# Patient Record
Sex: FEMALE | Race: WHITE | NOT HISPANIC OR LATINO
[De-identification: names, ages, dates, MRNs, and addresses within clinical notes are randomized per-mention and may not be internally consistent; named-entity substitution may affect disease eponyms.]

---

## 2018-04-15 ENCOUNTER — TRANSCRIPTION ENCOUNTER (OUTPATIENT)
Age: 73
End: 2018-04-15

## 2018-04-16 ENCOUNTER — INPATIENT (INPATIENT)
Facility: HOSPITAL | Age: 73
LOS: 2 days | Discharge: HOME | DRG: 853 | End: 2018-04-19
Attending: UROLOGY | Admitting: UROLOGY
Payer: MEDICARE

## 2018-04-16 VITALS
HEART RATE: 84 BPM | TEMPERATURE: 98 F | SYSTOLIC BLOOD PRESSURE: 129 MMHG | OXYGEN SATURATION: 98 % | HEIGHT: 63 IN | DIASTOLIC BLOOD PRESSURE: 70 MMHG | RESPIRATION RATE: 17 BRPM | WEIGHT: 220.02 LBS

## 2018-04-16 DIAGNOSIS — N20.0 CALCULUS OF KIDNEY: ICD-10-CM

## 2018-04-16 LAB
ALBUMIN SERPL ELPH-MCNC: 3.6 G/DL — SIGNIFICANT CHANGE UP (ref 3.3–5)
ALP SERPL-CCNC: 109 U/L — SIGNIFICANT CHANGE UP (ref 40–120)
ALT FLD-CCNC: 15 U/L RC — SIGNIFICANT CHANGE UP (ref 10–45)
ANION GAP SERPL CALC-SCNC: 18 MMOL/L — HIGH (ref 5–17)
APPEARANCE UR: ABNORMAL
APTT BLD: 27.3 SEC — LOW (ref 27.5–37.4)
AST SERPL-CCNC: 18 U/L — SIGNIFICANT CHANGE UP (ref 10–40)
BASOPHILS # BLD AUTO: 0 K/UL — SIGNIFICANT CHANGE UP (ref 0–0.2)
BILIRUB SERPL-MCNC: 1.4 MG/DL — HIGH (ref 0.2–1.2)
BILIRUB UR-MCNC: NEGATIVE — SIGNIFICANT CHANGE UP
BUN SERPL-MCNC: 28 MG/DL — HIGH (ref 7–23)
CALCIUM SERPL-MCNC: 9.9 MG/DL — SIGNIFICANT CHANGE UP (ref 8.4–10.5)
CHLORIDE SERPL-SCNC: 99 MMOL/L — SIGNIFICANT CHANGE UP (ref 96–108)
CO2 SERPL-SCNC: 22 MMOL/L — SIGNIFICANT CHANGE UP (ref 22–31)
COLOR SPEC: YELLOW — SIGNIFICANT CHANGE UP
CREAT SERPL-MCNC: 1.19 MG/DL — SIGNIFICANT CHANGE UP (ref 0.5–1.3)
DIFF PNL FLD: ABNORMAL
EOSINOPHIL # BLD AUTO: 0.1 K/UL — SIGNIFICANT CHANGE UP (ref 0–0.5)
GAS PNL BLDV: SIGNIFICANT CHANGE UP
GLUCOSE BLDC GLUCOMTR-MCNC: 133 MG/DL — HIGH (ref 70–99)
GLUCOSE SERPL-MCNC: 211 MG/DL — HIGH (ref 70–99)
GLUCOSE UR QL: NEGATIVE — SIGNIFICANT CHANGE UP
HCT VFR BLD CALC: 44.7 % — SIGNIFICANT CHANGE UP (ref 34.5–45)
HGB BLD-MCNC: 14.1 G/DL — SIGNIFICANT CHANGE UP (ref 11.5–15.5)
INR BLD: 1.3 RATIO — HIGH (ref 0.88–1.16)
KETONES UR-MCNC: NEGATIVE — SIGNIFICANT CHANGE UP
LEUKOCYTE ESTERASE UR-ACNC: ABNORMAL
LIDOCAIN IGE QN: 12 U/L — SIGNIFICANT CHANGE UP (ref 7–60)
LYMPHOCYTES # BLD AUTO: 0.8 K/UL — LOW (ref 1–3.3)
LYMPHOCYTES # BLD AUTO: 2 % — LOW (ref 13–44)
MCHC RBC-ENTMCNC: 27.9 PG — SIGNIFICANT CHANGE UP (ref 27–34)
MCHC RBC-ENTMCNC: 31.5 GM/DL — LOW (ref 32–36)
MCV RBC AUTO: 88.5 FL — SIGNIFICANT CHANGE UP (ref 80–100)
MONOCYTES # BLD AUTO: 0.4 K/UL — SIGNIFICANT CHANGE UP (ref 0–0.9)
MONOCYTES NFR BLD AUTO: 2 % — SIGNIFICANT CHANGE UP (ref 2–14)
NEUTROPHILS # BLD AUTO: 19.9 K/UL — HIGH (ref 1.8–7.4)
NEUTROPHILS NFR BLD AUTO: 76 % — SIGNIFICANT CHANGE UP (ref 43–77)
NITRITE UR-MCNC: NEGATIVE — SIGNIFICANT CHANGE UP
PH UR: 6.5 — SIGNIFICANT CHANGE UP (ref 5–8)
PLATELET # BLD AUTO: 191 K/UL — SIGNIFICANT CHANGE UP (ref 150–400)
POTASSIUM SERPL-MCNC: 4.3 MMOL/L — SIGNIFICANT CHANGE UP (ref 3.5–5.3)
POTASSIUM SERPL-SCNC: 4.3 MMOL/L — SIGNIFICANT CHANGE UP (ref 3.5–5.3)
PROT SERPL-MCNC: 7.4 G/DL — SIGNIFICANT CHANGE UP (ref 6–8.3)
PROT UR-MCNC: 100 MG/DL
PROTHROM AB SERPL-ACNC: 14.3 SEC — HIGH (ref 9.8–12.7)
RBC # BLD: 5.05 M/UL — SIGNIFICANT CHANGE UP (ref 3.8–5.2)
RBC # FLD: 15.1 % — HIGH (ref 10.3–14.5)
SODIUM SERPL-SCNC: 139 MMOL/L — SIGNIFICANT CHANGE UP (ref 135–145)
SP GR SPEC: >1.03 — HIGH (ref 1.01–1.02)
TROPONIN T SERPL-MCNC: <0.01 NG/ML — SIGNIFICANT CHANGE UP (ref 0–0.06)
UROBILINOGEN FLD QL: NEGATIVE — SIGNIFICANT CHANGE UP
WBC # BLD: 21.2 K/UL — HIGH (ref 3.8–10.5)
WBC # FLD AUTO: 21.2 K/UL — HIGH (ref 3.8–10.5)

## 2018-04-16 PROCEDURE — 93010 ELECTROCARDIOGRAM REPORT: CPT

## 2018-04-16 PROCEDURE — 76705 ECHO EXAM OF ABDOMEN: CPT | Mod: 26

## 2018-04-16 PROCEDURE — 52332 CYSTOSCOPY AND TREATMENT: CPT | Mod: LT

## 2018-04-16 PROCEDURE — 71045 X-RAY EXAM CHEST 1 VIEW: CPT | Mod: 26,59

## 2018-04-16 PROCEDURE — 74177 CT ABD & PELVIS W/CONTRAST: CPT | Mod: 26

## 2018-04-16 PROCEDURE — 99285 EMERGENCY DEPT VISIT HI MDM: CPT | Mod: 25

## 2018-04-16 PROCEDURE — 99291 CRITICAL CARE FIRST HOUR: CPT

## 2018-04-16 PROCEDURE — 71046 X-RAY EXAM CHEST 2 VIEWS: CPT | Mod: 26

## 2018-04-16 PROCEDURE — 74420 UROGRAPHY RTRGR +-KUB: CPT | Mod: 26

## 2018-04-16 RX ORDER — CHOLECALCIFEROL (VITAMIN D3) 125 MCG
2000 CAPSULE ORAL DAILY
Qty: 0 | Refills: 0 | Status: DISCONTINUED | OUTPATIENT
Start: 2018-04-16 | End: 2018-04-19

## 2018-04-16 RX ORDER — DEXTROSE 50 % IN WATER 50 %
1 SYRINGE (ML) INTRAVENOUS ONCE
Qty: 0 | Refills: 0 | Status: DISCONTINUED | OUTPATIENT
Start: 2018-04-16 | End: 2018-04-16

## 2018-04-16 RX ORDER — DEXTROSE 50 % IN WATER 50 %
12.5 SYRINGE (ML) INTRAVENOUS ONCE
Qty: 0 | Refills: 0 | Status: DISCONTINUED | OUTPATIENT
Start: 2018-04-16 | End: 2018-04-16

## 2018-04-16 RX ORDER — INSULIN LISPRO 100/ML
VIAL (ML) SUBCUTANEOUS EVERY 6 HOURS
Qty: 0 | Refills: 0 | Status: DISCONTINUED | OUTPATIENT
Start: 2018-04-16 | End: 2018-04-17

## 2018-04-16 RX ORDER — SODIUM CHLORIDE 9 MG/ML
1000 INJECTION INTRAMUSCULAR; INTRAVENOUS; SUBCUTANEOUS ONCE
Qty: 0 | Refills: 0 | Status: COMPLETED | OUTPATIENT
Start: 2018-04-16 | End: 2018-04-16

## 2018-04-16 RX ORDER — LOSARTAN POTASSIUM 100 MG/1
50 TABLET, FILM COATED ORAL DAILY
Qty: 0 | Refills: 0 | Status: DISCONTINUED | OUTPATIENT
Start: 2018-04-16 | End: 2018-04-16

## 2018-04-16 RX ORDER — DEXTROSE 50 % IN WATER 50 %
25 SYRINGE (ML) INTRAVENOUS ONCE
Qty: 0 | Refills: 0 | Status: DISCONTINUED | OUTPATIENT
Start: 2018-04-16 | End: 2018-04-16

## 2018-04-16 RX ORDER — SODIUM CHLORIDE 9 MG/ML
500 INJECTION, SOLUTION INTRAVENOUS ONCE
Qty: 0 | Refills: 0 | Status: COMPLETED | OUTPATIENT
Start: 2018-04-16 | End: 2018-04-16

## 2018-04-16 RX ORDER — ATORVASTATIN CALCIUM 80 MG/1
20 TABLET, FILM COATED ORAL AT BEDTIME
Qty: 0 | Refills: 0 | Status: DISCONTINUED | OUTPATIENT
Start: 2018-04-16 | End: 2018-04-19

## 2018-04-16 RX ORDER — SODIUM CHLORIDE 9 MG/ML
1000 INJECTION, SOLUTION INTRAVENOUS
Qty: 0 | Refills: 0 | Status: DISCONTINUED | OUTPATIENT
Start: 2018-04-16 | End: 2018-04-18

## 2018-04-16 RX ORDER — METRONIDAZOLE 500 MG
500 TABLET ORAL ONCE
Qty: 0 | Refills: 0 | Status: DISCONTINUED | OUTPATIENT
Start: 2018-04-16 | End: 2018-04-16

## 2018-04-16 RX ORDER — HEPARIN SODIUM 5000 [USP'U]/ML
5000 INJECTION INTRAVENOUS; SUBCUTANEOUS EVERY 8 HOURS
Qty: 0 | Refills: 0 | Status: DISCONTINUED | OUTPATIENT
Start: 2018-04-16 | End: 2018-04-19

## 2018-04-16 RX ORDER — SODIUM CHLORIDE 9 MG/ML
1000 INJECTION, SOLUTION INTRAVENOUS
Qty: 0 | Refills: 0 | Status: DISCONTINUED | OUTPATIENT
Start: 2018-04-16 | End: 2018-04-16

## 2018-04-16 RX ORDER — SENNA PLUS 8.6 MG/1
2 TABLET ORAL AT BEDTIME
Qty: 0 | Refills: 0 | Status: DISCONTINUED | OUTPATIENT
Start: 2018-04-16 | End: 2018-04-19

## 2018-04-16 RX ORDER — FAMOTIDINE 10 MG/ML
20 INJECTION INTRAVENOUS ONCE
Qty: 0 | Refills: 0 | Status: COMPLETED | OUTPATIENT
Start: 2018-04-16 | End: 2018-04-16

## 2018-04-16 RX ORDER — DOCUSATE SODIUM 100 MG
100 CAPSULE ORAL THREE TIMES A DAY
Qty: 0 | Refills: 0 | Status: DISCONTINUED | OUTPATIENT
Start: 2018-04-16 | End: 2018-04-19

## 2018-04-16 RX ORDER — GLUCAGON INJECTION, SOLUTION 0.5 MG/.1ML
1 INJECTION, SOLUTION SUBCUTANEOUS ONCE
Qty: 0 | Refills: 0 | Status: DISCONTINUED | OUTPATIENT
Start: 2018-04-16 | End: 2018-04-16

## 2018-04-16 RX ORDER — ONDANSETRON 8 MG/1
4 TABLET, FILM COATED ORAL ONCE
Qty: 0 | Refills: 0 | Status: COMPLETED | OUTPATIENT
Start: 2018-04-16 | End: 2018-04-16

## 2018-04-16 RX ORDER — ACETAMINOPHEN 500 MG
1000 TABLET ORAL ONCE
Qty: 0 | Refills: 0 | Status: COMPLETED | OUTPATIENT
Start: 2018-04-16 | End: 2018-04-16

## 2018-04-16 RX ORDER — OXYCODONE HYDROCHLORIDE 5 MG/1
10 TABLET ORAL ONCE
Qty: 0 | Refills: 0 | Status: DISCONTINUED | OUTPATIENT
Start: 2018-04-16 | End: 2018-04-16

## 2018-04-16 RX ORDER — MORPHINE SULFATE 50 MG/1
4 CAPSULE, EXTENDED RELEASE ORAL ONCE
Qty: 0 | Refills: 0 | Status: DISCONTINUED | OUTPATIENT
Start: 2018-04-16 | End: 2018-04-16

## 2018-04-16 RX ORDER — ATENOLOL 25 MG/1
50 TABLET ORAL DAILY
Qty: 0 | Refills: 0 | Status: DISCONTINUED | OUTPATIENT
Start: 2018-04-16 | End: 2018-04-16

## 2018-04-16 RX ORDER — CEFTRIAXONE 500 MG/1
1 INJECTION, POWDER, FOR SOLUTION INTRAMUSCULAR; INTRAVENOUS ONCE
Qty: 0 | Refills: 0 | Status: COMPLETED | OUTPATIENT
Start: 2018-04-16 | End: 2018-04-16

## 2018-04-16 RX ORDER — ASPIRIN/CALCIUM CARB/MAGNESIUM 324 MG
81 TABLET ORAL DAILY
Qty: 0 | Refills: 0 | Status: DISCONTINUED | OUTPATIENT
Start: 2018-04-16 | End: 2018-04-19

## 2018-04-16 RX ORDER — CEFTRIAXONE 500 MG/1
INJECTION, POWDER, FOR SOLUTION INTRAMUSCULAR; INTRAVENOUS
Qty: 0 | Refills: 0 | Status: DISCONTINUED | OUTPATIENT
Start: 2018-04-16 | End: 2018-04-19

## 2018-04-16 RX ORDER — CIPROFLOXACIN LACTATE 400MG/40ML
400 VIAL (ML) INTRAVENOUS ONCE
Qty: 0 | Refills: 0 | Status: DISCONTINUED | OUTPATIENT
Start: 2018-04-16 | End: 2018-04-16

## 2018-04-16 RX ORDER — METOCLOPRAMIDE HCL 10 MG
10 TABLET ORAL EVERY 6 HOURS
Qty: 0 | Refills: 0 | Status: DISCONTINUED | OUTPATIENT
Start: 2018-04-16 | End: 2018-04-16

## 2018-04-16 RX ORDER — ONDANSETRON 8 MG/1
4 TABLET, FILM COATED ORAL EVERY 6 HOURS
Qty: 0 | Refills: 0 | Status: DISCONTINUED | OUTPATIENT
Start: 2018-04-16 | End: 2018-04-16

## 2018-04-16 RX ORDER — ACETAMINOPHEN 500 MG
650 TABLET ORAL EVERY 6 HOURS
Qty: 0 | Refills: 0 | Status: DISCONTINUED | OUTPATIENT
Start: 2018-04-16 | End: 2018-04-19

## 2018-04-16 RX ORDER — SODIUM CHLORIDE 9 MG/ML
250 INJECTION, SOLUTION INTRAVENOUS ONCE
Qty: 0 | Refills: 0 | Status: COMPLETED | OUTPATIENT
Start: 2018-04-16 | End: 2018-04-16

## 2018-04-16 RX ORDER — INFLUENZA VIRUS VACCINE 15; 15; 15; 15 UG/.5ML; UG/.5ML; UG/.5ML; UG/.5ML
0.5 SUSPENSION INTRAMUSCULAR ONCE
Qty: 0 | Refills: 0 | Status: DISCONTINUED | OUTPATIENT
Start: 2018-04-16 | End: 2018-04-19

## 2018-04-16 RX ORDER — OXYCODONE HYDROCHLORIDE 5 MG/1
5 TABLET ORAL ONCE
Qty: 0 | Refills: 0 | Status: DISCONTINUED | OUTPATIENT
Start: 2018-04-16 | End: 2018-04-17

## 2018-04-16 RX ORDER — CEFTRIAXONE 500 MG/1
1 INJECTION, POWDER, FOR SOLUTION INTRAMUSCULAR; INTRAVENOUS EVERY 24 HOURS
Qty: 0 | Refills: 0 | Status: DISCONTINUED | OUTPATIENT
Start: 2018-04-17 | End: 2018-04-19

## 2018-04-16 RX ADMIN — SODIUM CHLORIDE 500 MILLILITER(S): 9 INJECTION, SOLUTION INTRAVENOUS at 19:55

## 2018-04-16 RX ADMIN — OXYCODONE HYDROCHLORIDE 10 MILLIGRAM(S): 5 TABLET ORAL at 14:41

## 2018-04-16 RX ADMIN — Medication 400 MILLIGRAM(S): at 10:27

## 2018-04-16 RX ADMIN — Medication 100 MILLIGRAM(S): at 22:23

## 2018-04-16 RX ADMIN — ATORVASTATIN CALCIUM 20 MILLIGRAM(S): 80 TABLET, FILM COATED ORAL at 22:23

## 2018-04-16 RX ADMIN — HEPARIN SODIUM 5000 UNIT(S): 5000 INJECTION INTRAVENOUS; SUBCUTANEOUS at 22:23

## 2018-04-16 RX ADMIN — SODIUM CHLORIDE 2000 MILLILITER(S): 9 INJECTION INTRAMUSCULAR; INTRAVENOUS; SUBCUTANEOUS at 10:27

## 2018-04-16 RX ADMIN — ONDANSETRON 4 MILLIGRAM(S): 8 TABLET, FILM COATED ORAL at 18:51

## 2018-04-16 RX ADMIN — CEFTRIAXONE 100 GRAM(S): 500 INJECTION, POWDER, FOR SOLUTION INTRAMUSCULAR; INTRAVENOUS at 15:41

## 2018-04-16 RX ADMIN — SODIUM CHLORIDE 125 MILLILITER(S): 9 INJECTION, SOLUTION INTRAVENOUS at 18:07

## 2018-04-16 RX ADMIN — FAMOTIDINE 20 MILLIGRAM(S): 10 INJECTION INTRAVENOUS at 10:28

## 2018-04-16 RX ADMIN — SODIUM CHLORIDE 1000 MILLILITER(S): 9 INJECTION, SOLUTION INTRAVENOUS at 19:08

## 2018-04-16 RX ADMIN — ONDANSETRON 4 MILLIGRAM(S): 8 TABLET, FILM COATED ORAL at 10:27

## 2018-04-16 RX ADMIN — SODIUM CHLORIDE 2000 MILLILITER(S): 9 INJECTION INTRAMUSCULAR; INTRAVENOUS; SUBCUTANEOUS at 14:36

## 2018-04-16 NOTE — ED ADULT NURSE NOTE - OBJECTIVE STATEMENT
Recvd pt with  at bedside with c/o suprapubic pain that radiates to left flank associated with n/v/d and fevers x 5 days.  per pt, her grandchildren had the same s/s and she cares for them.  pt is awake, alert and responsive to all stimuli.  no sob or respiratory distress noted at this time.  pt was medicated, per md's orders and is awaiting radiological studies, once po contrast is completed.  pt resting in bed with spouse at bedside.  will continue to monitor.

## 2018-04-16 NOTE — ED PROVIDER NOTE - ATTENDING CONTRIBUTION TO CARE
Patient presenting with nausea, vomiting and diarrhea x5 days associated with cramping abdominal pains (improved with bowel movements.  Has grandchildren who she helps take care of with similar symptoms prior to her.    On exam patient well appearing, vital signs within normal limits, DMM, RRR S1/S2, lungs clear to ascultation bilaterally, abdomen soft, non tender, non distended.    Symptoms most likely viral gastroenteritis given sick contacts, mildly dehydrated appearing on exam, plan for screening workup, intravenous fluids, symptomatic treatment, possible discharge if workup unremarkable and tolerating PO.

## 2018-04-16 NOTE — H&P ADULT - NSHPPHYSICALEXAM_GEN_ALL_CORE
GEN: NAD, mild SOB lying flat (patient states is baseline)  ABD: soft, NT/ND, obese, +mild left CVAT  : has not voided yet

## 2018-04-16 NOTE — ED PROVIDER NOTE - OBJECTIVE STATEMENT
72F w/PMH htn, hld, remote renal stent for nephrolithiasis now removed, PNA treated in Feb 2018 p/w abd pain, n/v/d x5d. +fever x2d last yesterday. +chills. Last vomiting yesterday (NBNB), last BM last night (loose, NB, no melena). +AGE in grandchildren ~2w ago. +dec UO. +sob. +mild gradual onset headache. No cp, back pain, cough, constipation, weakness/numbness, lightheadedness/syncope, dysuria/hematuria/frequency, travel/sick contacts. Referred in by Dr. Machuca.

## 2018-04-16 NOTE — H&P ADULT - NSHPLABSRESULTS_GEN_ALL_CORE
Lab Results:  CBC  CBC Full  -  ( 16 Apr 2018 10:39 )  WBC Count : 21.2 K/uL  Hemoglobin : 14.1 g/dL  Hematocrit : 44.7 %  Platelet Count - Automated : 191 K/uL  Mean Cell Volume : 88.5 fl  Mean Cell Hemoglobin : 27.9 pg  Mean Cell Hemoglobin Concentration : 31.5 gm/dL  Auto Neutrophil # : 19.9 K/uL  Auto Lymphocyte # : 0.8 K/uL  Auto Monocyte # : 0.4 K/uL  Auto Eosinophil # : 0.1 K/uL  Auto Basophil # : 0.0 K/uL  Auto Neutrophil % : 76.0 %  Auto Lymphocyte % : 2.0 %  Auto Monocyte % : 2.0 %  Auto Eosinophil % : x  Auto Basophil % : x    .		Differential:	[] Automated		[] Manual  Chemistry  04-16    139  |  99  |  28<H>  ----------------------------<  211<H>  4.3   |  22  |  1.19    Ca    9.9      16 Apr 2018 10:39    TPro  7.4  /  Alb  3.6  /  TBili  1.4<H>  /  DBili  x   /  AST  18  /  ALT  15  /  AlkPhos  109  04-16    LIVER FUNCTIONS - ( 16 Apr 2018 10:39 )  Alb: 3.6 g/dL / Pro: 7.4 g/dL / ALK PHOS: 109 U/L / ALT: 15 U/L RC / AST: 18 U/L / GGT: x           PT/INR - ( 16 Apr 2018 10:39 )   PT: 14.3 sec;   INR: 1.30 ratio         PTT - ( 16 Apr 2018 10:39 )  PTT:27.3 sec      MICROBIOLOGY/CULTURES:      RADIOLOGY RESULTS:  CT: 9mm distal left ureteral stone with moderate hydro and stranding

## 2018-04-16 NOTE — ED PROVIDER NOTE - MEDICAL DECISION MAKING DETAILS
[place]: 72F w/htn, hld p/w abd pain, n/v/d, sob. R/o diverticulitis/colitis, pancreatitis, PNA, ACS, UTI. +dehydration. Labs, EKG, CXR, CT A/P, analgesia, IVF.

## 2018-04-16 NOTE — CONSULT NOTE ADULT - ATTENDING COMMENTS
Patient seen and examined in SICU on 4/16/18 and managed overnight.  Admitted with left uretal obstruction and urosepsis.  S/P urgent placement of left uretal stent.  Postoperatively with signs / symptoms of septic shock.  Brought to SICU for active resuscitation  Actively resuscitated with fluids, SBP borderline but did not need vasopressors.  On empiric antibiotics, cultures pending  No signs of myocardial ischemia, troponins negative.    - 35 minutes direct critical care on 4/16/18

## 2018-04-16 NOTE — CONSULT NOTE ADULT - SUBJECTIVE AND OBJECTIVE BOX
SICU Consult Note    HISTORY OF PRESENT ILLNESS:  72F PMHx DM, HTN, HLD, who presented to [facility] ED the morning of 4/16/18 complaining of L flank pain and fevers. Reports has had pain for ~3 days, non-radiating. Baseline chronic abdominal pain from GI "issues" which have been about the same. New flank pain is associated with nausea and vomiting. Measured Tmax of 102.2F at home; reports chills. Denies hematuria, frequency, dysuria. Known history of kidney stones ~8 years ago, does not recall name of urologist; has not seen urologist since. Recently treated for pneumonia and flu, now resolved.     In ED VS: T36.8, P84, /70, RR17, SpO2 98% on RA. Labs notable for WBC21, venous lactate 4.8, Cr 1.19, Tbili 1.4. Bedside US showing cholelithiasis w/out cholecystitis. CT showing L 9mm distal stone in L distal ureter w/ moderate left hydroureteronephrosis and perinephric stranding. In ED patient received 2L IVF, cipro/flagyl/ceftriaxone.     Urology took patient to OR for cystoscopy & L ureteral stent placement.     PAST MEDICAL HISTORY: Nephrolithiasis  Hyperlipidemia  Diabetes type 2, controlled  Hypertension      PAST SURGICAL HISTORY: prior stent by urology    FAMILY HISTORY: n/a    SOCIAL HISTORY: .     CODE STATUS: FULL    HOME MEDICATIONS:  Home Medications:  Aspir 81 oral delayed release tablet: 1 tab(s) orally once a day (16 Apr 2018 13:44)  atenolol 50 mg oral tablet: 1 tab(s) orally once a day (16 Apr 2018 13:44)  atorvastatin 20 mg oral tablet: 1 tab(s) orally once a day (16 Apr 2018 13:44)  losartan 50 mg oral tablet: 1 tab(s) orally once a day (16 Apr 2018 13:44)  metFORMIN 500 mg oral tablet, extended release: 1 tab(s) orally once a day (16 Apr 2018 13:44)  Vitamin D3 2000 intl units oral tablet: 1 tab(s) orally once a day (16 Apr 2018 13:44)      ALLERGIES: No Known Allergies      VITAL SIGNS:  ICU Vital Signs Last 24 Hrs  T(C): 36.9 (16 Apr 2018 21:38), Max: 37.6 (16 Apr 2018 17:45)  T(F): 98.4 (16 Apr 2018 21:38), Max: 99.7 (16 Apr 2018 17:45)  HR: 89 (16 Apr 2018 21:38) (79 - 91)  BP: 112/55 (16 Apr 2018 21:38) (82/51 - 129/70)  BP(mean): 79 (16 Apr 2018 21:38) (60 - 79)  ABP: --  ABP(mean): --  RR: 38 (16 Apr 2018 21:38) (14 - 38)  SpO2: 92% (16 Apr 2018 21:38) (92% - 98%)      NEURO  Exam:  Meds:acetaminophen   Tablet. 650 milliGRAM(s) Oral every 6 hours PRN Mild Pain (1 - 3) or fever > 100.4  metoclopramide Injectable 10 milliGRAM(s) IV Push every 6 hours PRN Nausea and/or Vomiting  ondansetron Injectable 4 milliGRAM(s) IV Push every 6 hours PRN Nausea  oxyCODONE    IR 5 milliGRAM(s) Oral Once PRN Moderate Pain (4 - 6)      RESPIRATORY  Mechanical Ventilation:     Exam:  Meds:    CARDIOVASCULAR  VBG - ( 16 Apr 2018 10:39 )  pH: 7.35  /  pCO2: 45    /  pO2: 22    / HCO3: 24    / Base Excess: -0.9  /  SaO2: 31     Lactate: 4.8              Exam:  Cardiac Rhythm:  Meds:    GI/NUTRITION  Exam:  Diet:  Meds:docusate sodium 100 milliGRAM(s) Oral three times a day  senna 2 Tablet(s) Oral at bedtime PRN Constipation      GENITOURINARY/RENAL  Meds:cholecalciferol 2000 Unit(s) Oral daily  lactated ringers. 1000 milliLiter(s) IV Continuous <Continuous>      04-16 @ 07:01  -  04-16 @ 21:45  --------------------------------------------------------  IN:    IV PiggyBack: 250 mL    lactated ringers.: 375 mL    Sodium Chloride 0.9% IV Bolus: 500 mL  Total IN: 1125 mL    OUT:    Indwelling Catheter - Urethral: 100 mL    Voided: 50 mL  Total OUT: 150 mL    Total NET: 975 mL        Weight (kg): 99.8 (04-16 @ 09:32)  04-16    139  |  99  |  28<H>  ----------------------------<  211<H>  4.3   |  22  |  1.19    Ca    9.9      16 Apr 2018 10:39    TPro  7.4  /  Alb  3.6  /  TBili  1.4<H>  /  DBili  x   /  AST  18  /  ALT  15  /  AlkPhos  109  04-16    [ ] Madden catheter, indication: urine output monitoring in critically ill patient    HEMATOLOGIC  [ ] VTE Prophylaxis:  aspirin enteric coated 81 milliGRAM(s) Oral daily  heparin  Injectable 5000 Unit(s) SubCutaneous every 8 hours                          14.1   21.2  )-----------( 191      ( 16 Apr 2018 10:39 )             44.7     PT/INR - ( 16 Apr 2018 10:39 )   PT: 14.3 sec;   INR: 1.30 ratio         PTT - ( 16 Apr 2018 10:39 )  PTT:27.3 sec  Transfusion: [ ] PRBC	[ ] Platelets	[ ] FFP	[ ] Cryoprecipitate      INFECTIOUS DISEASES  Meds:cefTRIAXone   IVPB        RECENT CULTURES:      ENDOCRINE  Meds:atorvastatin 20 milliGRAM(s) Oral at bedtime  insulin lispro (HumaLOG) corrective regimen sliding scale   SubCutaneous every 6 hours    CAPILLARY BLOOD GLUCOSE      POCT Blood Glucose.: 133 mg/dL (16 Apr 2018 18:05)      PATIENT CARE ACCESS DEVICES:  [ ] Peripheral IV  [ ] Central Venous Line	[ ] R	[ ] L	[ ] IJ	[ ] Fem	[ ] SC	Placed:   [ ] Arterial Line		[ ] R	[ ] L	[ ] Fem	[ ] Rad	[ ] Ax	Placed:   [ ] PICC:					[ ] Mediport  [ ] Urinary Catheter, Date Placed:   [x] Necessity of urinary, arterial, and venous catheters discussed    OTHER MEDICATIONS:     IMAGING STUDIES: SICU Consult Note    HISTORY OF PRESENT ILLNESS:  72F PMHx DM, HTN, HLD, who presented to [facility] ED the morning of 4/16/18 complaining of L flank pain and fevers. Reports has had pain for ~3 days, non-radiating. Baseline chronic abdominal pain from GI "issues" which have been about the same. New flank pain is associated with nausea and vomiting. Measured Tmax of 102.2F at home; reports chills. Denies hematuria, frequency, dysuria. Known history of kidney stones ~8 years ago, does not recall name of urologist; has not seen urologist since. Recently treated for pneumonia and flu, now resolved.     In ED VS: T36.8, P84, /70, RR17, SpO2 98% on RA. Labs notable for WBC21, venous lactate 4.8, Cr 1.19, Tbili 1.4. Bedside US showing cholelithiasis w/out cholecystitis. CT showing L 9mm distal stone in L distal ureter w/ moderate left hydroureteronephrosis and perinephric stranding. In ED patient received 2L IVF, cipro/flagyl/ceftriaxone.     Urology took patient to OR emergently the afternoon of 4/16/18.  Procedure: cystoscopy & L 8x24 double J ureteral stent placement.   Anesthesia time: 59min  Airway: Mallampati 3; Easy, grade 1; Mac3, 7.0ETT 20cm   EBL: minimal  IVF: 600mL    In PACU, patient hypotensive ([id number]). Reportedly given 2L IVF in PACU (only ordered for 250 & 500 bolus per EMR), but remained hypotensive. SICU consulted given hypotension in patient w/ urosepsis for ongoing monitoring.       PAST MEDICAL HISTORY: Nephrolithiasis  Hyperlipidemia  Diabetes type 2, controlled  Hypertension      PAST SURGICAL HISTORY: prior stent by urology    FAMILY HISTORY: n/a    SOCIAL HISTORY: .     CODE STATUS: FULL    HOME MEDICATIONS:  Aspir 81 oral delayed release tablet: 1 tab(s) orally once a day (16 Apr 2018 13:44)  atenolol 50 mg oral tablet: 1 tab(s) orally once a day (16 Apr 2018 13:44)  atorvastatin 20 mg oral tablet: 1 tab(s) orally once a day (16 Apr 2018 13:44)  losartan 50 mg oral tablet: 1 tab(s) orally once a day (16 Apr 2018 13:44)  metFORMIN 500 mg oral tablet, extended release: 1 tab(s) orally once a day (16 Apr 2018 13:44)  Vitamin D3 2000 intl units oral tablet: 1 tab(s) orally once a day (16 Apr 2018 13:44)      ALLERGIES: No Known Allergies      VITAL SIGNS:  ICU Vital Signs Last 24 Hrs  T(C): 36.9 (16 Apr 2018 21:38), Max: 37.6 (16 Apr 2018 17:45)  T(F): 98.4 (16 Apr 2018 21:38), Max: 99.7 (16 Apr 2018 17:45)  HR: 89 (16 Apr 2018 21:38) (79 - 91)  BP: 112/55 (16 Apr 2018 21:38) (82/51 - 129/70)  BP(mean): 79 (16 Apr 2018 21:38) (60 - 79)  ABP: --  ABP(mean): --  RR: 38 (16 Apr 2018 21:38) (14 - 38)  SpO2: 92% (16 Apr 2018 21:38) (92% - 98%)      NEURO  Exam: A&Ox4; no focal deficits  Meds:acetaminophen   Tablet. 650 milliGRAM(s) Oral every 6 hours PRN Mild Pain (1 - 3) or fever > 100.4  metoclopramide Injectable 10 milliGRAM(s) IV Push every 6 hours PRN Nausea and/or Vomiting  ondansetron Injectable 4 milliGRAM(s) IV Push every 6 hours PRN Nausea  oxyCODONE    IR 5 milliGRAM(s) Oral Once PRN Moderate Pain (4 - 6)      RESPIRATORY  Mechanical Ventilation: x    Exam: breathing comfortably on NC  Meds: none        CARDIOVASCULAR  VBG - ( 16 Apr 2018 10:39 )  pH: 7.35  /  pCO2: 45    /  pO2: 22    / HCO3: 24    / Base Excess: -0.9  /  SaO2: 31     Lactate: 4.8      Exam: regular  Cardiac Rhythm: sinus  Meds: none    GI/NUTRITION  Exam: soft, NT/ND  Diet: NPO except Rx  Meds:docusate sodium 100 milliGRAM(s) Oral three times a day  senna 2 Tablet(s) Oral at bedtime PRN Constipation      GENITOURINARY/RENAL  Meds:cholecalciferol 2000 Unit(s) Oral daily  lactated ringers. 1000 milliLiter(s) IV Continuous <Continuous>      04-16 @ 07:01  -  04-16 @ 21:45  --------------------------------------------------------  IN:    IV PiggyBack: 250 mL    lactated ringers.: 375 mL    Sodium Chloride 0.9% IV Bolus: 500 mL  Total IN: 1125 mL    OUT:    Indwelling Catheter - Urethral: 100 mL    Voided: 50 mL  Total OUT: 150 mL    Total NET: 975 mL        Weight (kg): 99.8 (04-16 @ 09:32)  04-16    139  |  99  |  28<H>  ----------------------------<  211<H>  4.3   |  22  |  1.19    Ca    9.9      16 Apr 2018 10:39    TPro  7.4  /  Alb  3.6  /  TBili  1.4<H>  /  DBili  x   /  AST  18  /  ALT  15  /  AlkPhos  109  04-16    [x ] Madden catheter, indication: urine output monitoring in critically ill patient    HEMATOLOGIC  [x ] VTE Prophylaxis: SCD's & SQHq8  aspirin enteric coated 81 milliGRAM(s) Oral daily  heparin  Injectable 5000 Unit(s) SubCutaneous every 8 hours                          14.1   21.2  )-----------( 191      ( 16 Apr 2018 10:39 )             44.7     PT/INR - ( 16 Apr 2018 10:39 )   PT: 14.3 sec;   INR: 1.30 ratio    PTT - ( 16 Apr 2018 10:39 )  PTT:27.3 sec    Transfusion: none/24hr [ ] PRBC	[ ] Platelets	[ ] FFP	[ ] Cryoprecipitate      INFECTIOUS DISEASES  Meds:cefTRIAXone   IVPB        RECENT CULTURES: pending  -BCx  -UCx  -OR Cx      ENDOCRINE  Meds:atorvastatin 20 milliGRAM(s) Oral at bedtime  insulin lispro (HumaLOG) corrective regimen sliding scale   SubCutaneous every 6 hours    CAPILLARY BLOOD GLUCOSE      POCT Blood Glucose.: 133 mg/dL (16 Apr 2018 18:05)      PATIENT CARE ACCESS DEVICES:  [x ] Peripheral IV  [ ] Central Venous Line	[ ] R	[ ] L	[ ] IJ	[ ] Fem	[ ] SC	Placed:   [ ] Arterial Line		[ ] R	[ ] L	[ ] Fem	[ ] Rad	[ ] Ax	Placed:   [ ] PICC:					[ ] Mediport  [x] Urinary Catheter, Date Placed: 4/16/18  [x] Necessity of urinary, arterial, and venous catheters discussed    OTHER MEDICATIONS: x    IMAGING STUDIES:

## 2018-04-16 NOTE — CONSULT NOTE ADULT - ASSESSMENT
72F PMHx DM, HTN, HLD presenting w/ L flank pain, leukocytosis, elevated lactate found to have 9mm obstructing stone of L distal ureter, now s/p cystoscopy & stent placement by urology. Concern for ongoing urosepsis given hypotension despite IVF.    Neuro: pain  Tylenol PRN  -oxycodone PRN    Resp: post-op atelectasis  -IS  -OOB    CV: septic shock  -telemetry  -aggressive IVF & bolus w/ additional as necessary  -will start vasopressors if necessary  -hold home losartan & atenolol given hypotension  -con't home ASA & atorvastatin    GI: constipation  -NPO   -bowel regimen: senna/colace    : hydronephrosis & urosepsis 2/2 obstructing L ureter stone, s/p cystoscopy & stent placement  -abx  -con't rowley, strict I&O  -IVF: LR@150/h    Heme: DVTppx  -con't home ASA  -SQHq8    Endo: DM  -Hgb A1C w/ am labs  -hold home metformin  -low dose lispro ISS  -con't home cholecalciferol      ID: urosepsis  -f/u BCx, UCx, OR Cx  -daily BCx until clears  -con't ceftriaxone    Lines:  -rowley (4/16/18)  -PIV    Dispo:  -con't SICU care  -FULL CODE    Please contact SICU PA/resident [phone] or [phone] with questions/concerns.

## 2018-04-16 NOTE — H&P ADULT - ATTENDING COMMENTS
Patient seen and examined, images and labs reviewed.   Fever, obstructing stone in distal left ureter, elevated serum lactate and WBC. Emergent cystoscopy and ureteral stent insertion. R/b/a reviewed with patient and family. Will proceed.

## 2018-04-16 NOTE — H&P ADULT - ASSESSMENT
73 yo female with septic stone, 9mm left distal, fever 102 at home  -admit to urology  - urine and blood cultures  - ceftriaxone  - NPO  - IVF  - pain control  - add on for emergent left ureteral stent placement.

## 2018-04-16 NOTE — PROGRESS NOTE ADULT - SUBJECTIVE AND OBJECTIVE BOX
Post op Check    Pt seen and examined patient with persistent hypotension despite 2L bolus. Pt is not tachycardic, comfortable appearing, alert. Rowley placed, with purulent output.    Vital Signs Last 24 Hrs  T(C): 37.6 (16 Apr 2018 20:00), Max: 37.6 (16 Apr 2018 17:45)  T(F): 99.7 (16 Apr 2018 20:00), Max: 99.7 (16 Apr 2018 17:45)  HR: 86 (16 Apr 2018 20:30) (79 - 91)  BP: 94/51 (16 Apr 2018 20:30) (82/51 - 129/70)  BP(mean): 67 (16 Apr 2018 20:30) (60 - 76)  RR: 14 (16 Apr 2018 20:30) (14 - 18)  SpO2: 95% (16 Apr 2018 20:30) (92% - 98%)    I&O's Summary    16 Apr 2018 07:01  -  16 Apr 2018 20:55  --------------------------------------------------------  IN: 1125 mL / OUT: 150 mL / NET: 975 mL        Physical Exam  Gen: NAD, A&Ox3  Pulm: No respiratory distress, no subcostal retractions  CV: RRR, no JVD  Abd: Soft, NT, ND  : Rowley in place, dark yellow urine                           14.1   21.2  )-----------( 191      ( 16 Apr 2018 10:39 )             44.7       04-16    139  |  99  |  28<H>  ----------------------------<  211<H>  4.3   |  22  |  1.19    Ca    9.9      16 Apr 2018 10:39    TPro  7.4  /  Alb  3.6  /  TBili  1.4<H>  /  DBili  x   /  AST  18  /  ALT  15  /  AlkPhos  109  04-16      A/P: 72F hx DM s/p L ureteral stent for 9mm stone, now with persistant hypotension, fever intra op  - SICU consult placed, will accept for monitoring, BP support  - c/w abx  - monitor lactate, WBC  - c/w rowley  - f/u BCx, UCx  - Dr. Charles notified

## 2018-04-16 NOTE — CHART NOTE - NSCHARTNOTEFT_GEN_A_CORE
Called to beside for persistent hypotension despite 2L bolus. Pt is not tachycardic, comfortable appearing, alert. Rowley placed, with purulent output.    ICU Vital Signs Last 24 Hrs  T(C): 37.6 (16 Apr 2018 20:00), Max: 37.6 (16 Apr 2018 17:45)  T(F): 99.7 (16 Apr 2018 20:00), Max: 99.7 (16 Apr 2018 17:45)  HR: 89 (16 Apr 2018 20:00) (79 - 91)  BP: 92/51 (16 Apr 2018 20:00) (82/51 - 129/70)  BP(mean): 66 (16 Apr 2018 20:00) (60 - 76)  ABP: --  ABP(mean): --  RR: 17 (16 Apr 2018 20:00) (14 - 18)  SpO2: 97% (16 Apr 2018 20:00) (92% - 98%)    72F hx DM s/p L ureteral stent for 9mm stone, now w/persistent hypotension, fever intra op  -- SICU consult placed, will accept for monitoring, BP support  -- c/w abx  -- monitor lactate, WBC  -- c/w rowley  -- f/u BCx, UCx  -- Dr. Charles aware

## 2018-04-17 LAB
ALBUMIN SERPL ELPH-MCNC: 2.7 G/DL — LOW (ref 3.3–5)
ALP SERPL-CCNC: 100 U/L — SIGNIFICANT CHANGE UP (ref 40–120)
ALT FLD-CCNC: 14 U/L RC — SIGNIFICANT CHANGE UP (ref 10–45)
ANION GAP SERPL CALC-SCNC: 16 MMOL/L — SIGNIFICANT CHANGE UP (ref 5–17)
APTT BLD: 25.5 SEC — LOW (ref 27.5–37.4)
AST SERPL-CCNC: 24 U/L — SIGNIFICANT CHANGE UP (ref 10–40)
BILIRUB DIRECT SERPL-MCNC: 0.3 MG/DL — HIGH (ref 0–0.2)
BILIRUB INDIRECT FLD-MCNC: 0.4 MG/DL — SIGNIFICANT CHANGE UP (ref 0.2–1)
BILIRUB SERPL-MCNC: 0.7 MG/DL — SIGNIFICANT CHANGE UP (ref 0.2–1.2)
BUN SERPL-MCNC: 21 MG/DL — SIGNIFICANT CHANGE UP (ref 7–23)
CALCIUM SERPL-MCNC: 8.6 MG/DL — SIGNIFICANT CHANGE UP (ref 8.4–10.5)
CHLORIDE SERPL-SCNC: 102 MMOL/L — SIGNIFICANT CHANGE UP (ref 96–108)
CK MB BLD-MCNC: 1.9 % — SIGNIFICANT CHANGE UP (ref 0–3.5)
CK MB BLD-MCNC: 2 % — SIGNIFICANT CHANGE UP (ref 0–3.5)
CK MB CFR SERPL CALC: 2 NG/ML — SIGNIFICANT CHANGE UP (ref 0–3.8)
CK MB CFR SERPL CALC: 2.1 NG/ML — SIGNIFICANT CHANGE UP (ref 0–3.8)
CK SERPL-CCNC: 105 U/L — SIGNIFICANT CHANGE UP (ref 25–170)
CK SERPL-CCNC: 107 U/L — SIGNIFICANT CHANGE UP (ref 25–170)
CO2 SERPL-SCNC: 19 MMOL/L — LOW (ref 22–31)
CREAT SERPL-MCNC: 0.86 MG/DL — SIGNIFICANT CHANGE UP (ref 0.5–1.3)
GAS PNL BLDA: SIGNIFICANT CHANGE UP
GLUCOSE BLDC GLUCOMTR-MCNC: 121 MG/DL — HIGH (ref 70–99)
GLUCOSE BLDC GLUCOMTR-MCNC: 133 MG/DL — HIGH (ref 70–99)
GLUCOSE BLDC GLUCOMTR-MCNC: 133 MG/DL — HIGH (ref 70–99)
GLUCOSE BLDC GLUCOMTR-MCNC: 93 MG/DL — SIGNIFICANT CHANGE UP (ref 70–99)
GLUCOSE SERPL-MCNC: 133 MG/DL — HIGH (ref 70–99)
HBA1C BLD-MCNC: 7.4 % — HIGH (ref 4–5.6)
HCT VFR BLD CALC: 37.6 % — SIGNIFICANT CHANGE UP (ref 34.5–45)
HGB BLD-MCNC: 12.2 G/DL — SIGNIFICANT CHANGE UP (ref 11.5–15.5)
INR BLD: 1.31 RATIO — HIGH (ref 0.88–1.16)
MAGNESIUM SERPL-MCNC: 1.5 MG/DL — LOW (ref 1.6–2.6)
MCHC RBC-ENTMCNC: 28.6 PG — SIGNIFICANT CHANGE UP (ref 27–34)
MCHC RBC-ENTMCNC: 32.5 GM/DL — SIGNIFICANT CHANGE UP (ref 32–36)
MCV RBC AUTO: 88 FL — SIGNIFICANT CHANGE UP (ref 80–100)
PHOSPHATE SERPL-MCNC: 1.9 MG/DL — LOW (ref 2.5–4.5)
PLATELET # BLD AUTO: 122 K/UL — LOW (ref 150–400)
POTASSIUM SERPL-MCNC: 4.2 MMOL/L — SIGNIFICANT CHANGE UP (ref 3.5–5.3)
POTASSIUM SERPL-SCNC: 4.2 MMOL/L — SIGNIFICANT CHANGE UP (ref 3.5–5.3)
PROCALCITONIN SERPL-MCNC: 9.64 NG/ML — HIGH (ref 0–0.04)
PROT SERPL-MCNC: 6.1 G/DL — SIGNIFICANT CHANGE UP (ref 6–8.3)
PROTHROM AB SERPL-ACNC: 14.4 SEC — HIGH (ref 9.8–12.7)
RBC # BLD: 4.28 M/UL — SIGNIFICANT CHANGE UP (ref 3.8–5.2)
RBC # FLD: 15.2 % — HIGH (ref 10.3–14.5)
SODIUM SERPL-SCNC: 137 MMOL/L — SIGNIFICANT CHANGE UP (ref 135–145)
TROPONIN T SERPL-MCNC: <0.01 NG/ML — SIGNIFICANT CHANGE UP (ref 0–0.06)
TROPONIN T SERPL-MCNC: <0.01 NG/ML — SIGNIFICANT CHANGE UP (ref 0–0.06)
WBC # BLD: 14.9 K/UL — HIGH (ref 3.8–10.5)
WBC # FLD AUTO: 14.9 K/UL — HIGH (ref 3.8–10.5)

## 2018-04-17 PROCEDURE — 99233 SBSQ HOSP IP/OBS HIGH 50: CPT

## 2018-04-17 RX ORDER — DEXTROSE 50 % IN WATER 50 %
12.5 SYRINGE (ML) INTRAVENOUS ONCE
Qty: 0 | Refills: 0 | Status: DISCONTINUED | OUTPATIENT
Start: 2018-04-17 | End: 2018-04-19

## 2018-04-17 RX ORDER — MAGNESIUM SULFATE 500 MG/ML
2 VIAL (ML) INJECTION ONCE
Qty: 0 | Refills: 0 | Status: COMPLETED | OUTPATIENT
Start: 2018-04-17 | End: 2018-04-17

## 2018-04-17 RX ORDER — DEXTROSE 50 % IN WATER 50 %
25 SYRINGE (ML) INTRAVENOUS ONCE
Qty: 0 | Refills: 0 | Status: DISCONTINUED | OUTPATIENT
Start: 2018-04-17 | End: 2018-04-19

## 2018-04-17 RX ORDER — DEXTROSE 50 % IN WATER 50 %
1 SYRINGE (ML) INTRAVENOUS ONCE
Qty: 0 | Refills: 0 | Status: DISCONTINUED | OUTPATIENT
Start: 2018-04-17 | End: 2018-04-19

## 2018-04-17 RX ORDER — INSULIN LISPRO 100/ML
VIAL (ML) SUBCUTANEOUS AT BEDTIME
Qty: 0 | Refills: 0 | Status: DISCONTINUED | OUTPATIENT
Start: 2018-04-17 | End: 2018-04-19

## 2018-04-17 RX ORDER — GLUCAGON INJECTION, SOLUTION 0.5 MG/.1ML
1 INJECTION, SOLUTION SUBCUTANEOUS ONCE
Qty: 0 | Refills: 0 | Status: DISCONTINUED | OUTPATIENT
Start: 2018-04-17 | End: 2018-04-19

## 2018-04-17 RX ORDER — SODIUM CHLORIDE 9 MG/ML
1000 INJECTION, SOLUTION INTRAVENOUS ONCE
Qty: 0 | Refills: 0 | Status: COMPLETED | OUTPATIENT
Start: 2018-04-17 | End: 2018-04-17

## 2018-04-17 RX ORDER — INSULIN LISPRO 100/ML
VIAL (ML) SUBCUTANEOUS
Qty: 0 | Refills: 0 | Status: DISCONTINUED | OUTPATIENT
Start: 2018-04-17 | End: 2018-04-19

## 2018-04-17 RX ORDER — FUROSEMIDE 40 MG
20 TABLET ORAL ONCE
Qty: 0 | Refills: 0 | Status: COMPLETED | OUTPATIENT
Start: 2018-04-17 | End: 2018-04-17

## 2018-04-17 RX ORDER — SODIUM CHLORIDE 9 MG/ML
1000 INJECTION, SOLUTION INTRAVENOUS
Qty: 0 | Refills: 0 | Status: DISCONTINUED | OUTPATIENT
Start: 2018-04-17 | End: 2018-04-19

## 2018-04-17 RX ADMIN — SODIUM CHLORIDE 3000 MILLILITER(S): 9 INJECTION, SOLUTION INTRAVENOUS at 04:00

## 2018-04-17 RX ADMIN — Medication 100 MILLIGRAM(S): at 21:27

## 2018-04-17 RX ADMIN — Medication 100 MILLIGRAM(S): at 13:00

## 2018-04-17 RX ADMIN — Medication 650 MILLIGRAM(S): at 00:01

## 2018-04-17 RX ADMIN — Medication 62.5 MILLIMOLE(S): at 02:05

## 2018-04-17 RX ADMIN — OXYCODONE HYDROCHLORIDE 5 MILLIGRAM(S): 5 TABLET ORAL at 11:28

## 2018-04-17 RX ADMIN — Medication 20 MILLIGRAM(S): at 18:14

## 2018-04-17 RX ADMIN — HEPARIN SODIUM 5000 UNIT(S): 5000 INJECTION INTRAVENOUS; SUBCUTANEOUS at 13:00

## 2018-04-17 RX ADMIN — SODIUM CHLORIDE 150 MILLILITER(S): 9 INJECTION, SOLUTION INTRAVENOUS at 05:21

## 2018-04-17 RX ADMIN — ATORVASTATIN CALCIUM 20 MILLIGRAM(S): 80 TABLET, FILM COATED ORAL at 21:27

## 2018-04-17 RX ADMIN — Medication 81 MILLIGRAM(S): at 12:52

## 2018-04-17 RX ADMIN — Medication 650 MILLIGRAM(S): at 00:31

## 2018-04-17 RX ADMIN — CEFTRIAXONE 100 GRAM(S): 500 INJECTION, POWDER, FOR SOLUTION INTRAMUSCULAR; INTRAVENOUS at 12:52

## 2018-04-17 RX ADMIN — OXYCODONE HYDROCHLORIDE 5 MILLIGRAM(S): 5 TABLET ORAL at 11:58

## 2018-04-17 RX ADMIN — HEPARIN SODIUM 5000 UNIT(S): 5000 INJECTION INTRAVENOUS; SUBCUTANEOUS at 05:21

## 2018-04-17 RX ADMIN — Medication 2000 UNIT(S): at 12:52

## 2018-04-17 RX ADMIN — Medication 50 GRAM(S): at 02:05

## 2018-04-17 RX ADMIN — Medication 100 MILLIGRAM(S): at 05:21

## 2018-04-17 RX ADMIN — Medication 650 MILLIGRAM(S): at 16:07

## 2018-04-17 RX ADMIN — HEPARIN SODIUM 5000 UNIT(S): 5000 INJECTION INTRAVENOUS; SUBCUTANEOUS at 21:27

## 2018-04-17 NOTE — PROGRESS NOTE ADULT - ASSESSMENT
72F PMHx DM, HTN, HLD presenting w/ L flank pain, leukocytosis, elevated lactate found to have 9mm obstructing stone of L distal ureter, now s/p cystoscopy & stent placement by urology. Concern for ongoing urosepsis given hypotension despite IVF.    Neuro: pain  Tylenol PRN  -oxycodone PRN    Resp: post-op atelectasis  -IS  -OOB    CV: septic shock  -telemetry  -aggressive IVF & bolus w/ additional as necessary  -will start vasopressors if necessary  -hold home losartan & atenolol given hypotension  -con't home ASA & atorvastatin    GI: constipation  -NPO; will advance diet this am if remains hemodynamically normal, not requiring vasopressors.   -bowel regimen: senna/colace    : hydronephrosis & urosepsis 2/2 obstructing L ureter stone, s/p cystoscopy & stent placement  -abx  -con't rowley, strict I&O  -IVF: LR@150/h    Heme: DVTppx  -SQHq8  -ASA    Endo: DM  -Hgb A1C w/ am labs  -hold home metformin  -low dose lispro ISS  -con't home cholecalciferol    ID: urosepsis  -f/u BCx, UCx, OR Cx  -daily BCx until clears  -con't ceftriaxone    Lines:  -rowley (4/16/18)  -PIV    Dispo:  -con't SICU care  -FULL CODE    Please contact SICU PA/resident [phone] or [phone] with questions/concerns. 72F PMHx DM, HTN, HLD presenting w/ L flank pain, leukocytosis, elevated lactate found to have 9mm obstructing stone of L distal ureter, now s/p cystoscopy & stent placement by urology. Concern for ongoing urosepsis given hypotension despite IVF.    Neuro: pain  Tylenol PRN  -oxycodone PRN    Resp: post-op atelectasis  -IS  -OOB    CV: septic shock  -telemetry  -aggressive IVF & bolus w/ additional as necessary  -will start vasopressors if necessary  -hold home losartan & atenolol given hypotension  -con't home ASA & atorvastatin    GI: constipation  -NPO; will advance diet this am if remains hemodynamically normal, not requiring vasopressors.   -bowel regimen: senna/colace    : hydronephrosis & urosepsis 2/2 obstructing L ureter stone, s/p cystoscopy & stent placement  -abx  -con't rowley, strict I&O  -IVF: LR@150/h    Heme: DVTppx  -SQHq8  -ASA    Endo: DM, Hbg A1C 7.4%  -hold home metformin  -low dose lispro ISS  -con't home cholecalciferol    ID: urosepsis  -f/u BCx, UCx, OR Cx  -daily BCx until clears  -con't ceftriaxone    Lines:  -rowley (4/16/18)  -PIV    Dispo:  -con't SICU care  -FULL CODE    Please contact SICU PA/resident [phone] or [phone] with questions/concerns. 72F PMHx DM, HTN, HLD presenting w/ L flank pain, leukocytosis, elevated lactate found to have 9mm obstructing stone of L distal ureter, now s/p cystoscopy & stent placement by urology. Concern for ongoing urosepsis given hypotension despite IVF.    Neuro: pain  Tylenol PRN  -oxycodone PRN    Resp: post-op atelectasis  -IS  -OOB    CV: septic shock  -telemetry  -aggressive IVF & bolus w/ additional as necessary  -will start vasopressors if necessary  -hold home losartan & atenolol given hypotension  -con't home ASA & atorvastatin    GI: constipation  -NPO; will advance diet this am if remains hemodynamically normal, not requiring vasopressors.   -bowel regimen: senna/colace    : hydronephrosis & urosepsis 2/2 obstructing L ureter stone, s/p cystoscopy & stent placement  -abx  -con't rowley, strict I&O  -IVF: LR@150/h    Heme: DVTppx  -SQHq8  -ASA    Endo: DM, Hbg A1C 7.4%  -hold home metformin  -low dose lispro ISS  -con't home cholecalciferol    ID: urosepsis  -f/u BCx, UCx, OR Cx  -daily BCx until clears  -con't ceftriaxone    Lines:  -rowley (4/16/18)  -PIV    Dispo:  -ready to go to floor  -FULL CODE    Please contact SICU PA/resident [phone] or [phone] with questions/concerns.

## 2018-04-17 NOTE — PROGRESS NOTE ADULT - SUBJECTIVE AND OBJECTIVE BOX
UROLOGY PA PROGRESS NOTE:     Subjective:  s/p left ureteral stent placement last night for septic stone, hypotensive but not requiring pressors. feeling well this morning. denies any pain, n/v    Objective:  Vital signs  T(F): , Max: 99.7 (04-16-18 @ 17:45)  HR: 77 (04-17-18 @ 07:00)  BP: 96/54 (04-17-18 @ 07:00)  SpO2: 95% (04-17-18 @ 07:00)  Wt(kg): --    Output     04-16 @ 07:01  -  04-17 @ 07:00  --------------------------------------------------------  IN: 3900 mL / OUT: 990 mL / NET: 2910 mL        Physical Exam:  Gen: NAD  Abd: soft, NT/ND, no CVAT  : +rowley draining clear urine    Labs:  04-17  14.9  / 37.6  /0.86   04-16  21.2  / 44.7  /1.19                           12.2   14.9  )-----------( 122      ( 17 Apr 2018 01:14 )             37.6     04-17    137  |  102  |  21  ----------------------------<  133<H>  4.2   |  19<L>  |  0.86    Ca    8.6      17 Apr 2018 01:14  Phos  1.9     04-17  Mg     1.5     04-17    TPro  6.1  /  Alb  2.7<L>  /  TBili  0.7  /  DBili  0.3<H>  /  AST  24  /  ALT  14  /  AlkPhos  100  04-17    PT/INR - ( 17 Apr 2018 01:14 )   PT: 14.4 sec;   INR: 1.31 ratio         PTT - ( 17 Apr 2018 01:14 )  PTT:25.5 sec  Urinalysis Basic - ( 16 Apr 2018 15:47 )    Color: Yellow / Appearance: SL Turbid / SG: >1.030 / pH: x  Gluc: x / Ketone: Negative  / Bili: Negative / Urobili: Negative   Blood: x / Protein: 100 mg/dL / Nitrite: Negative   Leuk Esterase: Large / RBC: 5-10 /HPF / WBC >50 /HPF   Sq Epi: x / Non Sq Epi: Many /HPF / Bacteria: Few /HPF

## 2018-04-17 NOTE — PROGRESS NOTE ADULT - SUBJECTIVE AND OBJECTIVE BOX
SICU Progress Note    HISTORY  72F PMHx DM, HTN, HLD, who presented to [facility] ED the morning of 4/16/18 complaining of L flank pain and fevers. Reports has had pain for ~3 days, non-radiating. Baseline chronic abdominal pain from GI "issues" which have been about the same. New flank pain is associated with nausea and vomiting. Measured Tmax of 102.2F at home; reports chills. Denies hematuria, frequency, dysuria. Known history of kidney stones ~8 years ago, does not recall name of urologist; has not seen urologist since. Recently treated for pneumonia and flu, now resolved. In ED VS: T36.8, P84, /70, RR17, SpO2 98% on RA. Labs notable for WBC21, venous lactate 4.8, Cr 1.19, Tbili 1.4. Bedside US showing cholelithiasis w/out cholecystitis. CT showing L 9mm distal stone in L distal ureter w/ moderate left hydroureteronephrosis and perinephric stranding. In ED patient received 2L IVF, cipro/flagyl/ceftriaxone.     Urology took patient to OR emergently the afternoon of 4/16/18 for cystoscopy & L 8x24 double J ureteral stent placement. EBL: minimal, IVF: 600mL. In PACU, patient hypotensive ([id number]). Reportedly given 2L IVF in PACU (only ordered for 250 & 500 bolus per EMR), but remained hypotensive. SICU consulted given hypotension in patient w/ urosepsis for ongoing monitoring.     24 HOUR EVENTS:  -transferred to SICU  -    SUBJECTIVE/ROS:  [ ] A ten-point review of systems was otherwise negative except as noted.  [ ] Due to altered mental status/intubation, subjective information were not able to be obtained from the patient. History was obtained, to the extent possible, from review of the chart and collateral sources of information.      NEURO  RASS:     GCS:     CAM ICU:  Exam:   Meds: acetaminophen   Tablet. 650 milliGRAM(s) Oral every 6 hours PRN Mild Pain (1 - 3) or fever > 100.4  oxyCODONE    IR 5 milliGRAM(s) Oral Once PRN Moderate Pain (4 - 6)    [x] Adequacy of sedation and pain control has been assessed and adjusted      RESPIRATORY  RR: 20 (04-17-18 @ 01:00) (14 - 38)  SpO2: 96% (04-17-18 @ 01:00) (92% - 98%)  Wt(kg): --  Exam: unlabored, clear to auscultation bilaterally  Mechanical Ventilation:   ABG - ( 17 Apr 2018 01:06 )  pH: 7.43  /  pCO2: 33    /  pO2: 79    / HCO3: 22    / Base Excess: -1.4  /  SaO2: 96      Lactate: x                [ ] Extubation Readiness Assessed  Meds:       CARDIOVASCULAR  HR: 94 (04-17-18 @ 01:00) (79 - 96)  BP: 124/58 (04-17-18 @ 01:00) (82/51 - 137/82)  BP(mean): 73 (04-17-18 @ 01:00) (60 - 105)  ABP: --  ABP(mean): --  Wt(kg): --  CVP(cm H2O): --  VBG - ( 16 Apr 2018 10:39 )  pH: 7.35  /  pCO2: 45    /  pO2: 22    / HCO3: 24    / Base Excess: -0.9  /  SaO2: 31     Lactate: 4.8                Exam:  Cardiac Rhythm:  Perfusion     [ ]Adequate   [ ]Inadequate  Mentation   [ ]Normal       [ ]Reduced  Extremities  [ ]Warm         [ ]Cool  Volume Status [ ]Hypervolemic [ ]Euvolemic [ ]Hypovolemic  Meds:       GI/NUTRITION  Exam:  Diet:  Meds: docusate sodium 100 milliGRAM(s) Oral three times a day  senna 2 Tablet(s) Oral at bedtime PRN Constipation      GENITOURINARY  I&O's Detail    04-16 @ 07:01  -  04-17 @ 01:53  --------------------------------------------------------  IN:    IV PiggyBack: 250 mL    lactated ringers.: 1100 mL    Sodium Chloride 0.9% IV Bolus: 500 mL  Total IN: 1850 mL    OUT:    Indwelling Catheter - Urethral: 600 mL    Voided: 50 mL  Total OUT: 650 mL    Total NET: 1200 mL        Weight (kg): 99.8 (04-16 @ 09:32)  04-17    137  |  102  |  21  ----------------------------<  133<H>  4.2   |  19<L>  |  0.86    Ca    8.6      17 Apr 2018 01:14  Phos  1.9     04-17  Mg     1.5     04-17    TPro  6.1  /  Alb  2.7<L>  /  TBili  0.7  /  DBili  0.3<H>  /  AST  24  /  ALT  14  /  AlkPhos  100  04-17    [ ] Madden catheter, indication: N/A  Meds: cholecalciferol 2000 Unit(s) Oral daily  lactated ringers. 1000 milliLiter(s) IV Continuous <Continuous>        HEMATOLOGIC  Meds: aspirin enteric coated 81 milliGRAM(s) Oral daily  heparin  Injectable 5000 Unit(s) SubCutaneous every 8 hours    [x] VTE Prophylaxis                        12.2   14.9  )-----------( 122      ( 17 Apr 2018 01:14 )             37.6     PT/INR - ( 17 Apr 2018 01:14 )   PT: 14.4 sec;   INR: 1.31 ratio         PTT - ( 17 Apr 2018 01:14 )  PTT:25.5 sec  Transfusion     [ ] PRBC   [ ] Platelets   [ ] FFP   [ ] Cryoprecipitate      INFECTIOUS DISEASES  T(C): 36.9 (04-16-18 @ 23:00), Max: 37.6 (04-16-18 @ 17:45)  Wt(kg): --  WBC Count: 14.9 K/uL (04-17 @ 01:14)  WBC Count: 21.2 K/uL (04-16 @ 10:39)    Recent Cultures:    Meds: cefTRIAXone   IVPB 1 Gram(s) IV Intermittent every 24 hours  cefTRIAXone   IVPB      influenza   Vaccine 0.5 milliLiter(s) IntraMuscular once        ENDOCRINE  Capillary Blood Glucose    Meds: atorvastatin 20 milliGRAM(s) Oral at bedtime  insulin lispro (HumaLOG) corrective regimen sliding scale   SubCutaneous every 6 hours        ACCESS DEVICES:  [ ] Peripheral IV  [ ] Central Venous Line	[ ] R	[ ] L	[ ] IJ	[ ] Fem	[ ] SC	Placed:   [ ] Arterial Line		[ ] R	[ ] L	[ ] Fem	[ ] Rad	[ ] Ax	Placed:   [ ] PICC:					[ ] Mediport  [ ] Urinary Catheter, Date Placed:   [ ] Necessity of urinary, arterial, and venous catheters discussed    OTHER MEDICATIONS:      CODE STATUS:     IMAGING: SICU Progress Note    HISTORY  72F PMHx DM, HTN, HLD, who presented to [facility] ED the morning of 4/16/18 complaining of L flank pain and fevers. Reports has had pain for ~3 days, non-radiating. Baseline chronic abdominal pain from GI "issues" which have been about the same. New flank pain is associated with nausea and vomiting. Measured Tmax of 102.2F at home; reports chills. Denies hematuria, frequency, dysuria. Known history of kidney stones ~8 years ago, does not recall name of urologist; has not seen urologist since. Recently treated for pneumonia and flu, now resolved. In ED VS: T36.8, P84, /70, RR17, SpO2 98% on RA. Labs notable for WBC21, venous lactate 4.8, Cr 1.19, Tbili 1.4. Bedside US showing cholelithiasis w/out cholecystitis. CT showing L 9mm distal stone in L distal ureter w/ moderate left hydroureteronephrosis and perinephric stranding. In ED patient received 2L IVF, cipro/flagyl/ceftriaxone.     Urology took patient to OR emergently the afternoon of 4/16/18 for cystoscopy & L 8x24 double J ureteral stent placement. EBL: minimal, IVF: 600mL. In PACU, patient hypotensive ([id number]). Reportedly given 2L IVF in PACU (only ordered for 250 & 500 bolus per EMR), but remained hypotensive. SICU consulted given hypotension in patient w/ urosepsis for ongoing monitoring.     24 HOUR EVENTS:  -transferred to SICU  -normotensive in SICU; remained off vasopressor agents      SUBJECTIVE/ROS:  [x ] A ten-point review of systems was otherwise negative except as noted.  [ ] Due to altered mental status/intubation, subjective information were not able to be obtained from the patient. History was obtained, to the extent possible, from review of the chart and collateral sources of information. SICU Progress Note    HISTORY  72F PMHx DM, HTN, HLD, who presented to [facility] ED the morning of 4/16/18 complaining of L flank pain and fevers. Reports has had pain for ~3 days, non-radiating. Baseline chronic abdominal pain from GI "issues" which have been about the same. New flank pain is associated with nausea and vomiting. Measured Tmax of 102.2F at home; reports chills. Denies hematuria, frequency, dysuria. Known history of kidney stones ~8 years ago, does not recall name of urologist; has not seen urologist since. Recently treated for pneumonia and flu, now resolved. In ED VS: T36.8, P84, /70, RR17, SpO2 98% on RA. Labs notable for WBC21, venous lactate 4.8, Cr 1.19, Tbili 1.4. Bedside US showing cholelithiasis w/out cholecystitis. CT showing L 9mm distal stone in L distal ureter w/ moderate left hydroureteronephrosis and perinephric stranding. In ED patient received 2L IVF, cipro/flagyl/ceftriaxone.     Urology took patient to OR emergently the afternoon of 4/16/18 for cystoscopy & L 8x24 double J ureteral stent placement. EBL: minimal, IVF: 600mL. In PACU, patient hypotensive ([id number]). Reportedly given 2L IVF in PACU (only ordered for 250 & 500 bolus per EMR), but remained hypotensive. SICU consulted given hypotension in patient w/ urosepsis for ongoing monitoring.     24 HOUR EVENTS:  -transferred to SICU  -normotensive in SICU; remained off vasopressor agents      SUBJECTIVE/ROS:  [x ] A ten-point review of systems was otherwise negative except as noted.  [ ] Due to altered mental status/intubation, subjective information were not able to be obtained from the patient. History was obtained, to the extent possible, from review of the chart and collateral sources of information.        NEURO  RASS:   0  Exam: A&Ox4, no focal deficits  Meds: acetaminophen   Tablet. 650 milliGRAM(s) Oral every 6 hours PRN Mild Pain (1 - 3) or fever > 100.4  oxyCODONE    IR 5 milliGRAM(s) Oral Once PRN Moderate Pain (4 - 6)    [x] Adequacy of sedation and pain control has been assessed and adjusted      RESPIRATORY  RR: 23 (04-17-18 @ 04:00) (14 - 38)  SpO2: 94% (04-17-18 @ 04:00) (92% - 98%)  Wt(kg): --  Exam: unlabored, clear to auscultation bilaterally  Mechanical Ventilation:   ABG - ( 17 Apr 2018 01:06 )  pH: 7.43  /  pCO2: 33    /  pO2: 79    / HCO3: 22    / Base Excess: -1.4  /  SaO2: 96      Lactate: x          [n/a ] Extubation Readiness Assessed  Meds: x      CARDIOVASCULAR  HR: 85 (04-17-18 @ 04:00) (79 - 96)  BP: 95/51 (04-17-18 @ 04:00) (75/48 - 137/82)  BP(mean): 67 (04-17-18 @ 04:00) (58 - 105)  ABP: --  ABP(mean): --  Wt(kg): --  CVP(cm H2O): --  VBG - ( 16 Apr 2018 10:39 )  pH: 7.35  /  pCO2: 45    /  pO2: 22    / HCO3: 24    / Base Excess: -0.9  /  SaO2: 31     Lactate: 4.8        Exam: regular  Cardiac Rhythm: sinus  Perfusion     [ x]Adequate   [ ]Inadequate  Mentation   [ x]Normal       [ ]Reduced  Extremities  [ x]Warm         [ ]Cool  Volume Status [ ]Hypervolemic [x ]Euvolemic [ ]Hypovolemic  Meds: x      GI/NUTRITION  Exam: soft, NT/ND  Diet: NPO except Rx  Meds: docusate sodium 100 milliGRAM(s) Oral three times a day  senna 2 Tablet(s) Oral at bedtime PRN Constipation      GENITOURINARY  I&O's Detail    04-16 @ 07:01  -  04-17 @ 04:47  --------------------------------------------------------  IN:    IV PiggyBack: 300 mL    Lactated Ringers IV Bolus: 1000 mL    lactated ringers.: 1550 mL    Sodium Chloride 0.9% IV Bolus: 500 mL    Solution: 187.5 mL  Total IN: 3537.5 mL    OUT:    Indwelling Catheter - Urethral: 730 mL    Voided: 50 mL  Total OUT: 780 mL    Total NET: 2757.5 mL        Weight (kg): 99.8 (04-16 @ 09:32)  04-17    137  |  102  |  21  ----------------------------<  133<H>  4.2   |  19<L>  |  0.86    Ca    8.6      17 Apr 2018 01:14  Phos  1.9     04-17  Mg     1.5     04-17    TPro  6.1  /  Alb  2.7<L>  /  TBili  0.7  /  DBili  0.3<H>  /  AST  24  /  ALT  14  /  AlkPhos  100  04-17    [x ] Madden catheter, indication: N/A  Meds: cholecalciferol 2000 Unit(s) Oral daily  lactated ringers. 1000 milliLiter(s) IV Continuous <Continuous>        HEMATOLOGIC  Meds: aspirin enteric coated 81 milliGRAM(s) Oral daily  heparin  Injectable 5000 Unit(s) SubCutaneous every 8 hours    [x] VTE Prophylaxis                        12.2   14.9  )-----------( 122      ( 17 Apr 2018 01:14 )             37.6     PT/INR - ( 17 Apr 2018 01:14 )   PT: 14.4 sec;   INR: 1.31 ratio    PTT - ( 17 Apr 2018 01:14 )  PTT:25.5 sec    Transfusion: none/24hr     [ ] PRBC   [ ] Platelets   [ ] FFP   [ ] Cryoprecipitate      INFECTIOUS DISEASES  T(C): 37.5 (04-17-18 @ 03:00), Max: 37.6 (04-16-18 @ 17:45)  Wt(kg): --  WBC Count: 14.9 K/uL (04-17 @ 01:14)  WBC Count: 21.2 K/uL (04-16 @ 10:39)    Recent Cultures: x    Meds: cefTRIAXone   IVPB 1 Gram(s) IV Intermittent every 24 hours  cefTRIAXone   IVPB      influenza   Vaccine 0.5 milliLiter(s) IntraMuscular once        ENDOCRINE  Capillary Blood Glucose    Meds: atorvastatin 20 milliGRAM(s) Oral at bedtime  insulin lispro (HumaLOG) corrective regimen sliding scale   SubCutaneous every 6 hours        ACCESS DEVICES:  [x ] Peripheral IV  [ ] Central Venous Line	[ ] R	[ ] L	[ ] IJ	[ ] Fem	[ ] SC	Placed:   [ ] Arterial Line		[ ] R	[ ] L	[ ] Fem	[ ] Rad	[ ] Ax	Placed:   [ ] PICC:					[ ] Mediport  [x ] Urinary Catheter, Date Placed: 4/16/18  [x ] Necessity of urinary, arterial, and venous catheters discussed    OTHER MEDICATIONS: x      CODE STATUS: FULL     IMAGING:  < from: CT Abdomen and Pelvis w/ Oral Cont and w/ IV Cont (04.16.18 @ 11:48) >    IMPRESSION: Obstructing calculus in the left distal ureter, measuring 0.9   cm with moderate left hydroureteronephrosis and perinephric stranding.      < end of copied text > SICU Progress Note    HISTORY  72F PMHx DM, HTN, HLD, who presented to [facility] ED the morning of 4/16/18 complaining of L flank pain and fevers. Reports has had pain for ~3 days, non-radiating. Baseline chronic abdominal pain from GI "issues" which have been about the same. New flank pain is associated with nausea and vomiting. Measured Tmax of 102.2F at home; reports chills. Denies hematuria, frequency, dysuria. Known history of kidney stones ~8 years ago, does not recall name of urologist; has not seen urologist since. Recently treated for pneumonia and flu, now resolved. In ED VS: T36.8, P84, /70, RR17, SpO2 98% on RA. Labs notable for WBC21, venous lactate 4.8, Cr 1.19, Tbili 1.4. Bedside US showing cholelithiasis w/out cholecystitis. CT showing L 9mm distal stone in L distal ureter w/ moderate left hydroureteronephrosis and perinephric stranding. In ED patient received 2L IVF, cipro/flagyl/ceftriaxone.     Urology took patient to OR emergently the afternoon of 4/16/18 for cystoscopy & L 8x24 double J ureteral stent placement. EBL: minimal, IVF: 600mL. In PACU, patient hypotensive ([id number]). Reportedly given 2L IVF in PACU (only ordered for 250 & 500 bolus per EMR), but remained hypotensive. SICU consulted given hypotension in patient w/ urosepsis for ongoing monitoring.     24 HOUR EVENTS:  -transferred to SICU  -normotensive in SICU; remained off vasopressor agents      SUBJECTIVE/ROS:  [x ] A ten-point review of systems was otherwise negative except as noted.  [ ] Due to altered mental status/intubation, subjective information were not able to be obtained from the patient. History was obtained, to the extent possible, from review of the chart and collateral sources of information.        NEURO  RASS:   0  Exam: A&Ox4, no focal deficits  Meds: acetaminophen   Tablet. 650 milliGRAM(s) Oral every 6 hours PRN Mild Pain (1 - 3) or fever > 100.4  oxyCODONE    IR 5 milliGRAM(s) Oral Once PRN Moderate Pain (4 - 6)    [x] Adequacy of sedation and pain control has been assessed and adjusted      RESPIRATORY  RR: 23 (04-17-18 @ 04:00) (14 - 38)  SpO2: 94% (04-17-18 @ 04:00) (92% - 98%)  Wt(kg): --  Exam: unlabored, clear to auscultation bilaterally  Mechanical Ventilation:   ABG - ( 17 Apr 2018 01:06 )  pH: 7.43  /  pCO2: 33    /  pO2: 79    / HCO3: 22    / Base Excess: -1.4  /  SaO2: 96      Lactate: x          [n/a ] Extubation Readiness Assessed  Meds: x      CARDIOVASCULAR  HR: 85 (04-17-18 @ 04:00) (79 - 96)  BP: 95/51 (04-17-18 @ 04:00) (75/48 - 137/82)  BP(mean): 67 (04-17-18 @ 04:00) (58 - 105)  ABP: --  ABP(mean): --  Wt(kg): --  CVP(cm H2O): --  VBG - ( 16 Apr 2018 10:39 )  pH: 7.35  /  pCO2: 45    /  pO2: 22    / HCO3: 24    / Base Excess: -0.9  /  SaO2: 31     Lactate: 4.8        Exam: regular  Cardiac Rhythm: sinus  Perfusion     [ x]Adequate   [ ]Inadequate  Mentation   [ x]Normal       [ ]Reduced  Extremities  [ x]Warm         [ ]Cool  Volume Status [ ]Hypervolemic [x ]Euvolemic [ ]Hypovolemic  Meds: x      GI/NUTRITION  Exam: soft, NT/ND  Diet: NPO except Rx  Meds: docusate sodium 100 milliGRAM(s) Oral three times a day  senna 2 Tablet(s) Oral at bedtime PRN Constipation      GENITOURINARY  I&O's Summary    16 Apr 2018 07:01  -  17 Apr 2018 07:00  --------------------------------------------------------  IN: 3900 mL / OUT: 990 mL / NET: 2910 mL    17 Apr 2018 07:01  -  17 Apr 2018 09:35  --------------------------------------------------------  IN: 300 mL / OUT: 175 mL / NET: 125 mL        I&O's Detail    16 Apr 2018 07:01  -  17 Apr 2018 07:00  --------------------------------------------------------  IN:    IV PiggyBack: 300 mL    Lactated Ringers IV Bolus: 1000 mL    lactated ringers.: 1850 mL    Sodium Chloride 0.9% IV Bolus: 500 mL    Solution: 250 mL  Total IN: 3900 mL    OUT:    Indwelling Catheter - Urethral: 940 mL    Voided: 50 mL  Total OUT: 990 mL    Total NET: 2910 mL      17 Apr 2018 07:01  -  17 Apr 2018 09:35  --------------------------------------------------------  IN:    lactated ringers.: 300 mL  Total IN: 300 mL    OUT:    Indwelling Catheter - Urethral: 175 mL  Total OUT: 175 mL    Total NET: 125 mL              Weight (kg): 99.8 (04-16 @ 09:32)  04-17    137  |  102  |  21  ----------------------------<  133<H>  4.2   |  19<L>  |  0.86    Ca    8.6      17 Apr 2018 01:14  Phos  1.9     04-17  Mg     1.5     04-17    TPro  6.1  /  Alb  2.7<L>  /  TBili  0.7  /  DBili  0.3<H>  /  AST  24  /  ALT  14  /  AlkPhos  100  04-17    [x ] Madden catheter, indication: N/A  Meds: cholecalciferol 2000 Unit(s) Oral daily  lactated ringers. 1000 milliLiter(s) IV Continuous <Continuous>        HEMATOLOGIC  Meds: aspirin enteric coated 81 milliGRAM(s) Oral daily  heparin  Injectable 5000 Unit(s) SubCutaneous every 8 hours    [x] VTE Prophylaxis                        12.2   14.9  )-----------( 122      ( 17 Apr 2018 01:14 )             37.6     PT/INR - ( 17 Apr 2018 01:14 )   PT: 14.4 sec;   INR: 1.31 ratio    PTT - ( 17 Apr 2018 01:14 )  PTT:25.5 sec    Transfusion: none/24hr     [ ] PRBC   [ ] Platelets   [ ] FFP   [ ] Cryoprecipitate      INFECTIOUS DISEASES  T(C): 37.5 (04-17-18 @ 03:00), Max: 37.6 (04-16-18 @ 17:45)  Wt(kg): --  WBC Count: 14.9 K/uL (04-17 @ 01:14)  WBC Count: 21.2 K/uL (04-16 @ 10:39)    Recent Cultures: x    Meds: cefTRIAXone   IVPB 1 Gram(s) IV Intermittent every 24 hours  cefTRIAXone   IVPB      influenza   Vaccine 0.5 milliLiter(s) IntraMuscular once        ENDOCRINE  Capillary Blood Glucose    Meds: atorvastatin 20 milliGRAM(s) Oral at bedtime  insulin lispro (HumaLOG) corrective regimen sliding scale   SubCutaneous every 6 hours        ACCESS DEVICES:  [x ] Peripheral IV  [ ] Central Venous Line	[ ] R	[ ] L	[ ] IJ	[ ] Fem	[ ] SC	Placed:   [ ] Arterial Line		[ ] R	[ ] L	[ ] Fem	[ ] Rad	[ ] Ax	Placed:   [ ] PICC:					[ ] Mediport  [x ] Urinary Catheter, Date Placed: 4/16/18  [x ] Necessity of urinary, arterial, and venous catheters discussed    OTHER MEDICATIONS: x      CODE STATUS: FULL     IMAGING:  < from: CT Abdomen and Pelvis w/ Oral Cont and w/ IV Cont (04.16.18 @ 11:48) >    IMPRESSION: Obstructing calculus in the left distal ureter, measuring 0.9   cm with moderate left hydroureteronephrosis and perinephric stranding.      < end of copied text >

## 2018-04-17 NOTE — PROGRESS NOTE ADULT - ASSESSMENT
73yo female s/p left ureteral stent placement for septic stone, POD#1  - continue abx  - f/u cultures  - trend fevers/BP  - wean O2  - keep rowley for now

## 2018-04-18 LAB
-  AMIKACIN: SIGNIFICANT CHANGE UP
-  AMIKACIN: SIGNIFICANT CHANGE UP
-  AMOXICILLIN/CLAVULANIC ACID: SIGNIFICANT CHANGE UP
-  AMOXICILLIN/CLAVULANIC ACID: SIGNIFICANT CHANGE UP
-  AMPICILLIN/SULBACTAM: SIGNIFICANT CHANGE UP
-  AMPICILLIN/SULBACTAM: SIGNIFICANT CHANGE UP
-  AMPICILLIN: SIGNIFICANT CHANGE UP
-  AMPICILLIN: SIGNIFICANT CHANGE UP
-  AZTREONAM: SIGNIFICANT CHANGE UP
-  AZTREONAM: SIGNIFICANT CHANGE UP
-  CEFAZOLIN: SIGNIFICANT CHANGE UP
-  CEFAZOLIN: SIGNIFICANT CHANGE UP
-  CEFEPIME: SIGNIFICANT CHANGE UP
-  CEFEPIME: SIGNIFICANT CHANGE UP
-  CEFOXITIN: SIGNIFICANT CHANGE UP
-  CEFOXITIN: SIGNIFICANT CHANGE UP
-  CEFTRIAXONE: SIGNIFICANT CHANGE UP
-  CEFTRIAXONE: SIGNIFICANT CHANGE UP
-  CIPROFLOXACIN: SIGNIFICANT CHANGE UP
-  CIPROFLOXACIN: SIGNIFICANT CHANGE UP
-  ERTAPENEM: SIGNIFICANT CHANGE UP
-  ERTAPENEM: SIGNIFICANT CHANGE UP
-  GENTAMICIN: SIGNIFICANT CHANGE UP
-  GENTAMICIN: SIGNIFICANT CHANGE UP
-  IMIPENEM: SIGNIFICANT CHANGE UP
-  IMIPENEM: SIGNIFICANT CHANGE UP
-  LEVOFLOXACIN: SIGNIFICANT CHANGE UP
-  LEVOFLOXACIN: SIGNIFICANT CHANGE UP
-  MEROPENEM: SIGNIFICANT CHANGE UP
-  MEROPENEM: SIGNIFICANT CHANGE UP
-  NITROFURANTOIN: SIGNIFICANT CHANGE UP
-  NITROFURANTOIN: SIGNIFICANT CHANGE UP
-  PIPERACILLIN/TAZOBACTAM: SIGNIFICANT CHANGE UP
-  PIPERACILLIN/TAZOBACTAM: SIGNIFICANT CHANGE UP
-  TIGECYCLINE: SIGNIFICANT CHANGE UP
-  TIGECYCLINE: SIGNIFICANT CHANGE UP
-  TOBRAMYCIN: SIGNIFICANT CHANGE UP
-  TOBRAMYCIN: SIGNIFICANT CHANGE UP
-  TRIMETHOPRIM/SULFAMETHOXAZOLE: SIGNIFICANT CHANGE UP
-  TRIMETHOPRIM/SULFAMETHOXAZOLE: SIGNIFICANT CHANGE UP
ANION GAP SERPL CALC-SCNC: 11 MMOL/L — SIGNIFICANT CHANGE UP (ref 5–17)
BUN SERPL-MCNC: 18 MG/DL — SIGNIFICANT CHANGE UP (ref 7–23)
CALCIUM SERPL-MCNC: 8.9 MG/DL — SIGNIFICANT CHANGE UP (ref 8.4–10.5)
CHLORIDE SERPL-SCNC: 100 MMOL/L — SIGNIFICANT CHANGE UP (ref 96–108)
CO2 SERPL-SCNC: 24 MMOL/L — SIGNIFICANT CHANGE UP (ref 22–31)
CREAT SERPL-MCNC: 0.74 MG/DL — SIGNIFICANT CHANGE UP (ref 0.5–1.3)
GLUCOSE BLDC GLUCOMTR-MCNC: 103 MG/DL — HIGH (ref 70–99)
GLUCOSE BLDC GLUCOMTR-MCNC: 107 MG/DL — HIGH (ref 70–99)
GLUCOSE BLDC GLUCOMTR-MCNC: 125 MG/DL — HIGH (ref 70–99)
GLUCOSE BLDC GLUCOMTR-MCNC: 201 MG/DL — HIGH (ref 70–99)
GLUCOSE SERPL-MCNC: 110 MG/DL — HIGH (ref 70–99)
HCT VFR BLD CALC: 34.8 % — SIGNIFICANT CHANGE UP (ref 34.5–45)
HGB BLD-MCNC: 11.2 G/DL — LOW (ref 11.5–15.5)
LACTATE SERPL-SCNC: 1.6 MMOL/L — SIGNIFICANT CHANGE UP (ref 0.7–2)
MCHC RBC-ENTMCNC: 27.9 PG — SIGNIFICANT CHANGE UP (ref 27–34)
MCHC RBC-ENTMCNC: 32.3 GM/DL — SIGNIFICANT CHANGE UP (ref 32–36)
MCV RBC AUTO: 86.4 FL — SIGNIFICANT CHANGE UP (ref 80–100)
METHOD TYPE: SIGNIFICANT CHANGE UP
METHOD TYPE: SIGNIFICANT CHANGE UP
PLATELET # BLD AUTO: 108 K/UL — LOW (ref 150–400)
POTASSIUM SERPL-MCNC: 3.8 MMOL/L — SIGNIFICANT CHANGE UP (ref 3.5–5.3)
POTASSIUM SERPL-SCNC: 3.8 MMOL/L — SIGNIFICANT CHANGE UP (ref 3.5–5.3)
RBC # BLD: 4.03 M/UL — SIGNIFICANT CHANGE UP (ref 3.8–5.2)
RBC # FLD: 15.1 % — HIGH (ref 10.3–14.5)
SODIUM SERPL-SCNC: 135 MMOL/L — SIGNIFICANT CHANGE UP (ref 135–145)
WBC # BLD: 11.1 K/UL — HIGH (ref 3.8–10.5)
WBC # FLD AUTO: 11.1 K/UL — HIGH (ref 3.8–10.5)

## 2018-04-18 PROCEDURE — 99233 SBSQ HOSP IP/OBS HIGH 50: CPT

## 2018-04-18 PROCEDURE — 71045 X-RAY EXAM CHEST 1 VIEW: CPT | Mod: 26

## 2018-04-18 RX ORDER — ACETAMINOPHEN 500 MG
1000 TABLET ORAL ONCE
Qty: 0 | Refills: 0 | Status: DISCONTINUED | OUTPATIENT
Start: 2018-04-18 | End: 2018-04-19

## 2018-04-18 RX ORDER — SODIUM CHLORIDE 9 MG/ML
1000 INJECTION, SOLUTION INTRAVENOUS
Qty: 0 | Refills: 0 | Status: DISCONTINUED | OUTPATIENT
Start: 2018-04-18 | End: 2018-04-18

## 2018-04-18 RX ORDER — ACETAMINOPHEN 500 MG
650 TABLET ORAL EVERY 6 HOURS
Qty: 0 | Refills: 0 | Status: DISCONTINUED | OUTPATIENT
Start: 2018-04-18 | End: 2018-04-18

## 2018-04-18 RX ORDER — ONDANSETRON 8 MG/1
4 TABLET, FILM COATED ORAL EVERY 6 HOURS
Qty: 0 | Refills: 0 | Status: COMPLETED | OUTPATIENT
Start: 2018-04-18 | End: 2018-04-18

## 2018-04-18 RX ORDER — ONDANSETRON 8 MG/1
4 TABLET, FILM COATED ORAL ONCE
Qty: 0 | Refills: 0 | Status: COMPLETED | OUTPATIENT
Start: 2018-04-18 | End: 2018-04-18

## 2018-04-18 RX ADMIN — SODIUM CHLORIDE 75 MILLILITER(S): 9 INJECTION, SOLUTION INTRAVENOUS at 08:11

## 2018-04-18 RX ADMIN — ONDANSETRON 4 MILLIGRAM(S): 8 TABLET, FILM COATED ORAL at 18:29

## 2018-04-18 RX ADMIN — CEFTRIAXONE 100 GRAM(S): 500 INJECTION, POWDER, FOR SOLUTION INTRAMUSCULAR; INTRAVENOUS at 11:54

## 2018-04-18 RX ADMIN — Medication 2000 UNIT(S): at 11:54

## 2018-04-18 RX ADMIN — HEPARIN SODIUM 5000 UNIT(S): 5000 INJECTION INTRAVENOUS; SUBCUTANEOUS at 11:59

## 2018-04-18 RX ADMIN — Medication 650 MILLIGRAM(S): at 04:49

## 2018-04-18 RX ADMIN — Medication 100 MILLIGRAM(S): at 04:22

## 2018-04-18 RX ADMIN — ONDANSETRON 4 MILLIGRAM(S): 8 TABLET, FILM COATED ORAL at 04:19

## 2018-04-18 RX ADMIN — HEPARIN SODIUM 5000 UNIT(S): 5000 INJECTION INTRAVENOUS; SUBCUTANEOUS at 21:30

## 2018-04-18 RX ADMIN — Medication 650 MILLIGRAM(S): at 19:04

## 2018-04-18 RX ADMIN — Medication 650 MILLIGRAM(S): at 04:19

## 2018-04-18 RX ADMIN — ATORVASTATIN CALCIUM 20 MILLIGRAM(S): 80 TABLET, FILM COATED ORAL at 21:30

## 2018-04-18 RX ADMIN — Medication 650 MILLIGRAM(S): at 18:34

## 2018-04-18 RX ADMIN — HEPARIN SODIUM 5000 UNIT(S): 5000 INJECTION INTRAVENOUS; SUBCUTANEOUS at 04:22

## 2018-04-18 RX ADMIN — Medication 81 MILLIGRAM(S): at 11:54

## 2018-04-18 RX ADMIN — Medication 2: at 11:54

## 2018-04-18 NOTE — PROGRESS NOTE ADULT - SUBJECTIVE AND OBJECTIVE BOX
Progress Note    Subjective  Patient seen and examined in AM. Requiring 2L supplemental oxygen.   OOB in chair. Pain controlled. Afebrile since 4 pm yesterday.  Repeat CXR improved after 20 mg lasix.  No nausea/vomiting     Objective  T(F): , Max: 101.7 (04-17-18 @ 16:00), HR: 93, BP: 138/78, SpO2: 93% (2L O2)  Indwelling Catheter - Urethral: 940 mL cc clear yellow    Gen: NAD  Abd: soft, NT/ND    Cultures:  Urine Cx: Klebsiella  Blood Cx: NGTD    Antibiotics: Ceftriaxone

## 2018-04-18 NOTE — PROGRESS NOTE ADULT - ASSESSMENT
72F POD # 2 s/p L ureteral stent place, sepsis 2/2 obstructing stone  -AM CBC/BMP  -Ceftriaxone  -Follow up cultures  -OOB/amb  -IS  -Wean O2  -TOV in PM if afebrile

## 2018-04-19 ENCOUNTER — TRANSCRIPTION ENCOUNTER (OUTPATIENT)
Age: 73
End: 2018-04-19

## 2018-04-19 VITALS
HEART RATE: 86 BPM | OXYGEN SATURATION: 95 % | RESPIRATION RATE: 16 BRPM | SYSTOLIC BLOOD PRESSURE: 141 MMHG | TEMPERATURE: 98 F | DIASTOLIC BLOOD PRESSURE: 82 MMHG

## 2018-04-19 LAB
-  AMIKACIN: SIGNIFICANT CHANGE UP
-  AMPICILLIN/SULBACTAM: SIGNIFICANT CHANGE UP
-  AMPICILLIN: SIGNIFICANT CHANGE UP
-  AZTREONAM: SIGNIFICANT CHANGE UP
-  CEFAZOLIN: SIGNIFICANT CHANGE UP
-  CEFEPIME: SIGNIFICANT CHANGE UP
-  CEFOXITIN: SIGNIFICANT CHANGE UP
-  CEFTRIAXONE: SIGNIFICANT CHANGE UP
-  CIPROFLOXACIN: SIGNIFICANT CHANGE UP
-  ERTAPENEM: SIGNIFICANT CHANGE UP
-  GENTAMICIN: SIGNIFICANT CHANGE UP
-  IMIPENEM: SIGNIFICANT CHANGE UP
-  LEVOFLOXACIN: SIGNIFICANT CHANGE UP
-  MEROPENEM: SIGNIFICANT CHANGE UP
-  NITROFURANTOIN: SIGNIFICANT CHANGE UP
-  PIPERACILLIN/TAZOBACTAM: SIGNIFICANT CHANGE UP
-  TIGECYCLINE: SIGNIFICANT CHANGE UP
-  TOBRAMYCIN: SIGNIFICANT CHANGE UP
-  TRIMETHOPRIM/SULFAMETHOXAZOLE: SIGNIFICANT CHANGE UP
CULTURE RESULTS: SIGNIFICANT CHANGE UP
CULTURE RESULTS: SIGNIFICANT CHANGE UP
GLUCOSE BLDC GLUCOMTR-MCNC: 103 MG/DL — HIGH (ref 70–99)
METHOD TYPE: SIGNIFICANT CHANGE UP
ORGANISM # SPEC MICROSCOPIC CNT: SIGNIFICANT CHANGE UP
SPECIMEN SOURCE: SIGNIFICANT CHANGE UP
SPECIMEN SOURCE: SIGNIFICANT CHANGE UP

## 2018-04-19 PROCEDURE — 84145 PROCALCITONIN (PCT): CPT

## 2018-04-19 PROCEDURE — 85610 PROTHROMBIN TIME: CPT

## 2018-04-19 PROCEDURE — 83735 ASSAY OF MAGNESIUM: CPT

## 2018-04-19 PROCEDURE — 87086 URINE CULTURE/COLONY COUNT: CPT

## 2018-04-19 PROCEDURE — 83690 ASSAY OF LIPASE: CPT

## 2018-04-19 PROCEDURE — 96375 TX/PRO/DX INJ NEW DRUG ADDON: CPT

## 2018-04-19 PROCEDURE — 83036 HEMOGLOBIN GLYCOSYLATED A1C: CPT

## 2018-04-19 PROCEDURE — 84295 ASSAY OF SERUM SODIUM: CPT

## 2018-04-19 PROCEDURE — 80076 HEPATIC FUNCTION PANEL: CPT

## 2018-04-19 PROCEDURE — 87186 SC STD MICRODIL/AGAR DIL: CPT

## 2018-04-19 PROCEDURE — 99232 SBSQ HOSP IP/OBS MODERATE 35: CPT

## 2018-04-19 PROCEDURE — 96374 THER/PROPH/DIAG INJ IV PUSH: CPT | Mod: XU

## 2018-04-19 PROCEDURE — 71046 X-RAY EXAM CHEST 2 VIEWS: CPT

## 2018-04-19 PROCEDURE — 84484 ASSAY OF TROPONIN QUANT: CPT

## 2018-04-19 PROCEDURE — 82435 ASSAY OF BLOOD CHLORIDE: CPT

## 2018-04-19 PROCEDURE — 80053 COMPREHEN METABOLIC PANEL: CPT

## 2018-04-19 PROCEDURE — 76705 ECHO EXAM OF ABDOMEN: CPT

## 2018-04-19 PROCEDURE — 82330 ASSAY OF CALCIUM: CPT

## 2018-04-19 PROCEDURE — 84132 ASSAY OF SERUM POTASSIUM: CPT

## 2018-04-19 PROCEDURE — 82962 GLUCOSE BLOOD TEST: CPT

## 2018-04-19 PROCEDURE — 93005 ELECTROCARDIOGRAM TRACING: CPT

## 2018-04-19 PROCEDURE — 87040 BLOOD CULTURE FOR BACTERIA: CPT

## 2018-04-19 PROCEDURE — 82947 ASSAY GLUCOSE BLOOD QUANT: CPT

## 2018-04-19 PROCEDURE — 74177 CT ABD & PELVIS W/CONTRAST: CPT

## 2018-04-19 PROCEDURE — 85014 HEMATOCRIT: CPT

## 2018-04-19 PROCEDURE — 82803 BLOOD GASES ANY COMBINATION: CPT

## 2018-04-19 PROCEDURE — C1758: CPT

## 2018-04-19 PROCEDURE — 71045 X-RAY EXAM CHEST 1 VIEW: CPT

## 2018-04-19 PROCEDURE — 80048 BASIC METABOLIC PNL TOTAL CA: CPT

## 2018-04-19 PROCEDURE — 83605 ASSAY OF LACTIC ACID: CPT

## 2018-04-19 PROCEDURE — C2617: CPT

## 2018-04-19 PROCEDURE — 82550 ASSAY OF CK (CPK): CPT

## 2018-04-19 PROCEDURE — 76000 FLUOROSCOPY <1 HR PHYS/QHP: CPT

## 2018-04-19 PROCEDURE — 81001 URINALYSIS AUTO W/SCOPE: CPT

## 2018-04-19 PROCEDURE — 85027 COMPLETE CBC AUTOMATED: CPT

## 2018-04-19 PROCEDURE — 82553 CREATINE MB FRACTION: CPT

## 2018-04-19 PROCEDURE — 99285 EMERGENCY DEPT VISIT HI MDM: CPT | Mod: 25

## 2018-04-19 PROCEDURE — 84100 ASSAY OF PHOSPHORUS: CPT

## 2018-04-19 PROCEDURE — 85730 THROMBOPLASTIN TIME PARTIAL: CPT

## 2018-04-19 PROCEDURE — C1769: CPT

## 2018-04-19 RX ORDER — AZTREONAM 2 G
1 VIAL (EA) INJECTION
Qty: 20 | Refills: 0 | OUTPATIENT
Start: 2018-04-19 | End: 2018-04-28

## 2018-04-19 RX ADMIN — Medication 81 MILLIGRAM(S): at 11:07

## 2018-04-19 RX ADMIN — HEPARIN SODIUM 5000 UNIT(S): 5000 INJECTION INTRAVENOUS; SUBCUTANEOUS at 05:30

## 2018-04-19 RX ADMIN — Medication 2000 UNIT(S): at 11:07

## 2018-04-19 NOTE — DISCHARGE NOTE ADULT - CARE PROVIDER_API CALL
Vita Charles), Urology  [address] [place] [address]  Phone: [phone]  Fax: [phone]

## 2018-04-19 NOTE — DISCHARGE NOTE ADULT - PATIENT PORTAL LINK FT
You can access the [facility] Portal, offered by [facility], by registering with the following website: [email or web address]

## 2018-04-19 NOTE — DISCHARGE NOTE ADULT - CARE PLAN
Principal Discharge DX:	Nephrolithiasis  Goal:	s/p stent placement  Assessment and plan of treatment:	Drink plenty of fluids. Follow up with Dr. Charles  Secondary Diagnosis:	Diabetes type 2, controlled  Goal:	To maintain healthy blood glucose  Assessment and plan of treatment:	Take home medications as prescribed. Follow up with PCP outpatient  Secondary Diagnosis:	Hypertension  Goal:	To maintain healthy blood pressure  Assessment and plan of treatment:	Take home medications as prescribed. Follow up with PCP outpatient  Secondary Diagnosis:	Hyperlipidemia  Goal:	To maintain healthy cholesterol levels  Assessment and plan of treatment:	Take home medications as prescribed. Follow up with PCP outpatient Principal Discharge DX:	Nephrolithiasis  Goal:	s/p stent placement  Assessment and plan of treatment:	Drink plenty of fluids. Follow up with Dr. Charles next week  Secondary Diagnosis:	Diabetes type 2, controlled  Goal:	To maintain healthy blood glucose  Assessment and plan of treatment:	Take home medications as prescribed. Follow up with PCP outpatient  Secondary Diagnosis:	Hypertension  Goal:	To maintain healthy blood pressure  Assessment and plan of treatment:	Take home medications as prescribed. Follow up with PCP outpatient  Secondary Diagnosis:	Hyperlipidemia  Goal:	To maintain healthy cholesterol levels  Assessment and plan of treatment:	Take home medications as prescribed. Follow up with PCP outpatient

## 2018-04-19 NOTE — PROGRESS NOTE ADULT - ASSESSMENT
72F with sepsis POD # 3 s/p L stent placement 2/2 obstructing stone  -No AM labs  -Follow up cultures and add to culture list  -D/C home with 10 days Bactrim   -Will follow up as outpatient for definitive stone management

## 2018-04-19 NOTE — PROGRESS NOTE ADULT - SUBJECTIVE AND OBJECTIVE BOX
Progress Note    Subjective  Patient seen and examined in AM. No further fevers.  Cultures speciated with sensitivities. No nausea/vomiting, pain controlled.   Madden removed and voiding. OOB/ambulating.     Objective  Afebrile  HR: 86  BP: 145/85  SpO2: 94%    Void: 750 cc     Gen: NAD  Abd: soft, NT/ND    Diet/Fluids  ADA reg, IV lock        Cultures:  Urine Cx: Klebsiella  Blood Cx: prelim negative    Antibiotics: Ceftriaxone

## 2018-04-19 NOTE — DISCHARGE NOTE ADULT - HOSPITAL COURSE
72 year old female presents with septic left distal obstructing kidney stone. Patient admitted to urology. Started on empiric antibiotics with ceftriaxone. Patient emergently taken to the OR for cystoscopy, left ureteral stent placement. Post-op, patient hypotensive and transferred to the ICU for close monitoring. Patient remained stable and transferred to the floor. Madden discontinued with passed TOV. OR urines growing klebsiella. On the day of discharge, patient was tolerating diet, afebrile, voiding without difficulty with pain controlled. Patient deemed stable for discharge home

## 2018-04-19 NOTE — DISCHARGE NOTE ADULT - ADDITIONAL INSTRUCTIONS
Drink plenty of fluids. Ambulation encouraged. No heavy lifting or strenuous exercise. Take Bactrim for 10 days total. Please call to schedule an appointment with Dr. Charles outpatient [phone] Drink plenty of fluids. Ambulation encouraged. No heavy lifting or strenuous exercise. Take Bactrim for 10 days total. Please call to schedule an appointment with Dr. Charles outpatient  next week [phone]

## 2018-04-19 NOTE — DISCHARGE NOTE ADULT - MEDICATION SUMMARY - MEDICATIONS TO TAKE
I will START or STAY ON the medications listed below when I get home from the hospital:    Aspir 81 oral delayed release tablet  -- 1 tab(s) by mouth once a day  -- Indication: For Heart    losartan 50 mg oral tablet  -- 1 tab(s) by mouth once a day  -- Indication: For Hypertension    metFORMIN 500 mg oral tablet, extended release  -- 1 tab(s) by mouth once a day  -- Indication: For Diabetes type 2, controlled    atorvastatin 20 mg oral tablet  -- 1 tab(s) by mouth once a day  -- Indication: For Hyperlipidemia    atenolol 50 mg oral tablet  -- 1 tab(s) by mouth once a day  -- Indication: For Hypertension    Bactrim  mg-160 mg oral tablet  -- 1 tab(s) by mouth every 12 hours for infection  -- Avoid prolonged or excessive exposure to direct and/or artificial sunlight while taking this medication.  Finish all this medication unless otherwise directed by prescriber.  Medication should be taken with plenty of water.    -- Indication: For Calculus of kidney    Vitamin D3 2000 intl units oral tablet  -- 1 tab(s) by mouth once a day  -- Indication: For Nutrition

## 2018-04-19 NOTE — PROGRESS NOTE ADULT - ATTENDING COMMENTS
Patient seen and examined.  Agree with above.
Patient seen and examined.  Agree with above.
Patient seen and examined.  Agree with above.  Outpatient follow up to schedule Ureteroscopy laser lithotripsy.
presently off pressors  responded to fluids  reviewed with urology team.  OK for transfer to the floor

## 2018-04-19 NOTE — DISCHARGE NOTE ADULT - PLAN OF CARE
s/p stent placement Drink plenty of fluids. Follow up with Dr. Charles To maintain healthy blood glucose Take home medications as prescribed. Follow up with PCP outpatient To maintain healthy blood pressure To maintain healthy cholesterol levels Drink plenty of fluids. Follow up with Dr. Charles next week

## 2018-04-20 RX ORDER — ONDANSETRON 8 MG/1
1 TABLET, FILM COATED ORAL
Qty: 10 | Refills: 0 | OUTPATIENT
Start: 2018-04-20

## 2018-04-21 LAB
CULTURE RESULTS: SIGNIFICANT CHANGE UP
CULTURE RESULTS: SIGNIFICANT CHANGE UP
SPECIMEN SOURCE: SIGNIFICANT CHANGE UP
SPECIMEN SOURCE: SIGNIFICANT CHANGE UP

## 2018-04-23 PROBLEM — N20.0 CALCULUS OF KIDNEY: Chronic | Status: ACTIVE | Noted: 2018-04-16

## 2018-04-23 PROBLEM — E11.9 TYPE 2 DIABETES MELLITUS WITHOUT COMPLICATIONS: Chronic | Status: ACTIVE | Noted: 2018-04-16

## 2018-04-23 PROBLEM — E78.5 HYPERLIPIDEMIA, UNSPECIFIED: Chronic | Status: ACTIVE | Noted: 2018-04-16

## 2018-04-25 PROBLEM — Z00.00 ENCOUNTER FOR PREVENTIVE HEALTH EXAMINATION: Status: ACTIVE | Noted: 2018-04-25

## 2018-04-30 ENCOUNTER — APPOINTMENT (OUTPATIENT)
Dept: UROLOGY | Facility: CLINIC | Age: 73
End: 2018-04-30
Payer: MEDICARE

## 2018-04-30 ENCOUNTER — TRANSCRIPTION ENCOUNTER (OUTPATIENT)
Age: 73
End: 2018-04-30

## 2018-04-30 VITALS
DIASTOLIC BLOOD PRESSURE: 71 MMHG | TEMPERATURE: 98 F | RESPIRATION RATE: 16 BRPM | SYSTOLIC BLOOD PRESSURE: 111 MMHG | HEART RATE: 82 BPM

## 2018-04-30 DIAGNOSIS — N13.5 CROSSING VESSEL AND STRICTURE OF URETER W/OUT HYDRONEPHROSIS: ICD-10-CM

## 2018-04-30 PROCEDURE — 99214 OFFICE O/P EST MOD 30 MIN: CPT

## 2018-05-04 ENCOUNTER — INPATIENT (INPATIENT)
Facility: HOSPITAL | Age: 73
LOS: 6 days | Discharge: HOME | DRG: 292 | End: 2018-05-11
Attending: HOSPITALIST | Admitting: HOSPITALIST
Payer: COMMERCIAL

## 2018-05-04 VITALS
SYSTOLIC BLOOD PRESSURE: 125 MMHG | DIASTOLIC BLOOD PRESSURE: 84 MMHG | HEIGHT: 63 IN | HEART RATE: 105 BPM | TEMPERATURE: 98 F | OXYGEN SATURATION: 97 % | RESPIRATION RATE: 17 BRPM

## 2018-05-04 DIAGNOSIS — I50.9 HEART FAILURE, UNSPECIFIED: ICD-10-CM

## 2018-05-04 DIAGNOSIS — L03.115 CELLULITIS OF RIGHT LOWER LIMB: ICD-10-CM

## 2018-05-04 DIAGNOSIS — Z96.659 PRESENCE OF UNSPECIFIED ARTIFICIAL KNEE JOINT: Chronic | ICD-10-CM

## 2018-05-04 DIAGNOSIS — E11.9 TYPE 2 DIABETES MELLITUS WITHOUT COMPLICATIONS: ICD-10-CM

## 2018-05-04 DIAGNOSIS — N20.0 CALCULUS OF KIDNEY: Chronic | ICD-10-CM

## 2018-05-04 DIAGNOSIS — E78.5 HYPERLIPIDEMIA, UNSPECIFIED: ICD-10-CM

## 2018-05-04 DIAGNOSIS — R60.9 EDEMA, UNSPECIFIED: ICD-10-CM

## 2018-05-04 DIAGNOSIS — I10 ESSENTIAL (PRIMARY) HYPERTENSION: ICD-10-CM

## 2018-05-04 DIAGNOSIS — I48.2 CHRONIC ATRIAL FIBRILLATION: ICD-10-CM

## 2018-05-04 LAB
ALBUMIN SERPL ELPH-MCNC: 3.4 G/DL — SIGNIFICANT CHANGE UP (ref 3.3–5)
ALP SERPL-CCNC: 126 U/L — HIGH (ref 40–120)
ALT FLD-CCNC: 32 U/L — SIGNIFICANT CHANGE UP (ref 10–45)
ANION GAP SERPL CALC-SCNC: 14 MMOL/L — SIGNIFICANT CHANGE UP (ref 5–17)
APTT BLD: 29.1 SEC — SIGNIFICANT CHANGE UP (ref 27.5–37.4)
AST SERPL-CCNC: 31 U/L — SIGNIFICANT CHANGE UP (ref 10–40)
BACTERIA UR CULT: NORMAL
BASOPHILS # BLD AUTO: 0.1 K/UL — SIGNIFICANT CHANGE UP (ref 0–0.2)
BASOPHILS NFR BLD AUTO: 0.6 % — SIGNIFICANT CHANGE UP (ref 0–2)
BILIRUB SERPL-MCNC: 0.5 MG/DL — SIGNIFICANT CHANGE UP (ref 0.2–1.2)
BUN SERPL-MCNC: 12 MG/DL — SIGNIFICANT CHANGE UP (ref 7–23)
CALCIUM SERPL-MCNC: 9.4 MG/DL — SIGNIFICANT CHANGE UP (ref 8.4–10.5)
CHLORIDE SERPL-SCNC: 104 MMOL/L — SIGNIFICANT CHANGE UP (ref 96–108)
CK MB CFR SERPL CALC: 1.4 NG/ML — SIGNIFICANT CHANGE UP (ref 0–3.8)
CK SERPL-CCNC: 44 U/L — SIGNIFICANT CHANGE UP (ref 25–170)
CO2 SERPL-SCNC: 23 MMOL/L — SIGNIFICANT CHANGE UP (ref 22–31)
CREAT SERPL-MCNC: 0.88 MG/DL — SIGNIFICANT CHANGE UP (ref 0.5–1.3)
EOSINOPHIL # BLD AUTO: 0.1 K/UL — SIGNIFICANT CHANGE UP (ref 0–0.5)
EOSINOPHIL NFR BLD AUTO: 1 % — SIGNIFICANT CHANGE UP (ref 0–6)
GLUCOSE SERPL-MCNC: 121 MG/DL — HIGH (ref 70–99)
HCT VFR BLD CALC: 38.3 % — SIGNIFICANT CHANGE UP (ref 34.5–45)
HGB BLD-MCNC: 12 G/DL — SIGNIFICANT CHANGE UP (ref 11.5–15.5)
INR BLD: 1.24 RATIO — HIGH (ref 0.88–1.16)
LYMPHOCYTES # BLD AUTO: 1.7 K/UL — SIGNIFICANT CHANGE UP (ref 1–3.3)
LYMPHOCYTES # BLD AUTO: 17.4 % — SIGNIFICANT CHANGE UP (ref 13–44)
MCHC RBC-ENTMCNC: 28 PG — SIGNIFICANT CHANGE UP (ref 27–34)
MCHC RBC-ENTMCNC: 31.3 GM/DL — LOW (ref 32–36)
MCV RBC AUTO: 89.4 FL — SIGNIFICANT CHANGE UP (ref 80–100)
MONOCYTES # BLD AUTO: 0.8 K/UL — SIGNIFICANT CHANGE UP (ref 0–0.9)
MONOCYTES NFR BLD AUTO: 8.5 % — SIGNIFICANT CHANGE UP (ref 2–14)
NEUTROPHILS # BLD AUTO: 7.1 K/UL — SIGNIFICANT CHANGE UP (ref 1.8–7.4)
NEUTROPHILS NFR BLD AUTO: 72.5 % — SIGNIFICANT CHANGE UP (ref 43–77)
NT-PROBNP SERPL-SCNC: 3002 PG/ML — HIGH (ref 0–300)
PLATELET # BLD AUTO: 242 K/UL — SIGNIFICANT CHANGE UP (ref 150–400)
POTASSIUM SERPL-MCNC: 4.1 MMOL/L — SIGNIFICANT CHANGE UP (ref 3.5–5.3)
POTASSIUM SERPL-SCNC: 4.1 MMOL/L — SIGNIFICANT CHANGE UP (ref 3.5–5.3)
PROT SERPL-MCNC: 7.3 G/DL — SIGNIFICANT CHANGE UP (ref 6–8.3)
PROTHROM AB SERPL-ACNC: 13.6 SEC — HIGH (ref 9.8–12.7)
RBC # BLD: 4.28 M/UL — SIGNIFICANT CHANGE UP (ref 3.8–5.2)
RBC # FLD: 16.1 % — HIGH (ref 10.3–14.5)
SODIUM SERPL-SCNC: 141 MMOL/L — SIGNIFICANT CHANGE UP (ref 135–145)
TROPONIN T SERPL-MCNC: <0.01 NG/ML — SIGNIFICANT CHANGE UP (ref 0–0.06)
WBC # BLD: 9.8 K/UL — SIGNIFICANT CHANGE UP (ref 3.8–10.5)
WBC # FLD AUTO: 9.8 K/UL — SIGNIFICANT CHANGE UP (ref 3.8–10.5)

## 2018-05-04 PROCEDURE — 71045 X-RAY EXAM CHEST 1 VIEW: CPT | Mod: 26

## 2018-05-04 PROCEDURE — 93970 EXTREMITY STUDY: CPT | Mod: 26

## 2018-05-04 PROCEDURE — 99285 EMERGENCY DEPT VISIT HI MDM: CPT | Mod: 25

## 2018-05-04 PROCEDURE — 93010 ELECTROCARDIOGRAM REPORT: CPT

## 2018-05-04 PROCEDURE — 99223 1ST HOSP IP/OBS HIGH 75: CPT | Mod: AI

## 2018-05-04 RX ORDER — ASPIRIN/CALCIUM CARB/MAGNESIUM 324 MG
81 TABLET ORAL DAILY
Qty: 0 | Refills: 0 | Status: DISCONTINUED | OUTPATIENT
Start: 2018-05-04 | End: 2018-05-11

## 2018-05-04 RX ORDER — FUROSEMIDE 40 MG
40 TABLET ORAL ONCE
Qty: 0 | Refills: 0 | Status: COMPLETED | OUTPATIENT
Start: 2018-05-04 | End: 2018-05-04

## 2018-05-04 RX ORDER — FUROSEMIDE 40 MG
20 TABLET ORAL
Qty: 0 | Refills: 0 | Status: DISCONTINUED | OUTPATIENT
Start: 2018-05-04 | End: 2018-05-04

## 2018-05-04 RX ORDER — LOSARTAN POTASSIUM 100 MG/1
100 TABLET, FILM COATED ORAL DAILY
Qty: 0 | Refills: 0 | Status: DISCONTINUED | OUTPATIENT
Start: 2018-05-04 | End: 2018-05-07

## 2018-05-04 RX ORDER — INSULIN LISPRO 100/ML
VIAL (ML) SUBCUTANEOUS AT BEDTIME
Qty: 0 | Refills: 0 | Status: DISCONTINUED | OUTPATIENT
Start: 2018-05-04 | End: 2018-05-11

## 2018-05-04 RX ORDER — ATENOLOL 25 MG/1
100 TABLET ORAL
Qty: 0 | Refills: 0 | Status: DISCONTINUED | OUTPATIENT
Start: 2018-05-04 | End: 2018-05-06

## 2018-05-04 RX ORDER — CEPHALEXIN 500 MG
500 CAPSULE ORAL EVERY 6 HOURS
Qty: 0 | Refills: 0 | Status: DISCONTINUED | OUTPATIENT
Start: 2018-05-04 | End: 2018-05-11

## 2018-05-04 RX ORDER — ATORVASTATIN CALCIUM 80 MG/1
20 TABLET, FILM COATED ORAL AT BEDTIME
Qty: 0 | Refills: 0 | Status: DISCONTINUED | OUTPATIENT
Start: 2018-05-04 | End: 2018-05-11

## 2018-05-04 RX ORDER — INSULIN LISPRO 100/ML
VIAL (ML) SUBCUTANEOUS
Qty: 0 | Refills: 0 | Status: DISCONTINUED | OUTPATIENT
Start: 2018-05-04 | End: 2018-05-11

## 2018-05-04 RX ORDER — RIVAROXABAN 15 MG-20MG
20 KIT ORAL EVERY 24 HOURS
Qty: 0 | Refills: 0 | Status: DISCONTINUED | OUTPATIENT
Start: 2018-05-04 | End: 2018-05-11

## 2018-05-04 RX ORDER — DILTIAZEM HCL 120 MG
120 CAPSULE, EXT RELEASE 24 HR ORAL DAILY
Qty: 0 | Refills: 0 | Status: DISCONTINUED | OUTPATIENT
Start: 2018-05-04 | End: 2018-05-06

## 2018-05-04 RX ORDER — FUROSEMIDE 40 MG
20 TABLET ORAL
Qty: 0 | Refills: 0 | Status: DISCONTINUED | OUTPATIENT
Start: 2018-05-05 | End: 2018-05-06

## 2018-05-04 RX ORDER — INFLUENZA VIRUS VACCINE 15; 15; 15; 15 UG/.5ML; UG/.5ML; UG/.5ML; UG/.5ML
0.5 SUSPENSION INTRAMUSCULAR ONCE
Qty: 0 | Refills: 0 | Status: COMPLETED | OUTPATIENT
Start: 2018-05-04 | End: 2018-05-04

## 2018-05-04 RX ADMIN — ATENOLOL 100 MILLIGRAM(S): 25 TABLET ORAL at 20:27

## 2018-05-04 RX ADMIN — ATORVASTATIN CALCIUM 20 MILLIGRAM(S): 80 TABLET, FILM COATED ORAL at 22:33

## 2018-05-04 RX ADMIN — RIVAROXABAN 20 MILLIGRAM(S): KIT at 20:27

## 2018-05-04 RX ADMIN — Medication 40 MILLIGRAM(S): at 14:24

## 2018-05-04 NOTE — ED ADULT TRIAGE NOTE - HEIGHT IN FEET
5
Pt seen and examined , agree with most above noted  SVT  in sinus  EP consult for ablation  echo    elevated LFT  monitor   will get GI eval    hypothyroid  on synthroid  normal TSH

## 2018-05-04 NOTE — H&P ADULT - HISTORY OF PRESENT ILLNESS
72F h/o HTN, HLD, DM2, recently diagnosed Afib on Xarelto, nephrolithiasis s/p recent left ureteral stent, sent in by PCP with LE swelling. Pt says she has been drinking a lot of fluid since her kidney stone 2 weeks ago and her LE swelling has been progressively worsening since. Pt not on a water pill at home. Leg swelling has gotten so bad that she developed blisters that are weeping. Pt also states that her legs feel really heavy and is difficult to ambulate. Pt has had intermittent leg swelling in the past but never to this bad. Pt also reports some shortness of breath with exertion.

## 2018-05-04 NOTE — CHART NOTE - NSCHARTNOTEFT_GEN_A_CORE
Patient is a 72y old  Female who presents with a chief complaint of leg swelling (04 May 2018 17:51)  Pt seen seen & examined at bedside @ 20:30.  RN reported pt to be in KUSH noted on tele up to rates 160 bpm. Pt known to have AFib prior to hosp. admission, is on Atenolol & Xarelto.  Pt reported having elevated HR's an outpt., and her PMD raised the BB dose.   At bedside, pt seen seated at side of bed, denied having cp, sob, n/v, palpitations, but relates feeling "woozy", but no syncope or pre-syncope.  On tele, HR ranges high 90's - 120's in AFib.  Pt directed to return to bed, and maintain HOB > 30 deg., & she reported feeling less dizzy.      Vital Signs Last 24 Hrs  T(C): 37.1 (04 May 2018 20:12), Max: 37.2 (04 May 2018 14:36)  T(F): 98.7 (04 May 2018 20:12), Max: 99 (04 May 2018 14:36)  HR: 86 (04 May 2018 18:39) (85 - 105)  BP: 100/70 (04 May 2018 20:12) (100/70 - 125/84)  BP(mean): --  RR: 18 (04 May 2018 20:12) (17 - 18)  SpO2: 95% (04 May 2018 20:12) (95% - 99%)      Labs:                          12.0   9.8   )-----------( 242      ( 04 May 2018 14:29 )             38.3     05-04    141  |  104  |  12  ----------------------------<  121<H>  4.1   |  23  |  0.88    Ca    9.4      04 May 2018 14:29    TPro  7.3  /  Alb  3.4  /  TBili  0.5  /  DBili  x   /  AST  31  /  ALT  32  /  AlkPhos  126<H>  05-04        Radiology:    Physical Exam:  General: WN/WD NAD  Neurology: A&Ox3, nonfocal, THOMPSON x 4  Head:  Normocephalic, atraumatic  Respiratory: CTA B/L  CV: RRR, S1S2, no murmur  Abdominal: Soft, NT, ND no palpable mass  MSK: No edema, + peripheral pulses, FROM all 4 extremity    Assessment & Plan:  HPI:  72F h/o HTN, HLD, DM2, recently diagnosed Afib on Xarelto, nephrolithiasis s/p recent left ureteral stent, sent in by PCP with LE swelling. Pt says she has been drinking a lot of fluid since her kidney stone 2 weeks ago and her LE swelling has been progressively worsening since. Pt not on a water pill at home. Leg swelling has gotten so bad that she developed blisters that are weeping. Pt also states that her legs feel really heavy and is difficult to ambulate. Pt has had intermittent leg swelling in the past but never to this bad. Pt also reports some shortness of breath with exertion. (04 May 2018 17:51)    >  >  >  >        Follow up with Attending in AM. Patient is a 72y old  Female who presents with a chief complaint of leg swelling (04 May 2018 17:51)  Pt seen seen & examined at bedside @ 20:30.  RN reported pt to be in KUSH noted on tele up to rates 160 bpm. Pt known to have AFib prior to hosp. admission, is on Atenolol & Xarelto.  Pt reported having elevated HR's an outpt., and her PMD raised the BB dose.   At bedside, pt seen seated at side of bed, denied having cp, sob, n/v, palpitations, but relates feeling "woozy", but no syncope or pre-syncope.  On tele, HR ranges high 90's - 120's in AFib.  Pt directed to return to bed, and maintain HOB > 30 deg., & she reported feeling less dizzy.      Vital Signs Last 24 Hrs  T(C): 37.1 (04 May 2018 20:12), Max: 37.2 (04 May 2018 14:36)  T(F): 98.7 (04 May 2018 20:12), Max: 99 (04 May 2018 14:36)  HR: 86 (04 May 2018 18:39) (85 - 105)  BP: 100/70 (04 May 2018 20:12) (100/70 - 125/84)  BP(mean): --  RR: 18 (04 May 2018 20:12) (17 - 18)  SpO2: 95% (04 May 2018 20:12) (95% - 99%)      Labs:                          12.0   9.8   )-----------( 242      ( 04 May 2018 14:29 )             38.3     05-04    141  |  104  |  12  ----------------------------<  121<H>  4.1   |  23  |  0.88    Ca    9.4      04 May 2018 14:29    TPro  7.3  /  Alb  3.4  /  TBili  0.5  /  DBili  x   /  AST  31  /  ALT  32  /  AlkPhos  126<H>  05-04        Radiology:    Physical Exam:  General: WN/WD NAD  Neurology: A&Ox3, nonfocal, THOMPSON x 4  Head:  Normocephalic, atraumatic  Respiratory: CTA B/L  CV: irregular rate & rhythm,  S1S2, no murmur  Abdominal: Soft, non tender, non distended, + BS  MSK: 3+ roque LE edema, + peripheral pulses, FROM all 4 extremity    Assessment & Plan:  HPI:  72F h/o HTN, HLD, DM2, recently diagnosed Afib on Xarelto, nephrolithiasis s/p recent left ureteral stent, sent in by PCP with LE swelling. Pt says she has been drinking a lot of fluid since her kidney stone 2 weeks ago and her LE swelling has been progressively worsening since. Pt not on a water pill at home. Leg swelling has gotten so bad that she developed blisters that are weeping. Pt also states that her legs feel really heavy and is difficult to ambulate. Pt has had intermittent leg swelling in the past but never to this bad. Pt also reports some shortness of breath with exertion. (04 May 2018 17:51)    Pt seen for AFib with RVR up to 160 bpm. Pt initially felt dizzy while seated at edge of bed, improved when supine in bed. VSS.  PLAN:  >Continue to monitor on tele  >Continue Atenolol 100 mg PO BID & Cardizem  mg PO daily - both home meds  >Continue IV Lasix  >Keep K > 4 Mg > 2  >Will endorse to day team      Follow up with Attending in AM.  Dhara Auguste PA-C  #[id number]

## 2018-05-04 NOTE — ED PROVIDER NOTE - OBJECTIVE STATEMENT
72 YOF with AF, HLD, HTN p/w b/l lower extremity edema and erythema. Pt states she has had progressively worsening edema in b/l lower extremity. Pt denies any chest pain, back pain SOB, abdominal pain.

## 2018-05-04 NOTE — H&P ADULT - NSHPLABSRESULTS_GEN_ALL_CORE
12.0   9.8   )-----------( 242      ( 04 May 2018 14:29 )             38.3   05-04    141  |  104  |  12  ----------------------------<  121<H>  4.1   |  23  |  0.88    Ca    9.4      04 May 2018 14:29    TPro  7.3  /  Alb  3.4  /  TBili  0.5  /  DBili  x   /  AST  31  /  ALT  32  /  AlkPhos  126<H>  05-04    CARDIAC MARKERS ( 04 May 2018 14:29 )  x     / <0.01 ng/mL / 44 U/L / x     / 1.4 ng/mL    LE Duplex:   No evidence of bilateral proximal lower extremity deep venous thrombosis.   Calf veins are not visualized due to limb size and edema.    EKG tracing reviewed and interpreted: Afib w/ PVCs at 94bpm

## 2018-05-04 NOTE — ED PROVIDER NOTE - NS ED ROS FT
CONSTITUTIONAL: No fevers, no chills  Eyes: no visual changes  Ears: no ear drainage, no ear pain  Nose: no nasal congestion  Mouth/Throat: no sore throat  Cardiovascular: No Chest pain  Respiratory: No SOB  Gastrointestinal: No n/v/d, no abd pain  Genitourinary: no dysuria, no hematuria  SKIN: b/l lower extremity edema and erythema   NEURO: no headache  PSYCHIATRIC: no known mental health issues.

## 2018-05-04 NOTE — H&P ADULT - PROBLEM SELECTOR PLAN 1
-suspect acute unspecified heart failure d/t LE edema, orthopnea, TODD, elevated BNP  -check echo  -continue diuresis with IV Lasix  -strict I/Os, daily weights  -monitor lytes

## 2018-05-04 NOTE — H&P ADULT - PROBLEM SELECTOR PLAN 2
-as above  -check UA and r/o proteinuria   -LE dopplers negative for DVT -as above  -check UA and r/o proteinuria   -albumin normal   -LE dopplers negative for DVT

## 2018-05-04 NOTE — H&P ADULT - PROBLEM SELECTOR PLAN 4
-recently diagnosed as outpatient   -currently rate controlled, monitor on tele   -continue Atenolol and Diltiazem   -continue anticoagulation with Xarelto  -check TSH

## 2018-05-04 NOTE — PROVIDER CONTACT NOTE (OTHER) - ASSESSMENT
Patient is A&Ox4, c/o feeling "woozy" and lightheaded, no other complaints. , BP: 100/70, RR 18, O2 sat 955 on RA.

## 2018-05-04 NOTE — ED ADULT NURSE NOTE - OBJECTIVE STATEMENT
72y f pt arrived in ed with  sent by md for swelling/redness and weeping to [facility]; pt states has been swollen for "a while" redness/swelling and weeping started 2 days ago; pt states its just hard to walk cause legs are so heavy; aox3; no resp distress; no chest pain; no abd pain; pt obese; [facility] redness/warmth/swelling below knee; right leg worse; pt state had blisters but they broke and were weeping; skin warm dry; iv placed; labs drawn; pt medicated per md order; safety/comfort maintained;  at bedside; pt ambulated to bathroom on own 72y f pt arrived in ed with  sent by md for swelling/redness and weeping to [facility]; pt states has been swollen for "a while" redness/swelling and weeping started 2 days ago; pt states its just hard to walk cause legs are so heavy; pt has recent hx of kidney stone and stenting; was told by md to drink lots of water to move kidney stone; aox3; no resp distress; no chest pain; no abd pain; pt obese; [facility] below [facility]; right leg worse; pt state had blisters but they broke and were weeping; skin warm dry; iv placed; labs drawn; pt medicated per md order; safety/comfort maintained;  at bedside; pt ambulated to bathroom on own

## 2018-05-04 NOTE — H&P ADULT - ASSESSMENT
72F h/o HTN, HLD, DM2, recently diagnosed Afib on Xarelto, nephrolithiasis s/p recent left ureteral stent, sent in by PCP with worsening LE swelling, probably due to acute unspecified heart failure.

## 2018-05-04 NOTE — H&P ADULT - NSHPPHYSICALEXAM_GEN_ALL_CORE
Vital Signs Last 24 Hrs  T(C): 37.2 (04 May 2018 14:36), Max: 37.2 (04 May 2018 14:36)  T(F): 99 (04 May 2018 14:36), Max: 99 (04 May 2018 14:36)  HR: 85 (04 May 2018 14:36) (85 - 105)  BP: 121/82 (04 May 2018 14:36) (121/82 - 125/84)  BP(mean): --  RR: 18 (04 May 2018 14:36) (17 - 18)  SpO2: 99% (04 May 2018 14:36) (97% - 99%)

## 2018-05-04 NOTE — ED PROVIDER NOTE - ATTENDING CONTRIBUTION TO CARE
Private Physician [facility]  72y female pmh Afib, DMT2, HTN, HLD, no mi/cad/pcta, cancer Pt SP ureteral stent 4/16 , travel. Pt comes to ed complains of Per leg swelling two weeks with, weeping rt leg past two days, No fever chills cough, cp shortness of breath. Legs pain with weakness/heavyness with diff ambulating "I can barely lift my lefs" Seen by pmd and referred to hosptialits for admit.   PE Adult female awake alert NCAT Chest clear anterior & posterior abd soft + bs Cv rubs, gallops or murmurs, PER lower extr marked edema   Onesimo Fofana MD, Facep Private Physician [facility]  72y female pmh Afib, DMT2, HTN, HLD, no mi/cad/pcta, cancer Pt SP ureteral stent 4/16 , travel. Pt comes to ed complains of Per leg swelling two weeks with, weeping rt leg past two days, No fever chills cough, cp shortness of breath. Legs pain with weakness/heavyness with diff ambulating "I can barely lift my legs" Seen by pmd and referred to hosptialits for admit.   PE Adult female awake alert NCAT Chest clear anterior & posterior abd soft + bs Cv rubs, gallops or murmurs, PER lower extr marked edema   Onesimo Fofana MD, Facep

## 2018-05-05 LAB
ANION GAP SERPL CALC-SCNC: 12 MMOL/L — SIGNIFICANT CHANGE UP (ref 5–17)
APPEARANCE UR: CLEAR — SIGNIFICANT CHANGE UP
BACTERIA # UR AUTO: NEGATIVE — SIGNIFICANT CHANGE UP
BASOPHILS # BLD AUTO: 0.01 K/UL — SIGNIFICANT CHANGE UP (ref 0–0.2)
BASOPHILS NFR BLD AUTO: 0.1 % — SIGNIFICANT CHANGE UP (ref 0–2)
BILIRUB UR-MCNC: NEGATIVE — SIGNIFICANT CHANGE UP
BUN SERPL-MCNC: 14 MG/DL — SIGNIFICANT CHANGE UP (ref 7–23)
CALCIUM SERPL-MCNC: 9.1 MG/DL — SIGNIFICANT CHANGE UP (ref 8.4–10.5)
CHLORIDE SERPL-SCNC: 102 MMOL/L — SIGNIFICANT CHANGE UP (ref 96–108)
CO2 SERPL-SCNC: 26 MMOL/L — SIGNIFICANT CHANGE UP (ref 22–31)
COLOR SPEC: YELLOW — SIGNIFICANT CHANGE UP
CREAT ?TM UR-MCNC: 73 MG/DL — SIGNIFICANT CHANGE UP
CREAT SERPL-MCNC: 0.74 MG/DL — SIGNIFICANT CHANGE UP (ref 0.5–1.3)
DIFF PNL FLD: ABNORMAL
EOSINOPHIL # BLD AUTO: 0.13 K/UL — SIGNIFICANT CHANGE UP (ref 0–0.5)
EOSINOPHIL NFR BLD AUTO: 1.5 % — SIGNIFICANT CHANGE UP (ref 0–6)
EPI CELLS # UR: 11 /HPF — HIGH (ref 0–5)
GLUCOSE SERPL-MCNC: 147 MG/DL — HIGH (ref 70–99)
GLUCOSE UR QL: NEGATIVE MG/DL — SIGNIFICANT CHANGE UP
HBA1C BLD-MCNC: 7.6 % — HIGH (ref 4–5.6)
HCT VFR BLD CALC: 34.6 % — SIGNIFICANT CHANGE UP (ref 34.5–45)
HGB BLD-MCNC: 10.7 G/DL — LOW (ref 11.5–15.5)
HYALINE CASTS # UR AUTO: 7 /LPF — SIGNIFICANT CHANGE UP (ref 0–7)
IMM GRANULOCYTES NFR BLD AUTO: 0.6 % — SIGNIFICANT CHANGE UP (ref 0–1.5)
KETONES UR-MCNC: NEGATIVE — SIGNIFICANT CHANGE UP
LEUKOCYTE ESTERASE UR-ACNC: ABNORMAL
LYMPHOCYTES # BLD AUTO: 1.9 K/UL — SIGNIFICANT CHANGE UP (ref 1–3.3)
LYMPHOCYTES # BLD AUTO: 22.4 % — SIGNIFICANT CHANGE UP (ref 13–44)
MCHC RBC-ENTMCNC: 27 PG — SIGNIFICANT CHANGE UP (ref 27–34)
MCHC RBC-ENTMCNC: 30.9 GM/DL — LOW (ref 32–36)
MCV RBC AUTO: 87.2 FL — SIGNIFICANT CHANGE UP (ref 80–100)
MONOCYTES # BLD AUTO: 0.8 K/UL — SIGNIFICANT CHANGE UP (ref 0–0.9)
MONOCYTES NFR BLD AUTO: 9.4 % — SIGNIFICANT CHANGE UP (ref 2–14)
NEUTROPHILS # BLD AUTO: 5.61 K/UL — SIGNIFICANT CHANGE UP (ref 1.8–7.4)
NEUTROPHILS NFR BLD AUTO: 66 % — SIGNIFICANT CHANGE UP (ref 43–77)
NITRITE UR-MCNC: NEGATIVE — SIGNIFICANT CHANGE UP
PH UR: 5.5 — SIGNIFICANT CHANGE UP (ref 5–8)
PLATELET # BLD AUTO: 236 K/UL — SIGNIFICANT CHANGE UP (ref 150–400)
POTASSIUM SERPL-MCNC: 3.6 MMOL/L — SIGNIFICANT CHANGE UP (ref 3.5–5.3)
POTASSIUM SERPL-SCNC: 3.6 MMOL/L — SIGNIFICANT CHANGE UP (ref 3.5–5.3)
PROT ?TM UR-MCNC: 33 MG/DL — HIGH (ref 0–12)
PROT UR-MCNC: ABNORMAL MG/DL
PROT/CREAT UR-RTO: 0.5 RATIO — HIGH (ref 0–0.2)
RBC # BLD: 3.97 M/UL — SIGNIFICANT CHANGE UP (ref 3.8–5.2)
RBC # FLD: 18.2 % — HIGH (ref 10.3–14.5)
RBC CASTS # UR COMP ASSIST: 59 /HPF — HIGH (ref 0–4)
SODIUM SERPL-SCNC: 140 MMOL/L — SIGNIFICANT CHANGE UP (ref 135–145)
SP GR SPEC: 1.01 — SIGNIFICANT CHANGE UP (ref 1.01–1.02)
TSH SERPL-MCNC: 10.09 UIU/ML — HIGH (ref 0.27–4.2)
UROBILINOGEN FLD QL: NEGATIVE MG/DL — SIGNIFICANT CHANGE UP
WBC # BLD: 8.5 K/UL — SIGNIFICANT CHANGE UP (ref 3.8–10.5)
WBC # FLD AUTO: 8.5 K/UL — SIGNIFICANT CHANGE UP (ref 3.8–10.5)
WBC UR QL: 18 /HPF — HIGH (ref 0–5)

## 2018-05-05 RX ADMIN — Medication 500 MILLIGRAM(S): at 23:48

## 2018-05-05 RX ADMIN — Medication 120 MILLIGRAM(S): at 05:14

## 2018-05-05 RX ADMIN — Medication 20 MILLIGRAM(S): at 18:33

## 2018-05-05 RX ADMIN — Medication 500 MILLIGRAM(S): at 00:19

## 2018-05-05 RX ADMIN — ATENOLOL 100 MILLIGRAM(S): 25 TABLET ORAL at 05:14

## 2018-05-05 RX ADMIN — Medication 1: at 08:25

## 2018-05-05 RX ADMIN — Medication 20 MILLIGRAM(S): at 06:41

## 2018-05-05 RX ADMIN — Medication 81 MILLIGRAM(S): at 12:03

## 2018-05-05 RX ADMIN — Medication 500 MILLIGRAM(S): at 12:03

## 2018-05-05 RX ADMIN — ATORVASTATIN CALCIUM 20 MILLIGRAM(S): 80 TABLET, FILM COATED ORAL at 21:41

## 2018-05-05 RX ADMIN — LOSARTAN POTASSIUM 100 MILLIGRAM(S): 100 TABLET, FILM COATED ORAL at 05:14

## 2018-05-05 RX ADMIN — Medication 500 MILLIGRAM(S): at 05:14

## 2018-05-05 RX ADMIN — Medication 500 MILLIGRAM(S): at 17:26

## 2018-05-05 RX ADMIN — RIVAROXABAN 20 MILLIGRAM(S): KIT at 17:28

## 2018-05-05 RX ADMIN — ATENOLOL 100 MILLIGRAM(S): 25 TABLET ORAL at 17:27

## 2018-05-06 DIAGNOSIS — Z29.9 ENCOUNTER FOR PROPHYLACTIC MEASURES, UNSPECIFIED: ICD-10-CM

## 2018-05-06 DIAGNOSIS — R94.6 ABNORMAL RESULTS OF THYROID FUNCTION STUDIES: ICD-10-CM

## 2018-05-06 LAB
ANION GAP SERPL CALC-SCNC: 13 MMOL/L — SIGNIFICANT CHANGE UP (ref 5–17)
BUN SERPL-MCNC: 14 MG/DL — SIGNIFICANT CHANGE UP (ref 7–23)
CALCIUM SERPL-MCNC: 9.3 MG/DL — SIGNIFICANT CHANGE UP (ref 8.4–10.5)
CHLORIDE SERPL-SCNC: 98 MMOL/L — SIGNIFICANT CHANGE UP (ref 96–108)
CO2 SERPL-SCNC: 30 MMOL/L — SIGNIFICANT CHANGE UP (ref 22–31)
CREAT SERPL-MCNC: 0.79 MG/DL — SIGNIFICANT CHANGE UP (ref 0.5–1.3)
GLUCOSE SERPL-MCNC: 189 MG/DL — HIGH (ref 70–99)
HCT VFR BLD CALC: 40.3 % — SIGNIFICANT CHANGE UP (ref 34.5–45)
HGB BLD-MCNC: 12.3 G/DL — SIGNIFICANT CHANGE UP (ref 11.5–15.5)
MCHC RBC-ENTMCNC: 26.9 PG — LOW (ref 27–34)
MCHC RBC-ENTMCNC: 30.5 GM/DL — LOW (ref 32–36)
MCV RBC AUTO: 88 FL — SIGNIFICANT CHANGE UP (ref 80–100)
PLATELET # BLD AUTO: 241 K/UL — SIGNIFICANT CHANGE UP (ref 150–400)
POTASSIUM SERPL-MCNC: 3.7 MMOL/L — SIGNIFICANT CHANGE UP (ref 3.5–5.3)
POTASSIUM SERPL-SCNC: 3.7 MMOL/L — SIGNIFICANT CHANGE UP (ref 3.5–5.3)
RBC # BLD: 4.58 M/UL — SIGNIFICANT CHANGE UP (ref 3.8–5.2)
RBC # FLD: 18.1 % — HIGH (ref 10.3–14.5)
SODIUM SERPL-SCNC: 141 MMOL/L — SIGNIFICANT CHANGE UP (ref 135–145)
WBC # BLD: 10.1 K/UL — SIGNIFICANT CHANGE UP (ref 3.8–10.5)
WBC # FLD AUTO: 10.1 K/UL — SIGNIFICANT CHANGE UP (ref 3.8–10.5)

## 2018-05-06 PROCEDURE — 99233 SBSQ HOSP IP/OBS HIGH 50: CPT

## 2018-05-06 RX ORDER — METOPROLOL TARTRATE 50 MG
100 TABLET ORAL DAILY
Qty: 0 | Refills: 0 | Status: DISCONTINUED | OUTPATIENT
Start: 2018-05-06 | End: 2018-05-07

## 2018-05-06 RX ORDER — BUMETANIDE 0.25 MG/ML
2 INJECTION INTRAMUSCULAR; INTRAVENOUS
Qty: 0 | Refills: 0 | Status: DISCONTINUED | OUTPATIENT
Start: 2018-05-06 | End: 2018-05-07

## 2018-05-06 RX ORDER — ACETAMINOPHEN 500 MG
650 TABLET ORAL ONCE
Qty: 0 | Refills: 0 | Status: COMPLETED | OUTPATIENT
Start: 2018-05-06 | End: 2018-05-06

## 2018-05-06 RX ORDER — POTASSIUM CHLORIDE 20 MEQ
20 PACKET (EA) ORAL ONCE
Qty: 0 | Refills: 0 | Status: COMPLETED | OUTPATIENT
Start: 2018-05-06 | End: 2018-05-06

## 2018-05-06 RX ADMIN — Medication 1: at 12:16

## 2018-05-06 RX ADMIN — Medication 650 MILLIGRAM(S): at 03:45

## 2018-05-06 RX ADMIN — Medication 20 MILLIGRAM(S): at 05:29

## 2018-05-06 RX ADMIN — Medication 500 MILLIGRAM(S): at 17:36

## 2018-05-06 RX ADMIN — Medication 120 MILLIGRAM(S): at 05:29

## 2018-05-06 RX ADMIN — Medication 500 MILLIGRAM(S): at 05:29

## 2018-05-06 RX ADMIN — Medication 500 MILLIGRAM(S): at 12:16

## 2018-05-06 RX ADMIN — Medication 20 MILLIEQUIVALENT(S): at 16:55

## 2018-05-06 RX ADMIN — Medication 2: at 16:55

## 2018-05-06 RX ADMIN — BUMETANIDE 2 MILLIGRAM(S): 0.25 INJECTION INTRAMUSCULAR; INTRAVENOUS at 20:11

## 2018-05-06 RX ADMIN — Medication 81 MILLIGRAM(S): at 12:16

## 2018-05-06 RX ADMIN — RIVAROXABAN 20 MILLIGRAM(S): KIT at 17:36

## 2018-05-06 RX ADMIN — ATENOLOL 100 MILLIGRAM(S): 25 TABLET ORAL at 17:36

## 2018-05-06 RX ADMIN — ATORVASTATIN CALCIUM 20 MILLIGRAM(S): 80 TABLET, FILM COATED ORAL at 21:39

## 2018-05-06 RX ADMIN — LOSARTAN POTASSIUM 100 MILLIGRAM(S): 100 TABLET, FILM COATED ORAL at 05:29

## 2018-05-06 RX ADMIN — Medication 500 MILLIGRAM(S): at 23:22

## 2018-05-06 RX ADMIN — ATENOLOL 100 MILLIGRAM(S): 25 TABLET ORAL at 05:28

## 2018-05-06 RX ADMIN — Medication 1: at 08:24

## 2018-05-06 RX ADMIN — Medication 650 MILLIGRAM(S): at 02:44

## 2018-05-06 NOTE — PROGRESS NOTE ADULT - ATTENDING COMMENTS
Plan of care d/w NP (Chetna), and patient and her sister at bedside.    Jassi Ham M.D.  Hospitalist  Pager: [phone] Plan of care d/w NP (Chetna), and patient and her sister at bedside. d/w cardiology.    Jassi Ham M.D.  Hospitalist  Pager: [phone]

## 2018-05-06 NOTE — CONSULT NOTE ADULT - NSHPATTENDINGPLANDISCUSS_GEN_ALL_CORE
To reach Cardiology Attending call during weekdays Spectra [phone] or Fellow [phone]. Medicine. To reach Cardiology Attending call during weekdays [facility] [phone] or Fellow [phone].

## 2018-05-06 NOTE — CHART NOTE - NSCHARTNOTEFT_GEN_A_CORE
NP Note  - generalized weakness    called by RN for generalized weakness. pt seen and examined in the bed. pt felt dizziness, fatigue, and stomach upset while sitting in the chair and went to a bathroom and felt worsening of symptoms. now all symptoms are resolved after resting in the bed with 2L N/C O2 supply. v/s stable. all labs unremarkable. awaiting Echo and cardio c/s ([facility] called this afternoon). will continue to monitor.  Devin Basilio, LENIN  [phone]

## 2018-05-06 NOTE — CONSULT NOTE ADULT - ASSESSMENT
72F w/ PMHx of HTN, HLD, DM2, recently diagnosed AFl (CHADS2-VASC:4, on Xarelto), nephrolithiasis (s/p recent L ureteral stent) who presents from her PCPs office with worsening LE swelling. Found to have clinical signs of acute HF - suspect w/ rEF as addition of Cardizem coincided w/ progression of symptoms however this is nonspecific. Current SPBs in the 90s - 110s point away from chronic HTN causing HFpEF. The patient was found to have an elevated BNP, Edema and elevated JVP (w/ out overt rales on my exam), R-sided HF also a possibility. Tachy induced CM on DDx given recent Dx of AFl (typical AFl on ECG).    Plan:    1. Acute HF (likely w/ rEF)  -f/u TTE- ordered, pending  -D/C Cardizem  -Change Atenolol 100 bid to Toprol 100 daily (d/w Pharmacy)  -c/w Losartan 100 daily  -Change Lasix 20 IV bid to Bumex 2mg IV bid  -Strict I/Os, daily standing weights  -continue to monitor on Tele  -TSH:10 (care per Medicine)  -If HFrEF confirmed will need to consider ischemic evaluation    2. Typical AFl w/ variable block  -change Atenolol to Toprol and D/C Cardizem as above  -continue to monitor on Tele  -c/w Xarelto 20 daily  -Can consider AFl ablation pending clinical course    3. HLD  -c/w Lipitor 20    General Cardiology will continue to follow    OSCAR Goss  Cardiology Fellow  (p): [phone]

## 2018-05-06 NOTE — CONSULT NOTE ADULT - SUBJECTIVE AND OBJECTIVE BOX
Patient seen and evaluated at bedside on 6 [place] with  and sister at bedside    The patient does not have a Cardiologist. Her PCP is Dr. Dong Machuca    Chief Complaint: "My legs were getting bigger" x weeks    HPI: 72F w/ PMHx of HTN, HLD, DM2, recently diagnosed "AF" (CHADS2-VASC:4, on Xarelto), nephrolithiasis (s/p recent L ureteral stent) who presents from her PCPs office with worsening LE swelling. She reports that since she was Dx w/ "AF" 2w ago her legs have been increasing in size and they are becoming hard. She reports LE edema and red legs for a long time and has been sleeping sitting up on the couch for the past year. She did not notice a decline in her functional status until being diagnosed with the arrhythmia - now she has to walk slowly up 1 flight of stairs. She does not know if she gained weight and denied PND. She reports occasional palpitations and a chest, "discomfort", that resolved w/ ASA 81. When the "AF" was diagnosed 2s ago her Atenolol was increased from 50 bid t o100 bid, Cardizem  was added in addition to starting Xarelto. The patient denies ever having had a TTE, Stress or Cath in the past. General Cardiology called for heart failure    PMHx:   DM2  HLD  HTN  "AF"  Nephrolithiasis (s/p stent)    PSHx: Status post total knee replacement - b/l 1998 and 2000    Allergies: No Known Allergies    Home Medications:  Aspirin Enteric Coated 81 mg oral delayed release tablet: 1 tab(s) orally once a day (04 May 2018 18:04)  atenolol 100 mg oral tablet: 1 tab(s) orally 2 times a day (04 May 2018 18:04)  atorvastatin 20 mg oral tablet: 1 tab(s) orally once a day (04 May 2018 18:04)  DilTIAZem Hydrochloride  mg/24 hours oral capsule, extended release: 1 cap(s) orally once a day (04 May 2018 18:04)  losartan 100 mg oral tablet: 1 tab(s) orally once a day (04 May 2018 18:04)  metFORMIN 500 mg oral tablet, extended release: 1 tab(s) orally once a day (04 May 2018 18:04)  Vitamin D3 2000 intl units oral tablet: 1 tab(s) orally once a day (04 May 2018 18:04)  Xarelto 20 mg oral tablet: 1 tab(s) orally once a day (in the evening) (04 May 2018 18:04)    Current Medications:   aspirin enteric coated 81 milliGRAM(s) Oral daily  ATENolol  Tablet 100 milliGRAM(s) Oral two times a day  atorvastatin 20 milliGRAM(s) Oral at bedtime  cephalexin 500 milliGRAM(s) Oral every 6 hours  diltiazem    milliGRAM(s) Oral daily  furosemide   Injectable 20 milliGRAM(s) IV Push two times a day  influenza   Vaccine 0.5 milliLiter(s) IntraMuscular once  insulin lispro (HumaLOG) corrective regimen sliding scale   SubCutaneous three times a day before meals  insulin lispro (HumaLOG) corrective regimen sliding scale   SubCutaneous at bedtime  losartan 100 milliGRAM(s) Oral daily  rivaroxaban 20 milliGRAM(s) Oral every 24 hours    FAMILY HISTORY: Her father was diagnosed with HF in his 70s    Social History: , never worked  Smoking History: Denied  Alcohol Use: Denied  Drug Use: Denied    REVIEW OF SYSTEMS:  CONSTITUTIONAL: No weakness, fevers or chills  EYES/ENT: No visual changes;  No dysphagia  NECK: No pain or stiffness  RESPIRATORY: No cough, wheezing, hemoptysis; + shortness of breath  CARDIOVASCULAR: + chest pain (discomfort), + palpitations; + lower extremity edema  GASTROINTESTINAL: No abdominal or epigastric pain. + nausea, no vomiting, or hematemesis; No diarrhea or constipation. No melena or hematochezia.  BACK: No back pain  GENITOURINARY: No dysuria, frequency or hematuria  NEUROLOGICAL: No numbness or weakness  SKIN: No itching, burning, rashes, or lesions   All other review of systems is negative unless indicated above.    Physical Exam:  T(F): 98.3 (05-06), Max: 98.3 (05-06)  HR: 90 (05-06) (66 - 91)  BP: 104/68 (05-06) (99/66 - 116/77)  RR: 18 (05-06)  SpO2: 98% on 2L NC    I/Os: 480/450 - net + 30cc    GENERAL: No acute distress, well-developed, breathing comfortably on 2L NC, no accessory muscle use, speaking in full sentences  HEAD:  Atraumatic, Normocephalic  ENT: EOMI, PERRLA, conjunctiva and sclera clear, Neck supple, JVP elevated 5cm above the sternal notch with the bed at 45 degrees, moist mucosa  CHEST/LUNG: Clear to auscultation bilaterally; No wheeze, equal breath sounds bilaterally, no rales  BACK: No spinal tenderness  HEART: Irregular rhythm, not tachy on my exam, No murmurs, rubs, or gallops  ABDOMEN: Obese, Soft, Nontender, Nondistended; Bowel sounds present  EXTREMITIES:  No clubbing, cyanosis, significant b/l LE edema above the knees b/l w/ chronic venous stasis changes of b/l LE  PSYCH: Nl behavior, nl affect  NEUROLOGY: AAOx3, non-focal, cranial nerves intact  SKIN: see extrem  LINES: PIV    Cardiovascular Diagnostic Testing:    Tele: AFl 80s - 100s    ECG: Personally reviewed: Typical AFl @ 94 (variable block)    Echo: No prior in [place], TTE from this admission ordered and pending    Stress Testing: No prior in [place]    Cath: No prior in [place]    Imaging:    CXR: Personally reviewed: from 5/4 - clear lungs, enlarged cardiac silouette    < from: [facility] Duplex Lower Ext Vein Scan, Bilat (05.04.18 @ 15:54) > IMPRESSION:  No evidence of bilateral proximal lower extremity deep venous thrombosis.  Calf veins are not visualized due to limb size and edema. < end of copied text >    Labs: Personally reviewed                        12.3   10.10 )-----------( 241      ( 06 May 2018 14:25 )             40.3     05-06    141  |  98  |  14  ----------------------------<  189<H>  3.7   |  30  |  0.79    Ca    9.3      06 May 2018 12:11    Serum Pro-Brain Natriuretic Peptide: 3002 pg/mL (05-04 @ 14:29)    Hemoglobin A1C, Whole Blood: 7.6 % (05-05 @ 11:05)    Thyroid Stimulating Hormone, Serum: 10.09 uIU/mL (05-05 @ 11:05) Patient seen and evaluated at bedside on 6 [place] with  and sister at bedside    The patient does not have a Cardiologist. Her PCP is Dr. Dong Machuca    Chief Complaint: "My legs were getting bigger" x weeks    HPI: 72F w/ PMHx of HTN, HLD, DM2, recently diagnosed "AF" (CHADS2-VASC:4, on Xarelto), nephrolithiasis (s/p recent L ureteral stent) who presents from her PCPs office with worsening LE swelling. She reports that since she was Dx w/ "AF" 2w ago her legs have been increasing in size and they are becoming hard. She reports LE edema and red legs for a long time and has been sleeping sitting up on the couch for the past year. She did not notice a decline in her functional status until being diagnosed with the arrhythmia - now she has to walk slowly up 1 flight of stairs. She does not know if she gained weight and denied PND. She reports occasional palpitations and a chest, "discomfort", that resolved w/ ASA 81. When the "AF" was diagnosed 2s ago her Atenolol was increased from 50 bid t o100 bid, Cardizem  was added in addition to starting Xarelto. The patient denies ever having had a TTE, Stress or Cath in the past. General Cardiology called for heart failure    PMHx:   DM2  HLD  HTN  "AF"  Nephrolithiasis (s/p stent)    PSHx: Status post total knee replacement - b/l 1998 and 2000    REVIEW OF SYSTEMS:  CONSTITUTIONAL: No weakness, fevers or chills  EYES/ENT: No visual changes;  No dysphagia  NECK: No pain or stiffness  RESPIRATORY: No cough, wheezing, hemoptysis; + shortness of breath  CARDIOVASCULAR: + chest pain (discomfort), + palpitations; + lower extremity edema  GASTROINTESTINAL: No abdominal or epigastric pain. + nausea, no vomiting, or hematemesis; No diarrhea or constipation. No melena or hematochezia.  BACK: No back pain  GENITOURINARY: No dysuria, frequency or hematuria  NEUROLOGICAL: No numbness or weakness  SKIN: No itching, burning, rashes, or lesions   All other review of systems is negative unless indicated above.      Allergies: No Known Allergies    Home Medications:  Aspirin Enteric Coated 81 mg oral delayed release tablet: 1 tab(s) orally once a day (04 May 2018 18:04)  atenolol 100 mg oral tablet: 1 tab(s) orally 2 times a day (04 May 2018 18:04)  atorvastatin 20 mg oral tablet: 1 tab(s) orally once a day (04 May 2018 18:04)  DilTIAZem Hydrochloride  mg/24 hours oral capsule, extended release: 1 cap(s) orally once a day (04 May 2018 18:04)  losartan 100 mg oral tablet: 1 tab(s) orally once a day (04 May 2018 18:04)  metFORMIN 500 mg oral tablet, extended release: 1 tab(s) orally once a day (04 May 2018 18:04)  Vitamin D3 2000 intl units oral tablet: 1 tab(s) orally once a day (04 May 2018 18:04)  Xarelto 20 mg oral tablet: 1 tab(s) orally once a day (in the evening) (04 May 2018 18:04)    Current Medications:   aspirin enteric coated 81 milliGRAM(s) Oral daily  ATENolol  Tablet 100 milliGRAM(s) Oral two times a day  atorvastatin 20 milliGRAM(s) Oral at bedtime  cephalexin 500 milliGRAM(s) Oral every 6 hours  diltiazem    milliGRAM(s) Oral daily  furosemide   Injectable 20 milliGRAM(s) IV Push two times a day  influenza   Vaccine 0.5 milliLiter(s) IntraMuscular once  insulin lispro (HumaLOG) corrective regimen sliding scale   SubCutaneous three times a day before meals  insulin lispro (HumaLOG) corrective regimen sliding scale   SubCutaneous at bedtime  losartan 100 milliGRAM(s) Oral daily  rivaroxaban 20 milliGRAM(s) Oral every 24 hours    FAMILY HISTORY: Her father was diagnosed with HF in his 70s    Social History: , never worked  Smoking History: Denied  Alcohol Use: Denied  Drug Use: Denied    REVIEW OF SYSTEMS:  CONSTITUTIONAL: No weakness, fevers or chills  EYES/ENT: No visual changes;  No dysphagia  NECK: No pain or stiffness  RESPIRATORY: No cough, wheezing, hemoptysis; + shortness of breath  CARDIOVASCULAR: + chest pain (discomfort), + palpitations; + lower extremity edema  GASTROINTESTINAL: No abdominal or epigastric pain. + nausea, no vomiting, or hematemesis; No diarrhea or constipation. No melena or hematochezia.  BACK: No back pain  GENITOURINARY: No dysuria, frequency or hematuria  NEUROLOGICAL: No numbness or weakness  SKIN: No itching, burning, rashes, or lesions   All other review of systems is negative unless indicated above.    Physical Exam:  T(F): 98.3 (05-06), Max: 98.3 (05-06)  HR: 90 (05-06) (66 - 91)  BP: 104/68 (05-06) (99/66 - 116/77)  RR: 18 (05-06)  SpO2: 98% on 2L NC    I/Os: 480/450 - net + 30cc    GENERAL: No acute distress, well-developed, breathing comfortably on 2L NC, no accessory muscle use, speaking in full sentences  HEAD:  Atraumatic, Normocephalic  ENT: EOMI, PERRLA, conjunctiva and sclera clear, Neck supple, JVP elevated 5cm above the sternal notch with the bed at 45 degrees, moist mucosa  CHEST/LUNG: Clear to auscultation bilaterally; No wheeze, equal breath sounds bilaterally, no rales  BACK: No spinal tenderness  HEART: Irregular rhythm, not tachy on my exam, No murmurs, rubs, or gallops  ABDOMEN: Obese, Soft, Nontender, Nondistended; Bowel sounds present  EXTREMITIES:  No clubbing, cyanosis, significant b/l LE edema above the knees b/l w/ chronic venous stasis changes of b/l LE  PSYCH: Nl behavior, nl affect  NEUROLOGY: AAOx3, non-focal, cranial nerves intact  SKIN: see extrem  LINES: PIV    Cardiovascular Diagnostic Testing:    Tele: AFl 80s - 100s    ECG: Personally reviewed: Typical AFl @ 94 (variable block)    Echo: No prior in [place], TTE from this admission ordered and pending    Stress Testing: No prior in [place]    Cath: No prior in [place]    Imaging:    CXR: Personally reviewed: from 5/4 - clear lungs, enlarged cardiac silouette    < from: [facility] Duplex Lower Ext Vein Scan, Bilat (05.04.18 @ 15:54) > IMPRESSION:  No evidence of bilateral proximal lower extremity deep venous thrombosis.  Calf veins are not visualized due to limb size and edema. < end of copied text >    Labs: Personally reviewed                        12.3   10.10 )-----------( 241      ( 06 May 2018 14:25 )             40.3     05-06    141  |  98  |  14  ----------------------------<  189<H>  3.7   |  30  |  0.79    Ca    9.3      06 May 2018 12:11    Serum Pro-Brain Natriuretic Peptide: 3002 pg/mL (05-04 @ 14:29)    Hemoglobin A1C, Whole Blood: 7.6 % (05-05 @ 11:05)    Thyroid Stimulating Hormone, Serum: 10.09 uIU/mL (05-05 @ 11:05) Patient seen and evaluated at bedside on 6 [place] with  and sister at bedside    The patient does not have a Cardiologist. Her PCP is Dr. Dong Machuca    Chief Complaint: "My legs were getting bigger" x weeks    HPI: 72F w/ PMHx of HTN, HLD, DM2, recently diagnosed "AF" (CHADS2-VASC:4, on Xarelto), nephrolithiasis (s/p recent L ureteral stent) who presents from her PCPs office with worsening LE swelling. She reports that since she was Dx w/ "AF" 2w ago her legs have been increasing in size and they are becoming hard. She reports LE edema and red legs for a long time and has been sleeping sitting up on the couch for the past year. She did not notice a decline in her functional status until being diagnosed with the arrhythmia - now she has to walk slowly up 1 flight of stairs. She does not know if she gained weight and denied PND. She reports occasional palpitations and a chest, "discomfort", that resolved w/ ASA 81. When the "AF" was diagnosed 2s ago her Atenolol was increased from 50 bid t o100 bid, Cardizem  was added in addition to starting Xarelto. The patient denies ever having had a TTE, Stress or Cath in the past. General Cardiology called for heart failure    PMHx:   DM2  HLD  HTN  "AF"  Nephrolithiasis (s/p stent)    PSHx: Status post total knee replacement - b/l 1998 and 2000    Allergies: No Known Allergies    Home Medications:  Aspirin Enteric Coated 81 mg oral delayed release tablet: 1 tab(s) orally once a day (04 May 2018 18:04)  atenolol 100 mg oral tablet: 1 tab(s) orally 2 times a day (04 May 2018 18:04)  atorvastatin 20 mg oral tablet: 1 tab(s) orally once a day (04 May 2018 18:04)  DilTIAZem Hydrochloride  mg/24 hours oral capsule, extended release: 1 cap(s) orally once a day (04 May 2018 18:04)  losartan 100 mg oral tablet: 1 tab(s) orally once a day (04 May 2018 18:04)  metFORMIN 500 mg oral tablet, extended release: 1 tab(s) orally once a day (04 May 2018 18:04)  Vitamin D3 2000 intl units oral tablet: 1 tab(s) orally once a day (04 May 2018 18:04)  Xarelto 20 mg oral tablet: 1 tab(s) orally once a day (in the evening) (04 May 2018 18:04)    Current Medications:   aspirin enteric coated 81 milliGRAM(s) Oral daily  ATENolol  Tablet 100 milliGRAM(s) Oral two times a day  atorvastatin 20 milliGRAM(s) Oral at bedtime  cephalexin 500 milliGRAM(s) Oral every 6 hours  diltiazem    milliGRAM(s) Oral daily  furosemide   Injectable 20 milliGRAM(s) IV Push two times a day  influenza   Vaccine 0.5 milliLiter(s) IntraMuscular once  insulin lispro (HumaLOG) corrective regimen sliding scale   SubCutaneous three times a day before meals  insulin lispro (HumaLOG) corrective regimen sliding scale   SubCutaneous at bedtime  losartan 100 milliGRAM(s) Oral daily  rivaroxaban 20 milliGRAM(s) Oral every 24 hours    FAMILY HISTORY: Her father was diagnosed with HF in his 70s    Social History: , never worked  Smoking History: Denied  Alcohol Use: Denied  Drug Use: Denied    REVIEW OF SYSTEMS:  CONSTITUTIONAL: No weakness, fevers or chills  EYES/ENT: No visual changes;  No dysphagia  NECK: No pain or stiffness  RESPIRATORY: No cough, wheezing, hemoptysis; + shortness of breath  CARDIOVASCULAR: + chest pain (discomfort), + palpitations; + lower extremity edema  GASTROINTESTINAL: No abdominal or epigastric pain. + nausea, no vomiting, or hematemesis; No diarrhea or constipation. No melena or hematochezia.  BACK: No back pain  GENITOURINARY: No dysuria, frequency or hematuria  NEUROLOGICAL: No numbness or weakness  SKIN: No itching, burning, rashes, or lesions   All other review of systems is negative unless indicated above.    Physical Exam:  T(F): 98.3 (05-06), Max: 98.3 (05-06)  HR: 90 (05-06) (66 - 91)  BP: 104/68 (05-06) (99/66 - 116/77)  RR: 18 (05-06)  SpO2: 98% on 2L NC    I/Os: 480/450 - net + 30cc    GENERAL: No acute distress, well-developed, breathing comfortably on 2L NC, no accessory muscle use, speaking in full sentences  HEAD:  Atraumatic, Normocephalic  ENT: EOMI, PERRLA, conjunctiva and sclera clear, Neck supple, JVP elevated 5cm above the sternal notch with the bed at 45 degrees, moist mucosa  CHEST/LUNG: Clear to auscultation bilaterally; No wheeze, equal breath sounds bilaterally, no rales  BACK: No spinal tenderness  HEART: Irregular rhythm, not tachy on my exam, No murmurs, rubs, or gallops  ABDOMEN: Obese, Soft, Nontender, Nondistended; Bowel sounds present  EXTREMITIES:  No clubbing, cyanosis, significant b/l LE edema above the knees b/l w/ chronic venous stasis changes of b/l LE  PSYCH: Nl behavior, nl affect  NEUROLOGY: AAOx3, non-focal, cranial nerves intact  SKIN: see extrem  LINES: PIV    Cardiovascular Diagnostic Testing:    Tele: AFl 80s - 100s    ECG: Personally reviewed: Typical AFl @ 94 (variable block)    Echo: No prior in [place], TTE from this admission ordered and pending    Stress Testing: No prior in [place]    Cath: No prior in [place]    Imaging:    CXR: Personally reviewed: from 5/4 - clear lungs, enlarged cardiac silouette    < from: [facility] Duplex Lower Ext Vein Scan, Bilat (05.04.18 @ 15:54) > IMPRESSION:  No evidence of bilateral proximal lower extremity deep venous thrombosis.  Calf veins are not visualized due to limb size and edema. < end of copied text >    Labs: Personally reviewed                        12.3   10.10 )-----------( 241      ( 06 May 2018 14:25 )             40.3     05-06    141  |  98  |  14  ----------------------------<  189<H>  3.7   |  30  |  0.79    Ca    9.3      06 May 2018 12:11    Serum Pro-Brain Natriuretic Peptide: 3002 pg/mL (05-04 @ 14:29)    Hemoglobin A1C, Whole Blood: 7.6 % (05-05 @ 11:05)    Thyroid Stimulating Hormone, Serum: 10.09 uIU/mL (05-05 @ 11:05)

## 2018-05-06 NOTE — PROGRESS NOTE ADULT - PROBLEM SELECTOR PLAN 5
BP controlled   -continue Atenolol/Diltiazem/Losartan for now  -monitor BP controlled   - Meds as above

## 2018-05-06 NOTE — CHART NOTE - NSCHARTNOTEFT_GEN_A_CORE
Informed by RN of noted 4 beats of WCT on tele this AM while asleep.   VSS.  Pt seen this AM, recalls feeling "woozy" early this AM, but resolved when lying head up in bed.  She otherwise denied having cp, sob, n/v or palps.    Vital Signs Last 24 Hrs  T(C): 36.4 (06 May 2018 04:00), Max: 36.8 (05 May 2018 20:04)  T(F): 97.5 (06 May 2018 04:00), Max: 98.2 (05 May 2018 20:04)  HR: 69 (06 May 2018 05:51) (66 - 91)  BP: 111/75 (06 May 2018 05:51) (99/66 - 116/77)  BP(mean): --  RR: 18 (06 May 2018 04:00) (18 - 18)  SpO2: 96% (06 May 2018 04:00) (95% - 96%)    PLAN:  >Continue to monitor on tele  >Keep K > 4 Mg > 2  >Endorsed to day team    Follow up with Attending today    Dhara Ureña PA-C  #[id number]

## 2018-05-06 NOTE — CONSULT NOTE ADULT - ATTENDING COMMENTS
76 year old woman with rapid atrial fibrillation/flutter and volume overload congestive heart failure, ventricular function not determined. Adjusting medications, removing diltiazem and seeking rate control as remove volume excess. Cardiac echo pending.

## 2018-05-06 NOTE — PROGRESS NOTE ADULT - ASSESSMENT
71 y/o woman w/ PMH HTN, HLD, DM2, recently diagnosed Afib on Xarelto, nephrolithiasis s/p recent left ureteral stent, sent in by PCP with worsening LE swelling, probably due to acute unspecified heart failure.

## 2018-05-06 NOTE — PROGRESS NOTE ADULT - PROBLEM SELECTOR PLAN 4
-recently diagnosed as outpatient   -currently rate controlled, monitor on tele   -Continue with home atenolol  - C/w cardizem for now, caution given unknown EF (if < 35%, will need to D/C)  -continue anticoagulation with Xarelto  - f/u cardiology recs -recently diagnosed as outpatient   -currently rate controlled, monitor on tele   - Change atenolol to metoprolol, given new CHF  - D/C cardizem, given possibility of new HFrEF w/ EF < 35%  - continue anticoagulation with Xarelto  - f/u cardiology recs

## 2018-05-07 DIAGNOSIS — E03.9 HYPOTHYROIDISM, UNSPECIFIED: ICD-10-CM

## 2018-05-07 DIAGNOSIS — I48.91 UNSPECIFIED ATRIAL FIBRILLATION: ICD-10-CM

## 2018-05-07 LAB
ANION GAP SERPL CALC-SCNC: 12 MMOL/L — SIGNIFICANT CHANGE UP (ref 5–17)
ANION GAP SERPL CALC-SCNC: 14 MMOL/L — SIGNIFICANT CHANGE UP (ref 5–17)
BUN SERPL-MCNC: 20 MG/DL — SIGNIFICANT CHANGE UP (ref 7–23)
BUN SERPL-MCNC: 30 MG/DL — HIGH (ref 7–23)
CALCIUM SERPL-MCNC: 8.6 MG/DL — SIGNIFICANT CHANGE UP (ref 8.4–10.5)
CALCIUM SERPL-MCNC: 9.1 MG/DL — SIGNIFICANT CHANGE UP (ref 8.4–10.5)
CHLORIDE SERPL-SCNC: 99 MMOL/L — SIGNIFICANT CHANGE UP (ref 96–108)
CHLORIDE SERPL-SCNC: 99 MMOL/L — SIGNIFICANT CHANGE UP (ref 96–108)
CO2 SERPL-SCNC: 28 MMOL/L — SIGNIFICANT CHANGE UP (ref 22–31)
CO2 SERPL-SCNC: 30 MMOL/L — SIGNIFICANT CHANGE UP (ref 22–31)
CREAT SERPL-MCNC: 0.97 MG/DL — SIGNIFICANT CHANGE UP (ref 0.5–1.3)
CREAT SERPL-MCNC: 1 MG/DL — SIGNIFICANT CHANGE UP (ref 0.5–1.3)
GLUCOSE SERPL-MCNC: 173 MG/DL — HIGH (ref 70–99)
GLUCOSE SERPL-MCNC: 224 MG/DL — HIGH (ref 70–99)
HCT VFR BLD CALC: 38.4 % — SIGNIFICANT CHANGE UP (ref 34.5–45)
HGB BLD-MCNC: 11.9 G/DL — SIGNIFICANT CHANGE UP (ref 11.5–15.5)
MAGNESIUM SERPL-MCNC: 1.5 MG/DL — LOW (ref 1.6–2.6)
MAGNESIUM SERPL-MCNC: 1.8 MG/DL — SIGNIFICANT CHANGE UP (ref 1.6–2.6)
MCHC RBC-ENTMCNC: 27.1 PG — SIGNIFICANT CHANGE UP (ref 27–34)
MCHC RBC-ENTMCNC: 31 GM/DL — LOW (ref 32–36)
MCV RBC AUTO: 87.5 FL — SIGNIFICANT CHANGE UP (ref 80–100)
PHOSPHATE SERPL-MCNC: 3.5 MG/DL — SIGNIFICANT CHANGE UP (ref 2.5–4.5)
PLATELET # BLD AUTO: 237 K/UL — SIGNIFICANT CHANGE UP (ref 150–400)
POTASSIUM SERPL-MCNC: 3.7 MMOL/L — SIGNIFICANT CHANGE UP (ref 3.5–5.3)
POTASSIUM SERPL-MCNC: 4.6 MMOL/L — SIGNIFICANT CHANGE UP (ref 3.5–5.3)
POTASSIUM SERPL-SCNC: 3.7 MMOL/L — SIGNIFICANT CHANGE UP (ref 3.5–5.3)
POTASSIUM SERPL-SCNC: 4.6 MMOL/L — SIGNIFICANT CHANGE UP (ref 3.5–5.3)
RBC # BLD: 4.39 M/UL — SIGNIFICANT CHANGE UP (ref 3.8–5.2)
RBC # FLD: 18.3 % — HIGH (ref 10.3–14.5)
SODIUM SERPL-SCNC: 141 MMOL/L — SIGNIFICANT CHANGE UP (ref 135–145)
SODIUM SERPL-SCNC: 141 MMOL/L — SIGNIFICANT CHANGE UP (ref 135–145)
T3 SERPL-MCNC: 88 NG/DL — SIGNIFICANT CHANGE UP (ref 80–200)
T4 FREE SERPL-MCNC: 0.8 NG/DL — LOW (ref 0.9–1.8)
WBC # BLD: 8.66 K/UL — SIGNIFICANT CHANGE UP (ref 3.8–10.5)
WBC # FLD AUTO: 8.66 K/UL — SIGNIFICANT CHANGE UP (ref 3.8–10.5)

## 2018-05-07 PROCEDURE — 99223 1ST HOSP IP/OBS HIGH 75: CPT | Mod: GC

## 2018-05-07 PROCEDURE — 99233 SBSQ HOSP IP/OBS HIGH 50: CPT

## 2018-05-07 RX ORDER — MAGNESIUM SULFATE 500 MG/ML
1 VIAL (ML) INJECTION ONCE
Qty: 0 | Refills: 0 | Status: COMPLETED | OUTPATIENT
Start: 2018-05-07 | End: 2018-05-07

## 2018-05-07 RX ORDER — DIGOXIN 250 MCG
0.12 TABLET ORAL DAILY
Qty: 0 | Refills: 0 | Status: DISCONTINUED | OUTPATIENT
Start: 2018-05-08 | End: 2018-05-10

## 2018-05-07 RX ORDER — DIGOXIN 250 MCG
0.5 TABLET ORAL ONCE
Qty: 0 | Refills: 0 | Status: COMPLETED | OUTPATIENT
Start: 2018-05-07 | End: 2018-05-07

## 2018-05-07 RX ORDER — LOSARTAN POTASSIUM 100 MG/1
50 TABLET, FILM COATED ORAL DAILY
Qty: 0 | Refills: 0 | Status: DISCONTINUED | OUTPATIENT
Start: 2018-05-07 | End: 2018-05-07

## 2018-05-07 RX ORDER — LEVOTHYROXINE SODIUM 125 MCG
25 TABLET ORAL DAILY
Qty: 0 | Refills: 0 | Status: DISCONTINUED | OUTPATIENT
Start: 2018-05-07 | End: 2018-05-11

## 2018-05-07 RX ORDER — METOPROLOL TARTRATE 50 MG
125 TABLET ORAL DAILY
Qty: 0 | Refills: 0 | Status: DISCONTINUED | OUTPATIENT
Start: 2018-05-07 | End: 2018-05-09

## 2018-05-07 RX ORDER — ACETAMINOPHEN 500 MG
650 TABLET ORAL ONCE
Qty: 0 | Refills: 0 | Status: COMPLETED | OUTPATIENT
Start: 2018-05-07 | End: 2018-05-07

## 2018-05-07 RX ORDER — SODIUM CHLORIDE 9 MG/ML
250 INJECTION INTRAMUSCULAR; INTRAVENOUS; SUBCUTANEOUS ONCE
Qty: 0 | Refills: 0 | Status: COMPLETED | OUTPATIENT
Start: 2018-05-07 | End: 2018-05-07

## 2018-05-07 RX ORDER — BUMETANIDE 0.25 MG/ML
2 INJECTION INTRAMUSCULAR; INTRAVENOUS
Qty: 0 | Refills: 0 | Status: DISCONTINUED | OUTPATIENT
Start: 2018-05-07 | End: 2018-05-09

## 2018-05-07 RX ORDER — METOPROLOL TARTRATE 50 MG
25 TABLET ORAL ONCE
Qty: 0 | Refills: 0 | Status: COMPLETED | OUTPATIENT
Start: 2018-05-07 | End: 2018-05-07

## 2018-05-07 RX ORDER — DIGOXIN 250 MCG
0.25 TABLET ORAL EVERY 6 HOURS
Qty: 0 | Refills: 0 | Status: COMPLETED | OUTPATIENT
Start: 2018-05-07 | End: 2018-05-08

## 2018-05-07 RX ORDER — METOPROLOL TARTRATE 50 MG
2.5 TABLET ORAL ONCE
Qty: 0 | Refills: 0 | Status: COMPLETED | OUTPATIENT
Start: 2018-05-07 | End: 2018-05-07

## 2018-05-07 RX ADMIN — Medication 0.5 MILLIGRAM(S): at 14:31

## 2018-05-07 RX ADMIN — BUMETANIDE 2 MILLIGRAM(S): 0.25 INJECTION INTRAMUSCULAR; INTRAVENOUS at 20:32

## 2018-05-07 RX ADMIN — Medication 100 MILLIGRAM(S): at 06:04

## 2018-05-07 RX ADMIN — LOSARTAN POTASSIUM 100 MILLIGRAM(S): 100 TABLET, FILM COATED ORAL at 06:04

## 2018-05-07 RX ADMIN — Medication 100 GRAM(S): at 23:02

## 2018-05-07 RX ADMIN — Medication 0.25 MILLIGRAM(S): at 20:33

## 2018-05-07 RX ADMIN — Medication 25 MILLIGRAM(S): at 10:00

## 2018-05-07 RX ADMIN — SODIUM CHLORIDE 250 MILLILITER(S): 9 INJECTION INTRAMUSCULAR; INTRAVENOUS; SUBCUTANEOUS at 14:17

## 2018-05-07 RX ADMIN — BUMETANIDE 2 MILLIGRAM(S): 0.25 INJECTION INTRAMUSCULAR; INTRAVENOUS at 06:54

## 2018-05-07 RX ADMIN — Medication 500 MILLIGRAM(S): at 23:03

## 2018-05-07 RX ADMIN — Medication 500 MILLIGRAM(S): at 06:04

## 2018-05-07 RX ADMIN — Medication 100 GRAM(S): at 17:14

## 2018-05-07 RX ADMIN — Medication 1: at 12:00

## 2018-05-07 RX ADMIN — RIVAROXABAN 20 MILLIGRAM(S): KIT at 17:13

## 2018-05-07 RX ADMIN — Medication 1: at 08:11

## 2018-05-07 RX ADMIN — Medication 2.5 MILLIGRAM(S): at 12:01

## 2018-05-07 RX ADMIN — Medication 650 MILLIGRAM(S): at 21:59

## 2018-05-07 RX ADMIN — Medication 650 MILLIGRAM(S): at 20:59

## 2018-05-07 RX ADMIN — Medication 500 MILLIGRAM(S): at 12:01

## 2018-05-07 RX ADMIN — ATORVASTATIN CALCIUM 20 MILLIGRAM(S): 80 TABLET, FILM COATED ORAL at 20:32

## 2018-05-07 RX ADMIN — Medication 500 MILLIGRAM(S): at 17:13

## 2018-05-07 RX ADMIN — Medication 81 MILLIGRAM(S): at 12:01

## 2018-05-07 NOTE — PROGRESS NOTE ADULT - ATTENDING COMMENTS
Plan of care d/w NP (Samantha), and patient and her  at bedside. d/w cardiology (Dr. Ybarra).     Jassi Ham M.D.  Hospitalist  Pager: [phone]

## 2018-05-07 NOTE — PROGRESS NOTE ADULT - PROBLEM SELECTOR PLAN 5
Nonpurulent cellulitis of RLE in setting of LE edema is improving  -c/w PO Keflex (Day 4/7) and monitor

## 2018-05-07 NOTE — CHART NOTE - NSCHARTNOTEFT_GEN_A_CORE
Medicine NP Note     STEFFANIE VERDIN  MRN-[id number]  Allergies    No Known Allergies    Intolerances     Called to evaluate patient for persistent aflutter and hypotension 84/55. Pt states she feels lightheaded. Reports getting up and walking to the bathroom and feeling like she needed to " sit down" with nausea. No CP, no palpitation, shortness of breath.     Vital Signs Last 24 Hrs  T(C): 36.8 (18 @ 11:53), Max: 36.8 (18 @ 16:17)  T(F): 98.3 (18 @ 11:53), Max: 98.3 (18 @ 16:17)  HR: 128 (18 @ 13:54) (83 - 130)  BP: 84/55 (18 @ 13:54) (84/55 - 112/72)  BP(mean): --  RR: 18 (18 @ 11:53) (18 - 18)  SpO2: 96% (18 @ 11:53) (94% - 98%)  Daily     Daily Weight in k.9 (07 May 2018 08:00)  I&O's Summary    06 May 2018 07:01  -  07 May 2018 07:00  --------------------------------------------------------  IN: 600 mL / OUT: 500 mL / NET: 100 mL    07 May 2018 07:  -  07 May 2018 14:22  --------------------------------------------------------  IN: 520 mL / OUT: 600 mL / NET: -80 mL      CAPILLARY BLOOD GLUCOSE                          11.9   8.66  )-----------( 237      ( 07 May 2018 07:36 )             38.4     05    141  |  99  |  20  ----------------------------<  173<H>  4.6   |  30  |  1.00    Ca    9.1      07 May 2018 06:47  Phos  3.5     05-  Mg     1.5     05-    PHYSICAL EXAM:  GENERAL: NAD   CHEST/LUNG: Clear to auscultation bilaterally; No wheeze  HEART: irregular rhythm at times, tachycardic  ABDOMEN: Soft, Nontender, Nondistended; Bowel sounds present  EXTREMITIES:  mild edema, bilateral erythema   PSYCH: AAOx3      Assessment/Plan: HPI:  72F h/o HTN, HLD, DM2, recently diagnosed Afib on Xarelto, nephrolithiasis s/p recent left ureteral stent, sent in by PCP with LE swelling. Pt says she has been drinking a lot of fluid since her kidney stone 2 weeks ago and her LE swelling has been progressively worsening since. Pt not on a water pill at home. Leg swelling has gotten so bad that she developed blisters that are weeping. Pt also states that her legs feel really heavy and is difficult to ambulate. Pt has had intermittent leg swelling in the past but never to this bad. Pt also reports some shortness of breath with exertion. (04 May 2018 17:51) Medicine NP Note     STEFFANIE VERDIN  MRN-[id number]  Allergies    No Known Allergies    Intolerances     Called to evaluate patient for persistent aflutter and hypotension 84/55. Pt states she feels lightheaded. Reports getting up and walking to the bathroom and feeling like she needed to " sit down" with nausea. No CP, no palpitation, shortness of breath. Pt persistently tachycardic today, received Toprol XL 100mg at 6am, Lopressor 25mg at 10am and lopressor push 2.5mg at 12 noon ,without response.     Vital Signs Last 24 Hrs  T(C): 36.8 (18 @ 11:53), Max: 36.8 (18 @ 16:17)  T(F): 98.3 (18 @ 11:53), Max: 98.3 (18 @ 16:17)  HR: 128 (18 @ 13:54) (83 - 130)  BP: 84/55 (18 @ 13:54) (84/55 - 112/72)  BP(mean): --  RR: 18 (18 @ 11:53) (18 - 18)  SpO2: 96% (18 @ 11:53) (94% - 98%)  Daily     Daily Weight in k.9 (07 May 2018 08:00)  I&O's Summary    06 May 2018 07:  -  07 May 2018 07:00  --------------------------------------------------------  IN: 600 mL / OUT: 500 mL / NET: 100 mL    07 May 2018 07:  -  07 May 2018 14:22  --------------------------------------------------------  IN: 520 mL / OUT: 600 mL / NET: -80 mL      CAPILLARY BLOOD GLUCOSE                          11.9   8.66  )-----------( 237      ( 07 May 2018 07:36 )             38.4     -    141  |  99  |  20  ----------------------------<  173<H>  4.6   |  30  |  1.00    Ca    9.1      07 May 2018 06:47  Phos  3.5     05-  Mg     1.5     -    PHYSICAL EXAM:  GENERAL: NAD   CHEST/LUNG: Clear to auscultation bilaterally; No wheeze  HEART: irregular rhythm at times, tachycardic  ABDOMEN: Soft, Nontender, Nondistended; Bowel sounds present  EXTREMITIES:  mild edema, bilateral erythema   PSYCH: AAOx3      Assessment/Plan: HPI:  72F h/o HTN, HLD, DM2, recently diagnosed Afib on Xarelto, nephrolithiasis s/p recent left ureteral stent, sent in by PCP with LE swelling/Acute CHF on IV bumex, Aflutter with RVR unresponsive to lopressor now with hypotension, lightheadedness and nausea.     1. Symptomatic hypotension-  likely secondary to diuresis and BB this am. Discussed with Dr. Ham, will give 250ml NS bolus now. Continue daily BB for now. Cardiology to follow   2. Aflutter, RVR- without adequate response from BB and with hypotension, Discussed with cardiology fellow and Dr. Ham. Will start digoxin. Digoxin 0.5mg x 1 now. Pt may benefit from ablation as per cardiology if does not respond.

## 2018-05-07 NOTE — PROGRESS NOTE ADULT - PROBLEM SELECTOR PLAN 4
Some of LE edema is secondary to chronic venous stasis, given skin changes.  However, suspect some element of CHF given initial presentation w/ SOB and orthopnea.  - Management as above, may need compression stockings  - LE dopplers negative

## 2018-05-08 LAB
ANION GAP SERPL CALC-SCNC: 13 MMOL/L — SIGNIFICANT CHANGE UP (ref 5–17)
BUN SERPL-MCNC: 27 MG/DL — HIGH (ref 7–23)
CALCIUM SERPL-MCNC: 8.8 MG/DL — SIGNIFICANT CHANGE UP (ref 8.4–10.5)
CHLORIDE SERPL-SCNC: 99 MMOL/L — SIGNIFICANT CHANGE UP (ref 96–108)
CO2 SERPL-SCNC: 29 MMOL/L — SIGNIFICANT CHANGE UP (ref 22–31)
CREAT SERPL-MCNC: 0.82 MG/DL — SIGNIFICANT CHANGE UP (ref 0.5–1.3)
GLUCOSE SERPL-MCNC: 156 MG/DL — HIGH (ref 70–99)
HCT VFR BLD CALC: 39.5 % — SIGNIFICANT CHANGE UP (ref 34.5–45)
HGB BLD-MCNC: 12.4 G/DL — SIGNIFICANT CHANGE UP (ref 11.5–15.5)
MAGNESIUM SERPL-MCNC: 1.9 MG/DL — SIGNIFICANT CHANGE UP (ref 1.6–2.6)
MCHC RBC-ENTMCNC: 27.4 PG — SIGNIFICANT CHANGE UP (ref 27–34)
MCHC RBC-ENTMCNC: 31.4 GM/DL — LOW (ref 32–36)
MCV RBC AUTO: 87.2 FL — SIGNIFICANT CHANGE UP (ref 80–100)
PLATELET # BLD AUTO: 222 K/UL — SIGNIFICANT CHANGE UP (ref 150–400)
POTASSIUM SERPL-MCNC: 3.8 MMOL/L — SIGNIFICANT CHANGE UP (ref 3.5–5.3)
POTASSIUM SERPL-SCNC: 3.8 MMOL/L — SIGNIFICANT CHANGE UP (ref 3.5–5.3)
RBC # BLD: 4.53 M/UL — SIGNIFICANT CHANGE UP (ref 3.8–5.2)
RBC # FLD: 17.7 % — HIGH (ref 10.3–14.5)
SODIUM SERPL-SCNC: 141 MMOL/L — SIGNIFICANT CHANGE UP (ref 135–145)
WBC # BLD: 7.68 K/UL — SIGNIFICANT CHANGE UP (ref 3.8–10.5)
WBC # FLD AUTO: 7.68 K/UL — SIGNIFICANT CHANGE UP (ref 3.8–10.5)

## 2018-05-08 PROCEDURE — 99233 SBSQ HOSP IP/OBS HIGH 50: CPT

## 2018-05-08 PROCEDURE — 99233 SBSQ HOSP IP/OBS HIGH 50: CPT | Mod: GC

## 2018-05-08 RX ORDER — MAGNESIUM OXIDE 400 MG ORAL TABLET 241.3 MG
400 TABLET ORAL ONCE
Qty: 0 | Refills: 0 | Status: COMPLETED | OUTPATIENT
Start: 2018-05-08 | End: 2018-05-08

## 2018-05-08 RX ORDER — POTASSIUM CHLORIDE 20 MEQ
20 PACKET (EA) ORAL ONCE
Qty: 0 | Refills: 0 | Status: COMPLETED | OUTPATIENT
Start: 2018-05-08 | End: 2018-05-08

## 2018-05-08 RX ADMIN — Medication 500 MILLIGRAM(S): at 23:19

## 2018-05-08 RX ADMIN — BUMETANIDE 2 MILLIGRAM(S): 0.25 INJECTION INTRAMUSCULAR; INTRAVENOUS at 05:52

## 2018-05-08 RX ADMIN — BUMETANIDE 2 MILLIGRAM(S): 0.25 INJECTION INTRAMUSCULAR; INTRAVENOUS at 18:56

## 2018-05-08 RX ADMIN — Medication 20 MILLIEQUIVALENT(S): at 12:10

## 2018-05-08 RX ADMIN — Medication 1: at 08:49

## 2018-05-08 RX ADMIN — ATORVASTATIN CALCIUM 20 MILLIGRAM(S): 80 TABLET, FILM COATED ORAL at 20:03

## 2018-05-08 RX ADMIN — Medication 500 MILLIGRAM(S): at 18:27

## 2018-05-08 RX ADMIN — Medication 81 MILLIGRAM(S): at 12:10

## 2018-05-08 RX ADMIN — RIVAROXABAN 20 MILLIGRAM(S): KIT at 18:27

## 2018-05-08 RX ADMIN — MAGNESIUM OXIDE 400 MG ORAL TABLET 400 MILLIGRAM(S): 241.3 TABLET ORAL at 12:10

## 2018-05-08 RX ADMIN — Medication 125 MILLIGRAM(S): at 05:52

## 2018-05-08 RX ADMIN — Medication 500 MILLIGRAM(S): at 12:10

## 2018-05-08 RX ADMIN — Medication 25 MICROGRAM(S): at 08:51

## 2018-05-08 RX ADMIN — Medication 0.25 MILLIGRAM(S): at 02:19

## 2018-05-08 RX ADMIN — Medication 500 MILLIGRAM(S): at 05:52

## 2018-05-08 RX ADMIN — Medication 2: at 12:10

## 2018-05-08 NOTE — PROGRESS NOTE ADULT - ATTENDING COMMENTS
76 year old woman with rapid atrial fibrillation/flutter and volume overload congestive heart failure, ventricular function not determined. Adjusting medications, removing diltiazem and seeking rate control as remove volume excess. Cardiac echo pending. Medication adjustments since admission have improved rate control and continuing efforts to optimize volume status.

## 2018-05-08 NOTE — PROGRESS NOTE ADULT - ATTENDING COMMENTS
Plan of care d/w NP (Chetna), and patient at bedside. d/w cardiology (Dr. Ybarra).     Jassi Ham M.D.  Hospitalist  Pager: [phone]

## 2018-05-08 NOTE — PROGRESS NOTE ADULT - PROBLEM SELECTOR PLAN 5
Nonpurulent cellulitis of RLE in setting of LE edema is improving  - c/w PO Keflex (Day 5/7) and monitor

## 2018-05-08 NOTE — PROGRESS NOTE ADULT - ASSESSMENT
72F w/ PMHx of HTN, HLD, DM2, recently diagnosed AFl (CHADS2-VASC:4, on Xarelto), nephrolithiasis (s/p recent L ureteral stent) who presents from her PCPs office with worsening LE swelling. Found to have clinical signs of acute HF - suspect w/ rEF as addition of Cardizem coincided w/ progression of symptoms however this is nonspecific. Current SPBs in the 90s - 110s point away from chronic HTN causing HFpEF. The patient was found to have an elevated BNP, Edema and elevated JVP (w/ out overt rales on my exam), R-sided HF also a possibility. Tachy induced CM on DDx given recent Dx of AFl (typical AFl on ECG).    Plan:    1. Acute HF (likely w/ rEF)  -f/u TTE  -continue holding cardizem  -continue toprol  -c/w Losartan 100 daily  -continue bumex 2 mg IV BID  -Strict I/Os, daily standing weights  -continue to monitor on Tele  -If HFrEF confirmed will need to consider ischemic evaluation    2. Typical AFl w/ variable block  -change Atenolol to Toprol and D/C Cardizem as above  -continue to monitor on Tele  -c/w Xarelto 20 daily  -Can consider AFl ablation pending clinical course    3. HLD  -c/w Lipitor 20    General Cardiology will continue to follow    Barak Marquis MD

## 2018-05-08 NOTE — PHYSICAL THERAPY INITIAL EVALUATION ADULT - PERTINENT HX OF CURRENT PROBLEM, REHAB EVAL
73 y/o woman w/ PMH HTN, HLD, DM2, recently diagnosed Afib on Xarelto, nephrolithiasis s/p recent left ureteral stent, sent in by PCP with worsening LE swelling, probably due to acute unspecified heart failure.

## 2018-05-08 NOTE — PHYSICAL THERAPY INITIAL EVALUATION ADULT - ADDITIONAL COMMENTS
Patient lives in private home with 1 step to enter, no stairs inside. PTA, pt was independent with all mobility with use of cane for community ambulation. Spouse supportive and able to assist as needed

## 2018-05-08 NOTE — PHYSICAL THERAPY INITIAL EVALUATION ADULT - TRANSFER TRAINING, PT EVAL
GOAL: Pt will perform sit to/from stand transfers independently with cane as needed within 3-4weeks.

## 2018-05-08 NOTE — PHYSICAL THERAPY INITIAL EVALUATION ADULT - BALANCE TRAINING, PT EVAL
GOAL: Pt will improve her balance during static/dynamic standing activities by at least 1 balance grade within 3-4 weeks to assist with greater independence during functional mobility and ADL's.

## 2018-05-09 DIAGNOSIS — I50.31 ACUTE DIASTOLIC (CONGESTIVE) HEART FAILURE: ICD-10-CM

## 2018-05-09 LAB
ANION GAP SERPL CALC-SCNC: 14 MMOL/L — SIGNIFICANT CHANGE UP (ref 5–17)
BUN SERPL-MCNC: 21 MG/DL — SIGNIFICANT CHANGE UP (ref 7–23)
CALCIUM SERPL-MCNC: 9.6 MG/DL — SIGNIFICANT CHANGE UP (ref 8.4–10.5)
CHLORIDE SERPL-SCNC: 95 MMOL/L — LOW (ref 96–108)
CO2 SERPL-SCNC: 32 MMOL/L — HIGH (ref 22–31)
CREAT SERPL-MCNC: 0.72 MG/DL — SIGNIFICANT CHANGE UP (ref 0.5–1.3)
GLUCOSE SERPL-MCNC: 178 MG/DL — HIGH (ref 70–99)
MAGNESIUM SERPL-MCNC: 1.8 MG/DL — SIGNIFICANT CHANGE UP (ref 1.6–2.6)
PHOSPHATE SERPL-MCNC: 2.7 MG/DL — SIGNIFICANT CHANGE UP (ref 2.5–4.5)
POTASSIUM SERPL-MCNC: 5 MMOL/L — SIGNIFICANT CHANGE UP (ref 3.5–5.3)
POTASSIUM SERPL-SCNC: 5 MMOL/L — SIGNIFICANT CHANGE UP (ref 3.5–5.3)
SODIUM SERPL-SCNC: 141 MMOL/L — SIGNIFICANT CHANGE UP (ref 135–145)

## 2018-05-09 PROCEDURE — 93306 TTE W/DOPPLER COMPLETE: CPT | Mod: 26

## 2018-05-09 PROCEDURE — 99233 SBSQ HOSP IP/OBS HIGH 50: CPT

## 2018-05-09 PROCEDURE — 99233 SBSQ HOSP IP/OBS HIGH 50: CPT | Mod: GC

## 2018-05-09 RX ORDER — METOPROLOL TARTRATE 50 MG
200 TABLET ORAL DAILY
Qty: 0 | Refills: 0 | Status: DISCONTINUED | OUTPATIENT
Start: 2018-05-10 | End: 2018-05-11

## 2018-05-09 RX ORDER — METOPROLOL TARTRATE 50 MG
25 TABLET ORAL ONCE
Qty: 0 | Refills: 0 | Status: COMPLETED | OUTPATIENT
Start: 2018-05-09 | End: 2018-05-09

## 2018-05-09 RX ADMIN — Medication 0.12 MILLIGRAM(S): at 05:06

## 2018-05-09 RX ADMIN — Medication 500 MILLIGRAM(S): at 05:06

## 2018-05-09 RX ADMIN — Medication 500 MILLIGRAM(S): at 11:53

## 2018-05-09 RX ADMIN — Medication 500 MILLIGRAM(S): at 16:58

## 2018-05-09 RX ADMIN — Medication 1: at 11:52

## 2018-05-09 RX ADMIN — RIVAROXABAN 20 MILLIGRAM(S): KIT at 16:58

## 2018-05-09 RX ADMIN — Medication 1: at 07:58

## 2018-05-09 RX ADMIN — Medication 500 MILLIGRAM(S): at 23:11

## 2018-05-09 RX ADMIN — Medication 125 MILLIGRAM(S): at 05:06

## 2018-05-09 RX ADMIN — ATORVASTATIN CALCIUM 20 MILLIGRAM(S): 80 TABLET, FILM COATED ORAL at 21:47

## 2018-05-09 RX ADMIN — Medication 25 MICROGRAM(S): at 05:06

## 2018-05-09 RX ADMIN — Medication 81 MILLIGRAM(S): at 11:53

## 2018-05-09 RX ADMIN — Medication 25 MILLIGRAM(S): at 11:53

## 2018-05-09 RX ADMIN — BUMETANIDE 2 MILLIGRAM(S): 0.25 INJECTION INTRAMUSCULAR; INTRAVENOUS at 07:59

## 2018-05-09 NOTE — PROGRESS NOTE ADULT - PROBLEM SELECTOR PLAN 4
Some of LE edema is secondary to chronic venous stasis, given skin changes.  This has improved.  - Management as above, may need compression stockings  - LE dopplers negative  - Leg elevation encouraged

## 2018-05-09 NOTE — PROGRESS NOTE ADULT - ATTENDING COMMENTS
Plan of care d/w NP (Samantha), and patient and her  at bedside. d/w cardiology (Dr. Ybarra).     Jassi Ham M.D.  Hospitalist  Pager: [phone]

## 2018-05-09 NOTE — PROGRESS NOTE ADULT - ASSESSMENT
71 y/o woman w/ PMH HTN, HLD, DM2, recently diagnosed Afib on Xarelto, nephrolithiasis s/p recent left ureteral stent, sent in by PCP with worsening LE swelling due to acute diastolic heart failure.

## 2018-05-09 NOTE — PROGRESS NOTE ADULT - PROBLEM SELECTOR PLAN 5
Nonpurulent cellulitis of RLE in setting of LE edema is improving  - c/w PO Keflex (Day 6/7) and monitor

## 2018-05-09 NOTE — PROGRESS NOTE ADULT - ATTENDING COMMENTS
76 year old woman with rapid atrial fibrillation/flutter and volume overload congestive heart failure. Adjusting medications, removed diltiazem and seeking rate control as remove volume excess. Cardiac echo done this morning, result pending. Medication adjustments since admission have improved rate control and continuing efforts to optimize volume status.

## 2018-05-09 NOTE — H&P ADULT - HISTORY OF PRESENT ILLNESS
History     Chief Complaint:  Chest Pain and Shortness of Breath    HPI   Thaddeus Lundy is an otherwise healthy 28 year old male who presents with chest pain and shortness of breath. The patient reports he has had chest tightness/pain and shortness of breath for the past month since returning home from a trip to [facility]. He states his symptoms are worse at night and make it difficult for him to sleep. He notes the pain radiates to his throat, and he adds that burping alleviates his pain. He denies trouble swallowing, blood in his stool, or leg pain or swelling.     CARDIAC RISK FACTORS:  Sex:    Male  Tobacco:   Current every day smoker  Hypertension:   No  Hyperlipidemia:  No  Diabetes:   No  Family History:  No    PE/DVT RISK FACTORS:  Sex:    Male  Hormones:   No  Tobacco:   Current every day smoker  Cancer:   No  Travel:   Yes, [place]  Surgery:   No  Other immobilization: No  Personal history:  No  Family history:  No     Allergies:  No known drug allergies     Medications:    The patient is currently on no regular medications.     Past Medical History:    Shoulder dislocation    Past Surgical History:    History reviewed. No pertinent surgical history.     Family History:    History reviewed. No pertinent family history.      Social History:  Smoking status: Current every day smoker  Alcohol use: Yes   Marital Status:  Single [1]     Review of Systems   HENT: Positive for sore throat. Negative for trouble swallowing.    Respiratory: Positive for chest tightness and shortness of breath.    Cardiovascular: Positive for chest pain. Negative for leg swelling.   Gastrointestinal: Negative for blood in stool.   All other systems reviewed and are negative.    Physical Exam     Patient Vitals for the past 24 hrs:   BP Pulse Heart Rate Resp SpO2 Weight   05/09/18 0713 111/86 58 58 18 98 % 71.6 kg (157 lb 13.6 oz)      Physical Exam   Constitutional: He is oriented to person, place, and time. He appears  72F h/o Dm, HTN, HLD, presenting to ED c/o left flank pain and fevers. Has had pain for about 3 days now, non-radiating. has baseline abdominal pain from GI issues which have been about the same. Pain is associated with nausea and vomiting. Tmax of 102 2 days ago, associated with chills. Denies hematuria, frequency, dysuria.   Has a history of kidney stones about 8 years ago, does not recall name of urologist, had ?ESWL and stent. has not seen a urologist since. Recently treated for pneumonia and flu which according to patient , has completely cleared based on follow up visits. Not currently complaining of any URI sxx. well-developed.   HENT:   Head: Normocephalic and atraumatic.   Right Ear: External ear normal.   Mouth/Throat: Oropharynx is clear and moist.   Eyes: Conjunctivae and EOM are normal. Pupils are equal, round, and reactive to light.   Neck: Normal range of motion. Neck supple. No JVD present.   Cardiovascular: Normal rate, regular rhythm and normal heart sounds.    Pulmonary/Chest: Effort normal and breath sounds normal.   Abdominal: Soft. Bowel sounds are normal. He exhibits no distension. There is no tenderness. There is no rebound.   Musculoskeletal: Normal range of motion.   Lymphadenopathy:     He has no cervical adenopathy.   Neurological: He is alert and oriented to person, place, and time. He displays normal reflexes. No cranial nerve deficit. He exhibits normal muscle tone. Coordination normal.   Skin: Skin is warm and dry.   Psychiatric: He has a normal mood and affect. His behavior is normal. Judgment normal.   Nursing note and vitals reviewed.        Emergency Department Course   ECG (07:14:28):  Rate 52 bpm. GA interval 148. QRS duration 78. QT/QTc 402/373. P-R-T axes 71 25 37. Sinus bradycardia. Otherwise normal ECG. Interpreted at 0721 by Bravo Koenig MD.     Imaging:  Radiographic findings were communicated with the patient who voiced understanding of the findings.    Chest XR, PA & LAT:  Hyperinflation. Otherwise negative.   As read by Radiology.      Laboratory:  CBC: WNL (WBC 5.6, HGB 13.7, )   BMP: WNL (Creatinine 0.95)  Troponin: <0.015    Emergency Department Course:  Past medical records, nursing notes, and vitals reviewed.  0713: I performed an exam of the patient and obtained history, as documented above.  IV inserted and blood drawn.  The patient was sent for a chest x-ray while in the emergency department, findings above.  EKG obtained, results above.    0901: I rechecked the patient. Explained findings to the patient.    Findings and plan explained to the Patient. Patient  discharged home with instructions regarding supportive care, medications, and reasons to return. The importance of close follow-up was reviewed.    Impression & Plan    Medical Decision Making:  Patient presents with vague chest pain and shortness of breath.  Patient is very well-appearing.  His vitals are stable.  He is at no risk for pulmonary embolism and the risk of a CT scan is greater than his risk for a pulmonary embolism and therefore in investigation and this was not performed.  Due to smoking patient was sent for chest x-ray to rule out pneumothorax and was normal.  EKG and troponin are negative.  Patient was offered reassurance and follow-up with primary care.  Patient is concerned about reflux and therefore a Prilosec prescription was given as a bridge to follow-up with primary care.    Diagnosis:    ICD-10-CM   1. Atypical chest pain R07.89     Disposition:  discharged to home    Discharge Medications:  New Prescriptions    OMEPRAZOLE (PRILOSEC) 20 MG CR CAPSULE    Take 1 capsule (20 mg) by mouth 2 times daily       Tomeka Tenorio  5/9/2018   [facility] EMERGENCY DEPARTMENT    I, Tomeka Tenorio, am serving as a scribe at 7:13 AM on 5/9/2018 to document services personally performed by Bravo Koenig MD based on my observations and the provider's statements to me.       Bravo Koenig MD  05/12/18 2040

## 2018-05-10 ENCOUNTER — TRANSCRIPTION ENCOUNTER (OUTPATIENT)
Age: 73
End: 2018-05-10

## 2018-05-10 LAB
ANION GAP SERPL CALC-SCNC: 13 MMOL/L — SIGNIFICANT CHANGE UP (ref 5–17)
BUN SERPL-MCNC: 24 MG/DL — HIGH (ref 7–23)
CALCIUM SERPL-MCNC: 9.7 MG/DL — SIGNIFICANT CHANGE UP (ref 8.4–10.5)
CHLORIDE SERPL-SCNC: 94 MMOL/L — LOW (ref 96–108)
CO2 SERPL-SCNC: 31 MMOL/L — SIGNIFICANT CHANGE UP (ref 22–31)
CREAT SERPL-MCNC: 0.84 MG/DL — SIGNIFICANT CHANGE UP (ref 0.5–1.3)
GLUCOSE SERPL-MCNC: 166 MG/DL — HIGH (ref 70–99)
HCT VFR BLD CALC: 40.1 % — SIGNIFICANT CHANGE UP (ref 34.5–45)
HGB BLD-MCNC: 12.6 G/DL — SIGNIFICANT CHANGE UP (ref 11.5–15.5)
MAGNESIUM SERPL-MCNC: 1.8 MG/DL — SIGNIFICANT CHANGE UP (ref 1.6–2.6)
MCHC RBC-ENTMCNC: 27 PG — SIGNIFICANT CHANGE UP (ref 27–34)
MCHC RBC-ENTMCNC: 31.4 GM/DL — LOW (ref 32–36)
MCV RBC AUTO: 85.9 FL — SIGNIFICANT CHANGE UP (ref 80–100)
PLATELET # BLD AUTO: 232 K/UL — SIGNIFICANT CHANGE UP (ref 150–400)
POTASSIUM SERPL-MCNC: 4.9 MMOL/L — SIGNIFICANT CHANGE UP (ref 3.5–5.3)
POTASSIUM SERPL-SCNC: 4.9 MMOL/L — SIGNIFICANT CHANGE UP (ref 3.5–5.3)
RBC # BLD: 4.67 M/UL — SIGNIFICANT CHANGE UP (ref 3.8–5.2)
RBC # FLD: 18.1 % — HIGH (ref 10.3–14.5)
SODIUM SERPL-SCNC: 138 MMOL/L — SIGNIFICANT CHANGE UP (ref 135–145)
WBC # BLD: 7.95 K/UL — SIGNIFICANT CHANGE UP (ref 3.8–10.5)
WBC # FLD AUTO: 7.95 K/UL — SIGNIFICANT CHANGE UP (ref 3.8–10.5)

## 2018-05-10 PROCEDURE — 99233 SBSQ HOSP IP/OBS HIGH 50: CPT

## 2018-05-10 PROCEDURE — 99233 SBSQ HOSP IP/OBS HIGH 50: CPT | Mod: GC

## 2018-05-10 RX ORDER — MAGNESIUM OXIDE 400 MG ORAL TABLET 241.3 MG
400 TABLET ORAL ONCE
Qty: 0 | Refills: 0 | Status: COMPLETED | OUTPATIENT
Start: 2018-05-10 | End: 2018-05-10

## 2018-05-10 RX ORDER — METOPROLOL TARTRATE 50 MG
1 TABLET ORAL
Qty: 30 | Refills: 0
Start: 2018-05-10 | End: 2018-06-08

## 2018-05-10 RX ORDER — LEVOTHYROXINE SODIUM 125 MCG
1 TABLET ORAL
Qty: 30 | Refills: 0
Start: 2018-05-10 | End: 2018-06-08

## 2018-05-10 RX ADMIN — RIVAROXABAN 20 MILLIGRAM(S): KIT at 17:19

## 2018-05-10 RX ADMIN — Medication 81 MILLIGRAM(S): at 12:07

## 2018-05-10 RX ADMIN — Medication 500 MILLIGRAM(S): at 12:07

## 2018-05-10 RX ADMIN — Medication 1: at 07:29

## 2018-05-10 RX ADMIN — Medication 500 MILLIGRAM(S): at 05:30

## 2018-05-10 RX ADMIN — ATORVASTATIN CALCIUM 20 MILLIGRAM(S): 80 TABLET, FILM COATED ORAL at 21:31

## 2018-05-10 RX ADMIN — Medication 25 MICROGRAM(S): at 05:30

## 2018-05-10 RX ADMIN — Medication 0.12 MILLIGRAM(S): at 05:30

## 2018-05-10 RX ADMIN — Medication 1: at 17:18

## 2018-05-10 RX ADMIN — Medication 200 MILLIGRAM(S): at 05:31

## 2018-05-10 RX ADMIN — Medication 500 MILLIGRAM(S): at 23:25

## 2018-05-10 RX ADMIN — MAGNESIUM OXIDE 400 MG ORAL TABLET 400 MILLIGRAM(S): 241.3 TABLET ORAL at 19:57

## 2018-05-10 RX ADMIN — Medication 500 MILLIGRAM(S): at 17:18

## 2018-05-10 NOTE — DISCHARGE NOTE ADULT - HOME CARE AGENCY
[facility] - [phone] - Registered Nurse will contact you to arrange initial visit.

## 2018-05-10 NOTE — DISCHARGE NOTE ADULT - CARE PROVIDERS DIRECT ADDRESSES
,DirectAddress_Unknown ,DirectAddress_Unknown,[email or web address]

## 2018-05-10 NOTE — DISCHARGE NOTE ADULT - SECONDARY DIAGNOSIS.
Atrial fibrillation with RVR Cellulitis of right lower extremity Diabetes mellitus type 2, noninsulin dependent Hypothyroidism, unspecified type Essential hypertension Hyperlipidemia, unspecified hyperlipidemia type

## 2018-05-10 NOTE — DISCHARGE NOTE ADULT - CARE PROVIDER_API CALL
Dong Machuca), Cardiovascular Disease; Internal Medicine  [facility] [place] [id number]  Phone: [phone]  Fax: [phone] Dong Machuca), Cardiovascular Disease; Internal Medicine  [address] [place] [id number]  Phone: [phone]  Fax: [phone]    Armen Ybarra (MD), Cardiovascular Disease; Internal Medicine; Nuclear Cardiology  [address] [address]  Phone: [phone]  Fax: [phone]

## 2018-05-10 NOTE — DISCHARGE NOTE ADULT - PLAN OF CARE
stable Patient w/ hyperdynamic LV w/ EF 75%, severely dilated LA, no RV dysfunction or significant valvular disease.  Patient has diuresed well, weight decreased by >6 pounds since admission. Remains w/ LE edema that is likely due in part to venous insufficiency.   Weigh yourself daily.  If you gain 3lbs in 3 days, or 5lbs in a week call your Health Care Provider.  Do not eat or drink foods containing more than 2000mg of salt (sodium) in your diet every day.  Call your Health Care Provider if you have any swelling or increased swelling in your feet, ankles, and/or stomach.  Take all of your medication as directed.  If you become dizzy call your Health Care Provider. HR control significantly improved with Toprol XL 200mg. Patient may benefit from eventual cardioversion in one month after urological procedure (as patient should not have interruption of blood thinner post-procedure).    Continue Toprol XL to 200 mg daily  Atrial fibrillation is the most common heart rhythm problem.  The condition puts you at risk for has stroke and heart attack  It helps if you control your blood pressure, not drink more than 1-2 alcohol drinks per day, cut down on caffeine, getting treatment for over active thyroid gland, and get regular exercise  Call your doctor if you feel your heart racing or beating unusually, chest tightness or pain, lightheaded, faint, shortness of breath especially with exercise completed antibiotic for 7days.  Call your Health Care Provider within two days of arriving home to make a follow up appointment within one week.  If the affected cellulitic area increases in redness, warmth, pain or swelling call your Health Care Provider.  If you develop fever, chills, and/or malaise, call your Health Care Provider. HgA1C this admission 7.6.  Make sure you get your HgA1c checked every three months.  If you take oral diabetes medications, check your blood glucose two times a day.  If you take insulin, check your blood glucose before meals and at bedtime.  It's important not to skip any meals.  Keep a log of your blood glucose results and always take it with you to your doctor appointments.  Keep a list of your current medications including injectables and over the counter medications and bring this medication list with you to all your doctor appointments.  If you have not seen your ophthalmologist this year call for appointment.  Check your feet daily for redness, sores, or openings. Do not self treat. If no improvement in two days call your primary care physician for an appointment.  Low blood sugar (hypoglycemia) is a blood sugar below 70mg/dl. Check your blood sugar if you feel signs/symptoms of hypoglycemia. If your blood sugar is below 70 take 15 grams of carbohydrates (ex 4 oz of apple juice, 3-4 glucose tablets, or 4-6 oz of regular soda) wait 15 minutes and repeat blood sugar to make sure it comes up above 70.  If your blood sugar is above 70 and you are due for a meal, have a meal.  If you are not due for a meal have a snack.  This snack helps keeps your blood sugar at a safe range. continue with synthroid.  repeat thyroid panel with your PMD in 3months. Low salt diet  Activity as tolerated.  Take all medication as prescribed.  Follow up with your medical doctor for routine blood pressure monitoring at your next visit.  Notify your doctor if you have any of the following symptoms:   Dizziness, Lightheadedness, Blurry vision, Headache, Chest pain, Shortness of breath Follow up with PCP for treatment goals, continue medication, have liver function testing every 3 months as anti lipid medications can cause liver irritation, eat low fat, low cholesterol meals stable. Patient w/ hyperdynamic LV w/ EF 75%, severely dilated LA, no RV dysfunction or significant valvular disease.  Patient has diuresed well. Off diuretic for now.  Remains w/ LE edema that is likely due in part to venous insufficiency.   Weigh yourself daily.  If you gain 3lbs in 3 days, or 5lbs in a week call your Health Care Provider.  Do not eat or drink foods containing more than 2000mg of salt (sodium) in your diet every day.  Call your Health Care Provider if you have any swelling or increased swelling in your feet, ankles, and/or stomach.  Take all of your medication as directed.  If you become dizzy call your Health Care Provider. please complete antibiotic for total 7days.   Call your Health Care Provider within two days of arriving home to make a follow up appointment within one week.  If the affected cellulitic area increases in redness, warmth, pain or swelling call your Health Care Provider.  If you develop fever, chills, and/or malaise, call your Health Care Provider.

## 2018-05-10 NOTE — PROGRESS NOTE ADULT - PROBLEM SELECTOR PLAN 5
Nonpurulent cellulitis of RLE in setting of LE edema is improving  - c/w PO Keflex (Day 7/7), d/c tomorrow AM

## 2018-05-10 NOTE — DISCHARGE NOTE ADULT - ADDITIONAL INSTRUCTIONS
please make an appointment with your PMD in 1 week.  follow up with your nephrologist and cardiologist in 2 ~ 4 weeks. please make an appointment with your PMD in 1 week.  follow up with your cardiologist in 2 ~ 4 weeks.  follow up with your urologist for surgery on next month.

## 2018-05-10 NOTE — DISCHARGE NOTE ADULT - MEDICATION SUMMARY - MEDICATIONS TO TAKE
I will START or STAY ON the medications listed below when I get home from the hospital:    Aspirin Enteric Coated 81 mg oral delayed release tablet  -- 1 tab(s) by mouth once a day  -- Indication: For Acute heart failure, unspecified heart failure type    Xarelto 20 mg oral tablet  -- 1 tab(s) by mouth once a day (in the evening)  -- Indication: For Atrial fibrillation with RVR    metFORMIN 500 mg oral tablet, extended release  -- 1 tab(s) by mouth once a day  -- Indication: For Diabetes mellitus type 2, noninsulin dependent    atorvastatin 20 mg oral tablet  -- 1 tab(s) by mouth once a day  -- Indication: For Hyperlipidemia, unspecified hyperlipidemia type    metoprolol succinate 200 mg oral tablet, extended release  -- 1 tab(s) by mouth once a day  -- Indication: For Atrial fibrillation with RVR    cephalexin 500 mg oral capsule  -- 1 cap(s) by mouth every 6 hours  -- Indication: For Cellulitis of right lower extremity    levothyroxine 25 mcg (0.025 mg) oral tablet  -- 1 tab(s) by mouth once a day  -- Indication: For Hypothyroidism    Vitamin D3 2000 intl units oral tablet  -- 1 tab(s) by mouth once a day  -- Indication: For Supplement

## 2018-05-10 NOTE — DISCHARGE NOTE ADULT - HOSPITAL COURSE
to be completed ADMISSION     72F h/o HTN, HLD, DM2, recently diagnosed Afib on Xarelto, nephrolithiasis s/p recent left ureteral stent, sent in by PCP with LE swelling. Pt says she has been drinking a lot of fluid since her kidney stone 2 weeks ago and her LE swelling has been progressively worsening since. Pt not on a water pill at home. Leg swelling has gotten so bad that she developed blisters that are weeping. Pt also states that her legs feel really heavy and is difficult to ambulate. Pt has had intermittent leg swelling in the past but never to this bad. Pt also reports some shortness of breath with exertion.     HOSPITAL COURSE    Patient admitted to telemetry.  Medications adjusted after cardiology evaluation.  Removed cardizem and changed atenolol to metoprolol.  Beta blocker uptitrated to achieve rate control.  Patient digoxin loaded when briefly hypotensive w/ AFib w/ RVR.  Digoxin d/c'ed prior to discharge as per cardiology recs.  TTE notable for hyperdynamic LV.  No evidence of wall motion abnormalities or need for further ischemic workup, as per cardiology.  Patient diuresed well on bumex IV and decreased in weight by 6 pounds.    Cellulitis of RLE was treated with cephalexin x 7 days with improvement in symptoms.    Plan of care d/w patient's PCP, Dr. Machuca.  Patient's condition continued to improve over the course of hospitalization.  She will f/u with Cardiology (Dr. Ybarra) for possible cardioversion after her urologic surgery next month. 72F h/o HTN, HLD, DM2, recently diagnosed Afib on Xarelto, nephrolithiasis s/p recent left ureteral stent, sent in by PCP with LE swelling. Pt says she has been drinking a lot of fluid since her kidney stone 2 weeks ago and her LE swelling has been progressively worsening since. Pt not on a water pill at home. Leg swelling has gotten so bad that she developed blisters that are weeping. Pt also states that her legs feel really heavy and is difficult to ambulate. Pt has had intermittent leg swelling in the past but never to this bad. Pt also reports some shortness of breath with exertion.   HOSPITAL COURSE  Patient admitted to telemetry.  Medications adjusted after cardiology evaluation.  Removed cardizem and changed atenolol to metoprolol.  Beta blocker uptitrated to achieve rate control.  Patient digoxin loaded when briefly hypotensive w/ AFib w/ RVR.  Digoxin d/c'ed prior to discharge as per cardiology recs.  TTE notable for hyperdynamic LV.  No evidence of wall motion abnormalities or need for further ischemic workup, as per cardiology.  Patient diuresed well on bumex IV and decreased in weight by 6 pounds.    Cellulitis of RLE was treated with cephalexin x 7 days with improvement in symptoms.  Plan of care d/w patient's PCP, Dr. Machuca.  Patient's condition continued to improve over the course of hospitalization.  She will f/u with Cardiology (Dr. Ybarra) for possible cardioversion after her urologic surgery next month. 72F h/o HTN, HLD, DM2, recently diagnosed Afib on Xarelto, nephrolithiasis s/p recent left ureteral stent, sent in by PCP with LE swelling. Pt says she has been drinking a lot of fluid since her kidney stone 2 weeks ago and her LE swelling has been progressively worsening since. Pt not on a water pill at home. Leg swelling has gotten so bad that she developed blisters that are weeping. Pt also states that her legs feel really heavy and is difficult to ambulate. Pt has had intermittent leg swelling in the past but never to this bad. Pt also reports some shortness of breath with exertion.     HOSPITAL COURSE  Patient admitted to telemetry.  Medications adjusted after cardiology evaluation.  Removed cardizem and changed atenolol to metoprolol.  Beta blocker uptitrated to achieve acceptable rate control.  Patient digoxin loaded when briefly hypotensive w/ AFib w/ RVR.  Digoxin d/c'ed prior to discharge as per cardiology recs.  TTE notable for hyperdynamic LV, diastolic dysfunction absence of wall motion abnormalities or need for further ischemic workup, as per cardiology.  Patient diuresed well on bumex IV and decreased in weight by 6 pounds, no lasix on discharge per cards as LE swelling likely multifactorial and also related to resolving cellulitis and chronic venous changes. Cellulitis of RLE was treated with cephalexin x 7 days with improvement in symptoms, remained afebrile, HD stable, no leukocytosis. Free T4 noted to be low at 0.8 in the setting of TSH > 10. so patient was given synthroid 25 mcg daily with Repeat TSH needed in 6 weeks w/ PCP.  Patient symptoms resolved and patient HD stable for discharge home with PCP and cards followup. Plan of care d/w patient's PCP, Dr. Machuca.  Patient's condition continued to improve over the course of hospitalization.  She will f/u with Cardiology (Dr. Ybarra) for possible cardioversion after her urologic surgery next month.     Discharge Diagnosis:  Acute diastolic heart failure  Atrial fibrillation with RVR  Hypothyroidism  Cellulitis of right lower extremity  Edema  Venous insufficiency  Essential hypertension  HLD  Diabetes mellitus type 2, noninsulin dependent

## 2018-05-10 NOTE — PROGRESS NOTE ADULT - ATTENDING COMMENTS
76 year old woman with rapid atrial fibrillation/flutter and volume overload congestive heart failure. Cardiac echo demonstrated hyperdynamic LV with concentric hypertrophy. Medication adjustments since admission have improved rate and observing hyperdynamic ventricle further increased beta blocker dose. Would favor stopping digoxin. Suspect mild over-diuresis and diuretic stopped. Rate control now markedly improved, all parameters more favorable and anticipate discharge to home remaining on anticoagulation with rivaroxaban and with outpatient cardiology follow up. Consider transesophageal echo guided cardioversion after at least three weeks continuous anticoagulation. Another consideration is transesophageal guided cardioversion prior to discharge.

## 2018-05-10 NOTE — DISCHARGE NOTE ADULT - PATIENT PORTAL LINK FT
You can access the [facility] Patient Portal, offered by [facility], by registering with the following website: [email or web address]

## 2018-05-10 NOTE — DISCHARGE NOTE ADULT - NS AS ACTIVITY OBS
No Heavy lifting/straining/Bathing allowed/Walking-Indoors allowed/Showering allowed/as tolerated/Walking-Outdoors allowed

## 2018-05-10 NOTE — DISCHARGE NOTE ADULT - CARE PLAN
Principal Discharge DX:	Acute diastolic heart failure  Goal:	stable  Assessment and plan of treatment:	Patient w/ hyperdynamic LV w/ EF 75%, severely dilated LA, no RV dysfunction or significant valvular disease.  Patient has diuresed well, weight decreased by >6 pounds since admission. Remains w/ LE edema that is likely due in part to venous insufficiency.   Weigh yourself daily.  If you gain 3lbs in 3 days, or 5lbs in a week call your Health Care Provider.  Do not eat or drink foods containing more than 2000mg of salt (sodium) in your diet every day.  Call your Health Care Provider if you have any swelling or increased swelling in your feet, ankles, and/or stomach.  Take all of your medication as directed.  If you become dizzy call your Health Care Provider.  Secondary Diagnosis:	Atrial fibrillation with RVR  Assessment and plan of treatment:	HR control significantly improved with Toprol XL 200mg. Patient may benefit from eventual cardioversion in one month after urological procedure (as patient should not have interruption of blood thinner post-procedure).    Continue Toprol XL to 200 mg daily  Atrial fibrillation is the most common heart rhythm problem.  The condition puts you at risk for has stroke and heart attack  It helps if you control your blood pressure, not drink more than 1-2 alcohol drinks per day, cut down on caffeine, getting treatment for over active thyroid gland, and get regular exercise  Call your doctor if you feel your heart racing or beating unusually, chest tightness or pain, lightheaded, faint, shortness of breath especially with exercise  Secondary Diagnosis:	Cellulitis of right lower extremity  Assessment and plan of treatment:	completed antibiotic for 7days.  Call your Health Care Provider within two days of arriving home to make a follow up appointment within one week.  If the affected cellulitic area increases in redness, warmth, pain or swelling call your Health Care Provider.  If you develop fever, chills, and/or malaise, call your Health Care Provider.  Secondary Diagnosis:	Diabetes mellitus type 2, noninsulin dependent  Assessment and plan of treatment:	HgA1C this admission 7.6.  Make sure you get your HgA1c checked every three months.  If you take oral diabetes medications, check your blood glucose two times a day.  If you take insulin, check your blood glucose before meals and at bedtime.  It's important not to skip any meals.  Keep a log of your blood glucose results and always take it with you to your doctor appointments.  Keep a list of your current medications including injectables and over the counter medications and bring this medication list with you to all your doctor appointments.  If you have not seen your ophthalmologist this year call for appointment.  Check your feet daily for redness, sores, or openings. Do not self treat. If no improvement in two days call your primary care physician for an appointment.  Low blood sugar (hypoglycemia) is a blood sugar below 70mg/dl. Check your blood sugar if you feel signs/symptoms of hypoglycemia. If your blood sugar is below 70 take 15 grams of carbohydrates (ex 4 oz of apple juice, 3-4 glucose tablets, or 4-6 oz of regular soda) wait 15 minutes and repeat blood sugar to make sure it comes up above 70.  If your blood sugar is above 70 and you are due for a meal, have a meal.  If you are not due for a meal have a snack.  This snack helps keeps your blood sugar at a safe range.  Secondary Diagnosis:	Hypothyroidism, unspecified type  Assessment and plan of treatment:	continue with synthroid.  repeat thyroid panel with your PMD in 3months.  Secondary Diagnosis:	Essential hypertension  Assessment and plan of treatment:	Low salt diet  Activity as tolerated.  Take all medication as prescribed.  Follow up with your medical doctor for routine blood pressure monitoring at your next visit.  Notify your doctor if you have any of the following symptoms:   Dizziness, Lightheadedness, Blurry vision, Headache, Chest pain, Shortness of breath  Secondary Diagnosis:	Hyperlipidemia, unspecified hyperlipidemia type  Assessment and plan of treatment:	Follow up with PCP for treatment goals, continue medication, have liver function testing every 3 months as anti lipid medications can cause liver irritation, eat low fat, low cholesterol meals Principal Discharge DX:	Acute diastolic heart failure  Goal:	stable.  Assessment and plan of treatment:	Patient w/ hyperdynamic LV w/ EF 75%, severely dilated LA, no RV dysfunction or significant valvular disease.  Patient has diuresed well. Off diuretic for now.  Remains w/ LE edema that is likely due in part to venous insufficiency.   Weigh yourself daily.  If you gain 3lbs in 3 days, or 5lbs in a week call your Health Care Provider.  Do not eat or drink foods containing more than 2000mg of salt (sodium) in your diet every day.  Call your Health Care Provider if you have any swelling or increased swelling in your feet, ankles, and/or stomach.  Take all of your medication as directed.  If you become dizzy call your Health Care Provider.  Secondary Diagnosis:	Atrial fibrillation with RVR  Assessment and plan of treatment:	HR control significantly improved with Toprol XL 200mg. Patient may benefit from eventual cardioversion in one month after urological procedure (as patient should not have interruption of blood thinner post-procedure).    Continue Toprol XL to 200 mg daily  Atrial fibrillation is the most common heart rhythm problem.  The condition puts you at risk for has stroke and heart attack  It helps if you control your blood pressure, not drink more than 1-2 alcohol drinks per day, cut down on caffeine, getting treatment for over active thyroid gland, and get regular exercise  Call your doctor if you feel your heart racing or beating unusually, chest tightness or pain, lightheaded, faint, shortness of breath especially with exercise  Secondary Diagnosis:	Cellulitis of right lower extremity  Assessment and plan of treatment:	please complete antibiotic for total 7days.   Call your Health Care Provider within two days of arriving home to make a follow up appointment within one week.  If the affected cellulitic area increases in redness, warmth, pain or swelling call your Health Care Provider.  If you develop fever, chills, and/or malaise, call your Health Care Provider.  Secondary Diagnosis:	Diabetes mellitus type 2, noninsulin dependent  Assessment and plan of treatment:	HgA1C this admission 7.6.  Make sure you get your HgA1c checked every three months.  If you take oral diabetes medications, check your blood glucose two times a day.  If you take insulin, check your blood glucose before meals and at bedtime.  It's important not to skip any meals.  Keep a log of your blood glucose results and always take it with you to your doctor appointments.  Keep a list of your current medications including injectables and over the counter medications and bring this medication list with you to all your doctor appointments.  If you have not seen your ophthalmologist this year call for appointment.  Check your feet daily for redness, sores, or openings. Do not self treat. If no improvement in two days call your primary care physician for an appointment.  Low blood sugar (hypoglycemia) is a blood sugar below 70mg/dl. Check your blood sugar if you feel signs/symptoms of hypoglycemia. If your blood sugar is below 70 take 15 grams of carbohydrates (ex 4 oz of apple juice, 3-4 glucose tablets, or 4-6 oz of regular soda) wait 15 minutes and repeat blood sugar to make sure it comes up above 70.  If your blood sugar is above 70 and you are due for a meal, have a meal.  If you are not due for a meal have a snack.  This snack helps keeps your blood sugar at a safe range.  Secondary Diagnosis:	Hypothyroidism, unspecified type  Assessment and plan of treatment:	continue with synthroid.  repeat thyroid panel with your PMD in 3months.  Secondary Diagnosis:	Essential hypertension  Assessment and plan of treatment:	Low salt diet  Activity as tolerated.  Take all medication as prescribed.  Follow up with your medical doctor for routine blood pressure monitoring at your next visit.  Notify your doctor if you have any of the following symptoms:   Dizziness, Lightheadedness, Blurry vision, Headache, Chest pain, Shortness of breath  Secondary Diagnosis:	Hyperlipidemia, unspecified hyperlipidemia type  Assessment and plan of treatment:	Follow up with PCP for treatment goals, continue medication, have liver function testing every 3 months as anti lipid medications can cause liver irritation, eat low fat, low cholesterol meals

## 2018-05-10 NOTE — DISCHARGE NOTE ADULT - MEDICATION SUMMARY - MEDICATIONS TO STOP TAKING
I will STOP taking the medications listed below when I get home from the hospital:    losartan 100 mg oral tablet  -- 1 tab(s) by mouth once a day    atenolol 100 mg oral tablet  -- 1 tab(s) by mouth 2 times a day    DilTIAZem Hydrochloride  mg/24 hours oral capsule, extended release  -- 1 cap(s) by mouth once a day

## 2018-05-10 NOTE — PROGRESS NOTE ADULT - ASSESSMENT
72F w/ PMHx of HTN, HLD, DM2, recently diagnosed AFl (CHADS2-VASC:4, on Xarelto), nephrolithiasis (s/p recent L ureteral stent) who presents from her PCPs office with worsening LE swelling. Found to have clinical signs of acute HF - suspect w/ rEF as addition of Cardizem coincided w/ progression of symptoms however this is nonspecific. Current SPBs in the 90s - 110s point away from chronic HTN causing HFpEF. The patient was found to have an elevated BNP, Edema and elevated JVP (w/ out overt rales on my exam), R-sided HF also a possibility. Tachy induced CM on DDx given recent Dx of AFl (typical AFl on ECG).    Plan:    1. Acute HF (likely w/ rEF)  -will consider adding back diltiazem for better rate control  -continue toprol  -c/w Losartan 100 daily  -holding diuresis  -Strict I/Os, daily standing weights  -continue to monitor on Tele    2. Typical AFl w/ variable block  -change Atenolol to Toprol and D/C Cardizem as above  -continue to monitor on Tele  -c/w Xarelto 20 daily  -Can consider AFl ablation pending clinical course    3. HLD  -c/w Lipitor 20    General Cardiology will continue to follow    Barak Marquis MD 72F w/ PMHx of HTN, HLD, DM2, recently diagnosed AFl (CHADS2-VASC:4, on Xarelto), nephrolithiasis (s/p recent L ureteral stent) who presents from her PCPs office with worsening LE swelling. Found to have clinical signs of acute HF. The patient was found to have an elevated BNP, edema and elevated JVP (w/ out overt rales on my exam), R-sided HF also a possibility. Tachy induced CM on DDx given recent Dx of AFl (typical AFl on ECG).    Plan:    1. Acute HF (likely w/ rEF)  -will consider adding back diltiazem for better rate control  -continue toprol  -c/w Losartan 100 daily  -holding diuresis  -Strict I/Os, daily standing weights  -continue to monitor on Tele    2. Typical AFl w/ variable block  -c/w Xarelto 20 daily    3. HLD  -c/w Lipitor 20    General Cardiology will continue to follow    Barak Marquis MD

## 2018-05-11 VITALS
DIASTOLIC BLOOD PRESSURE: 84 MMHG | RESPIRATION RATE: 18 BRPM | TEMPERATURE: 98 F | OXYGEN SATURATION: 96 % | SYSTOLIC BLOOD PRESSURE: 116 MMHG | HEART RATE: 93 BPM

## 2018-05-11 LAB
ANION GAP SERPL CALC-SCNC: 15 MMOL/L — SIGNIFICANT CHANGE UP (ref 5–17)
BUN SERPL-MCNC: 24 MG/DL — HIGH (ref 7–23)
CALCIUM SERPL-MCNC: 10 MG/DL — SIGNIFICANT CHANGE UP (ref 8.4–10.5)
CHLORIDE SERPL-SCNC: 96 MMOL/L — SIGNIFICANT CHANGE UP (ref 96–108)
CO2 SERPL-SCNC: 26 MMOL/L — SIGNIFICANT CHANGE UP (ref 22–31)
CREAT SERPL-MCNC: 0.76 MG/DL — SIGNIFICANT CHANGE UP (ref 0.5–1.3)
GLUCOSE SERPL-MCNC: 179 MG/DL — HIGH (ref 70–99)
MAGNESIUM SERPL-MCNC: 2.1 MG/DL — SIGNIFICANT CHANGE UP (ref 1.6–2.6)
POTASSIUM SERPL-MCNC: 3.9 MMOL/L — SIGNIFICANT CHANGE UP (ref 3.5–5.3)
POTASSIUM SERPL-SCNC: 3.9 MMOL/L — SIGNIFICANT CHANGE UP (ref 3.5–5.3)
SODIUM SERPL-SCNC: 137 MMOL/L — SIGNIFICANT CHANGE UP (ref 135–145)

## 2018-05-11 PROCEDURE — 99239 HOSP IP/OBS DSCHRG MGMT >30: CPT

## 2018-05-11 PROCEDURE — 99233 SBSQ HOSP IP/OBS HIGH 50: CPT | Mod: GC

## 2018-05-11 RX ORDER — RIVAROXABAN 15 MG-20MG
1 KIT ORAL
Qty: 30 | Refills: 0
Start: 2018-05-11 | End: 2018-06-09

## 2018-05-11 RX ORDER — POTASSIUM CHLORIDE 20 MEQ
20 PACKET (EA) ORAL ONCE
Qty: 0 | Refills: 0 | Status: COMPLETED | OUTPATIENT
Start: 2018-05-11 | End: 2018-05-11

## 2018-05-11 RX ORDER — CEPHALEXIN 500 MG
1 CAPSULE ORAL
Qty: 4 | Refills: 0
Start: 2018-05-11 | End: 2018-05-11

## 2018-05-11 RX ADMIN — Medication 81 MILLIGRAM(S): at 11:58

## 2018-05-11 RX ADMIN — Medication 500 MILLIGRAM(S): at 11:58

## 2018-05-11 RX ADMIN — Medication 500 MILLIGRAM(S): at 05:27

## 2018-05-11 RX ADMIN — Medication 1: at 11:58

## 2018-05-11 RX ADMIN — Medication 25 MICROGRAM(S): at 05:27

## 2018-05-11 RX ADMIN — Medication 20 MILLIEQUIVALENT(S): at 09:05

## 2018-05-11 RX ADMIN — Medication 1: at 08:16

## 2018-05-11 RX ADMIN — Medication 200 MILLIGRAM(S): at 04:23

## 2018-05-11 NOTE — PROGRESS NOTE ADULT - PROBLEM SELECTOR PLAN 3
Free T4 noted to be low at 0.8 in the setting of TSH > 10.  - c/w synthroid low dose (given age) 25 mcg daily  - Repeat TSH needed in 6 weeks w/ PCP
Free T4 noted to be low at 0.8 in the setting of TSH > 10.  - c/w synthroid low dose (given age) 25 mcg daily  - Repeat TSH needed in 6 weeks w/ PCP
Free T4 noted to be low at 0.8 in the setting of TSH > 10.  - Start synthroid low dose (given age) 25 mcg daily  - Repeat TSH needed in 6 weeks w/ PCP
Free T4 noted to be low at 0.8 in the setting of TSH > 10.  - c/w synthroid low dose (given age) 25 mcg daily  - Repeat TSH needed in 6 weeks w/ PCP
Free T4 noted to be low at 0.8 in the setting of TSH > 10.  - c/w synthroid low dose (given age) 25 mcg daily  - Repeat TSH needed in 6 weeks w/ PCP
Nonpurulent cellulitis of RLE in setting of LE edema is improving  -c/w PO Keflex (Day 3/7) and monitor

## 2018-05-11 NOTE — PROGRESS NOTE ADULT - PROBLEM SELECTOR PROBLEM 4
Edema, unspecified type
Edema, unspecified type
Chronic atrial fibrillation
Edema, unspecified type

## 2018-05-11 NOTE — PROGRESS NOTE ADULT - ASSESSMENT
73 y/o woman w/ PMH HTN, HLD, DM2, recently diagnosed Afib on Xarelto, nephrolithiasis s/p recent left ureteral stent, sent in by PCP with worsening LE swelling due to acute diastolic heart failure and LE celluitis

## 2018-05-11 NOTE — PROGRESS NOTE ADULT - PROVIDER SPECIALTY LIST ADULT
Cardiology
Hospitalist

## 2018-05-11 NOTE — PROGRESS NOTE ADULT - PROBLEM SELECTOR PLAN 7
-continue statin
TSH of 10, however in the setting of acute illness.    - Check Free T4 and T3

## 2018-05-11 NOTE — PROGRESS NOTE ADULT - PROBLEM SELECTOR PROBLEM 3
Hypothyroidism
Cellulitis of right lower extremity
Hypothyroidism

## 2018-05-11 NOTE — PROGRESS NOTE ADULT - PROBLEM SELECTOR PLAN 6
BP controlled, improved w/ new med changes.   -has been off losartan since earlier this admission and BP low normal, will hold losartan on discharge
BP controlled, improved w/ new med changes.   - Hold losartan for now, may restart at lower dose when stable
-continue statin
BP controlled, improved w/ new med changes.   - Hold losartan for now, may restart at lower dose when stable
BP controlled, improved w/ new med changes.   - Hold losartan for now, will likely restart at lower dose when stable
Drop in BP w/ extra dose of metoprolol while in AFib w/ RVR.  BP may actually improve as HR is controlled.  - Hold losartan for now, will likely restart at lower dose when stable

## 2018-05-11 NOTE — PROGRESS NOTE ADULT - PROBLEM SELECTOR PLAN 1
Likely newly diagnosed CHF in the setting of recent onset of AFib.  - Pending TTE  - c/w lasix 20 mg IV BID (diuresing well)  - Cardiology consulted, will likely need cardiac cath (especially with upcoming urologic surgery for nephrolithiasis in three weeks)  - Daily weights, trend electrolytes
Likely newly diagnosed CHF in the setting of recent onset of AFib. Cardiology consult appreciated, case d/w Dr. Ybarra.   - Pending TTE, awaiting improvement in HR  - Lasix changed to bumex 2 mg IV BID  - Cardiology consult appeciated, will likely need cardiac cath (especially with upcoming urologic surgery for nephrolithiasis in three weeks)  - Daily weights, trend electrolytes
Likely newly diagnosed CHF in the setting of recent onset of AFib. Weight decreased by 5 pounds since admission.   - Pending TTE today to guide management  - c/w bumex 2 mg IV BID  - Cardiology f/u appeciated, may need cardiac cath or NST (especially with upcoming urologic surgery for nephrolithiasis in three weeks) pending TTE results  - Daily weights, trend electrolytes
Patient w/ hyperdynamic LV w/ EF 75%, severely dilated LA, no RV dysfunction or significant valvular disease.  Patient has diuresed well, weight decreased by >6 pounds since admission. Remains w/ LE edema that is likely due in part to venous insufficiency.  She appear intravascularly euvolemic (rising HCO3, no JVD)  - c/w holding diuretics  - c/w Toprol  mg daily  - Daily weights, trend electrolytes  - d/w cardiology.  No indication for further ischemic workup at this time w/ cath or stress test
hyperdynamic LV w/ EF 75%, severely dilated LA, no RV dysfunction or significant valvular disease, now euvolemic  -diuresed well since admission however LE edema also due to venous insufficiency.  -She appear intravascularly euvolemic (rising HCO3, no JVD)  -discussed with Dr Benitez obando, hold lasix on discharge  -will followup with Dr Amada obando as outpatient  - c/w Toprol  mg daily  - Daily weights, trend electrolytes  - d/w cardiology.  No indication for further ischemic workup at this time w/ cath or stress test
TTE reviewed this AM w/ cardiology (Dr. Ybarra).  Patient w/ hyperdynamic LV w/ EF 75%, severely dilated LA, no RV dysfunction or significant valvular disease.  Patient has diuresed well, weight decreased by >6 pounds since admission.  Given rising HCO3 and hyperdynamic LV, patient likely intravascularly euvolemic  - Hold diuretics  - Increase beta blocker to Toprol  mg daily  - Daily weights, trend electrolytes  - d/w cardiology.  No indication for further ischemic workup at this time w/ cath or stress test

## 2018-05-11 NOTE — PROGRESS NOTE ADULT - PROBLEM SELECTOR PLAN 9
DVT PPX: On xarelto    Dispo: discharge home today with PCP and cards followup    Spent 45 minutes on discharge process time
DVT PPX: On xarelto

## 2018-05-11 NOTE — PROGRESS NOTE ADULT - PROBLEM SELECTOR PROBLEM 8
Diabetes mellitus type 2, noninsulin dependent

## 2018-05-11 NOTE — PROGRESS NOTE ADULT - PROBLEM SELECTOR PROBLEM 2
Atrial fibrillation with RVR
Edema, unspecified type
Atrial fibrillation with RVR

## 2018-05-11 NOTE — PROGRESS NOTE ADULT - ASSESSMENT
72F w/ PMHx of HTN, HLD, DM2, recently diagnosed AFl (CHADS2-VASC:4, on Xarelto), nephrolithiasis (s/p recent L ureteral stent) who presents from her PCPs office with worsening LE swelling. Found to have clinical signs of acute HF. The patient was found to have an elevated BNP, edema and elevated JVP (w/ out overt rales on my exam), R-sided HF also a possibility. Tachy induced CM on DDx given recent Dx of AFl (typical AFl on ECG).    Plan:    1. Acute HF (likely w/ rEF)  -may require diltiazem for better rate control  -continue toprol  -c/w Losartan 100 daily  -holding diuresis  -Strict I/Os, daily standing weights  -continue to monitor on Tele    2. Typical AFl w/ variable block  -c/w Xarelto 20 daily    3. HLD  -c/w Lipitor 20    General Cardiology will continue to follow    Barak Marquis MD

## 2018-05-11 NOTE — PROGRESS NOTE ADULT - PROBLEM SELECTOR PLAN 5
Nonpurulent cellulitis of RLE in setting of LE edema is improving  - completing 7 days of PO Keflex today

## 2018-05-11 NOTE — PROGRESS NOTE ADULT - PROBLEM SELECTOR PROBLEM 1
Acute diastolic heart failure
Acute heart failure, unspecified heart failure type
Acute diastolic heart failure
Acute diastolic heart failure

## 2018-05-11 NOTE — PROGRESS NOTE ADULT - PROBLEM SELECTOR PLAN 8
FS controlled  - c/w F/S and ISS  -restart metformin on discharge
- Hold Metformin   - c/w F/S and ISS
- Hold Metformin   -c/w F/S and ISS
-check Hgb A1c  -hold Metformin   -c/w insulin sliding scale

## 2018-05-11 NOTE — PROGRESS NOTE ADULT - PROBLEM SELECTOR PROBLEM 5
Cellulitis of right lower extremity
Essential hypertension

## 2018-05-11 NOTE — PROVIDER CONTACT NOTE (OTHER) - ASSESSMENT
Pt A&Ox4. TMP 98.2, HR 87, /83, respirations 18 and oxygen saturation 98% Pt is not having any c/o chest pain, SOB, palpitations, and discomfort. Pt had no symptomatic arrhythmias. Pt is a fib on tele.

## 2018-05-11 NOTE — PROGRESS NOTE ADULT - ATTENDING COMMENTS
76 year old woman with rapid atrial fibrillation/flutter and volume overload congestive heart failure. Cardiac echo demonstrated hyperdynamic LV with concentric hypertrophy. Medication adjustments since admission have improved rate and observing hyperdynamic ventricle further increased beta blocker dose. Rate control now markedly improved with occasional periods of rapid rate and all parameters more favorable. Anticipate discharge to home remaining on anticoagulation with rivaroxaban and with outpatient cardiology follow up. Consider transesophageal echo guided cardioversion after at least three weeks continuous anticoagulation, probably after complete planned urologic surgical procedure at end of month.

## 2018-05-11 NOTE — PROGRESS NOTE ADULT - SUBJECTIVE AND OBJECTIVE BOX
Cardiology Progress Note    Interval events: Episode of aflutter yesterday. Dig loaded. Patient feels well this morning. Feels at her baseline.    Tele: afib 70-90s, PVCs    Medications:  aspirin enteric coated 81 milliGRAM(s) Oral daily  atorvastatin 20 milliGRAM(s) Oral at bedtime  buMETAnide Injectable 2 milliGRAM(s) IV Push two times a day  cephalexin 500 milliGRAM(s) Oral every 6 hours  digoxin     Tablet 0.125 milliGRAM(s) Oral daily  insulin lispro (HumaLOG) corrective regimen sliding scale   SubCutaneous three times a day before meals  insulin lispro (HumaLOG) corrective regimen sliding scale   SubCutaneous at bedtime  levothyroxine 25 MICROGram(s) Oral daily  metoprolol succinate  milliGRAM(s) Oral daily  rivaroxaban 20 milliGRAM(s) Oral every 24 hours    Review of Systems:  Constitutional: [ ] Fever [ ] Chills [ ] Fatigue [ ] Weight change   HEENT: [ ] Blurred vision [ ] Eye Pain [ ] Headache [ ] Runny nose [ ] Sore Throat   Respiratory: [ ] Cough [ ] Wheezing [ ] Shortness of breath  Cardiovascular: [ ] Chest Pain [ ] Palpitations [ ] TODD [ ] PND [ ] Orthopnea  Gastrointestinal: [ ] Abdominal Pain [ ] Diarrhea [ ] Constipation [ ] Hemorrhoids [ ] Nausea [ ] Vomiting  Genitourinary: [ ] Nocturia [ ] Dysuria [ ] Incontinence  Extremities: [ ] Swelling [ ] Joint Pain  Neurologic: [ ] Focal deficit [ ] Paresthesias [ ] Syncope  Lymphatic: [ ] Swelling [ ] Lymphadenopathy   Skin: [ ] Rash [ ] Ecchymoses [ ] Wounds [ ] Lesions  Psychiatry: [ ] Depression [ ] Suicidal/Homicidal Ideation [ ] Anxiety [ ] Sleep Disturbances  [x] 10 point review of systems is otherwise negative except as mentioned above            [ ]Unable to obtain    Vitals:  T(C): 36.7 (18 @ 04:46), Max: 36.8 (18 @ 11:53)  HR: 75 (18 @ 06:06) (61 - 130)  BP: 105/69 (18 @ 06:06) (84/55 - 120/80)  BP(mean): --  RR: 18 (18 @ 04:46) (16 - 18)  SpO2: 94% (18 @ 04:46) (94% - 96%)  Wt(kg): --  Daily     Daily Weight in k.9 (07 May 2018 08:00)  I&O's Summary    06 May 2018 07:01  -  07 May 2018 07:00  --------------------------------------------------------  IN: 600 mL / OUT: 500 mL / NET: 100 mL    07 May 2018 07:01  -  08 May 2018 06:39  --------------------------------------------------------  IN: 1430 mL / OUT: 600 mL / NET: 830 mL        Physical Exam:  NAD  PERRL, EOMI  Normal oral [place] NC/AT  S1, S2, irregular, No m/r/g appreciated, 3+ BL LE edema, elevated JVP  Clear to auscultation bilaterally  Soft, Non-tender, Non-distended, BS+  No clubbing, No joint deformity   Non-focal  No lymphadenopathy  AAOx3, Mood & affect appropriate  No rashes, No ecchymoses, No cyanosis    Labs:                        11.9   8.66  )-----------( 237      ( 07 May 2018 07:36 )             38.4         141  |  99  |  30<H>  ----------------------------<  224<H>  3.7   |  28  |  0.97    Ca    8.6      07 May 2018 20:34  Phos  3.5     05-  Mg     1.8           Serum Pro-Brain Natriuretic Peptide: 3002 pg/mL ( @ 14:29)      New results/imaging:
Cardiology Progress Note    Interval events: No acute events overnight. No complaints this morning.     Tele: afib , brief episodes to 150-170    Medications:  aspirin enteric coated 81 milliGRAM(s) Oral daily  atorvastatin 20 milliGRAM(s) Oral at bedtime  cephalexin 500 milliGRAM(s) Oral every 6 hours  insulin lispro (HumaLOG) corrective regimen sliding scale   SubCutaneous three times a day before meals  insulin lispro (HumaLOG) corrective regimen sliding scale   SubCutaneous at bedtime  levothyroxine 25 MICROGram(s) Oral daily  metoprolol succinate  milliGRAM(s) Oral daily  rivaroxaban 20 milliGRAM(s) Oral every 24 hours    Review of Systems:  Constitutional: [ ] Fever [ ] Chills [ ] Fatigue [ ] Weight change   HEENT: [ ] Blurred vision [ ] Eye Pain [ ] Headache [ ] Runny nose [ ] Sore Throat   Respiratory: [ ] Cough [ ] Wheezing [ ] Shortness of breath  Cardiovascular: [ ] Chest Pain [ ] Palpitations [ ] TODD [ ] PND [ ] Orthopnea  Gastrointestinal: [ ] Abdominal Pain [ ] Diarrhea [ ] Constipation [ ] Hemorrhoids [ ] Nausea [ ] Vomiting  Genitourinary: [ ] Nocturia [ ] Dysuria [ ] Incontinence  Extremities: [ ] Swelling [ ] Joint Pain  Neurologic: [ ] Focal deficit [ ] Paresthesias [ ] Syncope  Lymphatic: [ ] Swelling [ ] Lymphadenopathy   Skin: [ ] Rash [ ] Ecchymoses [ ] Wounds [ ] Lesions  Psychiatry: [ ] Depression [ ] Suicidal/Homicidal Ideation [ ] Anxiety [ ] Sleep Disturbances  [x] 10 point review of systems is otherwise negative except as mentioned above            [ ]Unable to obtain    Vitals:  T(C): 36.4 (05-11-18 @ 04:38), Max: 36.8 (05-11-18 @ 03:55)  HR: 80 (05-11-18 @ 04:38) (80 - 94)  BP: 106/65 (05-11-18 @ 04:38) (106/65 - 131/83)  BP(mean): --  RR: 18 (05-11-18 @ 04:38) (18 - 18)  SpO2: 98% (05-11-18 @ 04:38) (92% - 99%)  Wt(kg): --  Daily     Daily   I&O's Summary    09 May 2018 07:01  -  10 May 2018 07:00  --------------------------------------------------------  IN: 1170 mL / OUT: 1625 mL / NET: -455 mL    10 May 2018 07:01  -  11 May 2018 06:03  --------------------------------------------------------  IN: 660 mL / OUT: 700 mL / NET: -40 mL      Physical Exam:  NAD  PERRL, EOMI  Normal oral [place] NC/AT  S1, S2, irregularly irregular, No m/r/g appreciated, 3+ LE edema, elevated JVP  Clear to auscultation bilaterally  Soft, Non-tender, Non-distended, BS+  No clubbing, No joint deformity   Non-focal  No lymphadenopathy  AAOx3, Mood & affect appropriate  No rashes, No ecchymoses, No cyanosis    Labs:                        12.6   7.95  )-----------( 232      ( 10 May 2018 09:40 )             40.1     05-10    138  |  94<L>  |  24<H>  ----------------------------<  166<H>  4.9   |  31  |  0.84    Ca    9.7      10 May 2018 07:25  Phos  2.7     05-09  Mg     1.8     05-10            Serum Pro-Brain Natriuretic Peptide: 3002 pg/mL (05-04 @ 14:29)          New results/imaging:
Cardiology Progress Note    Interval events: No acute events overnight. Patient feels well this morning. Ambulating in the hallway HR elevated to 140s.    Tele: Afib     Medications:  aspirin enteric coated 81 milliGRAM(s) Oral daily  atorvastatin 20 milliGRAM(s) Oral at bedtime  cephalexin 500 milliGRAM(s) Oral every 6 hours  digoxin     Tablet 0.125 milliGRAM(s) Oral daily  insulin lispro (HumaLOG) corrective regimen sliding scale   SubCutaneous three times a day before meals  insulin lispro (HumaLOG) corrective regimen sliding scale   SubCutaneous at bedtime  levothyroxine 25 MICROGram(s) Oral daily  metoprolol succinate  milliGRAM(s) Oral daily  rivaroxaban 20 milliGRAM(s) Oral every 24 hours      Review of Systems:  Constitutional: [ ] Fever [ ] Chills [ ] Fatigue [ ] Weight change   HEENT: [ ] Blurred vision [ ] Eye Pain [ ] Headache [ ] Runny nose [ ] Sore Throat   Respiratory: [ ] Cough [ ] Wheezing [ ] Shortness of breath  Cardiovascular: [ ] Chest Pain [ ] Palpitations [ ] TODD [ ] PND [ ] Orthopnea  Gastrointestinal: [ ] Abdominal Pain [ ] Diarrhea [ ] Constipation [ ] Hemorrhoids [ ] Nausea [ ] Vomiting  Genitourinary: [ ] Nocturia [ ] Dysuria [ ] Incontinence  Extremities: [ ] Swelling [ ] Joint Pain  Neurologic: [ ] Focal deficit [ ] Paresthesias [ ] Syncope  Lymphatic: [ ] Swelling [ ] Lymphadenopathy   Skin: [ ] Rash [ ] Ecchymoses [ ] Wounds [ ] Lesions  Psychiatry: [ ] Depression [ ] Suicidal/Homicidal Ideation [ ] Anxiety [ ] Sleep Disturbances  [x] 10 point review of systems is otherwise negative except as mentioned above            [ ]Unable to obtain    Vitals:  T(C): 36.7 (05-10-18 @ 05:01), Max: 36.8 (18 @ 20:29)  HR: 89 (05-10-18 @ 05:01) (85 - 102)  BP: 111/77 (05-10-18 @ 05:01) (104/66 - 124/83)  BP(mean): --  RR: 18 (05-10-18 @ 05:01) (18 - 18)  SpO2: 97% (05-10-18 @ 05:01) (96% - 97%)  Wt(kg): --  Daily     Daily Weight in k.1 (09 May 2018 08:37)  I&O's Summary    08 May 2018 07:01  -  09 May 2018 07:00  --------------------------------------------------------  IN: 1080 mL / OUT: 1300 mL / NET: -220 mL    09 May 2018 07:01  -  10 May 2018 06:10  --------------------------------------------------------  IN: 1170 mL / OUT: 1625 mL / NET: -455 mL    Physical Exam:  NAD  PERRL, EOMI  Normal oral [place] NC/AT  S1, S2, irregularly irregular, No m/r/g appreciated, 3+ LE edema, elevated JVP  Clear to auscultation bilaterally  Soft, Non-tender, Non-distended, BS+  No clubbing, No joint deformity   Non-focal  No lymphadenopathy  AAOx3, Mood & affect appropriate  No rashes, No ecchymoses, No cyanosis    Labs:                        12.4   7.68  )-----------( 222      ( 08 May 2018 07:38 )             39.5     05-09    141  |  95<L>  |  21  ----------------------------<  178<H>  5.0   |  32<H>  |  0.72    Ca    9.6      09 May 2018 06:21  Phos  2.7     -  Mg     1.8         Serum Pro-Brain Natriuretic Peptide: 3002 pg/mL ( @ 14:29)    New results/imaging:  TTE  EF 75%  Conclusions:  1. Concentric left ventricular hypertrophy. Increased  relative wall thickness with normal left ventricular mass  index, consistent with concentric left ventricular  remodeling.  2. Hyperdynamic left ventricular systolic function.  3. The right ventricle is not well visualized; grossly  normal right ventricular systolic function.  4. Estimated pulmonary artery systolic pressure equals 33  mm Hg, assuming right atrial pressure equals 8  mm Hg,  consistent with normal pulmonary pressures.
Cardiology Progress Note    Interval events: No acute events overnight. Patient feels well this morning. No complaints. States she is going for echo this morning at 7:30 AM    Tele: Afib , episode to 160s yesterday    Medications:  aspirin enteric coated 81 milliGRAM(s) Oral daily  atorvastatin 20 milliGRAM(s) Oral at bedtime  buMETAnide Injectable 2 milliGRAM(s) IV Push two times a day  cephalexin 500 milliGRAM(s) Oral every 6 hours  digoxin     Tablet 0.125 milliGRAM(s) Oral daily  insulin lispro (HumaLOG) corrective regimen sliding scale   SubCutaneous three times a day before meals  insulin lispro (HumaLOG) corrective regimen sliding scale   SubCutaneous at bedtime  levothyroxine 25 MICROGram(s) Oral daily  metoprolol succinate  milliGRAM(s) Oral daily  rivaroxaban 20 milliGRAM(s) Oral every 24 hours      Review of Systems:  Constitutional: [ ] Fever [ ] Chills [ ] Fatigue [ ] Weight change   HEENT: [ ] Blurred vision [ ] Eye Pain [ ] Headache [ ] Runny nose [ ] Sore Throat   Respiratory: [ ] Cough [ ] Wheezing [ ] Shortness of breath  Cardiovascular: [ ] Chest Pain [ ] Palpitations [ ] TODD [ ] PND [ ] Orthopnea  Gastrointestinal: [ ] Abdominal Pain [ ] Diarrhea [ ] Constipation [ ] Hemorrhoids [ ] Nausea [ ] Vomiting  Genitourinary: [ ] Nocturia [ ] Dysuria [ ] Incontinence  Extremities: [ ] Swelling [ ] Joint Pain  Neurologic: [ ] Focal deficit [ ] Paresthesias [ ] Syncope  Lymphatic: [ ] Swelling [ ] Lymphadenopathy   Skin: [ ] Rash [ ] Ecchymoses [ ] Wounds [ ] Lesions  Psychiatry: [ ] Depression [ ] Suicidal/Homicidal Ideation [ ] Anxiety [ ] Sleep Disturbances  [x] 10 point review of systems is otherwise negative except as mentioned above            [ ]Unable to obtain    Vitals:  T(C): 36.5 (18 @ 04:43), Max: 36.7 (18 @ 12:20)  HR: 98 (18 @ 04:43) (65 - 98)  BP: 104/73 (18 @ 04:43) (92/64 - 109/69)  BP(mean): --  RR: 16 (18 @ 04:43) (16 - 18)  SpO2: 95% (18 @ 04:43) (92% - 95%)  Wt(kg): --  Daily     Daily Weight in k.6 (08 May 2018 08:00)  I&O's Summary    07 May 2018 07:01  -  08 May 2018 07:00  --------------------------------------------------------  IN: 1430 mL / OUT: 600 mL / NET: 830 mL    08 May 2018 07:  -  09 May 2018 06:17  --------------------------------------------------------  IN: 780 mL / OUT: 1300 mL / NET: -520 mL        Physical Exam:  NAD  PERRL, EOMI  Normal oral [place] NC/AT  S1, S2, irregularly irregular, No m/r/g appreciated, 3+ BL LE edema, elevated JVP  Clear to auscultation bilaterally  Soft, Non-tender, Non-distended, BS+  No clubbing, No joint deformity   Non-focal  No lymphadenopathy  AAOx3, Mood & affect appropriate  No rashes, No ecchymoses, No cyanosis    Labs:                        12.4   7.68  )-----------( 222      ( 08 May 2018 07:38 )             39.5     08    141  |  99  |  27<H>  ----------------------------<  156<H>  3.8   |  29  |  0.82    Ca    8.8      08 May 2018 06:37  Phos  3.5     -07  Mg     1.9                 Serum Pro-Brain Natriuretic Peptide: 3002 pg/mL ( @ 14:29)          New results/imaging:
Patient is a 72y old  Female who presents with a chief complaint of leg swelling (04 May 2018 17:51)        SUBJECTIVE / OVERNIGHT EVENTS: Patient seen and examined.  Patient w/ palpitations and "wooziness" when standing this AM.  AFib w/ RVR this AM, w/ HR >150.  Patient given metoprolol 2.5 mg IVP and metoprolol 25 mg PO with moderate response.    REVIEW OF SYSTEMS      General: No fevers, chills  	  Ophthalmologic: No change in vision    Respiratory and Thorax: No SOB or cough  	  Cardiovascular: No chest pain, palpitations, or LE edema    Gastrointestinal: No abdominal pain, nausea, vomiting, or diarrhea    Genitourinary: No dysuria or polyuria    	MEDICATIONS  (STANDING):  aspirin enteric coated 81 milliGRAM(s) Oral daily  atorvastatin 20 milliGRAM(s) Oral at bedtime  buMETAnide Injectable 2 milliGRAM(s) IV Push two times a day  cephalexin 500 milliGRAM(s) Oral every 6 hours  digoxin  Injectable 0.25 milliGRAM(s) IV Push every 6 hours  influenza   Vaccine 0.5 milliLiter(s) IntraMuscular once  insulin lispro (HumaLOG) corrective regimen sliding scale   SubCutaneous three times a day before meals  insulin lispro (HumaLOG) corrective regimen sliding scale   SubCutaneous at bedtime  levothyroxine 25 MICROGram(s) Oral daily  metoprolol succinate ER 25 milliGRAM(s) Oral once  metoprolol succinate  milliGRAM(s) Oral daily  rivaroxaban 20 milliGRAM(s) Oral every 24 hours    MEDICATIONS  (PRN):      I&O's Summary    06 May 2018 07:01  -  07 May 2018 07:00  --------------------------------------------------------  IN: 600 mL / OUT: 500 mL / NET: 100 mL    07 May 2018 07:01  -  07 May 2018 14:46  --------------------------------------------------------  IN: 520 mL / OUT: 600 mL / NET: -80 mL        CAPILLARY BLOOD GLUCOSE      POCT Blood Glucose.: 177 mg/dL (07 May 2018 11:46)  POCT Blood Glucose.: 187 mg/dL (07 May 2018 07:50)  POCT Blood Glucose.: 141 mg/dL (06 May 2018 20:59)  POCT Blood Glucose.: 209 mg/dL (06 May 2018 16:32)      Vital Signs Last 24 Hrs  T(C): 36.8 (07 May 2018 11:53), Max: 36.8 (06 May 2018 16:17)  T(F): 98.3 (07 May 2018 11:53), Max: 98.3 (06 May 2018 16:17)  HR: 128 (07 May 2018 13:54) (83 - 130)  BP: 84/55 (07 May 2018 13:54) (84/55 - 112/72)  BP(mean): --  RR: 18 (07 May 2018 11:53) (18 - 18)  SpO2: 96% (07 May 2018 11:53) (94% - 98%)    PHYSICAL EXAM:      Constitutional: Well-appearing, NAD, lying in bed, appears stated age    Eyes: EOMI, PERRLA, clear conjunctiva    ENMT: No pharyngeal erythema, mucus membranes moist    Neck: No JVD    Back: No spinal tenderness or kyphosis    Respiratory: Lungs clear to auscultation     Cardiovascular: Regular rate and rhythm, S1S2+, no murmurs appreciated.  2+ LE edema b/l    Gastrointestinal: Soft, non-tender, non-distended, bowel sounds positive all four quadrants    Extremities: no clubbing or cyanosis    Vascular: 2+ pulses radially and dorsalis pedis    Neurological: Alert and oriented to name, place, and date.  Cranial nerves II-XII intact.  No focal neurological deficits.  5/5 motor strength all four extremities    Skin: Chronic venous stasis changes of b/l LE.  +cellulitis of RLE is improving compared to demarcation from Friday, not hot or fluctuant, no drainage    Lymph Nodes: No cervical lymphadenopathy    Musculoskeletal: No joint swelling or erythema    Psychiatric: Pleasant affect    LABS:          (05-07 @ 07:36)                      11.9  8.66 )-----------( 237                 38.4    Neutrophils = -- (--%)  Lymphocytes = -- (--%)  Eosinophils = -- (--%)  Basophils = -- (--%)  Monocytes = -- (--%)  Bands = --%    05-07    141  |  99  |  20  ----------------------------<  173<H>  4.6   |  30  |  1.00    Ca    9.1      07 May 2018 06:47  Phos  3.5     05-07  Mg     1.5     05-07      RADIOLOGY & ADDITIONAL TESTS:    Imaging Personally Reviewed: [x]  Consultant(s) Notes Reviewed:  [x]   Care Discussed with Consultants/Other Providers: [x] cards
Patient is a 72y old  Female who presents with a chief complaint of leg swelling (04 May 2018 17:51)        SUBJECTIVE / OVERNIGHT EVENTS: Patient seen and examined.  Patient's HR and BP significantly improved today s/p digoxin load and increased BB dose yesterday.  Her "wooziness" and dizziness have resolved.      Tele: Patient HR dropped from 130s-170s to 70s-90s ~7 PM last night during digoxin load.  Brief burst of AFib w/ RVR to 140s this AM with ambulation, otherwise AFlutter 60s-90s.    REVIEW OF SYSTEMS      General: No fevers, chills  	  Ophthalmologic: No change in vision    Respiratory and Thorax: No SOB or cough  	  Cardiovascular: No chest pain, +palpitations and LE edema    Gastrointestinal: No abdominal pain, nausea, vomiting, or diarrhea    Genitourinary: No dysuria or polyuria    	  MEDICATIONS  (STANDING):  aspirin enteric coated 81 milliGRAM(s) Oral daily  atorvastatin 20 milliGRAM(s) Oral at bedtime  buMETAnide Injectable 2 milliGRAM(s) IV Push two times a day  cephalexin 500 milliGRAM(s) Oral every 6 hours  digoxin     Tablet 0.125 milliGRAM(s) Oral daily  insulin lispro (HumaLOG) corrective regimen sliding scale   SubCutaneous three times a day before meals  insulin lispro (HumaLOG) corrective regimen sliding scale   SubCutaneous at bedtime  levothyroxine 25 MICROGram(s) Oral daily  metoprolol succinate  milliGRAM(s) Oral daily  rivaroxaban 20 milliGRAM(s) Oral every 24 hours    MEDICATIONS  (PRN):      I&O's Summary    07 May 2018 07:01  -  08 May 2018 07:00  --------------------------------------------------------  IN: 1430 mL / OUT: 600 mL / NET: 830 mL        CAPILLARY BLOOD GLUCOSE      POCT Blood Glucose.: 206 mg/dL (08 May 2018 11:49)  POCT Blood Glucose.: 185 mg/dL (08 May 2018 08:31)  POCT Blood Glucose.: 207 mg/dL (07 May 2018 21:04)  POCT Blood Glucose.: 130 mg/dL (07 May 2018 16:40)      Vital Signs Last 24 Hrs  T(C): 36.7 (08 May 2018 04:46), Max: 36.7 (08 May 2018 04:46)  T(F): 98 (08 May 2018 04:46), Max: 98 (08 May 2018 04:46)  HR: 75 (08 May 2018 06:06) (61 - 128)  BP: 105/69 (08 May 2018 06:06) (84/55 - 120/80)  BP(mean): --  RR: 18 (08 May 2018 04:46) (16 - 18)  SpO2: 94% (08 May 2018 04:46) (94% - 96%)    PHYSICAL EXAM:      Constitutional: Well-appearing, NAD, lying in bed, appears stated age    Eyes: EOMI, PERRLA, clear conjunctiva    ENMT: No pharyngeal erythema, mucus membranes moist    Neck: No JVD    Back: No spinal tenderness or kyphosis    Respiratory: Lungs clear to auscultation     Cardiovascular: Regular rate and rhythm, S1S2+, no murmurs appreciated.  2+ LE edema b/l    Gastrointestinal: Soft, non-tender, non-distended, bowel sounds positive all four quadrants    Extremities: no clubbing or cyanosis    Vascular: 2+ pulses radially and dorsalis pedis    Neurological: Alert and oriented to name, place, and date.  Cranial nerves II-XII intact.  No focal neurological deficits.  5/5 motor strength all four extremities    Skin: Chronic venous stasis changes of b/l LE.  +cellulitis of RLE is improving compared to demarcation from Friday, not hot or fluctuant, no drainage    Lymph Nodes: No cervical lymphadenopathy    Musculoskeletal: No joint swelling or erythema    Psychiatric: Pleasant affect    LABS:          (05-08 @ 07:38)                      12.4  7.68 )-----------( 222                 39.5    Neutrophils = -- (--%)  Lymphocytes = -- (--%)  Eosinophils = -- (--%)  Basophils = -- (--%)  Monocytes = -- (--%)  Bands = --%    05-08    141  |  99  |  27<H>  ----------------------------<  156<H>  3.8   |  29  |  0.82    Ca    8.8      08 May 2018 06:37  Phos  3.5     05-07  Mg     1.9     05-08    RADIOLOGY & ADDITIONAL TESTS:    Imaging Personally Reviewed: [x]  Consultant(s) Notes Reviewed:  [x]   Care Discussed with Consultants/Other Providers: [x] cards
Patient is a 72y old  Female who presents with a chief complaint of leg swelling (04 May 2018 17:51)        SUBJECTIVE / OVERNIGHT EVENTS: Patient seen and examined.  She feels much improved over the past two days, reports SOB has completely resolved and LE edema is improving.  Reports her RLE pain at site of cellulitis has improved.  Tele w/ NSR 80-110s w/ PVCs.    MEDICATIONS  (STANDING):  aspirin enteric coated 81 milliGRAM(s) Oral daily  ATENolol  Tablet 100 milliGRAM(s) Oral two times a day  atorvastatin 20 milliGRAM(s) Oral at bedtime  cephalexin 500 milliGRAM(s) Oral every 6 hours  diltiazem    milliGRAM(s) Oral daily  furosemide   Injectable 20 milliGRAM(s) IV Push two times a day  influenza   Vaccine 0.5 milliLiter(s) IntraMuscular once  insulin lispro (HumaLOG) corrective regimen sliding scale   SubCutaneous three times a day before meals  insulin lispro (HumaLOG) corrective regimen sliding scale   SubCutaneous at bedtime  losartan 100 milliGRAM(s) Oral daily  potassium chloride    Tablet ER 20 milliEquivalent(s) Oral once  rivaroxaban 20 milliGRAM(s) Oral every 24 hours    MEDICATIONS  (PRN):      REVIEW OF SYSTEMS      General: No fevers, chills  	  Ophthalmologic: No change in vision    Respiratory and Thorax: No SOB or cough  	  Cardiovascular: No chest pain, palpitations, or LE edema    Gastrointestinal: No abdominal pain, nausea, vomiting, or diarrhea    Genitourinary: No dysuria or polyuria    	    CAPILLARY BLOOD GLUCOSE      POCT Blood Glucose.: 166 mg/dL (06 May 2018 11:52)  POCT Blood Glucose.: 179 mg/dL (06 May 2018 07:36)  POCT Blood Glucose.: 201 mg/dL (05 May 2018 21:14)  POCT Blood Glucose.: 125 mg/dL (05 May 2018 16:46)    I&O's Summary    05 May 2018 07:  -  06 May 2018 07:00  --------------------------------------------------------  IN: 780 mL / OUT: 400 mL / NET: 380 mL    06 May 2018 07:  -  06 May 2018 16:00  --------------------------------------------------------  IN: 480 mL / OUT: 450 mL / NET: 30 mL      Vital Signs Last 24 Hrs  T(C): 36.4 (06 May 2018 11:54), Max: 36.8 (05 May 2018 20:04)  T(F): 97.5 (06 May 2018 11:54), Max: 98.2 (05 May 2018 20:04)  HR: 71 (06 May 2018 11:54) (66 - 91)  BP: 103/69 (06 May 2018 11:54) (99/66 - 116/77)  BP(mean): --  RR: 18 (06 May 2018 11:54) (18 - 18)  SpO2: 97% (06 May 2018 11:54) (96% - 97%)    PHYSICAL EXAM:      Constitutional: Well-appearing, NAD, lying in bed, appears stated age    Eyes: EOMI, PERRLA, clear conjunctiva    ENMT: No pharyngeal erythema, mucus membranes moist    Neck: No JVD    Back: No spinal tenderness or kyphosis    Respiratory: Lungs clear to auscultation     Cardiovascular: Regular rate and rhythm, S1S2+, no murmurs appreciated.  2+ LE edema b/l    Gastrointestinal: Soft, non-tender, non-distended, bowel sounds positive all four quadrants    Extremities: no clubbing or cyanosis    Vascular: 2+ pulses radially and dorsalis pedis    Neurological: Alert and oriented to name, place, and date.  Cranial nerves II-XII intact.  No focal neurological deficits.  5/5 motor strength all four extremities    Skin: Chronic venous stasis changes of b/l LE.  +cellulitis of RLE is improving compared to demarcation from Friday, not hot or fluctuant, no drainage    Lymph Nodes: No cervical lymphadenopathy    Musculoskeletal: No joint swelling or erythema    Psychiatric: Pleasant affect    LABS:                        12.3   10.10 )-----------( 241      ( 06 May 2018 14:25 )             40.3     05-06    141  |  98  |  14  ----------------------------<  189<H>  3.7   |  30  |  0.79    Ca    9.3      06 May 2018 12:11            Urinalysis Basic - ( 05 May 2018 07:09 )    Color: Yellow / Appearance: Clear / S.013 / pH: x  Gluc: x / Ketone: Negative  / Bili: Negative / Urobili: Negative mg/dL   Blood: x / Protein: Trace mg/dL / Nitrite: Negative   Leuk Esterase: Moderate / RBC: 59 /HPF / WBC 18 /HPF   Sq Epi: x / Non Sq Epi: 11 /HPF / Bacteria: Negative      RADIOLOGY & ADDITIONAL TESTS:    Imaging Personally Reviewed: [x]  Consultant(s) Notes Reviewed:    Care Discussed with Consultants/Other Providers:
Patient is a 72y old  Female who presents with a chief complaint of leg swelling (04 May 2018 17:51)        SUBJECTIVE / OVERNIGHT EVENTS: Patient seen and examined.  She felt slightly light-headed early this morning with ambulation, but feels much improved.  She was able to ambulate with observation.  Denies chest pain and palpitations.    REVIEW OF SYSTEMS    General: No fevers, chills  	  Ophthalmologic: No change in vision    Respiratory and Thorax: No SOB or cough  	  Cardiovascular: No chest pain    Gastrointestinal: No abdominal pain, nausea, vomiting, or diarrhea    Genitourinary: No dysuria or polyuria    MEDICATIONS  (STANDING):  aspirin enteric coated 81 milliGRAM(s) Oral daily  atorvastatin 20 milliGRAM(s) Oral at bedtime  cephalexin 500 milliGRAM(s) Oral every 6 hours  insulin lispro (HumaLOG) corrective regimen sliding scale   SubCutaneous three times a day before meals  insulin lispro (HumaLOG) corrective regimen sliding scale   SubCutaneous at bedtime  levothyroxine 25 MICROGram(s) Oral daily  magnesium oxide 400 milliGRAM(s) Oral once  metoprolol succinate  milliGRAM(s) Oral daily  rivaroxaban 20 milliGRAM(s) Oral every 24 hours    MEDICATIONS  (PRN):      I&O's Summary    09 May 2018 07:01  -  10 May 2018 07:00  --------------------------------------------------------  IN: 1170 mL / OUT: 1625 mL / NET: -455 mL    10 May 2018 07:01  -  10 May 2018 13:41  --------------------------------------------------------  IN: 420 mL / OUT: 700 mL / NET: -280 mL        CAPILLARY BLOOD GLUCOSE      POCT Blood Glucose.: 145 mg/dL (10 May 2018 11:36)  POCT Blood Glucose.: 180 mg/dL (10 May 2018 07:21)  POCT Blood Glucose.: 147 mg/dL (09 May 2018 21:31)  POCT Blood Glucose.: 146 mg/dL (09 May 2018 16:43)      Vital Signs Last 24 Hrs  T(C): 36.6 (10 May 2018 13:36), Max: 36.8 (09 May 2018 20:29)  T(F): 97.8 (10 May 2018 13:36), Max: 98.2 (09 May 2018 20:29)  HR: 92 (10 May 2018 13:36) (88 - 92)  BP: 114/73 (10 May 2018 13:36) (111/77 - 124/83)  BP(mean): --  RR: 18 (10 May 2018 13:36) (18 - 18)  SpO2: 99% (10 May 2018 13:36) (97% - 99%)    PHYSICAL EXAM:      Constitutional: Well-appearing, NAD, sitting in chair, appears stated age    Eyes: EOMI, PERRLA, clear conjunctiva    ENMT: No pharyngeal erythema, mucus membranes moist    Neck: No JVD    Back: No spinal tenderness or kyphosis    Respiratory: Lungs clear to auscultation     Cardiovascular: Tachycardic w/ irregular rate and rhythm, S1S2+, no murmurs appreciated.  1+ LE edema b/l (improved)    Gastrointestinal: Soft, non-tender, non-distended, bowel sounds positive all four quadrants    Extremities: no clubbing or cyanosis    Vascular: 2+ pulses radially and dorsalis pedis    Neurological: Alert and oriented to name, place, and date.  Cranial nerves II-XII intact.  No focal neurological deficits.  5/5 motor strength all four extremities    Skin: Chronic venous stasis changes of b/l LE.  +cellulitis of RLE is improved, not hot or fluctuant, no drainage    Lymph Nodes: No cervical lymphadenopathy    Musculoskeletal: No joint swelling or erythema    Psychiatric: Pleasant affect    LABS:  (05-10 @ 09:40)                      12.6  7.95 )-----------( 232                 40.1    Neutrophils = -- (--%)  Lymphocytes = -- (--%)  Eosinophils = -- (--%)  Basophils = -- (--%)  Monocytes = -- (--%)  Bands = --%    05-10    138  |  94<L>  |  24<H>  ----------------------------<  166<H>  4.9   |  31  |  0.84    Ca    9.7      10 May 2018 07:25  Phos  2.7     05-09  Mg     1.8     05-10      RADIOLOGY & ADDITIONAL TESTS:    Imaging Personally Reviewed: [x]  Consultant(s) Notes Reviewed:  [x]   Care Discussed with Consultants/Other Providers: [x] cards
Patient is a 72y old  Female who presents with a chief complaint of leg swelling (10 May 2018 13:59)      SUBJECTIVE / OVERNIGHT EVENTS: No overnight events. Feels well. Denies f/c, redness of RLE resolved. No CP, sob, palpitations, lightheadedness     MEDICATIONS  (STANDING):  aspirin enteric coated 81 milliGRAM(s) Oral daily  atorvastatin 20 milliGRAM(s) Oral at bedtime  cephalexin 500 milliGRAM(s) Oral every 6 hours  insulin lispro (HumaLOG) corrective regimen sliding scale   SubCutaneous three times a day before meals  insulin lispro (HumaLOG) corrective regimen sliding scale   SubCutaneous at bedtime  levothyroxine 25 MICROGram(s) Oral daily  metoprolol succinate  milliGRAM(s) Oral daily  rivaroxaban 20 milliGRAM(s) Oral every 24 hours    MEDICATIONS  (PRN):      Vital Signs Last 24 Hrs  T(C): 36.5 (11 May 2018 11:29), Max: 36.8 (11 May 2018 03:55)  T(F): 97.7 (11 May 2018 11:29), Max: 98.2 (11 May 2018 03:55)  HR: 93 (11 May 2018 11:29) (80 - 94)  BP: 116/84 (11 May 2018 11:29) (106/65 - 131/83)  BP(mean): --  RR: 18 (11 May 2018 11:29) (18 - 18)  SpO2: 96% (11 May 2018 11:29) (92% - 99%)  CAPILLARY BLOOD GLUCOSE      POCT Blood Glucose.: 156 mg/dL (11 May 2018 11:25)  POCT Blood Glucose.: 176 mg/dL (11 May 2018 07:32)  POCT Blood Glucose.: 152 mg/dL (10 May 2018 21:25)  POCT Blood Glucose.: 154 mg/dL (10 May 2018 16:53)    I&O's Summary    10 May 2018 07:01  -  11 May 2018 07:00  --------------------------------------------------------  IN: 1300 mL / OUT: 1100 mL / NET: 200 mL    11 May 2018 07:01  -  11 May 2018 13:08  --------------------------------------------------------  IN: 480 mL / OUT: 700 mL / NET: -220 mL        PHYSICAL EXAM:  GENERAL: NAD, well-developed  HEAD:  Atraumatic, Normocephalic  NECK: Supple, No JVD  CHEST/LUNG: Clear to auscultation bilaterally; No wheeze  HEART: Iregular rate and rhythm;   ABDOMEN: Soft, Nontender, Nondistended; Bowel sounds present  EXTREMITIES:  trace pedal edema b/l  PSYCH: AAOx3  SKIN: chronic venous changes b/l LE,  previously demarcated area with no sign of cellulitis anymore, nontender      LABS:                        12.6   7.95  )-----------( 232      ( 10 May 2018 09:40 )             40.1     05-11    137  |  96  |  24<H>  ----------------------------<  179<H>  3.9   |  26  |  0.76    Ca    10.0      11 May 2018 06:27  Mg     2.1     05-11                    RADIOLOGY & ADDITIONAL TESTS:    tele reviewed Afib 80-110s with brief episodes of 130-140 during exertion    Imaging Personally Reviewed:    Consultant(s) Notes Reviewed:  cards    Care Discussed with Consultants/Other Providers: cards Dr Marquis - as below
Patient is a 72y old  Female who presents with a chief complaint of leg swelling (04 May 2018 17:51)        SUBJECTIVE / OVERNIGHT EVENTS: Patient seen and examined.  She had an episode of "wooziness" this AM while walking to the bathroom, still w/ AFib w/ RVR up to the 160s w/ ambulation.      REVIEW OF SYSTEMS      General: No fevers, chills  	  Ophthalmologic: No change in vision    Respiratory and Thorax: No SOB or cough  	  Cardiovascular: No chest pain, +palpitations and LE edema    Gastrointestinal: No abdominal pain, nausea, vomiting, or diarrhea    Genitourinary: No dysuria or polyuria    	  MEDICATIONS  (STANDING):  aspirin enteric coated 81 milliGRAM(s) Oral daily  atorvastatin 20 milliGRAM(s) Oral at bedtime  cephalexin 500 milliGRAM(s) Oral every 6 hours  digoxin     Tablet 0.125 milliGRAM(s) Oral daily  insulin lispro (HumaLOG) corrective regimen sliding scale   SubCutaneous three times a day before meals  insulin lispro (HumaLOG) corrective regimen sliding scale   SubCutaneous at bedtime  levothyroxine 25 MICROGram(s) Oral daily  rivaroxaban 20 milliGRAM(s) Oral every 24 hours    MEDICATIONS  (PRN):      I&O's Summary    08 May 2018 07:01  -  09 May 2018 07:00  --------------------------------------------------------  IN: 1080 mL / OUT: 1300 mL / NET: -220 mL    09 May 2018 07:01  -  09 May 2018 15:48  --------------------------------------------------------  IN: 480 mL / OUT: 1000 mL / NET: -520 mL        CAPILLARY BLOOD GLUCOSE      POCT Blood Glucose.: 177 mg/dL (09 May 2018 11:22)  POCT Blood Glucose.: 170 mg/dL (09 May 2018 07:50)  POCT Blood Glucose.: 146 mg/dL (08 May 2018 21:04)  POCT Blood Glucose.: 124 mg/dL (08 May 2018 16:23)      Vital Signs Last 24 Hrs  T(C): 36.5 (09 May 2018 04:43), Max: 36.7 (08 May 2018 20:05)  T(F): 97.7 (09 May 2018 04:43), Max: 98.1 (08 May 2018 20:05)  HR: 85 (09 May 2018 11:49) (65 - 102)  BP: 104/66 (09 May 2018 11:49) (95/64 - 113/65)  BP(mean): --  RR: 18 (09 May 2018 11:49) (16 - 18)  SpO2: 96% (09 May 2018 11:49) (92% - 96%).    PHYSICAL EXAM:      Constitutional: Well-appearing, NAD, sitting in chair, appears stated age    Eyes: EOMI, PERRLA, clear conjunctiva    ENMT: No pharyngeal erythema, mucus membranes moist    Neck: No JVD    Back: No spinal tenderness or kyphosis    Respiratory: Lungs clear to auscultation     Cardiovascular: Tachycardic w/ irregular rate and rhythm, S1S2+, no murmurs appreciated.  1+ LE edema b/l    Gastrointestinal: Soft, non-tender, non-distended, bowel sounds positive all four quadrants    Extremities: no clubbing or cyanosis    Vascular: 2+ pulses radially and dorsalis pedis    Neurological: Alert and oriented to name, place, and date.  Cranial nerves II-XII intact.  No focal neurological deficits.  5/5 motor strength all four extremities    Skin: Chronic venous stasis changes of b/l LE.  +cellulitis of RLE is improving, not hot or fluctuant, no drainage    Lymph Nodes: No cervical lymphadenopathy    Musculoskeletal: No joint swelling or erythema    Psychiatric: Pleasant affect    LABS:    (05-08 @ 07:38)                      12.4  7.68 )-----------( 222                 39.5    Neutrophils = -- (--%)  Lymphocytes = -- (--%)  Eosinophils = -- (--%)  Basophils = -- (--%)  Monocytes = -- (--%)  Bands = --%    05-09    141  |  95<L>  |  21  ----------------------------<  178<H>  5.0   |  32<H>  |  0.72    Ca    9.6      09 May 2018 06:21  Phos  2.7     05-09  Mg     1.8     05-09    RADIOLOGY & ADDITIONAL TESTS:    Imaging Personally Reviewed: [x]  Consultant(s) Notes Reviewed:  [x]   Care Discussed with Consultants/Other Providers: [x] cards

## 2018-05-11 NOTE — PROVIDER CONTACT NOTE (OTHER) - ACTION/TREATMENT ORDERED:
PA aware, routine labs ordered for AM. Continuing to monitor patient
Dhara PALOMO aware and acknowledged, as per PA administer Atenolol 100mg PO, she will assess patient. Will continue to monitor and maintain safety.
PA ordered to give the metoprolol early.

## 2018-05-11 NOTE — PROGRESS NOTE ADULT - PROBLEM SELECTOR PROBLEM 7
Hyperlipidemia, unspecified hyperlipidemia type
Hyperlipidemia, unspecified hyperlipidemia type
Elevated TSH
Hyperlipidemia, unspecified hyperlipidemia type

## 2018-05-11 NOTE — PROGRESS NOTE ADULT - PROBLEM SELECTOR PROBLEM 6
Essential hypertension
Hyperlipidemia, unspecified hyperlipidemia type

## 2018-05-11 NOTE — PROGRESS NOTE ADULT - PROBLEM SELECTOR PLAN 2
Continued episodes of HR > 150 w/ symptoms.  - c/w digoxin 0.125 mg daily  - Increase Toprol XL to 200 mg daily  - Tele monitoring
HR > 150 at times this AM with D/C of cardizem yesterday (in the setting of unknown EF, CCB contraindicated w/ EF < 35%).  Metoprolol dose increased w/ some decrease in BP (80s/50s) and associated light-headedness.  Case d/w cardiology.    - Digoxin load this afternoon and evening  - c/w Toprol  mg daily  - Tele monitoring
HR > 150 at times yesterday with D/C of cardizem yesterday (in the setting of unknown EF, CCB contraindicated w/ EF < 35%).  Metoprolol dose was increased w/ some decrease in BP (80s/50s) and associated light-headedness.  Patient s/p digoxin load with improvement in HR, rate controlled AFlutter.  - c/w digoxin 0.125 mg daily  - c/w Toprol  mg daily  - Tele monitoring
HR control significantly improved w/ increased beta blocker dose.  D/W cardiology (Dr. Ybarra).  Patient may benefit from eventual DCCV in one month after urological procedure (as patient should not have interruption of A/C post-procedure).    - D/C digoxin as per cardiology recs  - c/w Toprol XL to 200 mg daily  - Tele monitoring
Some of LE edema is secondary to chronic venous stasis, given skin changes.  However, suspect some element of CHF given initial presentation w/ SOB and orthopnea.  - Management as above, may need compression stockings  - LE dopplers negative
-HR control significantly improved on Toprol 200mg daily with brief episdoes of tachycardia during exertion - discussed with cards, Dr. Cadet - no further medication adjusts or inpatient monitoring, can followup with Dr Ybarra as outpatient  - D/C digoxin   - c/w xarelto for a/c

## 2018-05-11 NOTE — PROGRESS NOTE ADULT - NSHPATTENDINGPLANDISCUSS_GEN_ALL_CORE
Medicine. To reach Cardiology Attending call during weekdays Spectra [phone] or Fellow [phone].
Medicine. To reach Cardiology Attending call during weekdays Spectra [phone] or Fellow [phone].
Medicine. To reach Cardiology Attending call during weekdays Spectra [phone] or Fellow [phone].
Medicine. To reach Cardiology Attending call during weekdays Spectra [phone] or Fellow [phone].
LENIN Basilio

## 2018-05-31 ENCOUNTER — APPOINTMENT (OUTPATIENT)
Dept: UROLOGY | Facility: HOSPITAL | Age: 73
End: 2018-05-31

## 2018-06-29 ENCOUNTER — OUTPATIENT (OUTPATIENT)
Dept: OUTPATIENT SERVICES | Facility: HOSPITAL | Age: 73
LOS: 1 days | End: 2018-06-29
Payer: COMMERCIAL

## 2018-06-29 VITALS
WEIGHT: 213.85 LBS | SYSTOLIC BLOOD PRESSURE: 118 MMHG | HEART RATE: 74 BPM | RESPIRATION RATE: 18 BRPM | HEIGHT: 60 IN | DIASTOLIC BLOOD PRESSURE: 76 MMHG | TEMPERATURE: 98 F | OXYGEN SATURATION: 98 %

## 2018-06-29 DIAGNOSIS — Z96.659 PRESENCE OF UNSPECIFIED ARTIFICIAL KNEE JOINT: Chronic | ICD-10-CM

## 2018-06-29 DIAGNOSIS — Z98.890 OTHER SPECIFIED POSTPROCEDURAL STATES: Chronic | ICD-10-CM

## 2018-06-29 DIAGNOSIS — N20.0 CALCULUS OF KIDNEY: ICD-10-CM

## 2018-06-29 DIAGNOSIS — E78.5 HYPERLIPIDEMIA, UNSPECIFIED: ICD-10-CM

## 2018-06-29 DIAGNOSIS — Z01.818 ENCOUNTER FOR OTHER PREPROCEDURAL EXAMINATION: ICD-10-CM

## 2018-06-29 DIAGNOSIS — I10 ESSENTIAL (PRIMARY) HYPERTENSION: ICD-10-CM

## 2018-06-29 DIAGNOSIS — I48.91 UNSPECIFIED ATRIAL FIBRILLATION: ICD-10-CM

## 2018-06-29 DIAGNOSIS — E66.01 MORBID (SEVERE) OBESITY DUE TO EXCESS CALORIES: ICD-10-CM

## 2018-06-29 DIAGNOSIS — N20.0 CALCULUS OF KIDNEY: Chronic | ICD-10-CM

## 2018-06-29 DIAGNOSIS — E11.9 TYPE 2 DIABETES MELLITUS WITHOUT COMPLICATIONS: ICD-10-CM

## 2018-06-29 LAB
ANION GAP SERPL CALC-SCNC: 16 MMOL/L — SIGNIFICANT CHANGE UP (ref 5–17)
BUN SERPL-MCNC: 16 MG/DL — SIGNIFICANT CHANGE UP (ref 7–23)
CALCIUM SERPL-MCNC: 9.7 MG/DL — SIGNIFICANT CHANGE UP (ref 8.4–10.5)
CHLORIDE SERPL-SCNC: 98 MMOL/L — SIGNIFICANT CHANGE UP (ref 96–108)
CO2 SERPL-SCNC: 27 MMOL/L — SIGNIFICANT CHANGE UP (ref 22–31)
CREAT SERPL-MCNC: 0.73 MG/DL — SIGNIFICANT CHANGE UP (ref 0.5–1.3)
GLUCOSE SERPL-MCNC: 152 MG/DL — HIGH (ref 70–99)
HBA1C BLD-MCNC: 7.1 % — HIGH (ref 4–5.6)
HCT VFR BLD CALC: 39.1 % — SIGNIFICANT CHANGE UP (ref 34.5–45)
HGB BLD-MCNC: 11.9 G/DL — SIGNIFICANT CHANGE UP (ref 11.5–15.5)
MCHC RBC-ENTMCNC: 26.8 PG — LOW (ref 27–34)
MCHC RBC-ENTMCNC: 30.4 GM/DL — LOW (ref 32–36)
MCV RBC AUTO: 88.1 FL — SIGNIFICANT CHANGE UP (ref 80–100)
PLATELET # BLD AUTO: 247 K/UL — SIGNIFICANT CHANGE UP (ref 150–400)
POTASSIUM SERPL-MCNC: 3.3 MMOL/L — LOW (ref 3.5–5.3)
POTASSIUM SERPL-SCNC: 3.3 MMOL/L — LOW (ref 3.5–5.3)
RBC # BLD: 4.44 M/UL — SIGNIFICANT CHANGE UP (ref 3.8–5.2)
RBC # FLD: 16.7 % — HIGH (ref 10.3–14.5)
SODIUM SERPL-SCNC: 141 MMOL/L — SIGNIFICANT CHANGE UP (ref 135–145)
WBC # BLD: 8.68 K/UL — SIGNIFICANT CHANGE UP (ref 3.8–10.5)
WBC # FLD AUTO: 8.68 K/UL — SIGNIFICANT CHANGE UP (ref 3.8–10.5)

## 2018-06-29 RX ORDER — ASPIRIN/CALCIUM CARB/MAGNESIUM 324 MG
1 TABLET ORAL
Qty: 0 | Refills: 0 | COMMUNITY

## 2018-06-29 RX ORDER — ATORVASTATIN CALCIUM 80 MG/1
1 TABLET, FILM COATED ORAL
Qty: 0 | Refills: 0 | COMMUNITY

## 2018-06-29 RX ORDER — LOSARTAN POTASSIUM 100 MG/1
1 TABLET, FILM COATED ORAL
Qty: 0 | Refills: 0 | COMMUNITY

## 2018-06-29 RX ORDER — CEFAZOLIN SODIUM 1 G
2000 VIAL (EA) INJECTION ONCE
Qty: 0 | Refills: 0 | Status: DISCONTINUED | OUTPATIENT
Start: 2018-07-06 | End: 2018-07-21

## 2018-06-29 RX ORDER — ATENOLOL 25 MG/1
1 TABLET ORAL
Qty: 0 | Refills: 0 | COMMUNITY

## 2018-06-29 NOTE — H&P PST ADULT - PMH
Atrial fibrillation  onset 5/2018  Diabetes type 2, controlled    Hyperlipidemia    Hypertension  stable  Hypothyroid    Nephrolithiasis

## 2018-06-29 NOTE — H&P PST ADULT - HISTORY OF PRESENT ILLNESS
This is a 72 y/o female c/o hematuria and left flank pain, admitted to [facility] 4/2018 for calculus of kidney, s/p left ureteral stent, she presents today for left ureteroscopy, laser lithotripsy and stent change.  PMH includes HTN, hyperlipidemia, DM, hypothyroidism,  morbid obesity, new onset atrial fibrillation 5/2018 on xarelto ,  recommended stop 2 days before surgery, pt will see cardiologist for evaluation and cardiologist will advise pt regading xarelto and ASA pre op This is a 74 y/o female c/o hematuria and left flank pain, admitted to [facility] 4/2018 for calculus of kidney, s/p left ureteral stent, she presents today for left ureteroscopy, laser lithotripsy and stent change.  PMH includes HTN, hyperlipidemia, DM, hypothyroidism,  morbid obesity, new onset atrial fibrillation 5/2018 on xarelto ,  recommended stop 2 days before surgery, continue ASA pre op

## 2018-06-29 NOTE — H&P PST ADULT - PROBLEM SELECTOR PLAN 2
stop xarelto 2 day before surgery stop xarelto 2 day before surgery, last dose 7/3/2018  continue ASA pre op stop xarelto 2 day before surgery, last dose 7/3/2018  continue ASA pre op  As per :  Interrupt DOAC. Bridging with LMWH not suggested for DOACs.   Rivaroxaban Interruption Suggestions   creat cl. 50mL/min   2 days before procedure    Resume within 24 hours after procedure.

## 2018-06-29 NOTE — H&P PST ADULT - PSH
H/O total knee replacement  bilateral  , 1998, 2000  S/P cystoscopy  with left ureteral stent 4/2018

## 2018-06-30 PROBLEM — I10 ESSENTIAL (PRIMARY) HYPERTENSION: Chronic | Status: ACTIVE | Noted: 2018-04-16

## 2018-06-30 LAB
CULTURE RESULTS: SIGNIFICANT CHANGE UP
SPECIMEN SOURCE: SIGNIFICANT CHANGE UP

## 2018-07-02 DIAGNOSIS — Z79.2 LONG TERM (CURRENT) USE OF ANTIBIOTICS: ICD-10-CM

## 2018-07-05 ENCOUNTER — TRANSCRIPTION ENCOUNTER (OUTPATIENT)
Age: 73
End: 2018-07-05

## 2018-07-06 ENCOUNTER — RESULT REVIEW (OUTPATIENT)
Age: 73
End: 2018-07-06

## 2018-07-06 ENCOUNTER — APPOINTMENT (OUTPATIENT)
Dept: UROLOGY | Facility: HOSPITAL | Age: 73
End: 2018-07-06

## 2018-07-06 ENCOUNTER — OUTPATIENT (OUTPATIENT)
Dept: OUTPATIENT SERVICES | Facility: HOSPITAL | Age: 73
LOS: 1 days | End: 2018-07-06
Payer: COMMERCIAL

## 2018-07-06 VITALS
HEART RATE: 66 BPM | SYSTOLIC BLOOD PRESSURE: 135 MMHG | OXYGEN SATURATION: 97 % | RESPIRATION RATE: 16 BRPM | DIASTOLIC BLOOD PRESSURE: 79 MMHG | TEMPERATURE: 98 F

## 2018-07-06 VITALS
DIASTOLIC BLOOD PRESSURE: 92 MMHG | OXYGEN SATURATION: 95 % | RESPIRATION RATE: 14 BRPM | HEART RATE: 81 BPM | SYSTOLIC BLOOD PRESSURE: 149 MMHG | TEMPERATURE: 98 F

## 2018-07-06 DIAGNOSIS — Z96.659 PRESENCE OF UNSPECIFIED ARTIFICIAL KNEE JOINT: Chronic | ICD-10-CM

## 2018-07-06 DIAGNOSIS — N20.0 CALCULUS OF KIDNEY: Chronic | ICD-10-CM

## 2018-07-06 DIAGNOSIS — N20.0 CALCULUS OF KIDNEY: ICD-10-CM

## 2018-07-06 DIAGNOSIS — Z98.890 OTHER SPECIFIED POSTPROCEDURAL STATES: Chronic | ICD-10-CM

## 2018-07-06 LAB — GLUCOSE BLDC GLUCOMTR-MCNC: 142 MG/DL — HIGH (ref 70–99)

## 2018-07-06 PROCEDURE — 80048 BASIC METABOLIC PNL TOTAL CA: CPT

## 2018-07-06 PROCEDURE — 76000 FLUOROSCOPY <1 HR PHYS/QHP: CPT

## 2018-07-06 PROCEDURE — 87086 URINE CULTURE/COLONY COUNT: CPT

## 2018-07-06 PROCEDURE — 85027 COMPLETE CBC AUTOMATED: CPT

## 2018-07-06 PROCEDURE — G0463: CPT

## 2018-07-06 PROCEDURE — 88300 SURGICAL PATH GROSS: CPT | Mod: 26

## 2018-07-06 PROCEDURE — 74420 UROGRAPHY RTRGR +-KUB: CPT | Mod: 26

## 2018-07-06 PROCEDURE — 52356 CYSTO/URETERO W/LITHOTRIPSY: CPT | Mod: LT

## 2018-07-06 PROCEDURE — 83036 HEMOGLOBIN GLYCOSYLATED A1C: CPT

## 2018-07-06 RX ORDER — SODIUM CHLORIDE 9 MG/ML
3 INJECTION INTRAMUSCULAR; INTRAVENOUS; SUBCUTANEOUS EVERY 8 HOURS
Qty: 0 | Refills: 0 | Status: DISCONTINUED | OUTPATIENT
Start: 2018-07-06 | End: 2018-07-06

## 2018-07-06 RX ORDER — CEPHALEXIN 500 MG
1 CAPSULE ORAL
Qty: 9 | Refills: 0
Start: 2018-07-06 | End: 2018-07-08

## 2018-07-06 RX ORDER — ONDANSETRON 8 MG/1
4 TABLET, FILM COATED ORAL ONCE
Qty: 0 | Refills: 0 | Status: DISCONTINUED | OUTPATIENT
Start: 2018-07-06 | End: 2018-07-08

## 2018-07-06 RX ORDER — FENTANYL CITRATE 50 UG/ML
25 INJECTION INTRAVENOUS
Qty: 0 | Refills: 0 | Status: DISCONTINUED | OUTPATIENT
Start: 2018-07-06 | End: 2018-07-08

## 2018-07-06 RX ORDER — TAMSULOSIN HYDROCHLORIDE 0.4 MG/1
1 CAPSULE ORAL
Qty: 10 | Refills: 0
Start: 2018-07-06

## 2018-07-06 RX ORDER — LIDOCAINE HCL 20 MG/ML
0.2 VIAL (ML) INJECTION ONCE
Qty: 0 | Refills: 0 | Status: DISCONTINUED | OUTPATIENT
Start: 2018-07-06 | End: 2018-07-06

## 2018-07-06 RX ORDER — SODIUM CHLORIDE 9 MG/ML
1000 INJECTION INTRAMUSCULAR; INTRAVENOUS; SUBCUTANEOUS
Qty: 0 | Refills: 0 | Status: DISCONTINUED | OUTPATIENT
Start: 2018-07-06 | End: 2018-07-21

## 2018-07-06 RX ADMIN — SODIUM CHLORIDE 75 MILLILITER(S): 9 INJECTION INTRAMUSCULAR; INTRAVENOUS; SUBCUTANEOUS at 13:47

## 2018-07-06 NOTE — ASU DISCHARGE PLAN (ADULT/PEDIATRIC). - MEDICATION SUMMARY - MEDICATIONS TO CHANGE
I will SWITCH the dose or number of times a day I take the medications listed below when I get home from the hospital:    Xarelto 20 mg oral tablet  -- 1 tab(s) by mouth once a day (in the evening), last dose 7/3/2018

## 2018-07-06 NOTE — ASU DISCHARGE PLAN (ADULT/PEDIATRIC). - MEDICATION SUMMARY - MEDICATIONS TO TAKE
I will START or STAY ON the medications listed below when I get home from the hospital:    oxyCODONE-acetaminophen 5 mg-325 mg oral tablet  -- 1 tab(s) by mouth every 6 hours MDD:6  -- Caution federal law prohibits the transfer of this drug to any person other  than the person for whom it was prescribed.  May cause drowsiness.  Alcohol may intensify this effect.  Use care when operating dangerous machinery.  This prescription cannot be refilled.  This product contains acetaminophen.  Do not use  with any other product containing acetaminophen to prevent possible liver damage.  Using more of this medication than prescribed may cause serious breathing problems.    -- Indication: For Pain control    Aspir 81 oral delayed release tablet  -- 1 tab(s) by mouth once a day (at bedtime), cotinue ASA pre op  -- Indication: For Home    tamsulosin 0.4 mg oral capsule  -- 1 cap(s) by mouth once a day (at bedtime)   -- It is very important that you take or use this exactly as directed.  Do not skip doses or discontinue unless directed by your doctor.  May cause drowsiness.  Alcohol may intensify this effect.  Use care when operating dangerous machinery.  Some non-prescription drugs may aggravate your condition.  Read all labels carefully.  If a warning appears, check with your doctor before taking.  Swallow whole.  Do not crush.  Take with food or milk.    -- Indication: For Home    Xarelto 20 mg oral tablet  -- 1 tab(s) by mouth once a day (in the evening), last dose 7/3/2018  -- Indication: For Resume on Monday    metFORMIN 500 mg oral tablet, extended release  -- 1 tab(s) by mouth once a day  -- Indication: For Home    atorvastatin 20 mg oral tablet  -- 1 tab(s) by mouth once a day (at bedtime)  -- Indication: For Home    metoprolol succinate 200 mg oral tablet, extended release  -- 1 tab(s) by mouth once a day  -- Indication: For home    Keflex 500 mg oral capsule  -- 1 cap(s) by mouth every 8 hours   -- Finish all this medication unless otherwise directed by prescriber.    -- Indication: For home    bumetanide 1 mg oral tablet  -- 1 tab(s) by mouth once a day  -- Indication: For home    levoFLOXacin 500 mg oral tablet  -- 1 tab(s) by mouth every 24 hours  -- Indication: For home    levothyroxine 25 mcg (0.025 mg) oral tablet  -- 1 tab(s) by mouth once a day  -- Indication: For Home    Vitamin D3 2000 intl units oral tablet  -- 1 tab(s) by mouth once a day  -- Indication: For home

## 2018-07-06 NOTE — BRIEF OPERATIVE NOTE - PROCEDURE
<<-----Click on this checkbox to enter Procedure Ureteroscopy of left ureter with use of laser  07/06/2018    Active  [id number]

## 2018-07-09 ENCOUNTER — TRANSCRIPTION ENCOUNTER (OUTPATIENT)
Age: 73
End: 2018-07-09

## 2018-07-09 PROBLEM — Z00.00 ENCOUNTER FOR PREVENTIVE HEALTH EXAMINATION: Noted: 2018-07-09

## 2018-07-09 PROCEDURE — 82962 GLUCOSE BLOOD TEST: CPT

## 2018-07-09 PROCEDURE — C1769: CPT

## 2018-07-09 PROCEDURE — C2617: CPT

## 2018-07-09 PROCEDURE — 88300 SURGICAL PATH GROSS: CPT

## 2018-07-09 PROCEDURE — C1758: CPT

## 2018-07-09 PROCEDURE — 82365 CALCULUS SPECTROSCOPY: CPT

## 2018-07-09 PROCEDURE — C1889: CPT

## 2018-07-09 PROCEDURE — 52356 CYSTO/URETERO W/LITHOTRIPSY: CPT | Mod: LT

## 2018-07-09 RX ORDER — ATENOLOL 25 MG/1
1 TABLET ORAL
Qty: 0 | Refills: 0 | COMMUNITY

## 2018-07-09 RX ORDER — METFORMIN HYDROCHLORIDE 850 MG/1
0 TABLET ORAL
Qty: 0 | Refills: 0 | COMMUNITY

## 2018-07-09 RX ORDER — LOSARTAN POTASSIUM 100 MG/1
1 TABLET, FILM COATED ORAL
Qty: 0 | Refills: 0 | COMMUNITY

## 2018-07-09 RX ORDER — RIVAROXABAN 15 MG-20MG
1 KIT ORAL
Qty: 0 | Refills: 0 | COMMUNITY

## 2018-07-09 RX ORDER — LOSARTAN POTASSIUM 100 MG/1
0 TABLET, FILM COATED ORAL
Qty: 0 | Refills: 0 | COMMUNITY

## 2018-07-09 RX ORDER — DILTIAZEM HCL 120 MG
1 CAPSULE, EXT RELEASE 24 HR ORAL
Qty: 0 | Refills: 0 | COMMUNITY

## 2018-07-09 RX ORDER — DILTIAZEM HCL 120 MG
0 CAPSULE, EXT RELEASE 24 HR ORAL
Qty: 0 | Refills: 0 | COMMUNITY

## 2018-07-09 RX ORDER — ATENOLOL 25 MG/1
0 TABLET ORAL
Qty: 0 | Refills: 0 | COMMUNITY

## 2018-07-09 RX ORDER — RIVAROXABAN 15 MG-20MG
0 KIT ORAL
Qty: 0 | Refills: 0 | COMMUNITY

## 2018-07-09 RX ORDER — ATORVASTATIN CALCIUM 80 MG/1
0 TABLET, FILM COATED ORAL
Qty: 0 | Refills: 0 | COMMUNITY

## 2018-07-09 RX ORDER — ASPIRIN/CALCIUM CARB/MAGNESIUM 324 MG
0 TABLET ORAL
Qty: 0 | Refills: 0 | COMMUNITY

## 2018-07-09 RX ORDER — METOPROLOL TARTRATE 50 MG
0 TABLET ORAL
Qty: 0 | Refills: 0 | COMMUNITY

## 2018-07-11 ENCOUNTER — APPOINTMENT (OUTPATIENT)
Dept: UROLOGY | Facility: CLINIC | Age: 73
End: 2018-07-11
Payer: MEDICARE

## 2018-07-11 VITALS
SYSTOLIC BLOOD PRESSURE: 131 MMHG | DIASTOLIC BLOOD PRESSURE: 77 MMHG | WEIGHT: 206 LBS | RESPIRATION RATE: 16 BRPM | HEART RATE: 67 BPM | TEMPERATURE: 97.6 F

## 2018-07-11 DIAGNOSIS — N20.1 CALCULUS OF URETER: ICD-10-CM

## 2018-07-11 PROBLEM — I48.91 UNSPECIFIED ATRIAL FIBRILLATION: Chronic | Status: ACTIVE | Noted: 2018-05-04

## 2018-07-11 PROBLEM — I10 ESSENTIAL (PRIMARY) HYPERTENSION: Chronic | Status: ACTIVE | Noted: 2018-05-04

## 2018-07-11 PROBLEM — E11.9 TYPE 2 DIABETES MELLITUS WITHOUT COMPLICATIONS: Chronic | Status: ACTIVE | Noted: 2018-05-04

## 2018-07-11 PROBLEM — E78.5 HYPERLIPIDEMIA, UNSPECIFIED: Chronic | Status: ACTIVE | Noted: 2018-05-04

## 2018-07-11 LAB — COMPN STONE: SIGNIFICANT CHANGE UP

## 2018-07-11 PROCEDURE — 99213 OFFICE O/P EST LOW 20 MIN: CPT

## 2018-07-13 LAB — SURGICAL PATHOLOGY STUDY: SIGNIFICANT CHANGE UP

## 2018-07-16 ENCOUNTER — APPOINTMENT (OUTPATIENT)
Dept: UROLOGY | Facility: CLINIC | Age: 73
End: 2018-07-16

## 2018-07-30 ENCOUNTER — APPOINTMENT (OUTPATIENT)
Dept: UROLOGY | Facility: CLINIC | Age: 73
End: 2018-07-30

## 2018-09-29 ENCOUNTER — TRANSCRIPTION ENCOUNTER (OUTPATIENT)
Age: 73
End: 2018-09-29

## 2018-10-29 ENCOUNTER — APPOINTMENT (OUTPATIENT)
Dept: UROLOGY | Facility: CLINIC | Age: 73
End: 2018-10-29

## 2018-11-05 NOTE — PATIENT PROFILE ADULT. - NS TRANSFER DISPOSITION PATIENT BELONGINGS
Follow up message sent to Dr Rob's office.  
Message sent to Darron's office for Clearance for surgery //16  
with patient

## 2018-11-06 PROBLEM — I48.91 UNSPECIFIED ATRIAL FIBRILLATION: Chronic | Status: ACTIVE | Noted: 2018-06-29

## 2018-11-06 PROBLEM — E03.9 HYPOTHYROIDISM, UNSPECIFIED: Chronic | Status: ACTIVE | Noted: 2018-06-29

## 2018-12-05 ENCOUNTER — APPOINTMENT (OUTPATIENT)
Dept: UROLOGY | Facility: CLINIC | Age: 73
End: 2018-12-05
Payer: MEDICARE

## 2018-12-05 VITALS
DIASTOLIC BLOOD PRESSURE: 90 MMHG | RESPIRATION RATE: 19 BRPM | BODY MASS INDEX: 36.5 KG/M2 | HEART RATE: 64 BPM | SYSTOLIC BLOOD PRESSURE: 162 MMHG | WEIGHT: 206 LBS | HEIGHT: 63 IN | TEMPERATURE: 97.7 F

## 2018-12-05 DIAGNOSIS — N20.0 CALCULUS OF KIDNEY: ICD-10-CM

## 2018-12-05 DIAGNOSIS — R82.991 HYPOCITRATURIA: ICD-10-CM

## 2018-12-05 DIAGNOSIS — R82.6 ABNORMAL URINE LEVELS OF SUBSTANCES CHIEFLY NONMEDICINAL AS TO SOURCE: ICD-10-CM

## 2018-12-05 PROCEDURE — 99214 OFFICE O/P EST MOD 30 MIN: CPT

## 2018-12-05 RX ORDER — LEVOFLOXACIN 500 MG/1
500 TABLET, FILM COATED ORAL DAILY
Qty: 7 | Refills: 0 | Status: COMPLETED | COMMUNITY
Start: 2018-07-02 | End: 2018-12-05

## 2018-12-17 ENCOUNTER — APPOINTMENT (OUTPATIENT)
Dept: ULTRASOUND IMAGING | Facility: IMAGING CENTER | Age: 73
End: 2018-12-17

## 2019-02-19 NOTE — H&P PST ADULT - TOBACCO USE
Reason for Call:  Medication or medication refill:    Do you use a [facility]?  Name of the pharmacy and phone number for the current request:  [facility] - [phone]    Name of the medication requested: antibiotic    Other request: patient still has sinus infection. She is wondering if you could call a different rx in. Declined appt wanted to speak with nurse or preferably Dr Ayala    Can we leave a detailed message on this number? YES    Phone number patient can be reached at: [phone]    Best Time: any    Call taken on 2/19/2019 at 9:28 AM by Ursula Cavanaugh         Never smoker

## 2019-04-10 ENCOUNTER — APPOINTMENT (OUTPATIENT)
Dept: UROLOGY | Facility: CLINIC | Age: 74
End: 2019-04-10

## 2019-05-30 NOTE — PATIENT PROFILE ADULT. - FUNCTIONAL SCREEN CURRENT LEVEL: BATHING, MLM
Epidural Block    Start time: 5/30/2019 1:43 PM  End time: 5/30/2019 1:53 PM  Performed by: Mauro Brown MD  Authorized by: Mauro Brown MD     Pre-Procedure  Indication: at surgeon's request and labor epidural    Preanesthetic Checklist: patient identified, risks and benefits discussed, anesthesia consent, site marked, patient being monitored, timeout performed and anesthesia consent    Timeout Time: 13:43        Epidural:   Patient position:  Seated  Prep region:  Lumbar  Prep: DuraPrep    Location:  L3-4    Needle and Epidural Catheter:   Needle Type:  Tuohy  Needle Gauge:  17 G  Injection Technique:  Loss of resistance using saline  Attempts:  1  Catheter Size:  19 G  Catheter at Skin Depth (cm):  12  Depth in Epidural Space (cm):  5  Events: no blood with aspiration, no cerebrospinal fluid with aspiration, no paresthesia and negative aspiration test    Test Dose:  Negative    Assessment:   Catheter Secured:  Tegaderm and tape  Insertion:  Uncomplicated  Patient tolerance:  Patient tolerated the procedure well with no immediate complications (2) assistive person

## 2019-06-15 ENCOUNTER — TRANSCRIPTION ENCOUNTER (OUTPATIENT)
Age: 74
End: 2019-06-15

## 2020-12-12 NOTE — DISCHARGE NOTE ADULT - PAIN PRESENT
Pt. complaining of vertigo since Monday.  Pt with history of vertigo states "it has never been that bad before."  Pt states she tried to do exercises for vertigo but was unrelieved of symptoms.  Pt. also complaining of blood in urine that began this morning. No

## 2021-05-06 ENCOUNTER — INPATIENT (INPATIENT)
Facility: HOSPITAL | Age: 76
LOS: 2 days | Discharge: HOME | DRG: 602 | End: 2021-05-09
Attending: INTERNAL MEDICINE | Admitting: INTERNAL MEDICINE
Payer: MEDICARE

## 2021-05-06 VITALS
DIASTOLIC BLOOD PRESSURE: 79 MMHG | TEMPERATURE: 98 F | SYSTOLIC BLOOD PRESSURE: 183 MMHG | OXYGEN SATURATION: 96 % | RESPIRATION RATE: 22 BRPM | HEIGHT: 60 IN | WEIGHT: 244.93 LBS | HEART RATE: 81 BPM

## 2021-05-06 DIAGNOSIS — I48.0 PAROXYSMAL ATRIAL FIBRILLATION: ICD-10-CM

## 2021-05-06 DIAGNOSIS — I10 ESSENTIAL (PRIMARY) HYPERTENSION: ICD-10-CM

## 2021-05-06 DIAGNOSIS — L03.119 CELLULITIS OF UNSPECIFIED PART OF LIMB: ICD-10-CM

## 2021-05-06 DIAGNOSIS — Z29.9 ENCOUNTER FOR PROPHYLACTIC MEASURES, UNSPECIFIED: ICD-10-CM

## 2021-05-06 DIAGNOSIS — Z98.890 OTHER SPECIFIED POSTPROCEDURAL STATES: Chronic | ICD-10-CM

## 2021-05-06 DIAGNOSIS — E78.5 HYPERLIPIDEMIA, UNSPECIFIED: ICD-10-CM

## 2021-05-06 DIAGNOSIS — E03.9 HYPOTHYROIDISM, UNSPECIFIED: ICD-10-CM

## 2021-05-06 DIAGNOSIS — N20.0 CALCULUS OF KIDNEY: Chronic | ICD-10-CM

## 2021-05-06 DIAGNOSIS — Z96.659 PRESENCE OF UNSPECIFIED ARTIFICIAL KNEE JOINT: Chronic | ICD-10-CM

## 2021-05-06 DIAGNOSIS — L03.90 CELLULITIS, UNSPECIFIED: ICD-10-CM

## 2021-05-06 DIAGNOSIS — E11.9 TYPE 2 DIABETES MELLITUS WITHOUT COMPLICATIONS: ICD-10-CM

## 2021-05-06 LAB
ALBUMIN SERPL ELPH-MCNC: 3.8 G/DL — SIGNIFICANT CHANGE UP (ref 3.3–5)
ALP SERPL-CCNC: 120 U/L — SIGNIFICANT CHANGE UP (ref 40–120)
ALT FLD-CCNC: 14 U/L — SIGNIFICANT CHANGE UP (ref 10–45)
ANION GAP SERPL CALC-SCNC: 11 MMOL/L — SIGNIFICANT CHANGE UP (ref 5–17)
AST SERPL-CCNC: 22 U/L — SIGNIFICANT CHANGE UP (ref 10–40)
BASOPHILS # BLD AUTO: 0.01 K/UL — SIGNIFICANT CHANGE UP (ref 0–0.2)
BASOPHILS NFR BLD AUTO: 0.1 % — SIGNIFICANT CHANGE UP (ref 0–2)
BILIRUB SERPL-MCNC: 0.4 MG/DL — SIGNIFICANT CHANGE UP (ref 0.2–1.2)
BUN SERPL-MCNC: 21 MG/DL — SIGNIFICANT CHANGE UP (ref 7–23)
CALCIUM SERPL-MCNC: 9.3 MG/DL — SIGNIFICANT CHANGE UP (ref 8.4–10.5)
CHLORIDE SERPL-SCNC: 106 MMOL/L — SIGNIFICANT CHANGE UP (ref 96–108)
CO2 SERPL-SCNC: 23 MMOL/L — SIGNIFICANT CHANGE UP (ref 22–31)
CREAT SERPL-MCNC: 0.75 MG/DL — SIGNIFICANT CHANGE UP (ref 0.5–1.3)
EOSINOPHIL # BLD AUTO: 0.13 K/UL — SIGNIFICANT CHANGE UP (ref 0–0.5)
EOSINOPHIL NFR BLD AUTO: 1.6 % — SIGNIFICANT CHANGE UP (ref 0–6)
GLUCOSE BLDC GLUCOMTR-MCNC: 115 MG/DL — HIGH (ref 70–99)
GLUCOSE BLDC GLUCOMTR-MCNC: 119 MG/DL — HIGH (ref 70–99)
GLUCOSE SERPL-MCNC: 182 MG/DL — HIGH (ref 70–99)
HCT VFR BLD CALC: 37.8 % — SIGNIFICANT CHANGE UP (ref 34.5–45)
HGB BLD-MCNC: 11.6 G/DL — SIGNIFICANT CHANGE UP (ref 11.5–15.5)
IMM GRANULOCYTES NFR BLD AUTO: 0.6 % — SIGNIFICANT CHANGE UP (ref 0–1.5)
LYMPHOCYTES # BLD AUTO: 1.38 K/UL — SIGNIFICANT CHANGE UP (ref 1–3.3)
LYMPHOCYTES # BLD AUTO: 17.3 % — SIGNIFICANT CHANGE UP (ref 13–44)
MCHC RBC-ENTMCNC: 28.5 PG — SIGNIFICANT CHANGE UP (ref 27–34)
MCHC RBC-ENTMCNC: 30.7 GM/DL — LOW (ref 32–36)
MCV RBC AUTO: 92.9 FL — SIGNIFICANT CHANGE UP (ref 80–100)
MONOCYTES # BLD AUTO: 0.43 K/UL — SIGNIFICANT CHANGE UP (ref 0–0.9)
MONOCYTES NFR BLD AUTO: 5.4 % — SIGNIFICANT CHANGE UP (ref 2–14)
NEUTROPHILS # BLD AUTO: 5.97 K/UL — SIGNIFICANT CHANGE UP (ref 1.8–7.4)
NEUTROPHILS NFR BLD AUTO: 75 % — SIGNIFICANT CHANGE UP (ref 43–77)
NRBC # BLD: 0 /100 WBCS — SIGNIFICANT CHANGE UP (ref 0–0)
NT-PROBNP SERPL-SCNC: 432 PG/ML — HIGH (ref 0–300)
PLATELET # BLD AUTO: 198 K/UL — SIGNIFICANT CHANGE UP (ref 150–400)
POTASSIUM SERPL-MCNC: 4 MMOL/L — SIGNIFICANT CHANGE UP (ref 3.5–5.3)
POTASSIUM SERPL-SCNC: 4 MMOL/L — SIGNIFICANT CHANGE UP (ref 3.5–5.3)
PROT SERPL-MCNC: 7.2 G/DL — SIGNIFICANT CHANGE UP (ref 6–8.3)
RBC # BLD: 4.07 M/UL — SIGNIFICANT CHANGE UP (ref 3.8–5.2)
RBC # FLD: 15.2 % — HIGH (ref 10.3–14.5)
SODIUM SERPL-SCNC: 140 MMOL/L — SIGNIFICANT CHANGE UP (ref 135–145)
WBC # BLD: 7.97 K/UL — SIGNIFICANT CHANGE UP (ref 3.8–10.5)
WBC # FLD AUTO: 7.97 K/UL — SIGNIFICANT CHANGE UP (ref 3.8–10.5)

## 2021-05-06 PROCEDURE — 71045 X-RAY EXAM CHEST 1 VIEW: CPT | Mod: 26

## 2021-05-06 PROCEDURE — 99223 1ST HOSP IP/OBS HIGH 75: CPT

## 2021-05-06 PROCEDURE — 99285 EMERGENCY DEPT VISIT HI MDM: CPT

## 2021-05-06 RX ORDER — INSULIN LISPRO 100/ML
VIAL (ML) SUBCUTANEOUS AT BEDTIME
Refills: 0 | Status: DISCONTINUED | OUTPATIENT
Start: 2021-05-06 | End: 2021-05-09

## 2021-05-06 RX ORDER — CEFAZOLIN SODIUM 1 G
1000 VIAL (EA) INJECTION EVERY 8 HOURS
Refills: 0 | Status: DISCONTINUED | OUTPATIENT
Start: 2021-05-07 | End: 2021-05-09

## 2021-05-06 RX ORDER — DEXTROSE 50 % IN WATER 50 %
15 SYRINGE (ML) INTRAVENOUS ONCE
Refills: 0 | Status: DISCONTINUED | OUTPATIENT
Start: 2021-05-06 | End: 2021-05-09

## 2021-05-06 RX ORDER — ATORVASTATIN CALCIUM 80 MG/1
1 TABLET, FILM COATED ORAL
Qty: 0 | Refills: 0 | DISCHARGE

## 2021-05-06 RX ORDER — RIVAROXABAN 15 MG-20MG
20 KIT ORAL
Refills: 0 | Status: COMPLETED | OUTPATIENT
Start: 2021-05-06 | End: 2021-05-06

## 2021-05-06 RX ORDER — ENOXAPARIN SODIUM 100 MG/ML
40 INJECTION SUBCUTANEOUS EVERY 12 HOURS
Refills: 0 | Status: DISCONTINUED | OUTPATIENT
Start: 2021-05-06 | End: 2021-05-06

## 2021-05-06 RX ORDER — LEVOTHYROXINE SODIUM 125 MCG
25 TABLET ORAL DAILY
Refills: 0 | Status: DISCONTINUED | OUTPATIENT
Start: 2021-05-06 | End: 2021-05-09

## 2021-05-06 RX ORDER — DEXTROSE 50 % IN WATER 50 %
25 SYRINGE (ML) INTRAVENOUS ONCE
Refills: 0 | Status: DISCONTINUED | OUTPATIENT
Start: 2021-05-06 | End: 2021-05-09

## 2021-05-06 RX ORDER — GLUCAGON INJECTION, SOLUTION 0.5 MG/.1ML
1 INJECTION, SOLUTION SUBCUTANEOUS ONCE
Refills: 0 | Status: DISCONTINUED | OUTPATIENT
Start: 2021-05-06 | End: 2021-05-09

## 2021-05-06 RX ORDER — DEXTROSE 50 % IN WATER 50 %
12.5 SYRINGE (ML) INTRAVENOUS ONCE
Refills: 0 | Status: DISCONTINUED | OUTPATIENT
Start: 2021-05-06 | End: 2021-05-09

## 2021-05-06 RX ORDER — ATORVASTATIN CALCIUM 80 MG/1
20 TABLET, FILM COATED ORAL AT BEDTIME
Refills: 0 | Status: DISCONTINUED | OUTPATIENT
Start: 2021-05-06 | End: 2021-05-09

## 2021-05-06 RX ORDER — INSULIN LISPRO 100/ML
VIAL (ML) SUBCUTANEOUS
Refills: 0 | Status: DISCONTINUED | OUTPATIENT
Start: 2021-05-06 | End: 2021-05-09

## 2021-05-06 RX ORDER — RIVAROXABAN 15 MG-20MG
20 KIT ORAL
Refills: 0 | Status: DISCONTINUED | OUTPATIENT
Start: 2021-05-07 | End: 2021-05-09

## 2021-05-06 RX ORDER — ASPIRIN/CALCIUM CARB/MAGNESIUM 324 MG
1 TABLET ORAL
Qty: 0 | Refills: 0 | DISCHARGE

## 2021-05-06 RX ORDER — METOPROLOL TARTRATE 50 MG
200 TABLET ORAL DAILY
Refills: 0 | Status: DISCONTINUED | OUTPATIENT
Start: 2021-05-06 | End: 2021-05-09

## 2021-05-06 RX ORDER — GABAPENTIN 400 MG/1
100 CAPSULE ORAL THREE TIMES A DAY
Refills: 0 | Status: DISCONTINUED | OUTPATIENT
Start: 2021-05-06 | End: 2021-05-09

## 2021-05-06 RX ORDER — METOPROLOL TARTRATE 50 MG
1 TABLET ORAL
Qty: 0 | Refills: 0 | DISCHARGE

## 2021-05-06 RX ORDER — BUMETANIDE 0.25 MG/ML
1 INJECTION INTRAMUSCULAR; INTRAVENOUS DAILY
Refills: 0 | Status: DISCONTINUED | OUTPATIENT
Start: 2021-05-07 | End: 2021-05-07

## 2021-05-06 RX ORDER — ACETAMINOPHEN 500 MG
975 TABLET ORAL ONCE
Refills: 0 | Status: COMPLETED | OUTPATIENT
Start: 2021-05-06 | End: 2021-05-06

## 2021-05-06 RX ORDER — METFORMIN HYDROCHLORIDE 850 MG/1
1 TABLET ORAL
Qty: 0 | Refills: 0 | DISCHARGE

## 2021-05-06 RX ORDER — CHOLECALCIFEROL (VITAMIN D3) 125 MCG
2000 CAPSULE ORAL DAILY
Refills: 0 | Status: DISCONTINUED | OUTPATIENT
Start: 2021-05-06 | End: 2021-05-09

## 2021-05-06 RX ORDER — CHOLECALCIFEROL (VITAMIN D3) 125 MCG
1 CAPSULE ORAL
Qty: 0 | Refills: 0 | DISCHARGE

## 2021-05-06 RX ORDER — SODIUM CHLORIDE 9 MG/ML
1000 INJECTION, SOLUTION INTRAVENOUS
Refills: 0 | Status: DISCONTINUED | OUTPATIENT
Start: 2021-05-06 | End: 2021-05-09

## 2021-05-06 RX ORDER — CEFTRIAXONE 500 MG/1
1000 INJECTION, POWDER, FOR SOLUTION INTRAMUSCULAR; INTRAVENOUS ONCE
Refills: 0 | Status: COMPLETED | OUTPATIENT
Start: 2021-05-06 | End: 2021-05-06

## 2021-05-06 RX ADMIN — Medication 975 MILLIGRAM(S): at 10:49

## 2021-05-06 RX ADMIN — ATORVASTATIN CALCIUM 20 MILLIGRAM(S): 80 TABLET, FILM COATED ORAL at 22:43

## 2021-05-06 RX ADMIN — GABAPENTIN 100 MILLIGRAM(S): 400 CAPSULE ORAL at 22:44

## 2021-05-06 RX ADMIN — RIVAROXABAN 20 MILLIGRAM(S): KIT at 22:44

## 2021-05-06 RX ADMIN — CEFTRIAXONE 100 MILLIGRAM(S): 500 INJECTION, POWDER, FOR SOLUTION INTRAMUSCULAR; INTRAVENOUS at 14:04

## 2021-05-06 RX ADMIN — CEFTRIAXONE 1000 MILLIGRAM(S): 500 INJECTION, POWDER, FOR SOLUTION INTRAMUSCULAR; INTRAVENOUS at 14:56

## 2021-05-06 RX ADMIN — Medication 975 MILLIGRAM(S): at 14:56

## 2021-05-06 NOTE — ED PROVIDER NOTE - OBJECTIVE STATEMENT
74yo F with pmhx of Afib on xarelto, HTN, HLD, DM, hypothyroidism presents with increased swelling of lower extremities for past week, increased redness to her extremities. Seen by primary care physician 3ds ago - started on Augmentin for cellulitis.  States the redness has improved, primary care physician sent for IV abx dose however. No fever, chills, n/v, abd pain, chest pain, shortness of breath.   States she does get shortness of breath with ambulation sometimes, did not change over past few months.

## 2021-05-06 NOTE — ED ADULT NURSE NOTE - NSIMPLEMENTINTERV_GEN_ALL_ED
Implemented All Fall Risk Interventions:  [place] to call system. Call bell, personal items and telephone within reach. Instruct patient to call for assistance. Room bathroom lighting operational. Non-slip footwear when patient is off stretcher. Physically safe environment: no spills, clutter or unnecessary equipment. Stretcher in lowest position, wheels locked, appropriate side rails in place. Provide visual cue, wrist band, yellow gown, etc. Monitor gait and stability. Monitor for mental status changes and reorient to person, place, and time. Review medications for side effects contributing to fall risk. Reinforce activity limits and safety measures with patient and family.

## 2021-05-06 NOTE — H&P ADULT - NSHPPHYSICALEXAM_GEN_ALL_CORE
Vital Signs Last 24 Hrs  T(C): 36.5 (06 May 2021 12:34), Max: 36.7 (06 May 2021 09:53)  T(F): 97.7 (06 May 2021 12:34), Max: 98.1 (06 May 2021 09:53)  HR: 66 (06 May 2021 12:34) (66 - 81)  BP: 159/88 (06 May 2021 12:34) (140/71 - 183/79)  BP(mean): --  RR: 18 (06 May 2021 12:34) (18 - 22)  SpO2: 98% (06 May 2021 12:34) (96% - 98%)    PHYSICAL EXAM:  CONSTITUTIONAL: NAD, well-developed, obese   EYES: PERRLA; conjunctiva and sclera clear  ENMT: Moist oral mucosa, no pharyngeal injection or exudates; normal dentition  NECK: Supple, no palpable masses; no thyromegaly  RESPIRATORY: Normal respiratory effort; lungs are clear to auscultation bilaterally  CARDIOVASCULAR: Regular rate and rhythm, normal S1 and S2, no murmur/rub/gallop; 3+ lower extremity edema;   ABDOMEN: Nontender to palpation, normoactive bowel sounds, no rebound/guarding; No hepatosplenomegaly  MUSCULOSKELETAL:  Normal gait; no clubbing or cyanosis of digits; no joint swelling or tenderness to palpation  PSYCH: A+O to person, place, and time; affect appropriate  NEUROLOGY: CN 2-12 are intact and symmetric; no gross sensory deficits   SKIN: +bright red erythema R>L w/ associated warmth and swelling

## 2021-05-06 NOTE — ED PROVIDER NOTE - PHYSICAL EXAMINATION
Gen: non toxic appearing, NAD, obese  Eyes: anicteric  ENT: airway patent, mmm  CV: RRR, +S1/S2  Resp: CTAB, symmetric breath sounds  GI: abdomen soft non-distended, NTTP  Extremities - symmetric pulses, 3+ pitting edema, +erythema distal lower extremities b/l, mildly warm to touch  Neuro: A&Ox4, following commands, speech clear, moving all four extremities spontaneously

## 2021-05-06 NOTE — ED PROVIDER NOTE - PROGRESS NOTE DETAILS
Lulu Marquez,  PGY-2: spoke with Dr. Machuca - would like the patient to be admitted to Dr Day for cellulitis. States patient only had erythema of RT leg initially when he saw her on monday and yesterday.   Spoke with Dr. Day agrees with admission. Pt agrees with plan to be admitted. All questions answered.

## 2021-05-06 NOTE — ED ADULT NURSE NOTE - PSH
H/O total knee replacement  bilateral  , 1998, 2000  Nephrolithiasis    S/P cystoscopy  with left ureteral stent 4/2018  Status post total knee replacement, unspecified laterality

## 2021-05-06 NOTE — ED ADULT NURSE REASSESSMENT NOTE - NS ED NURSE REASSESS COMMENT FT1
Received patient from RN FABIENS, patient at baseline mental status, able to make needs known, NAD, patient agreeable to plan of care, pending medicine admission, comfort and safety provided.

## 2021-05-06 NOTE — H&P ADULT - NSHPLABSRESULTS_GEN_ALL_CORE
LABS:                         11.6   7.97  )-----------( 198      ( 06 May 2021 11:12 )             37.8     05-06    140  |  106  |  21  ----------------------------<  182<H>  4.0   |  23  |  0.75    Ca    9.3      06 May 2021 11:12    TPro  7.2  /  Alb  3.8  /  TBili  0.4  /  DBili  x   /  AST  22  /  ALT  14  /  AlkPhos  120  05-06\    < from: Xray Chest 1 View AP/PA (05.06.21 @ 11:07) >    IMPRESSION:  Clear lungs.    No pleural effusion.        < end of copied text >          Serum Pro-Brain Natriuretic Peptide: 432 pg/mL (05-06 @ 11:12)      Records reviewed from prior hospitalization.  Labs reviewed remarkable for mildly elevated bnp, no wbc  CXR personally reviewed - clear lungs

## 2021-05-06 NOTE — H&P ADULT - NSICDXPASTMEDICALHX_GEN_ALL_CORE_FT
PAST MEDICAL HISTORY:  AF (atrial fibrillation)     Atrial fibrillation onset 5/2018    Diabetes     Diabetes type 2, controlled     HLD (hyperlipidemia)     HTN (hypertension)     Hyperlipidemia     Hypertension stable    Hypothyroid     Nephrolithiasis

## 2021-05-06 NOTE — H&P ADULT - PROBLEM SELECTOR PLAN 1
-received CTX in ER  -start ancef in AM 1g tid  -check LE dopplers to r.o dvt (low suspicion as on ac)  -check BNP, attempt to obtain records from outpt echo to r/o CHF as contributor for edema  -c/w home bumex dose  -monitor for fevers, improvement

## 2021-05-06 NOTE — H&P ADULT - PROBLEM SELECTOR PLAN 2
-c/w xarelto and metoprolol  -would hold ASA-per pt no hx of MI or PCI. Would clarify indication w/ PMD and favor dc on discharge as pt will be at risk for bleeding on AC + ASA

## 2021-05-06 NOTE — ED PROVIDER NOTE - CARE PLAN
Principal Discharge DX:	Lower extremity edema   Principal Discharge DX:	Cellulitis   Principal Discharge DX:	Cellulitis  Goal:	Bilateral LE's

## 2021-05-06 NOTE — ED PROVIDER NOTE - INTERNATIONAL TRAVEL
Patient Summary Document

                             Created on: 2019



JOANNA CABRERA

External Reference #: [id number]

: 1966

Sex: Male



Demographics







                          Address                   [address] [place] [address]  [address]

 

                          Home Phone                [phone]

 

                          Preferred Language        Unknown

 

                          Marital Status            Unknown

 

                          Spiritism Affiliation     Unknown

 

                          Race                      Unknown

 

                          Ethnic Group              Unknown





Author







                          Author                    [facility]

 

                          Address                   Unknown

 

                          Phone                     Unavailable







Care Team Providers







                    Care Team Member Name    Role                Phone

 

                    UNKNOWN,  REFFERING    PP                  Unavailable

 

                    BAER BHARDWAJ    Unavailable         Unavailable

 

                    ANDRÉS RUSSELL    Unavailable         Unavailable

 

                    AHMED,  DEVORAH     Unavailable         Unavailable

 

                    DORIAN,  ARIF         Unavailable         Unavailable

 

                    ABBEY RAMOS    Unavailable         Unavailable

 

                    ANUJ SARAVIA       Unavailable         Unavailable

 

                    CEASAR MCDONNELL       Unavailable         Unavailable

 

                    KIKE MCNEIL    Unavailable         Unavailable

 

                    JOHN PAUL CARDONA    Unavailable         Unavailable

 

                    ALEXSANDRA PEREZ    Unavailable         Unavailable

 

                    BRIGHT WADE        Unavailable         Unavailable

 

                    KAR SALINAS    Unavailable         Unavailable

 

                    CORINNE MONTILLA    Unavailable         Unavailable

 

                    KATHLEEN GREEN    Unavailable         Unavailable

 

                    LISA BALLESTEROS    Unavailable         Unavailable







Problems

This patient has no known problems.



Allergies, Adverse Reactions, Alerts

This patient has no known allergies or adverse reactions.



Medications

This patient has no known medications.



Encounters







             Start Date/Time    End Date/Time    Encounter Type    Admission Type    Attending Clinicians

                    Care Facility       Care Department     Encounter ID

 

        2018 00:00:00    2018 00:00:00    Out[facility]                    [facility]    [facility]    [id number]









Results







           Test Description    Test Time    Test Comments    Text Results    Atomic Results    Result

 Comments

 

                CHEST SINGLE (PORTABLE)    2019 00:42:00                                                   

                                                       [facility]                        [address] [address]      Patient Name: JOANNA CABRERA                         
         MR #: [id number]                     : 1966                   
               Age/Sex: 53/M  Acct #: [id number]                              
Req #: [id number]  Adm Physician:                                               
      Ordered by: SANDOVAL BHARDWAJ MD                            Report #: 
[id number]        Location: ER                                      Room/Bed:    
                
___________________________________________________________________________________________________
   Procedure:  DX/CHEST SINGLE (PORTABLE)  Exam Date: 19         
                  Exam Time:                                               
REPORT STATUS: Signed    EXAMINATION:  CHEST SINGLE (PORTABLE)          
INDICATION:          chest pain    2019    Y      COMPARISON:  None 
         FINDINGS:  AP view         TUBES and LINES:  Single lead left chest 
wall cardiac device in place with   distal tip projecting over right ventricle. 
Retrocardiac hazy opacification.      LUNGS:  Lungs are well inflated.  Mild 
central vascular congestion.      PLEURA:  No significant pleural effusion or 
pneumothorax.      HEART AND MEDIASTINUM:  The cardiomediastinal silhouette is 
enlarged.          BONES AND SOFT TISSUES:  No acute osseous lesion.  Soft 
tissues are   unremarkable.      UPPER ABDOMEN: No free air under the diaphragm.
         IMPRESSION:    Enlarged cardiomediastinal silhouette and mild vascular 
congestion.   Retrocardiac hazy opacification, representing atelectasis and/or 
pneumonia in   the appropriate clinical context.      Signed by: Dr. Nakul Monroy MD on 2019 12:44 AM        Dictated By: NAKUL MONROY MD  
Electronically Signed By: NAKUL MONROY MD on 19  Transcribed By: 
KAITLIN on 19       COPY TO:   SANDOVAL BHARDWAJ MD              

 

                CT ABDOMEN/PELVIS WO    2019 00:12:00                                                      

                                                    [place] [address]      Patient Name: JOANNA CABRERA                                   
MR #: [id number]                     : 1966                            
      Age/Sex: 53/M  Acct #: [id number]                              Req #: 
[id number]  Adm Physician:                                                      
Ordered by: SANDOVAL BHARDWAJ MD                            Report #: [id number]
[id number]        Location: ER                                      Room/Bed:         
           
___________________________________________________________________________________________________
   Procedure: [id number] CT/CT ABDOMEN/PELVIS WO  Exam Date: 19            
               Exam Time:                                               
REPORT STATUS: Signed    EXAM: CT Abdomen and Pelvis WITHOUT contrast     
INDICATION:          abd pain    2019    Y    COMPARISON: None.   
TECHNIQUE: Abdomen and pelvis were scanned utilizing a multidetector helical   
scanner from the lung base to the pubic symphysis without administration of IV  
contrast. Absence of intravenous contrast decreases sensitivity for detection   
of focal lesions and vascular pathology. Coronal and sagittal reformations were 
 obtained. Routine protocol was performed.         IV CONTRAST: None        ORAL
CONTRAST: None                  COMPLICATIONS: None      RADIATION DOSE:        
Total DLP: 826.88 mGy*cm        Estimated effective dose: (DLP x 0.015 x size 
factor) mSv        CTDIvol has been reviewed. It is below the limits set by the 
Radiation   Protocol Committee (RPC).      FINDINGS:      LINES and TUBES: None.
     LOWER THORAX:  Small right and trace left pleural effusions. Trace 
pericardial   effusion. Partially seen distal lead of cardiac device, 
terminating in right   ventricle. Lingular atelectasis/scarring.      
HEPATOBILIARY:      Unenhanced liver is unremarkable. No biliary ductal   
dilation.       GALLBLADDER: No radio-opaque stones or sludge.  No wall 
thickening.      SPLEEN: No splenomegaly.       PANCREAS: No focal masses or 
ductal dilatation.        ADRENALS: No adrenal nodules          KIDNEYS/URETERS:
 No hydronephrosis. Limited for evaluation of renal parenchyma   without 
intravenous contrast. Nonspecific bilateral perinephric fat stranding.    No 
stones.      GI TRACT: No abnormal distention, wall thickening, or evidence of 
bowel   obstruction.  There are diverticula within the colon without evidence of
  diverticulitis.  Appendix is normal.      PELVIC ORGANS/BLADDER: Unremarkable.
     LYMPH NODES: No lymphadenopathy.      VESSELS: Unremarkable.      
PERITONEUM / RETROPERITONEUM: No free air or fluid.      BONES: Unremarkable.   
  SOFT TISSUES: Fat-containing bilateral inguinal and a small fat-containing   
umbilical hernias.                  IMPRESSION:    1.  No definite evidence of 
acute inflammatory process in the abdomen/pelvis,   considering limitations of 
unenhanced study.   2.  Small right and trace left pleural effusions.   3.  Mild
colonic diverticulosis without evidence of diverticulitis.      Signed by: Dr. Nakul Monroy MD on 2019 12:21 AM        Dictated By: NAKUL MONROY MD  
Electronically Signed By: NAKUL MONROY MD on 19 0021  Transcribed By: 
KAITLIN on 19 0021       COPY TO:   SANDOVAL BHARDWAJ MD              

 

                B-TYPE NATRIURETIC FACTOR (BNP)    2019 11:34:00                      

 

   

 

                B-TYPE NATRIURETIC PEPTIDE (BEAKER) (test code=700)    258 pg/mL       0-100            





BASIC METABOLIC [id number] 11:27:00* 





                Test Item       Value           Reference Range    Comments

 

                SODIUM (BEAKER) (test code=381)    134 meq/L       136-145          

 

                POTASSIUM (BEAKER) (test code=379)    3.7 meq/L       3.5-5.1          

 

                CHLORIDE (BEAKER) (test code=382)    95 meq/L                   

 

                CO2 (BEAKER) (test code=355)    31 meq/L        22-29            

 

                BLOOD UREA NITROGEN (BEAKER) (test code=354)    24 mg/dL        7-21             

 

                CREATININE (BEAKER) (test code=358)    1.70 mg/dL      0.57-1.25        

 

                GLUCOSE RANDOM (BEAKER) (test code=652)    313 mg/dL                  

 

                CALCIUM (BEAKER) (test code=697)    9.0 mg/dL       8.4-10.2         

 

                EGFR (BEAKER) (test code=1092)    42 mL/min/1.73 sq m                    ESTIMATED GFR IS NOT AS 

ACCURATE AS CREATININE CLEARANCE IN PREDICTING GLOMERULAR FILTRATION RATE. 
ESTIMATED GFR IS NOT APPLICABLE FOR DIALYSIS PATIENTS.





CBC W/PLT COUNT & AUTO [id number] 11:15:00* 





                Test Item       Value           Reference Range    Comments

 

                WHITE BLOOD CELL COUNT (BEAKER) (test code=775)    7.2 K/ L        3.5-10.5         

 

                RED BLOOD CELL COUNT (BEAKER) (test code=761)    5.31 M/ L       4.63-6.08        

 

                HEMOGLOBIN (BEAKER) (test code=410)    15.6 GM/DL      13.7-17.5        

 

                HEMATOCRIT (BEAKER) (test code=411)    45.9 %          40.1-51.0        

 

                MEAN CORPUSCULAR VOLUME (BEAKER) (test code=753)    86.4 fL         79.0-92.2        

 

                MEAN CORPUSCULAR HEMOGLOBIN (BEAKER) (test code=751)    29.4 pg         25.7-32.2        

 

                    MEAN CORPUSCULAR HEMOGLOBIN CONC (BEAKER) (test code=752)    34.0 GM/DL          32.3-36.5

                                         

 

                RED CELL DISTRIBUTION WIDTH (BEAKER) (test code=412)    13.0 %          11.6-14.4        

 

                PLATELET COUNT (BEAKER) (test code=756)    194 K/CU MM     150-450          

 

                MEAN PLATELET VOLUME (BEAKER) (test code=754)    9.7 fL          9.4-12.4         

 

                NUCLEATED RED BLOOD CELLS (BEAKER) (test code=413)    0 /100 WBC      0-0              

 

                NEUTROPHILS RELATIVE PERCENT (BEAKER) (test code=429)    65 %                             

 

                LYMPHOCYTES RELATIVE PERCENT (BEAKER) (test code=430)    21 %                             

 

                MONOCYTES RELATIVE PERCENT (BEAKER) (test code=431)    11 %                             

 

                EOSINOPHILS RELATIVE PERCENT (BEAKER) (test code=432)    2 %                              

 

                BASOPHILS RELATIVE PERCENT (BEAKER) (test code=437)    0 %                              

 

                NEUTROPHILS ABSOLUTE COUNT (BEAKER) (test code=670)    4.66 K/ L       1.78-5.38        

 

                LYMPHOCYTES ABSOLUTE COUNT (BEAKER) (test code=414)    1.50 K/ L       1.32-3.57        

 

                MONOCYTES ABSOLUTE COUNT (BEAKER) (test code=415)    0.76 K/ L       0.30-0.82        

 

                EOSINOPHILS ABSOLUTE COUNT (BEAKER) (test code=416)    0.16 K/ L       0.04-0.54        

 

                BASOPHILS ABSOLUTE COUNT (BEAKER) (test code=417)    0.03 K/ L       0.01-0.08        

 

                IMMATURE GRANULOCYTES-RELATIVE PERCENT (BEAKER) (test code=2801)    1 %             0-1              





BLOOD [id number] 02:00:00* 





                Test Item       Value           Reference Range    Comments

 

                CULTURE (BEAKER) (test code=1095)    No growth in 5 days                     





BLOOD [id number] 20:01:00* 





                Test Item       Value           Reference Range    Comments

 

                CULTURE (BEAKER) (test code=1095)    No growth in 5 days                     





POCT-GLUCOSE [id number] 09:25:00* 





                Test Item       Value           Reference Range    Comments

 

                POC-GLUCOSE METER (BEAKER) (test code=1538)    294 mg/dL                 TESTED AT [facility] 

[address] [facility] [address]





BASIC METABOLIC [id number] 05:52:00* 





                Test Item       Value           Reference Range    Comments

 

                SODIUM (BEAKER) (test code=381)    141 meq/L       136-145          

 

                POTASSIUM (BEAKER) (test code=379)    3.9 meq/L       3.5-5.1         Specimen slightly hemolyzed



 

                CHLORIDE (BEAKER) (test code=382)    103 meq/L                  

 

                CO2 (BEAKER) (test code=355)    29 meq/L        22-29            

 

                BLOOD UREA NITROGEN (BEAKER) (test code=354)    17 mg/dL        7-21             

 

                CREATININE (BEAKER) (test code=358)    1.48 mg/dL      0.57-1.25       Specimen slightly hemolyzed



 

                GLUCOSE RANDOM (BEAKER) (test code=652)    283 mg/dL                  

 

                CALCIUM (BEAKER) (test code=697)    8.7 mg/dL       8.4-10.2         

 

                EGFR (BEAKER) (test code=1092)    50 mL/min/1.73 sq m                    ESTIMATED GFR IS NOT AS 

ACCURATE AS CREATININE CLEARANCE IN PREDICTING GLOMERULAR FILTRATION RATE. 
ESTIMATED GFR IS NOT APPLICABLE FOR DIALYSIS PATIENTS.





CBC W/PLT COUNT & AUTO [id number] 05:20:00* 





                Test Item       Value           Reference Range    Comments

 

                WHITE BLOOD CELL COUNT (BEAKER) (test code=775)    8.6 K/ L        3.5-10.5         

 

                RED BLOOD CELL COUNT (BEAKER) (test code=761)    5.32 M/ L       4.63-6.08        

 

                HEMOGLOBIN (BEAKER) (test code=410)    15.3 GM/DL      13.7-17.5        

 

                HEMATOCRIT (BEAKER) (test code=411)    46.9 %          40.1-51.0        

 

                MEAN CORPUSCULAR VOLUME (BEAKER) (test code=753)    88.2 fL         79.0-92.2        

 

                MEAN CORPUSCULAR HEMOGLOBIN (BEAKER) (test code=751)    28.8 pg         25.7-32.2        

 

                    MEAN CORPUSCULAR HEMOGLOBIN CONC (BEAKER) (test code=752)    32.6 GM/DL          32.3-36.5

                                         

 

                RED CELL DISTRIBUTION WIDTH (BEAKER) (test code=412)    12.7 %          11.6-14.4        

 

                PLATELET COUNT (BEAKER) (test code=756)    198 K/CU MM     150-450          

 

                MEAN PLATELET VOLUME (BEAKER) (test code=754)    9.9 fL          9.4-12.4         

 

                NUCLEATED RED BLOOD CELLS (BEAKER) (test code=413)    0 /100 WBC      0-0              

 

                NEUTROPHILS RELATIVE PERCENT (BEAKER) (test code=429)    71 %                             

 

                LYMPHOCYTES RELATIVE PERCENT (BEAKER) (test code=430)    17 %                             

 

                MONOCYTES RELATIVE PERCENT (BEAKER) (test code=431)    9 %                              

 

                EOSINOPHILS RELATIVE PERCENT (BEAKER) (test code=432)    2 %                              

 

                BASOPHILS RELATIVE PERCENT (BEAKER) (test code=437)    0 %                              

 

                NEUTROPHILS ABSOLUTE COUNT (BEAKER) (test code=670)    6.13 K/ L       1.78-5.38        

 

                LYMPHOCYTES ABSOLUTE COUNT (BEAKER) (test code=414)    1.50 K/ L       1.32-3.57        

 

                MONOCYTES ABSOLUTE COUNT (BEAKER) (test code=415)    0.75 K/ L       0.30-0.82        

 

                EOSINOPHILS ABSOLUTE COUNT (BEAKER) (test code=416)    0.17 K/ L       0.04-0.54        

 

                BASOPHILS ABSOLUTE COUNT (BEAKER) (test code=417)    0.03 K/ L       0.01-0.08        

 

                IMMATURE GRANULOCYTES-RELATIVE PERCENT (BEAKER) (test code=2801)    0 %             0-1              





POCT-GLUCOSE [id number] 22:21:00* 





                Test Item       Value           Reference Range    Comments

 

                POC-GLUCOSE METER (BEAKER) (test code=1538)    227 mg/dL                 TESTED AT [address] [address]





POCT-GLUCOSE [id number] 18:38:00* 





                Test Item       Value           Reference Range    Comments

 

                POC-GLUCOSE METER (BEAKER) (test code=1538)    231 mg/dL                 TESTED AT [address] [address]





POCT-GLUCOSE [id number] 14:34:00* 





                Test Item       Value           Reference Range    Comments

 

                POC-GLUCOSE METER (BEAKER) (test code=1538)    141 mg/dL                 TESTED AT [address] [address]





POCT-GLUCOSE [id number] 10:31:00* 





                Test Item       Value           Reference Range    Comments

 

                POC-GLUCOSE METER (BEAKER) (test code=1538)    292 mg/dL                 TESTED AT [address] [address]





BASIC METABOLIC [id number] 05:59:00* 





                Test Item       Value           Reference Range    Comments

 

                SODIUM (BEAKER) (test code=381)    141 meq/L       136-145          

 

                POTASSIUM (BEAKER) (test code=379)    3.6 meq/L       3.5-5.1         Specimen slightly hemolyzed



 

                CHLORIDE (BEAKER) (test code=382)    106 meq/L                  

 

                CO2 (BEAKER) (test code=355)    27 meq/L        22-29            

 

                BLOOD UREA NITROGEN (BEAKER) (test code=354)    15 mg/dL        7-21             

 

                CREATININE (BEAKER) (test code=358)    1.39 mg/dL      0.57-1.25       Specimen slightly hemolyzed



 

                GLUCOSE RANDOM (BEAKER) (test code=652)    255 mg/dL                  

 

                CALCIUM (BEAKER) (test code=697)    8.4 mg/dL       8.4-10.2         

 

                EGFR (BEAKER) (test code=1092)    53 mL/min/1.73 sq m                    ESTIMATED GFR IS NOT AS 

ACCURATE AS CREATININE CLEARANCE IN PREDICTING GLOMERULAR FILTRATION RATE. 
ESTIMATED GFR IS NOT APPLICABLE FOR DIALYSIS PATIENTS.





CBC W/PLT COUNT & AUTO [id number] 04:57:00* 





                Test Item       Value           Reference Range    Comments

 

                WHITE BLOOD CELL COUNT (BEAKER) (test code=775)    6.4 K/ L        3.5-10.5         

 

                RED BLOOD CELL COUNT (BEAKER) (test code=761)    5.18 M/ L       4.63-6.08        

 

                HEMOGLOBIN (BEAKER) (test code=410)    15.2 GM/DL      13.7-17.5        

 

                HEMATOCRIT (BEAKER) (test code=411)    45.6 %          40.1-51.0        

 

                MEAN CORPUSCULAR VOLUME (BEAKER) (test code=753)    88.0 fL         79.0-92.2        

 

                MEAN CORPUSCULAR HEMOGLOBIN (BEAKER) (test code=751)    29.3 pg         25.7-32.2        

 

                    MEAN CORPUSCULAR HEMOGLOBIN CONC (BEAKER) (test code=752)    33.3 GM/DL          32.3-36.5

                                         

 

                RED CELL DISTRIBUTION WIDTH (BEAKER) (test code=412)    12.7 %          11.6-14.4        

 

                PLATELET COUNT (BEAKER) (test code=756)    165 K/CU MM     150-450          

 

                MEAN PLATELET VOLUME (BEAKER) (test code=754)    10.1 fL         9.4-12.4         

 

                NUCLEATED RED BLOOD CELLS (BEAKER) (test code=413)    0 /100 WBC      0-0              

 

                NEUTROPHILS RELATIVE PERCENT (BEAKER) (test code=429)    70 %                             

 

                LYMPHOCYTES RELATIVE PERCENT (BEAKER) (test code=430)    16 %                             

 

                MONOCYTES RELATIVE PERCENT (BEAKER) (test code=431)    10 %                             

 

                EOSINOPHILS RELATIVE PERCENT (BEAKER) (test code=432)    3 %                              

 

                BASOPHILS RELATIVE PERCENT (BEAKER) (test code=437)    1 %                              

 

                NEUTROPHILS ABSOLUTE COUNT (BEAKER) (test code=670)    4.49 K/ L       1.78-5.38        

 

                LYMPHOCYTES ABSOLUTE COUNT (BEAKER) (test code=414)    1.01 K/ L       1.32-3.57        

 

                MONOCYTES ABSOLUTE COUNT (BEAKER) (test code=415)    0.65 K/ L       0.30-0.82        

 

                EOSINOPHILS ABSOLUTE COUNT (BEAKER) (test code=416)    0.16 K/ L       0.04-0.54        

 

                BASOPHILS ABSOLUTE COUNT (BEAKER) (test code=417)    0.03 K/ L       0.01-0.08        

 

                IMMATURE GRANULOCYTES-RELATIVE PERCENT (BEAKER) (test code=2801)    1 %             0-1              





POCT-GLUCOSE METER2019-08-15 23:02:00* 





                Test Item       Value           Reference Range    Comments

 

                POC-GLUCOSE METER (BEAKER) (test code=1538)    270 mg/dL                 TESTED AT [address] [address]





POCT-GLUCOSE METER2019-08-15 17:31:00* 





                Test Item       Value           Reference Range    Comments

 

                POC-GLUCOSE METER (BEAKER) (test code=1538)    216 mg/dL                 TESTED AT [address] [address]





POCT-GLUCOSE METER2019-08-15 14:10:00* 





                Test Item       Value           Reference Range    Comments

 

                POC-GLUCOSE METER (BEAKER) (test code=1538)    105 mg/dL                 TESTED AT [address] [address]





POCT-GLUCOSE METER2019-08-15 11:14:00* 





                Test Item       Value           Reference Range    Comments

 

                POC-GLUCOSE METER (BEAKER) (test code=1538)    177 mg/dL                 TESTED AT [address] [address]





POCT-GLUCOSE [id number] 18:08:00* 





                Test Item       Value           Reference Range    Comments

 

                POC-GLUCOSE METER (BEAKER) (test code=1538)    214 mg/dL                 TESTED AT [facility] 

[address] [facility] [address]





BASIC METABOLIC [id number] 07:05:00* 





                Test Item       Value           Reference Range    Comments

 

                SODIUM (BEAKER) (test code=381)    137 meq/L       136-145          

 

                POTASSIUM (BEAKER) (test code=379)    3.7 meq/L       3.5-5.1          

 

                CHLORIDE (BEAKER) (test code=382)    103 meq/L                  

 

                CO2 (BEAKER) (test code=355)    29 meq/L        22-29            

 

                BLOOD UREA NITROGEN (BEAKER) (test code=354)    16 mg/dL        7-21             

 

                CREATININE (BEAKER) (test code=358)    1.27 mg/dL      0.57-1.25        

 

                GLUCOSE RANDOM (BEAKER) (test code=652)    310 mg/dL                  

 

                CALCIUM (BEAKER) (test code=697)    8.3 mg/dL       8.4-10.2         

 

                EGFR (BEAKER) (test code=1092)    59 mL/min/1.73 sq m                    ESTIMATED GFR IS NOT AS 

ACCURATE AS CREATININE CLEARANCE IN PREDICTING GLOMERULAR FILTRATION RATE. 
ESTIMATED GFR IS NOT APPLICABLE FOR DIALYSIS PATIENTS.





CBC W/PLT COUNT & AUTO [id number] 06:47:00* 





                Test Item       Value           Reference Range    Comments

 

                WHITE BLOOD CELL COUNT (BEAKER) (test code=775)    6.4 K/ L        3.5-10.5         

 

                RED BLOOD CELL COUNT (BEAKER) (test code=761)    4.86 M/ L       4.63-6.08        

 

                HEMOGLOBIN (BEAKER) (test code=410)    14.3 GM/DL      13.7-17.5        

 

                HEMATOCRIT (BEAKER) (test code=411)    43.0 %          40.1-51.0        

 

                MEAN CORPUSCULAR VOLUME (BEAKER) (test code=753)    88.5 fL         79.0-92.2        

 

                MEAN CORPUSCULAR HEMOGLOBIN (BEAKER) (test code=751)    29.4 pg         25.7-32.2        

 

                    MEAN CORPUSCULAR HEMOGLOBIN CONC (BEAKER) (test code=752)    33.3 GM/DL          32.3-36.5

                                         

 

                RED CELL DISTRIBUTION WIDTH (BEAKER) (test code=412)    12.7 %          11.6-14.4        

 

                PLATELET COUNT (BEAKER) (test code=756)    167 K/CU MM     150-450          

 

                MEAN PLATELET VOLUME (BEAKER) (test code=754)    9.9 fL          9.4-12.4         

 

                NUCLEATED RED BLOOD CELLS (BEAKER) (test code=413)    0 /100 WBC      0-0              

 

                NEUTROPHILS RELATIVE PERCENT (BEAKER) (test code=429)    65 %                             

 

                LYMPHOCYTES RELATIVE PERCENT (BEAKER) (test code=430)    20 %                             

 

                MONOCYTES RELATIVE PERCENT (BEAKER) (test code=431)    11 %                             

 

                EOSINOPHILS RELATIVE PERCENT (BEAKER) (test code=432)    3 %                              

 

                BASOPHILS RELATIVE PERCENT (BEAKER) (test code=437)    1 %                              

 

                NEUTROPHILS ABSOLUTE COUNT (BEAKER) (test code=670)    4.15 K/ L       1.78-5.38        

 

                LYMPHOCYTES ABSOLUTE COUNT (BEAKER) (test code=414)    1.28 K/ L       1.32-3.57        

 

                MONOCYTES ABSOLUTE COUNT (BEAKER) (test code=415)    0.72 K/ L       0.30-0.82        

 

                EOSINOPHILS ABSOLUTE COUNT (BEAKER) (test code=416)    0.18 K/ L       0.04-0.54        

 

                BASOPHILS ABSOLUTE COUNT (BEAKER) (test code=417)    0.04 K/ L       0.01-0.08        

 

                IMMATURE GRANULOCYTES-RELATIVE PERCENT (BEAKER) (test code=2801)    1 %             0-1              





DIGOXIN [id number] 15:18:00* 





                Test Item       Value           Reference Range    Comments

 

                DIGOXIN LEVEL (BEAKER) (test code=669)    0.4 ng/mL       0.8-2.0          





BASIC METABOLIC [id number] 06:50:00* 





                Test Item       Value           Reference Range    Comments

 

                SODIUM (BEAKER) (test code=381)    139 meq/L       136-145          

 

                POTASSIUM (BEAKER) (test code=379)    3.5 meq/L       3.5-5.1          

 

                CHLORIDE (BEAKER) (test code=382)    99 meq/L                   

 

                CO2 (BEAKER) (test code=355)    32 meq/L        22-29            

 

                BLOOD UREA NITROGEN (BEAKER) (test code=354)    22 mg/dL        7-21             

 

                CREATININE (BEAKER) (test code=358)    1.49 mg/dL      0.57-1.25        

 

                GLUCOSE RANDOM (BEAKER) (test code=652)    242 mg/dL                  

 

                CALCIUM (BEAKER) (test code=697)    9.0 mg/dL       8.4-10.2         

 

                EGFR (BEAKER) (test code=1092)    49 mL/min/1.73 sq m                    ESTIMATED GFR IS NOT AS 

ACCURATE AS CREATININE CLEARANCE IN PREDICTING GLOMERULAR FILTRATION RATE. 
ESTIMATED GFR IS NOT APPLICABLE FOR DIALYSIS PATIENTS.





CBC W/PLT COUNT & AUTO [id number] 06:05:00* 





                Test Item       Value           Reference Range    Comments

 

                WHITE BLOOD CELL COUNT (BEAKER) (test code=775)    7.8 K/ L        3.5-10.5         

 

                RED BLOOD CELL COUNT (BEAKER) (test code=761)    5.66 M/ L       4.63-6.08        

 

                HEMOGLOBIN (BEAKER) (test code=410)    16.7 GM/DL      13.7-17.5        

 

                HEMATOCRIT (BEAKER) (test code=411)    49.1 %          40.1-51.0        

 

                MEAN CORPUSCULAR VOLUME (BEAKER) (test code=753)    86.7 fL         79.0-92.2        

 

                MEAN CORPUSCULAR HEMOGLOBIN (BEAKER) (test code=751)    29.5 pg         25.7-32.2        

 

                    MEAN CORPUSCULAR HEMOGLOBIN CONC (BEAKER) (test code=752)    34.0 GM/DL          32.3-36.5

                                         

 

                RED CELL DISTRIBUTION WIDTH (BEAKER) (test code=412)    12.6 %          11.6-14.4        

 

                PLATELET COUNT (BEAKER) (test code=756)    203 K/CU MM     150-450          

 

                MEAN PLATELET VOLUME (BEAKER) (test code=754)    9.8 fL          9.4-12.4         

 

                NUCLEATED RED BLOOD CELLS (BEAKER) (test code=413)    0 /100 WBC      0-0              

 

                NEUTROPHILS RELATIVE PERCENT (BEAKER) (test code=429)    70 %                             

 

                LYMPHOCYTES RELATIVE PERCENT (BEAKER) (test code=430)    19 %                             

 

                MONOCYTES RELATIVE PERCENT (BEAKER) (test code=431)    8 %                              

 

                EOSINOPHILS RELATIVE PERCENT (BEAKER) (test code=432)    2 %                              

 

                BASOPHILS RELATIVE PERCENT (BEAKER) (test code=437)    0 %                              

 

                NEUTROPHILS ABSOLUTE COUNT (BEAKER) (test code=670)    5.44 K/ L       1.78-5.38        

 

                LYMPHOCYTES ABSOLUTE COUNT (BEAKER) (test code=414)    1.48 K/ L       1.32-3.57        

 

                MONOCYTES ABSOLUTE COUNT (BEAKER) (test code=415)    0.66 K/ L       0.30-0.82        

 

                EOSINOPHILS ABSOLUTE COUNT (BEAKER) (test code=416)    0.19 K/ L       0.04-0.54        

 

                BASOPHILS ABSOLUTE COUNT (BEAKER) (test code=417)    0.03 K/ L       0.01-0.08        

 

                IMMATURE GRANULOCYTES-RELATIVE PERCENT (BEAKER) (test code=2801)    0 %             0-1              





POCT-GLUCOSE [id number] 22:14:00* 





                Test Item       Value           Reference Range    Comments

 

                POC-GLUCOSE METER (BEAKER) (test code=1538)    328 mg/dL                 Notified RN MD/TESTED

 AT [address] [address]





POCT-GLUCOSE [id number] 18:52:00* 





                Test Item       Value           Reference Range    Comments

 

                POC-GLUCOSE METER (BEAKER) (test code=1538)    244 mg/dL                 TESTED AT [address] [address]





HEMOGLOBIN [id number] 18:19:00* 





                Test Item       Value           Reference Range    Comments

 

                HEMOGLOBIN A1C (BEAKER) (test code=368)    10.4 %          4.3-6.1          





URINALYSIS W/ REFLEX URINE [id number] 18:03:00* 





                Test Item       Value           Reference Range    Comments

 

                COLOR (BEAKER) (test code=470)    Colorless                        

 

                CLARITY (BEAKER) (test code=469)    Clear                            

 

                SPECIFIC GRAVITY UA (BEAKER) (test code=468)    1.007           1.001-1.035      

 

                PH UA (BEAKER) (test code=467)    6.0             5.0-8.0          

 

                PROTEIN UA (BEAKER) (test code=464)    Negative        Negative         

 

                GLUCOSE UA (BEAKER) (test code=365)    Negative        Negative         

 

                KETONES UA (BEAKER) (test code=371)    Negative        Negative         

 

                BILIRUBIN UA (BEAKER) (test code=462)    Negative        Negative         

 

                BLOOD UA (BEAKER) (test code=461)    Negative        Negative         

 

                NITRITE UA (BEAKER) (test code=465)    Negative        Negative         

 

                LEUKOCYTE ESTERASE UA (BEAKER) (test code=466)    Negative        Negative         

 

                UROBILINOGEN UA (BEAKER) (test code=463)    0.2 mg/dL       0.2-1.0          

 

                RBC UA (BEAKER) (test code=519)    < /HPF                           

 

                WBC UA (BEAKER) (test code=520)    < /HPF                           

 

                SQUAMOUS EPITHELIAL (BEAKER) (test code=516)    < /HPF                           

 

                SOURCE(BEAKER) (test code=2795)                                     





HEMOGLOBIN [id number] 14:01:00* 





                Test Item       Value           Reference Range    Comments

 

                HEMOGLOBIN A1C (BEAKER) (test code=368)    10.5 %          4.3-6.1          





B-TYPE NATRIURETIC FACTOR (BNP)2019 11:11:00* 





                Test Item       Value           Reference Range    Comments

 

                B-TYPE NATRIURETIC PEPTIDE (BEAKER) (test code=700)    225 pg/mL       0-100            





BASIC METABOLIC [id number] 11:03:00* 





                Test Item       Value           Reference Range    Comments

 

                SODIUM (BEAKER) (test code=381)    134 meq/L       136-145          

 

                POTASSIUM (BEAKER) (test code=379)    3.9 meq/L       3.5-5.1         Specimen slightly hemolyzed



 

                CHLORIDE (BEAKER) (test code=382)    95 meq/L                   

 

                CO2 (BEAKER) (test code=355)    29 meq/L        22-29            

 

                BLOOD UREA NITROGEN (BEAKER) (test code=354)    28 mg/dL        7-21             

 

                CREATININE (BEAKER) (test code=358)    1.72 mg/dL      0.57-1.25       Specimen slightly hemolyzed



 

                GLUCOSE RANDOM (BEAKER) (test code=652)    345 mg/dL                  

 

                CALCIUM (BEAKER) (test code=697)    8.9 mg/dL       8.4-10.2         

 

                EGFR (BEAKER) (test code=1092)    42 mL/min/1.73 sq m                    ESTIMATED GFR IS NOT AS 

ACCURATE AS CREATININE CLEARANCE IN PREDICTING GLOMERULAR FILTRATION RATE. 
ESTIMATED GFR IS NOT APPLICABLE FOR DIALYSIS PATIENTS.





LIPID [id number] 11:03:00* 





                Test Item       Value           Reference Range    Comments

 

                TRIGLYCERIDES (BEAKER) (test code=540)    349 mg/dL                       Specimen slightly hemolyzed



 

                CHOLESTEROL (BEAKER) (test code=631)    131 mg/dL                       Specimen slightly hemolyzed

 

                HDL CHOLESTEROL (BEAKER) (test code=976)    27 mg/dL                         

 

                LDL CHOLESTEROL CALCULATED (BEAKER) (test code=633)    34 mg/dL                         





Triglyceride Reference Range:   Low Risk         <150   Borderline    150-199   
High Risk     200-499   Very High Risk  >=500Cholesterol Reference Range:   Low 
Risk         <200   Borderline    200-239    High Risk        >240HDL 
Cholesterol Reference Range:   Low Risk         >=60   High Risk         <40LDL 
Cholesterol Reference Range:   Optimal          <100   Near Optimal  100-129   
Borderline    130-159   High          160-189   Very High       >=190   HEPATIC 
FUNCTION [id number] 11:03:00* 





                Test Item       Value           Reference Range    Comments

 

                TOTAL PROTEIN (BEAKER) (test code=770)    7.3 gm/dL       6.0-8.3         Specimen slightly hemolyzed



 

                ALBUMIN (BEAKER) (test code=1145)    4.1 g/dL        3.5-5.0         Specimen slightly hemolyzed



 

                BILIRUBIN TOTAL (BEAKER) (test code=377)    0.4 mg/dL       0.2-1.2         Specimen slightly 

hemolyzed

 

                BILIRUBIN DIRECT (BEAKER) (test code=706)    0.1 mg/dL       0.1-0.5         Specimen slightly

 hemolyzed

 

                ALKALINE PHOSPHATASE (BEAKER) (test code=346)    153 U/L                    

 

                AST (SGOT) (BEAKER) (test code=353)    17 U/L          5-34            Specimen slightly hemolyzed

 

                ALT (SGPT) (BEAKER) (test code=347)    16 U/L          6-55            Specimen slightly hemolyzed





CBC W/PLT COUNT & AUTO [id number] 10:46:00* 





                Test Item       Value           Reference Range    Comments

 

                WHITE BLOOD CELL COUNT (BEAKER) (test code=775)    7.4 K/ L        3.5-10.5         

 

                RED BLOOD CELL COUNT (BEAKER) (test code=761)    5.30 M/ L       4.63-6.08        

 

                HEMOGLOBIN (BEAKER) (test code=410)    15.4 GM/DL      13.7-17.5        

 

                HEMATOCRIT (BEAKER) (test code=411)    45.7 %          40.1-51.0        

 

                MEAN CORPUSCULAR VOLUME (BEAKER) (test code=753)    86.2 fL         79.0-92.2        

 

                MEAN CORPUSCULAR HEMOGLOBIN (BEAKER) (test code=751)    29.1 pg         25.7-32.2        

 

                    MEAN CORPUSCULAR HEMOGLOBIN CONC (BEAKER) (test code=752)    33.7 GM/DL          32.3-36.5

                                         

 

                RED CELL DISTRIBUTION WIDTH (BEAKER) (test code=412)    12.7 %          11.6-14.4        

 

                PLATELET COUNT (BEAKER) (test code=756)    194 K/CU MM     150-450          

 

                MEAN PLATELET VOLUME (BEAKER) (test code=754)    9.9 fL          9.4-12.4         

 

                NUCLEATED RED BLOOD CELLS (BEAKER) (test code=413)    0 /100 WBC      0-0              

 

                NEUTROPHILS RELATIVE PERCENT (BEAKER) (test code=429)    71 %                             

 

                LYMPHOCYTES RELATIVE PERCENT (BEAKER) (test code=430)    16 %                             

 

                MONOCYTES RELATIVE PERCENT (BEAKER) (test code=431)    9 %                              

 

                EOSINOPHILS RELATIVE PERCENT (BEAKER) (test code=432)    3 %                              

 

                BASOPHILS RELATIVE PERCENT (BEAKER) (test code=437)    0 %                              

 

                NEUTROPHILS ABSOLUTE COUNT (BEAKER) (test code=670)    5.30 K/ L       1.78-5.38        

 

                LYMPHOCYTES ABSOLUTE COUNT (BEAKER) (test code=414)    1.18 K/ L       1.32-3.57        

 

                MONOCYTES ABSOLUTE COUNT (BEAKER) (test code=415)    0.69 K/ L       0.30-0.82        

 

                EOSINOPHILS ABSOLUTE COUNT (BEAKER) (test code=416)    0.19 K/ L       0.04-0.54        

 

                BASOPHILS ABSOLUTE COUNT (BEAKER) (test code=417)    0.03 K/ L       0.01-0.08        

 

                IMMATURE GRANULOCYTES-RELATIVE PERCENT (BEAKER) (test code=2801)    0 %             0-1              





CBC W/PLT COUNT & AUTO [id number] 11:30:00* 





                Test Item       Value           Reference Range    Comments

 

                WHITE BLOOD CELL COUNT (BEAKER) (test code=775)    8.3 K/ L        3.5-10.5         

 

                RED BLOOD CELL COUNT (BEAKER) (test code=761)    4.78 M/ L       4.63-6.08        

 

                HEMOGLOBIN (BEAKER) (test code=410)    14.3 GM/DL      13.7-17.5        

 

                HEMATOCRIT (BEAKER) (test code=411)    42.7 %          40.1-51.0        

 

                MEAN CORPUSCULAR VOLUME (BEAKER) (test code=753)    89.3 fL         79.0-92.2        

 

                MEAN CORPUSCULAR HEMOGLOBIN (BEAKER) (test code=751)    29.9 pg         25.7-32.2        

 

                    MEAN CORPUSCULAR HEMOGLOBIN CONC (BEAKER) (test code=752)    33.5 GM/DL          32.3-36.5

                                         

 

                RED CELL DISTRIBUTION WIDTH (BEAKER) (test code=412)    13.2 %          11.6-14.4        

 

                PLATELET COUNT (BEAKER) (test code=756)    180 K/CU MM     150-450          

 

                MEAN PLATELET VOLUME (BEAKER) (test code=754)    10.1 fL         9.4-12.4         

 

                NUCLEATED RED BLOOD CELLS (BEAKER) (test code=413)    0 /100 WBC      0-0              





(CELLAVISION MANUAL DIFF)2019 11:30:00* 





                Test Item       Value           Reference Range    Comments

 

                NEUTROPHILS - REL (CELLAVISION)(BEAKER) (test code=2816)    71 %                             

 

                LYMPHOCYTES - REL (CELLAVISION)(BEAKER) (test code=2817)    17 %                             

 

                MONOCYTES - REL (CELLAVISION)(BEAKER) (test code=2818)    8 %                              

 

                EOSINOPHILS - REL (CELLAVISION)(BEAKER) (test code=2819)    4 %                              

 

                NEUTROPHILS - ABS (CELLAVISION)(BEAKER) (test code=2830)    5.89 K/ul       1.78-5.38       

 

 

                LYMPHOCYTES - ABS (CELLAVISION)(BEAKER) (test code=2831)    1.41 K/ul       1.32-3.57       

 

 

                MONOCYTES - ABS (CELLAVISION)(BEAKER) (test code=2832)    0.66 K/uL       0.30-0.82        

 

                EOSINOPHILS - ABS (CELLAVISION)(BEAKER) (test code=2834)    0.33 K/uL       0.04-0.54       

 

 

                TOTAL COUNTED (BEAKER) (test code=1351)    100                              

 

                WBC MORPHOLOGY (BEAKER) (test code=487)    Normal                           

 

                GIANT PLATELETS (BEAKER) (test code=313)    Present                          

 

                ANISOCYTOSIS (BEAKER) (test code=961)    2+ moderate                      

 

                MICROCYTES (BEAKER) (test code=965)    2+ moderate                      

 

                POIKILOCYTES (BEAKER) (test code=966)    1+ few                           

 

                OVALOCYTES (BEAKER) (test code=477)    1+ few                           

 

                ARTIFACT (CELLAVISION)(BEAKER) (test code=3432)    Present                          

 

                PLATELET CONCENTRATION (CELLAVISION)(BEAKER) (test code=3438)    Adequate                         





Received comment: User comments: Slide comments: B-TYPE NATRIURETIC FACTOR (BNP)
2019 11:02:00* 





                Test Item       Value           Reference Range    Comments

 

                B-TYPE NATRIURETIC PEPTIDE (BEAKER) (test code=700)    400 pg/mL       0-100            





BASIC METABOLIC [id number] 10:55:00* 





                Test Item       Value           Reference Range    Comments

 

                SODIUM (BEAKER) (test code=381)    136 meq/L       136-145          

 

                POTASSIUM (BEAKER) (test code=379)    4.2 meq/L       3.5-5.1         Specimen slightly hemolyzed



 

                CHLORIDE (BEAKER) (test code=382)    98 meq/L                   

 

                CO2 (BEAKER) (test code=355)    30 meq/L        22-29            

 

                BLOOD UREA NITROGEN (BEAKER) (test code=354)    27 mg/dL        7-21             

 

                CREATININE (BEAKER) (test code=358)    1.77 mg/dL      0.57-1.25       Specimen slightly hemolyzed



 

                GLUCOSE RANDOM (BEAKER) (test code=652)    327 mg/dL                  

 

                CALCIUM (BEAKER) (test code=697)    8.7 mg/dL       8.4-10.2         

 

                EGFR (BEAKER) (test code=1092)    40 mL/min/1.73 sq m                    ESTIMATED GFR IS NOT AS 

ACCURATE AS CREATININE CLEARANCE IN PREDICTING GLOMERULAR FILTRATION RATE. 
ESTIMATED GFR IS NOT APPLICABLE FOR DIALYSIS PATIENTS.





POCT-GLUCOSE [id number] 21:39:00* 





                Test Item       Value           Reference Range    Comments

 

                POC-GLUCOSE METER (BEAKER) (test code=1538)    244 mg/dL                 TESTED AT [facility] 

[address] [facility] [address]





POCT-GLUCOSE [id number] 18:28:00* 





                Test Item       Value           Reference Range    Comments

 

                POC-GLUCOSE METER (BEAKER) (test code=1538)    195 mg/dL                 TESTED AT [address] [address]





POCT-GLUCOSE [id number] 13:26:00* 





                Test Item       Value           Reference Range    Comments

 

                POC-GLUCOSE METER (BEAKER) (test code=1538)    154 mg/dL                 TESTED AT [address] [address]





POCT-GLUCOSE [id number] 08:49:00* 





                Test Item       Value           Reference Range    Comments

 

                POC-GLUCOSE METER (BEAKER) (test code=1538)    293 mg/dL                 TESTED AT [address] [address]





POCT-GLUCOSE [id number] 22:16:00* 





                Test Item       Value           Reference Range    Comments

 

                POC-GLUCOSE METER (BEAKER) (test code=1538)    311 mg/dL                 Notified RN MD/TESTED

 AT [address] [address]





POCT-GLUCOSE [id number] 10:04:00* 





                Test Item       Value           Reference Range    Comments

 

                POC-GLUCOSE METER (BEAKER) (test code=1538)    211 mg/dL                 TESTED AT [address] [address]





POCT-GLUCOSE [id number] 23:14:00* 





                Test Item       Value           Reference Range    Comments

 

                POC-GLUCOSE METER (BEAKER) (test code=1538)    323 mg/dL                 TESTED AT [address] [address]





POCT-GLUCOSE [id number] 22:16:00* 





                Test Item       Value           Reference Range    Comments

 

                POC-GLUCOSE METER (BEAKER) (test code=1538)    313 mg/dL                 TESTED AT [address] [address]





POCT-GLUCOSE [id number] 14:31:00* 





                Test Item       Value           Reference Range    Comments

 

                POC-GLUCOSE METER (BEAKER) (test code=1538)    371 mg/dL                 TESTED AT [address] [address]





BASIC METABOLIC [id number] 12:04:00* 





                Test Item       Value           Reference Range    Comments

 

                SODIUM (BEAKER) (test code=381)    137 meq/L       136-145          

 

                POTASSIUM (BEAKER) (test code=379)    4.2 meq/L       3.5-5.1          

 

                CHLORIDE (BEAKER) (test code=382)    100 meq/L                  

 

                CO2 (BEAKER) (test code=355)    28 meq/L        22-29            

 

                BLOOD UREA NITROGEN (BEAKER) (test code=354)    22 mg/dL        7-21             

 

                CREATININE (BEAKER) (test code=358)    1.63 mg/dL      0.57-1.25        

 

                GLUCOSE RANDOM (BEAKER) (test code=652)    525 mg/dL                  

 

                CALCIUM (BEAKER) (test code=697)    8.6 mg/dL       8.4-10.2         

 

                EGFR (BEAKER) (test code=1092)    44 mL/min/1.73 sq m                    ESTIMATED GFR IS NOT AS 

ACCURATE AS CREATININE CLEARANCE IN PREDICTING GLOMERULAR FILTRATION RATE. 
ESTIMATED GFR IS NOT APPLICABLE FOR DIALYSIS PATIENTS.





CBC W/PLT COUNT & AUTO [id number] 11:42:00* 





                Test Item       Value           Reference Range    Comments

 

                WHITE BLOOD CELL COUNT (BEAKER) (test code=775)    7.1 K/ L        3.5-10.5         

 

                RED BLOOD CELL COUNT (BEAKER) (test code=761)    4.61 M/ L       4.63-6.08        

 

                HEMOGLOBIN (BEAKER) (test code=410)    13.7 GM/DL      13.7-17.5        

 

                HEMATOCRIT (BEAKER) (test code=411)    40.8 %          40.1-51.0        

 

                MEAN CORPUSCULAR VOLUME (BEAKER) (test code=753)    88.5 fL         79.0-92.2        

 

                MEAN CORPUSCULAR HEMOGLOBIN (BEAKER) (test code=751)    29.7 pg         25.7-32.2        

 

                    MEAN CORPUSCULAR HEMOGLOBIN CONC (BEAKER) (test code=752)    33.6 GM/DL          32.3-36.5

                                         

 

                RED CELL DISTRIBUTION WIDTH (BEAKER) (test code=412)    13.2 %          11.6-14.4        

 

                PLATELET COUNT (BEAKER) (test code=756)    161 K/CU MM     150-450          

 

                MEAN PLATELET VOLUME (BEAKER) (test code=754)    10.2 fL         9.4-12.4         

 

                NUCLEATED RED BLOOD CELLS (BEAKER) (test code=413)    0 /100 WBC      0-0              

 

                NEUTROPHILS RELATIVE PERCENT (BEAKER) (test code=429)    75 %                             

 

                LYMPHOCYTES RELATIVE PERCENT (BEAKER) (test code=430)    15 %                             

 

                MONOCYTES RELATIVE PERCENT (BEAKER) (test code=431)    8 %                              

 

                EOSINOPHILS RELATIVE PERCENT (BEAKER) (test code=432)    2 %                              

 

                BASOPHILS RELATIVE PERCENT (BEAKER) (test code=437)    0 %                              

 

                NEUTROPHILS ABSOLUTE COUNT (BEAKER) (test code=670)    5.32 K/ L       1.78-5.38        

 

                LYMPHOCYTES ABSOLUTE COUNT (BEAKER) (test code=414)    1.03 K/ L       1.32-3.57        

 

                MONOCYTES ABSOLUTE COUNT (BEAKER) (test code=415)    0.54 K/ L       0.30-0.82        

 

                EOSINOPHILS ABSOLUTE COUNT (BEAKER) (test code=416)    0.14 K/ L       0.04-0.54        

 

                BASOPHILS ABSOLUTE COUNT (BEAKER) (test code=417)    0.02 K/ L       0.01-0.08        

 

                IMMATURE GRANULOCYTES-RELATIVE PERCENT (BEAKER) (test code=2801)    1 %             0-1              





POCT-GLUCOSE [id number] 09:43:00* 





                Test Item       Value           Reference Range    Comments

 

                POC-GLUCOSE METER (BEAKER) (test code=1538)    369 mg/dL                 TESTED AT [facility] 

[address] [facility] [address]





RAD, CHEST, 2 [id number] 03:39:00 in network no referral 
neededReason for exam:->chfFINAL REPORT PATIENT ID:   [id number] EXAMINATION: 2 
view chest CLINICAL INDICATION: Congestive heart failure IMPRESSION: Compared 
with AP chest 2019. A left subclavian AICD is again noted. The cardiac 
silhouette is mildly enlarged but stable. Mediastinal contours are overall 
similar to previous. Streaky opacities are noted at the lung bases. A component 
of atelectasis or scarring is favored. Mild superimposed pulmonary edema should 
also be considered. Subtle blunting of the costophrenic sulci may reflect trace 
pleural fluid and/or pleural thickening. No evidence of an acute osseous 
abnormality or pneumothorax. In summary, recommend clinical correlation 
regarding mild CHF. Signed: [facility]Silver [facility] Verified Date/Time:  
2019 03:39:05 Reading Location: [address] Reading Room      
Electronically signed by: ROSIBEL SALGADO M.D. on 2019 03:39 AM POCT-
GLUCOSE [id number] 21:46:00* 





                Test Item       Value           Reference Range    Comments

 

                POC-GLUCOSE METER (BEAKER) (test code=1538)    282 mg/dL                 TESTED AT [facility] 

[address] [facility] [address]





POCT-GLUCOSE [id number] 18:15:00* 





                Test Item       Value           Reference Range    Comments

 

                POC-GLUCOSE METER (BEAKER) (test code=1538)    317 mg/dL                 TESTED AT [facility] 

[address] [facility] [address]





POCT-GLUCOSE [id number] 08:40:00* 





                Test Item       Value           Reference Range    Comments

 

                POC-GLUCOSE METER (BEAKER) (test code=1538)    229 mg/dL                 TESTED AT [facility] 

[address] [facility] [address]





CREATINE KINASE (CK)2019 08:36:00* 





                Test Item       Value           Reference Range    Comments

 

                CREATINE KINASE TOTAL (BEAKER) (test code=380)    263 U/L                    





BASIC METABOLIC [id number] 07:15:00* 





                Test Item       Value           Reference Range    Comments

 

                SODIUM (BEAKER) (test code=381)    139 meq/L       136-145          

 

                POTASSIUM (BEAKER) (test code=379)    4.0 meq/L       3.5-5.1         Specimen slightly hemolyzed



 

                CHLORIDE (BEAKER) (test code=382)    105 meq/L                  

 

                CO2 (BEAKER) (test code=355)    27 meq/L        22-29            

 

                BLOOD UREA NITROGEN (BEAKER) (test code=354)    21 mg/dL        7-21             

 

                CREATININE (BEAKER) (test code=358)    1.31 mg/dL      0.57-1.25       Specimen slightly hemolyzed



 

                GLUCOSE RANDOM (BEAKER) (test code=652)    250 mg/dL                  

 

                CALCIUM (BEAKER) (test code=697)    8.3 mg/dL       8.4-10.2         

 

                EGFR (BEAKER) (test code=1092)    57 mL/min/1.73 sq m                    ESTIMATED GFR IS NOT AS 

ACCURATE AS CREATININE CLEARANCE IN PREDICTING GLOMERULAR FILTRATION RATE. 
ESTIMATED GFR IS NOT APPLICABLE FOR DIALYSIS PATIENTS.





TROPONIN -79-17 07:12:00* 





                Test Item       Value           Reference Range    Comments

 

                TROPONIN I (BEAKER) (test code=397)    0.03 ng/mL      0.00-0.03        





Troponin I (TnI) levels must be interpreted in the context of the presenting sym
ptoms and the clinical findings. Elevated TnI levels indicate myocardial damage,
but are not specific for ischemic heart disease. Elevated TnI levels are seen in
patients with other cardiac conditions (including myocarditis and congestive h
eart failure), and slight TnI elevations occur in patients with other conditions
, including sepsis, renal failure, acidosis, acute neurological disease, and per
sistent tachyarrhythmia.CBC W/PLT COUNT & AUTO [id number] 06:56:00* 





                Test Item       Value           Reference Range    Comments

 

                WHITE BLOOD CELL COUNT (BEAKER) (test code=775)    7.2 K/ L        3.5-10.5         

 

                RED BLOOD CELL COUNT (BEAKER) (test code=761)    4.47 M/ L       4.63-6.08        

 

                HEMOGLOBIN (BEAKER) (test code=410)    13.4 GM/DL      13.7-17.5        

 

                HEMATOCRIT (BEAKER) (test code=411)    41.2 %          40.1-51.0        

 

                MEAN CORPUSCULAR VOLUME (BEAKER) (test code=753)    92.2 fL         79.0-92.2        

 

                MEAN CORPUSCULAR HEMOGLOBIN (BEAKER) (test code=751)    30.0 pg         25.7-32.2        

 

                    MEAN CORPUSCULAR HEMOGLOBIN CONC (BEAKER) (test code=752)    32.5 GM/DL          32.3-36.5

                                         

 

                RED CELL DISTRIBUTION WIDTH (BEAKER) (test code=412)    13.3 %          11.6-14.4        

 

                PLATELET COUNT (BEAKER) (test code=756)    186 K/CU MM     150-450          

 

                MEAN PLATELET VOLUME (BEAKER) (test code=754)    10.6 fL         9.4-12.4         

 

                NUCLEATED RED BLOOD CELLS (BEAKER) (test code=413)    0 /100 WBC      0-0              

 

                NEUTROPHILS RELATIVE PERCENT (BEAKER) (test code=429)    70 %                             

 

                LYMPHOCYTES RELATIVE PERCENT (BEAKER) (test code=430)    17 %                             

 

                MONOCYTES RELATIVE PERCENT (BEAKER) (test code=431)    10 %                             

 

                EOSINOPHILS RELATIVE PERCENT (BEAKER) (test code=432)    3 %                              

 

                BASOPHILS RELATIVE PERCENT (BEAKER) (test code=437)    0 %                              

 

                NEUTROPHILS ABSOLUTE COUNT (BEAKER) (test code=670)    5.05 K/ L       1.78-5.38        

 

                LYMPHOCYTES ABSOLUTE COUNT (BEAKER) (test code=414)    1.21 K/ L       1.32-3.57        

 

                MONOCYTES ABSOLUTE COUNT (BEAKER) (test code=415)    0.70 K/ L       0.30-0.82        

 

                EOSINOPHILS ABSOLUTE COUNT (BEAKER) (test code=416)    0.22 K/ L       0.04-0.54        

 

                BASOPHILS ABSOLUTE COUNT (BEAKER) (test code=417)    0.03 K/ L       0.01-0.08        

 

                IMMATURE GRANULOCYTES-RELATIVE PERCENT (BEAKER) (test code=2801)    0 %             0-1              





URINALYSIS W/ [id number] 00:25:00* 





                Test Item       Value           Reference Range    Comments

 

                COLOR (BEAKER) (test code=470)    Light Yellow                     

 

                CLARITY (BEAKER) (test code=469)    Clear                            

 

                SPECIFIC GRAVITY UA (BEAKER) (test code=468)    1.020           1.001-1.035      

 

                PH UA (BEAKER) (test code=467)    6.0             5.0-8.0          

 

                PROTEIN UA (BEAKER) (test code=464)    10 mg/dL        Negative         

 

                GLUCOSE UA (BEAKER) (test code=365)    >1000 mg/dL     Negative         

 

                KETONES UA (BEAKER) (test code=371)    Negative        Negative         

 

                BILIRUBIN UA (BEAKER) (test code=462)    Negative        Negative         

 

                BLOOD UA (BEAKER) (test code=461)    Negative        Negative         

 

                NITRITE UA (BEAKER) (test code=465)    Negative        Negative         

 

                LEUKOCYTE ESTERASE UA (BEAKER) (test code=466)    Negative        Negative         

 

                UROBILINOGEN UA (BEAKER) (test code=463)    0.2 mg/dL       0.2-1.0          

 

                RBC UA (BEAKER) (test code=519)    0 /HPF                           

 

                WBC UA (BEAKER) (test code=520)    0 /HPF                           

 

                SQUAMOUS EPITHELIAL (BEAKER) (test code=516)    < /HPF                           

 

                SOURCE(BEAKER) (test code=2795)                                     





CREATINE KINASE (CK)2019 00:22:00* 





                Test Item       Value           Reference Range    Comments

 

                CREATINE KINASE TOTAL (BEAKER) (test code=380)    417 U/L                    





TSH/FREE T4 IF [id number] 23:48:00* 





                Test Item       Value           Reference Range    Comments

 

                THYROID STIMULATING HORMONE (BEAKER) (test code=772)    0.92 uIU/mL     0.35-4.94        





BASIC METABOLIC [id number] 23:39:00* 





                Test Item       Value           Reference Range    Comments

 

                SODIUM (BEAKER) (test code=381)    136 meq/L       136-145          

 

                POTASSIUM (BEAKER) (test code=379)    4.1 meq/L       3.5-5.1          

 

                CHLORIDE (BEAKER) (test code=382)    103 meq/L                  

 

                CO2 (BEAKER) (test code=355)    24 meq/L        22-29            

 

                BLOOD UREA NITROGEN (BEAKER) (test code=354)    25 mg/dL        7-21             

 

                CREATININE (BEAKER) (test code=358)    1.61 mg/dL      0.57-1.25        

 

                GLUCOSE RANDOM (BEAKER) (test code=652)    406 mg/dL                  

 

                CALCIUM (BEAKER) (test code=697)    8.8 mg/dL       8.4-10.2         

 

                EGFR (BEAKER) (test code=1092)    45 mL/min/1.73 sq m                    ESTIMATED GFR IS NOT AS 

ACCURATE AS CREATININE CLEARANCE IN PREDICTING GLOMERULAR FILTRATION RATE. 
ESTIMATED GFR IS NOT APPLICABLE FOR DIALYSIS PATIENTS.





TROPONIN -[id number] 23:34:00* 





                Test Item       Value           Reference Range    Comments

 

                TROPONIN I (BEAKER) (test code=397)    0.02 ng/mL      0.00-0.03        





Troponin I (TnI) levels must be interpreted in the context of the presenting sym
ptoms and the clinical findings. Elevated TnI levels indicate myocardial damage,
but are not specific for ischemic heart disease. Elevated TnI levels are seen in
patients with other cardiac conditions (including myocarditis and congestive h
eart failure), and slight TnI elevations occur in patients with other conditions
, including sepsis, renal failure, acidosis, acute neurological disease, and per
sistent tachyarrhythmia.POCT-GLUCOSE [id number] 23:29:00* 





                Test Item       Value           Reference Range    Comments

 

                POC-GLUCOSE METER (BEAKER) (test code=1538)    383 mg/dL                 Notified RN MD/TESTED

 AT [facility] [address] [place] [id number]





CBC W/PLT COUNT & AUTO [id number] 23:05:00* 





                Test Item       Value           Reference Range    Comments

 

                WHITE BLOOD CELL COUNT (BEAKER) (test code=775)    9.2 K/ L        3.5-10.5         

 

                RED BLOOD CELL COUNT (BEAKER) (test code=761)    4.92 M/ L       4.63-6.08        

 

                HEMOGLOBIN (BEAKER) (test code=410)    14.8 GM/DL      13.7-17.5        

 

                HEMATOCRIT (BEAKER) (test code=411)    43.2 %          40.1-51.0        

 

                MEAN CORPUSCULAR VOLUME (BEAKER) (test code=753)    87.8 fL         79.0-92.2        

 

                MEAN CORPUSCULAR HEMOGLOBIN (BEAKER) (test code=751)    30.1 pg         25.7-32.2        

 

                    MEAN CORPUSCULAR HEMOGLOBIN CONC (BEAKER) (test code=752)    34.3 GM/DL          32.3-36.5

                                         

 

                RED CELL DISTRIBUTION WIDTH (BEAKER) (test code=412)    13.1 %          11.6-14.4        

 

                PLATELET COUNT (BEAKER) (test code=756)    200 K/CU MM     150-450          

 

                MEAN PLATELET VOLUME (BEAKER) (test code=754)    10.0 fL         9.4-12.4         

 

                NUCLEATED RED BLOOD CELLS (BEAKER) (test code=413)    0 /100 WBC      0-0              

 

                NEUTROPHILS RELATIVE PERCENT (BEAKER) (test code=429)    74 %                             

 

                LYMPHOCYTES RELATIVE PERCENT (BEAKER) (test code=430)    15 %                             

 

                MONOCYTES RELATIVE PERCENT (BEAKER) (test code=431)    8 %                              

 

                EOSINOPHILS RELATIVE PERCENT (BEAKER) (test code=432)    3 %                              

 

                BASOPHILS RELATIVE PERCENT (BEAKER) (test code=437)    0 %                              

 

                NEUTROPHILS ABSOLUTE COUNT (BEAKER) (test code=670)    6.76 K/ L       1.78-5.38        

 

                LYMPHOCYTES ABSOLUTE COUNT (BEAKER) (test code=414)    1.34 K/ L       1.32-3.57        

 

                MONOCYTES ABSOLUTE COUNT (BEAKER) (test code=415)    0.77 K/ L       0.30-0.82        

 

                EOSINOPHILS ABSOLUTE COUNT (BEAKER) (test code=416)    0.26 K/ L       0.04-0.54        

 

                BASOPHILS ABSOLUTE COUNT (BEAKER) (test code=417)    0.03 K/ L       0.01-0.08        

 

                IMMATURE GRANULOCYTES-RELATIVE PERCENT (BEAKER) (test code=2801)    0 %             0-1              





BASIC METABOLIC [id number] 12:02:00* 





                Test Item       Value           Reference Range    Comments

 

                SODIUM (BEAKER) (test code=381)    137 meq/L       136-145          

 

                POTASSIUM (BEAKER) (test code=379)    4.0 meq/L       3.5-5.1          

 

                CHLORIDE (BEAKER) (test code=382)    97 meq/L                   

 

                CO2 (BEAKER) (test code=355)    29 meq/L        22-29            

 

                BLOOD UREA NITROGEN (BEAKER) (test code=354)    31 mg/dL        7-21             

 

                CREATININE (BEAKER) (test code=358)    1.85 mg/dL      0.57-1.25        

 

                GLUCOSE RANDOM (BEAKER) (test code=652)    343 mg/dL                  

 

                CALCIUM (BEAKER) (test code=697)    8.9 mg/dL       8.4-10.2         

 

                EGFR (BEAKER) (test code=1092)    38 mL/min/1.73 sq m                    ESTIMATED GFR IS NOT AS 

ACCURATE AS CREATININE CLEARANCE IN PREDICTING GLOMERULAR FILTRATION RATE. 
ESTIMATED GFR IS NOT APPLICABLE FOR DIALYSIS PATIENTS.





B-TYPE NATRIURETIC FACTOR (BNP)2019 11:50:00* 





                Test Item       Value           Reference Range    Comments

 

                B-TYPE NATRIURETIC PEPTIDE (BEAKER) (test code=700)    252 pg/mL       0-100            





CBC W/PLT COUNT & AUTO [id number] 11:30:00* 





                Test Item       Value           Reference Range    Comments

 

                WHITE BLOOD CELL COUNT (BEAKER) (test code=775)    9.0 K/ L        3.5-10.5         

 

                RED BLOOD CELL COUNT (BEAKER) (test code=761)    4.85 M/ L       4.63-6.08        

 

                HEMOGLOBIN (BEAKER) (test code=410)    14.6 GM/DL      13.7-17.5        

 

                HEMATOCRIT (BEAKER) (test code=411)    43.4 %          40.1-51.0        

 

                MEAN CORPUSCULAR VOLUME (BEAKER) (test code=753)    89.5 fL         79.0-92.2        

 

                MEAN CORPUSCULAR HEMOGLOBIN (BEAKER) (test code=751)    30.1 pg         25.7-32.2        

 

                    MEAN CORPUSCULAR HEMOGLOBIN CONC (BEAKER) (test code=752)    33.6 GM/DL          32.3-36.5

                                         

 

                RED CELL DISTRIBUTION WIDTH (BEAKER) (test code=412)    13.2 %          11.6-14.4        

 

                PLATELET COUNT (BEAKER) (test code=756)    212 K/CU MM     150-450          

 

                MEAN PLATELET VOLUME (BEAKER) (test code=754)    10.0 fL         9.4-12.4         

 

                NUCLEATED RED BLOOD CELLS (BEAKER) (test code=413)    0 /100 WBC      0-0              

 

                NEUTROPHILS RELATIVE PERCENT (BEAKER) (test code=429)    76 %                             

 

                LYMPHOCYTES RELATIVE PERCENT (BEAKER) (test code=430)    12 %                             

 

                MONOCYTES RELATIVE PERCENT (BEAKER) (test code=431)    8 %                              

 

                EOSINOPHILS RELATIVE PERCENT (BEAKER) (test code=432)    3 %                              

 

                BASOPHILS RELATIVE PERCENT (BEAKER) (test code=437)    0 %                              

 

                NEUTROPHILS ABSOLUTE COUNT (BEAKER) (test code=670)    6.76 K/ L       1.78-5.38        

 

                LYMPHOCYTES ABSOLUTE COUNT (BEAKER) (test code=414)    1.11 K/ L       1.32-3.57        

 

                MONOCYTES ABSOLUTE COUNT (BEAKER) (test code=415)    0.75 K/ L       0.30-0.82        

 

                EOSINOPHILS ABSOLUTE COUNT (BEAKER) (test code=416)    0.26 K/ L       0.04-0.54        

 

                BASOPHILS ABSOLUTE COUNT (BEAKER) (test code=417)    0.02 K/ L       0.01-0.08        

 

                IMMATURE GRANULOCYTES-RELATIVE PERCENT (BEAKER) (test code=2801)    1 %             0-1              





HEMOGLOBIN [id number] 12:15:00* 





                Test Item       Value           Reference Range    Comments

 

                HEMOGLOBIN A1C (BEAKER) (test code=368)    8.1 %           4.3-6.1          





B-TYPE NATRIURETIC FACTOR (BNP)2019 12:06:00* 





                Test Item       Value           Reference Range    Comments

 

                B-TYPE NATRIURETIC PEPTIDE (BEAKER) (test code=700)    497 pg/mL       0-100            





BASIC METABOLIC [id number] 11:58:00* 





                Test Item       Value           Reference Range    Comments

 

                SODIUM (BEAKER) (test code=381)    140 meq/L       136-145          

 

                POTASSIUM (BEAKER) (test code=379)    5.1 meq/L       3.5-5.1          

 

                CHLORIDE (BEAKER) (test code=382)    97 meq/L                   

 

                CO2 (BEAKER) (test code=355)    33 meq/L        22-29            

 

                BLOOD UREA NITROGEN (BEAKER) (test code=354)    33 mg/dL        7-21             

 

                CREATININE (BEAKER) (test code=358)    1.72 mg/dL      0.57-1.25        

 

                GLUCOSE RANDOM (BEAKER) (test code=652)    284 mg/dL                  

 

                CALCIUM (BEAKER) (test code=697)    9.9 mg/dL       8.4-10.2         

 

                EGFR (BEAKER) (test code=1092)    42 mL/min/1.73 sq m                    ESTIMATED GFR IS NOT AS 

ACCURATE AS CREATININE CLEARANCE IN PREDICTING GLOMERULAR FILTRATION RATE. 
ESTIMATED GFR IS NOT APPLICABLE FOR DIALYSIS PATIENTS.





CBC W/PLT COUNT & AUTO [id number] 11:45:00* 





                Test Item       Value           Reference Range    Comments

 

                WHITE BLOOD CELL COUNT (BEAKER) (test code=775)    9.3 K/ L        3.5-10.5         

 

                RED BLOOD CELL COUNT (BEAKER) (test code=761)    5.31 M/ L       4.63-6.08        

 

                HEMOGLOBIN (BEAKER) (test code=410)    15.7 GM/DL      13.7-17.5        

 

                HEMATOCRIT (BEAKER) (test code=411)    47.6 %          40.1-51.0        

 

                MEAN CORPUSCULAR VOLUME (BEAKER) (test code=753)    89.6 fL         79.0-92.2        

 

                MEAN CORPUSCULAR HEMOGLOBIN (BEAKER) (test code=751)    29.6 pg         25.7-32.2        

 

                    MEAN CORPUSCULAR HEMOGLOBIN CONC (BEAKER) (test code=752)    33.0 GM/DL          32.3-36.5

                                         

 

                RED CELL DISTRIBUTION WIDTH (BEAKER) (test code=412)    13.7 %          11.6-14.4        

 

                PLATELET COUNT (BEAKER) (test code=756)    177 K/CU MM     150-450          

 

                MEAN PLATELET VOLUME (BEAKER) (test code=754)    9.9 fL          9.4-12.4         

 

                NUCLEATED RED BLOOD CELLS (BEAKER) (test code=413)    0 /100 WBC      0-0              

 

                NEUTROPHILS RELATIVE PERCENT (BEAKER) (test code=429)    70 %                             

 

                LYMPHOCYTES RELATIVE PERCENT (BEAKER) (test code=430)    17 %                             

 

                MONOCYTES RELATIVE PERCENT (BEAKER) (test code=431)    11 %                             

 

                EOSINOPHILS RELATIVE PERCENT (BEAKER) (test code=432)    3 %                              

 

                BASOPHILS RELATIVE PERCENT (BEAKER) (test code=437)    0 %                              

 

                NEUTROPHILS ABSOLUTE COUNT (BEAKER) (test code=670)    6.46 K/ L       1.78-5.38        

 

                LYMPHOCYTES ABSOLUTE COUNT (BEAKER) (test code=414)    1.54 K/ L       1.32-3.57        

 

                MONOCYTES ABSOLUTE COUNT (BEAKER) (test code=415)    1.00 K/ L       0.30-0.82        

 

                EOSINOPHILS ABSOLUTE COUNT (BEAKER) (test code=416)    0.24 K/ L       0.04-0.54        

 

                BASOPHILS ABSOLUTE COUNT (BEAKER) (test code=417)    0.04 K/ L       0.01-0.08        

 

                IMMATURE GRANULOCYTES-RELATIVE PERCENT (BEAKER) (test code=2801)    0 %             0-1              





B-TYPE NATRIURETIC FACTOR (BNP)2019 12:24:00* 





                Test Item       Value           Reference Range    Comments

 

                B-TYPE NATRIURETIC PEPTIDE (BEAKER) (test code=700)    516 pg/mL       0-100            





BASIC METABOLIC [id number] 12:18:00* 





                Test Item       Value           Reference Range    Comments

 

                SODIUM (BEAKER) (test code=381)    140 meq/L       136-145          

 

                POTASSIUM (BEAKER) (test code=379)    3.4 meq/L       3.5-5.1         Specimen slightly hemolyzed



 

                CHLORIDE (BEAKER) (test code=382)    96 meq/L                   

 

                CO2 (BEAKER) (test code=355)    33 meq/L        22-29            

 

                BLOOD UREA NITROGEN (BEAKER) (test code=354)    28 mg/dL        7-21             

 

                CREATININE (BEAKER) (test code=358)    1.62 mg/dL      0.57-1.25       Specimen slightly hemolyzed



 

                GLUCOSE RANDOM (BEAKER) (test code=652)    265 mg/dL                  

 

                CALCIUM (BEAKER) (test code=697)    9.7 mg/dL       8.4-10.2         

 

                EGFR (BEAKER) (test code=1092)    45 mL/min/1.73 sq m                    ESTIMATED GFR IS NOT AS 

ACCURATE AS CREATININE CLEARANCE IN PREDICTING GLOMERULAR FILTRATION RATE. 
ESTIMATED GFR IS NOT APPLICABLE FOR DIALYSIS PATIENTS.





LIPID [id number] 12:18:00* 





                Test Item       Value           Reference Range    Comments

 

                TRIGLYCERIDES (BEAKER) (test code=540)    307 mg/dL                       Specimen slightly hemolyzed



 

                CHOLESTEROL (BEAKER) (test code=631)    155 mg/dL                       Specimen slightly hemolyzed

 

                HDL CHOLESTEROL (BEAKER) (test code=976)    31 mg/dL                         

 

                LDL CHOLESTEROL CALCULATED (BEAKER) (test code=633)    63 mg/dL                         





Triglyceride Reference Range:   Low Risk         <150   Borderline    150-199   
High Risk     200-499   Very High Risk  >=500Cholesterol Reference Range:   Low 
Risk         <200   Borderline    200-239    High Risk        >240HDL 
Cholesterol Reference Range:   Low Risk         >=60   High Risk         <40LDL 
Cholesterol Reference Range:   Optimal          <100   Near Optimal  100-129   
Borderline    130-159   High          160-189   Very High       >=190   HEPATIC 
FUNCTION [id number] 12:18:00* 





                Test Item       Value           Reference Range    Comments

 

                TOTAL PROTEIN (BEAKER) (test code=770)    7.4 gm/dL       6.0-8.3         Specimen slightly hemolyzed



 

                ALBUMIN (BEAKER) (test code=1145)    4.1 g/dL        3.5-5.0         Specimen slightly hemolyzed



 

                BILIRUBIN TOTAL (BEAKER) (test code=377)    0.4 mg/dL       0.2-1.2         Specimen slightly 

hemolyzed

 

                BILIRUBIN DIRECT (BEAKER) (test code=706)    0.1 mg/dL       0.1-0.5         Specimen slightly

 hemolyzed

 

                ALKALINE PHOSPHATASE (BEAKER) (test code=346)    135 U/L                    

 

                AST (SGOT) (BEAKER) (test code=353)    27 U/L          5-34            Specimen slightly hemolyzed

 

                ALT (SGPT) (BEAKER) (test code=347)    25 U/L          6-55            Specimen slightly hemolyzed





CBC W/PLT COUNT & AUTO [id number] 11:56:00* 





                Test Item       Value           Reference Range    Comments

 

                WHITE BLOOD CELL COUNT (BEAKER) (test code=775)    6.5 K/ L        3.5-10.5         

 

                RED BLOOD CELL COUNT (BEAKER) (test code=761)    5.42 M/ L       4.63-6.08        

 

                HEMOGLOBIN (BEAKER) (test code=410)    15.2 GM/DL      13.7-17.5        

 

                HEMATOCRIT (BEAKER) (test code=411)    47.0 %          40.1-51.0        

 

                MEAN CORPUSCULAR VOLUME (BEAKER) (test code=753)    86.7 fL         79.0-92.2        

 

                MEAN CORPUSCULAR HEMOGLOBIN (BEAKER) (test code=751)    28.0 pg         25.7-32.2        

 

                    MEAN CORPUSCULAR HEMOGLOBIN CONC (BEAKER) (test code=752)    32.3 GM/DL          32.3-36.5

                                         

 

                RED CELL DISTRIBUTION WIDTH (BEAKER) (test code=412)    13.7 %          11.6-14.4        

 

                PLATELET COUNT (BEAKER) (test code=756)    175 K/CU MM     150-450          

 

                MEAN PLATELET VOLUME (BEAKER) (test code=754)    10.3 fL         9.4-12.4         

 

                NUCLEATED RED BLOOD CELLS (BEAKER) (test code=413)    0 /100 WBC      0-0              

 

                NEUTROPHILS RELATIVE PERCENT (BEAKER) (test code=429)    63 %                             

 

                LYMPHOCYTES RELATIVE PERCENT (BEAKER) (test code=430)    19 %                             

 

                MONOCYTES RELATIVE PERCENT (BEAKER) (test code=431)    12 %                             

 

                EOSINOPHILS RELATIVE PERCENT (BEAKER) (test code=432)    4 %                              

 

                BASOPHILS RELATIVE PERCENT (BEAKER) (test code=437)    1 %                              

 

                NEUTROPHILS ABSOLUTE COUNT (BEAKER) (test code=670)    4.09 K/ L       1.78-5.38        

 

                LYMPHOCYTES ABSOLUTE COUNT (BEAKER) (test code=414)    1.26 K/ L       1.32-3.57        

 

                MONOCYTES ABSOLUTE COUNT (BEAKER) (test code=415)    0.78 K/ L       0.30-0.82        

 

                EOSINOPHILS ABSOLUTE COUNT (BEAKER) (test code=416)    0.27 K/ L       0.04-0.54        

 

                BASOPHILS ABSOLUTE COUNT (BEAKER) (test code=417)    0.04 K/ L       0.01-0.08        

 

                IMMATURE GRANULOCYTES-RELATIVE PERCENT (BEAKER) (test code=2801)    1 %             0-1              





B-TYPE NATRIURETIC FACTOR (BNP)2019 12:31:00* 





                Test Item       Value           Reference Range    Comments

 

                B-TYPE NATRIURETIC PEPTIDE (BEAKER) (test code=700)    192 pg/mL       0-100            





BASIC METABOLIC [id number] 12:23:00* 





                Test Item       Value           Reference Range    Comments

 

                SODIUM (BEAKER) (test code=381)    138 meq/L       136-145          

 

                POTASSIUM (BEAKER) (test code=379)    3.6 meq/L       3.5-5.1          

 

                CHLORIDE (BEAKER) (test code=382)    94 meq/L                   

 

                CO2 (BEAKER) (test code=355)    34 meq/L        22-29            

 

                BLOOD UREA NITROGEN (BEAKER) (test code=354)    33 mg/dL        7-21             

 

                CREATININE (BEAKER) (test code=358)    1.62 mg/dL      0.57-1.25        

 

                GLUCOSE RANDOM (BEAKER) (test code=652)    245 mg/dL                  

 

                CALCIUM (BEAKER) (test code=697)    9.5 mg/dL       8.4-10.2         

 

                EGFR (BEAKER) (test code=1092)    45 mL/min/1.73 sq m                    ESTIMATED GFR IS NOT AS 

ACCURATE AS CREATININE CLEARANCE IN PREDICTING GLOMERULAR FILTRATION RATE. 
ESTIMATED GFR IS NOT APPLICABLE FOR DIALYSIS PATIENTS.





CBC W/PLT COUNT & AUTO [id number] 12:08:00* 





                Test Item       Value           Reference Range    Comments

 

                WHITE BLOOD CELL COUNT (BEAKER) (test code=775)    6.1 K/ L        3.5-10.5         

 

                RED BLOOD CELL COUNT (BEAKER) (test code=761)    5.62 M/ L       4.63-6.08        

 

                HEMOGLOBIN (BEAKER) (test code=410)    16.0 GM/DL      13.7-17.5        

 

                HEMATOCRIT (BEAKER) (test code=411)    47.9 %          40.1-51.0        

 

                MEAN CORPUSCULAR VOLUME (BEAKER) (test code=753)    85.2 fL         79.0-92.2        

 

                MEAN CORPUSCULAR HEMOGLOBIN (BEAKER) (test code=751)    28.5 pg         25.7-32.2        

 

                    MEAN CORPUSCULAR HEMOGLOBIN CONC (BEAKER) (test code=752)    33.4 GM/DL          32.3-36.5

                                         

 

                RED CELL DISTRIBUTION WIDTH (BEAKER) (test code=412)    13.2 %          11.6-14.4        

 

                PLATELET COUNT (BEAKER) (test code=756)    178 K/CU MM     150-450          

 

                MEAN PLATELET VOLUME (BEAKER) (test code=754)    10.6 fL         9.4-12.4         

 

                NUCLEATED RED BLOOD CELLS (BEAKER) (test code=413)    0 /100 WBC      0-0              

 

                NEUTROPHILS RELATIVE PERCENT (BEAKER) (test code=429)    72 %                             

 

                LYMPHOCYTES RELATIVE PERCENT (BEAKER) (test code=430)    15 %                             

 

                MONOCYTES RELATIVE PERCENT (BEAKER) (test code=431)    11 %                             

 

                EOSINOPHILS RELATIVE PERCENT (BEAKER) (test code=432)    2 %                              

 

                BASOPHILS RELATIVE PERCENT (BEAKER) (test code=437)    0 %                              

 

                NEUTROPHILS ABSOLUTE COUNT (BEAKER) (test code=670)    4.33 K/ L       1.78-5.38        

 

                LYMPHOCYTES ABSOLUTE COUNT (BEAKER) (test code=414)    0.93 K/ L       1.32-3.57        

 

                MONOCYTES ABSOLUTE COUNT (BEAKER) (test code=415)    0.64 K/ L       0.30-0.82        

 

                EOSINOPHILS ABSOLUTE COUNT (BEAKER) (test code=416)    0.13 K/ L       0.04-0.54        

 

                BASOPHILS ABSOLUTE COUNT (BEAKER) (test code=417)    0.02 K/ L       0.01-0.08        

 

                IMMATURE GRANULOCYTES-RELATIVE PERCENT (BEAKER) (test code=2801)    0 %             0-1              





B-TYPE NATRIURETIC FACTOR (BNP)2019 11:13:00* 





                Test Item       Value           Reference Range    Comments

 

                B-TYPE NATRIURETIC PEPTIDE (BEAKER) (test code=700)    660 pg/mL       0-100            





BASIC METABOLIC [id number] 11:05:00* 





                Test Item       Value           Reference Range    Comments

 

                SODIUM (BEAKER) (test code=381)    141 meq/L       136-145          

 

                POTASSIUM (BEAKER) (test code=379)    4.1 meq/L       3.5-5.1          

 

                CHLORIDE (BEAKER) (test code=382)    100 meq/L                  

 

                CO2 (BEAKER) (test code=355)    33 meq/L        22-29            

 

                BLOOD UREA NITROGEN (BEAKER) (test code=354)    28 mg/dL        7-21             

 

                CREATININE (BEAKER) (test code=358)    1.64 mg/dL      0.57-1.25        

 

                GLUCOSE RANDOM (BEAKER) (test code=652)    193 mg/dL                  

 

                CALCIUM (BEAKER) (test code=697)    9.2 mg/dL       8.4-10.2         

 

                EGFR (BEAKER) (test code=1092)    44 mL/min/1.73 sq m                    ESTIMATED GFR IS NOT AS 

ACCURATE AS CREATININE CLEARANCE IN PREDICTING GLOMERULAR FILTRATION RATE. 
ESTIMATED GFR IS NOT APPLICABLE FOR DIALYSIS PATIENTS.





CBC W/PLT COUNT & AUTO [id number] 10:47:00* 





                Test Item       Value           Reference Range    Comments

 

                WHITE BLOOD CELL COUNT (BEAKER) (test code=775)    8.4 K/ L        3.5-10.5         

 

                RED BLOOD CELL COUNT (BEAKER) (test code=761)    5.32 M/ L       4.63-6.08        

 

                HEMOGLOBIN (BEAKER) (test code=410)    15.2 GM/DL      13.7-17.5        

 

                HEMATOCRIT (BEAKER) (test code=411)    46.2 %          40.1-51.0        

 

                MEAN CORPUSCULAR VOLUME (BEAKER) (test code=753)    86.8 fL         79.0-92.2        

 

                MEAN CORPUSCULAR HEMOGLOBIN (BEAKER) (test code=751)    28.6 pg         25.7-32.2        

 

                    MEAN CORPUSCULAR HEMOGLOBIN CONC (BEAKER) (test code=752)    32.9 GM/DL          32.3-36.5

                                         

 

                RED CELL DISTRIBUTION WIDTH (BEAKER) (test code=412)    13.0 %          11.6-14.4        

 

                PLATELET COUNT (BEAKER) (test code=756)    169 K/CU MM     150-450          

 

                MEAN PLATELET VOLUME (BEAKER) (test code=754)    9.8 fL          9.4-12.4         

 

                NUCLEATED RED BLOOD CELLS (BEAKER) (test code=413)    0 /100 WBC      0-0              

 

                NEUTROPHILS RELATIVE PERCENT (BEAKER) (test code=429)    75 %                             

 

                LYMPHOCYTES RELATIVE PERCENT (BEAKER) (test code=430)    12 %                             

 

                MONOCYTES RELATIVE PERCENT (BEAKER) (test code=431)    10 %                             

 

                EOSINOPHILS RELATIVE PERCENT (BEAKER) (test code=432)    3 %                              

 

                BASOPHILS RELATIVE PERCENT (BEAKER) (test code=437)    0 %                              

 

                NEUTROPHILS ABSOLUTE COUNT (BEAKER) (test code=670)    6.27 K/ L       1.78-5.38        

 

                LYMPHOCYTES ABSOLUTE COUNT (BEAKER) (test code=414)    0.97 K/ L       1.32-3.57        

 

                MONOCYTES ABSOLUTE COUNT (BEAKER) (test code=415)    0.87 K/ L       0.30-0.82        

 

                EOSINOPHILS ABSOLUTE COUNT (BEAKER) (test code=416)    0.24 K/ L       0.04-0.54        

 

                BASOPHILS ABSOLUTE COUNT (BEAKER) (test code=417)    0.02 K/ L       0.01-0.08        

 

                IMMATURE GRANULOCYTES-RELATIVE PERCENT (BEAKER) (test code=2801)    0 %             0-1              





B-TYPE NATRIURETIC FACTOR (BNP)2019 11:28:00* 





                Test Item       Value           Reference Range    Comments

 

                B-TYPE NATRIURETIC PEPTIDE (BEAKER) (test code=700)    880 pg/mL       0-100            





BASIC METABOLIC [id number] 11:19:00* 





                Test Item       Value           Reference Range    Comments

 

                SODIUM (BEAKER) (test code=381)    140 meq/L       136-145          

 

                POTASSIUM (BEAKER) (test code=379)    3.7 meq/L       3.5-5.1          

 

                CHLORIDE (BEAKER) (test code=382)    100 meq/L                  

 

                CO2 (BEAKER) (test code=355)    31 meq/L        22-29            

 

                BLOOD UREA NITROGEN (BEAKER) (test code=354)    26 mg/dL        7-21             

 

                CREATININE (BEAKER) (test code=358)    2.01 mg/dL      0.57-1.25        

 

                GLUCOSE RANDOM (BEAKER) (test code=652)    272 mg/dL                  

 

                CALCIUM (BEAKER) (test code=697)    9.0 mg/dL       8.4-10.2         

 

                EGFR (BEAKER) (test code=1092)    35 mL/min/1.73 sq m                    ESTIMATED GFR IS NOT AS 

ACCURATE AS CREATININE CLEARANCE IN PREDICTING GLOMERULAR FILTRATION RATE. 
ESTIMATED GFR IS NOT APPLICABLE FOR DIALYSIS PATIENTS.





CBC W/PLT COUNT & AUTO [id number] 11:06:00* 





                Test Item       Value           Reference Range    Comments

 

                WHITE BLOOD CELL COUNT (BEAKER) (test code=775)    7.7 K/ L        3.5-10.5         

 

                RED BLOOD CELL COUNT (BEAKER) (test code=761)    4.83 M/ L       4.63-6.08        

 

                HEMOGLOBIN (BEAKER) (test code=410)    14.2 GM/DL      13.7-17.5        

 

                HEMATOCRIT (BEAKER) (test code=411)    43.0 %          40.1-51.0        

 

                MEAN CORPUSCULAR VOLUME (BEAKER) (test code=753)    89.0 fL         79.0-92.2        

 

                MEAN CORPUSCULAR HEMOGLOBIN (BEAKER) (test code=751)    29.4 pg         25.7-32.2        

 

                    MEAN CORPUSCULAR HEMOGLOBIN CONC (BEAKER) (test code=752)    33.0 GM/DL          32.3-36.5

                                         

 

                RED CELL DISTRIBUTION WIDTH (BEAKER) (test code=412)    12.5 %          11.6-14.4        

 

                PLATELET COUNT (BEAKER) (test code=756)    178 K/CU MM     150-450          

 

                MEAN PLATELET VOLUME (BEAKER) (test code=754)    10.4 fL         9.4-12.4         

 

                NUCLEATED RED BLOOD CELLS (BEAKER) (test code=413)    0 /100 WBC      0-0              

 

                NEUTROPHILS RELATIVE PERCENT (BEAKER) (test code=429)    74 %                             

 

                LYMPHOCYTES RELATIVE PERCENT (BEAKER) (test code=430)    14 %                             

 

                MONOCYTES RELATIVE PERCENT (BEAKER) (test code=431)    9 %                              

 

                EOSINOPHILS RELATIVE PERCENT (BEAKER) (test code=432)    2 %                              

 

                BASOPHILS RELATIVE PERCENT (BEAKER) (test code=437)    0 %                              

 

                NEUTROPHILS ABSOLUTE COUNT (BEAKER) (test code=670)    5.70 K/ L       1.78-5.38        

 

                LYMPHOCYTES ABSOLUTE COUNT (BEAKER) (test code=414)    1.05 K/ L       1.32-3.57        

 

                MONOCYTES ABSOLUTE COUNT (BEAKER) (test code=415)    0.71 K/ L       0.30-0.82        

 

                EOSINOPHILS ABSOLUTE COUNT (BEAKER) (test code=416)    0.15 K/ L       0.04-0.54        

 

                BASOPHILS ABSOLUTE COUNT (BEAKER) (test code=417)    0.03 K/ L       0.01-0.08        

 

                IMMATURE GRANULOCYTES-RELATIVE PERCENT (BEAKER) (test code=2801)    1 %             0-1              





POCT-GLUCOSE [id number] 10:07:00* 





                Test Item       Value           Reference Range    Comments

 

                POC-GLUCOSE METER (BEAKER) (test code=1538)    281 mg/dL                 TESTED AT [address] [address]





BASIC METABOLIC [id number] 06:21:00* 





                Test Item       Value           Reference Range    Comments

 

                SODIUM (BEAKER) (test code=381)    136 meq/L       136-145          

 

                POTASSIUM (BEAKER) (test code=379)    3.3 meq/L       3.5-5.1          

 

                CHLORIDE (BEAKER) (test code=382)    94 meq/L                   

 

                CO2 (BEAKER) (test code=355)    31 meq/L        22-29            

 

                BLOOD UREA NITROGEN (BEAKER) (test code=354)    28 mg/dL        7-21             

 

                CREATININE (BEAKER) (test code=358)    1.48 mg/dL      0.57-1.25        

 

                GLUCOSE RANDOM (BEAKER) (test code=652)    254 mg/dL                  

 

                CALCIUM (BEAKER) (test code=697)    9.4 mg/dL       8.4-10.2         

 

                EGFR (BEAKER) (test code=1092)    50 mL/min/1.73 sq m                    ESTIMATED GFR IS NOT AS 

ACCURATE AS CREATININE CLEARANCE IN PREDICTING GLOMERULAR FILTRATION RATE. 
ESTIMATED GFR IS NOT APPLICABLE FOR DIALYSIS PATIENTS.





POCT-GLUCOSE METER2019-01-10 23:06:00* 





                Test Item       Value           Reference Range    Comments

 

                POC-GLUCOSE METER (BEAKER) (test code=1538)    327 mg/dL                 Notified RN MD/TESTED

 AT [address] [address]





POCT-GLUCOSE METER2019-01-10 20:02:00* 





                Test Item       Value           Reference Range    Comments

 

                POC-GLUCOSE METER (BEAKER) (test code=1538)    318 mg/dL                 TESTED AT [address] [address]





POCT-GLUCOSE METER2019-01-10 18:27:00* 





                Test Item       Value           Reference Range    Comments

 

                POC-GLUCOSE METER (BEAKER) (test code=1538)    287 mg/dL                 TESTED AT [place] [id number]





POCT-GLUCOSE METER2019-01-10 13:29:00* 





                Test Item       Value           Reference Range    Comments

 

                POC-GLUCOSE METER (BEAKER) (test code=1538)    275 mg/dL                 TESTED AT [address] [address]





POCT-GLUCOSE METER2019-01-10 10:06:00* 





                Test Item       Value           Reference Range    Comments

 

                POC-GLUCOSE METER (BEAKER) (test code=1538)    226 mg/dL                 TESTED AT [address] [address]





POCT-GLUCOSE METER2019-01-10 07:37:00* 





                Test Item       Value           Reference Range    Comments

 

                POC-GLUCOSE METER (BEAKER) (test code=1538)    235 mg/dL                 TESTED AT [place] [id number]





BASIC METABOLIC PANEL2019-01-10 06:34:00* 





                Test Item       Value           Reference Range    Comments

 

                SODIUM (BEAKER) (test code=381)    135 meq/L       136-145          

 

                POTASSIUM (BEAKER) (test code=379)    3.1 meq/L       3.5-5.1          

 

                CHLORIDE (BEAKER) (test code=382)    91 meq/L                   

 

                CO2 (BEAKER) (test code=355)    31 meq/L        22-29            

 

                BLOOD UREA NITROGEN (BEAKER) (test code=354)    30 mg/dL        7-21             

 

                CREATININE (BEAKER) (test code=358)    1.57 mg/dL      0.57-1.25        

 

                GLUCOSE RANDOM (BEAKER) (test code=652)    274 mg/dL                  

 

                CALCIUM (BEAKER) (test code=697)    9.6 mg/dL       8.4-10.2         

 

                EGFR (BEAKER) (test code=1092)    46 mL/min/1.73 sq m                    ESTIMATED GFR IS NOT AS 

ACCURATE AS CREATININE CLEARANCE IN PREDICTING GLOMERULAR FILTRATION RATE. 
ESTIMATED GFR IS NOT APPLICABLE FOR DIALYSIS PATIENTS.





CBC W/PLT COUNT & AUTO DIFFERENTIAL2019-01-10 05:48:00* 





                Test Item       Value           Reference Range    Comments

 

                WHITE BLOOD CELL COUNT (BEAKER) (test code=775)    9.3 K/ L        3.5-10.5         

 

                RED BLOOD CELL COUNT (BEAKER) (test code=761)    5.60 M/ L       4.63-6.08        

 

                HEMOGLOBIN (BEAKER) (test code=410)    16.3 GM/DL      13.7-17.5        

 

                HEMATOCRIT (BEAKER) (test code=411)    48.0 %          40.1-51.0        

 

                MEAN CORPUSCULAR VOLUME (BEAKER) (test code=753)    85.7 fL         79.0-92.2        

 

                MEAN CORPUSCULAR HEMOGLOBIN (BEAKER) (test code=751)    29.1 pg         25.7-32.2        

 

                    MEAN CORPUSCULAR HEMOGLOBIN CONC (BEAKER) (test code=752)    34.0 GM/DL          32.3-36.5

                                         

 

                RED CELL DISTRIBUTION WIDTH (BEAKER) (test code=412)    12.2 %          11.6-14.4        

 

                PLATELET COUNT (BEAKER) (test code=756)    193 K/CU MM     150-450          

 

                MEAN PLATELET VOLUME (BEAKER) (test code=754)    10.2 fL         9.4-12.4         

 

                NUCLEATED RED BLOOD CELLS (BEAKER) (test code=413)    0 /100 WBC      0-0              

 

                NEUTROPHILS RELATIVE PERCENT (BEAKER) (test code=429)    69 %                             

 

                LYMPHOCYTES RELATIVE PERCENT (BEAKER) (test code=430)    15 %                             

 

                MONOCYTES RELATIVE PERCENT (BEAKER) (test code=431)    12 %                             

 

                EOSINOPHILS RELATIVE PERCENT (BEAKER) (test code=432)    3 %                              

 

                BASOPHILS RELATIVE PERCENT (BEAKER) (test code=437)    1 %                              

 

                NEUTROPHILS ABSOLUTE COUNT (BEAKER) (test code=670)    6.36 K/ L       1.78-5.38        

 

                LYMPHOCYTES ABSOLUTE COUNT (BEAKER) (test code=414)    1.39 K/ L       1.32-3.57        

 

                MONOCYTES ABSOLUTE COUNT (BEAKER) (test code=415)    1.14 K/ L       0.30-0.82        

 

                EOSINOPHILS ABSOLUTE COUNT (BEAKER) (test code=416)    0.29 K/ L       0.04-0.54        

 

                BASOPHILS ABSOLUTE COUNT (BEAKER) (test code=417)    0.05 K/ L       0.01-0.08        

 

                IMMATURE GRANULOCYTES-RELATIVE PERCENT (BEAKER) (test code=2801)    1 %             0-1              





POCT-GLUCOSE [id number] 23:42:00* 





                Test Item       Value           Reference Range    Comments

 

                POC-GLUCOSE METER (BEAKER) (test code=1538)    258 mg/dL                 TESTED AT [place] [id number]





POCT-GLUCOSE [id number] 17:46:00* 





                Test Item       Value           Reference Range    Comments

 

                POC-GLUCOSE METER (BEAKER) (test code=1538)    380 mg/dL                 Notified RN MD/TESTED

 AT [address] [address]





POCT-GLUCOSE [id number] 12:04:00* 





                Test Item       Value           Reference Range    Comments

 

                POC-GLUCOSE METER (BEAKER) (test code=1538)    428 mg/dL                 Notified RN MD/TESTED

 AT [address] [address]





POCT-GLUCOSE [id number] 07:43:00* 





                Test Item       Value           Reference Range    Comments

 

                POC-GLUCOSE METER (BEAKER) (test code=1538)    264 mg/dL                 TESTED AT [address] [address]





BASIC METABOLIC [id number] 05:45:00* 





                Test Item       Value           Reference Range    Comments

 

                SODIUM (BEAKER) (test code=381)    138 meq/L       136-145          

 

                POTASSIUM (BEAKER) (test code=379)    3.5 meq/L       3.5-5.1          

 

                CHLORIDE (BEAKER) (test code=382)    96 meq/L                   

 

                CO2 (BEAKER) (test code=355)    31 meq/L        22-29            

 

                BLOOD UREA NITROGEN (BEAKER) (test code=354)    30 mg/dL        7-21             

 

                CREATININE (BEAKER) (test code=358)    1.50 mg/dL      0.57-1.25        

 

                GLUCOSE RANDOM (BEAKER) (test code=652)    349 mg/dL                  

 

                CALCIUM (BEAKER) (test code=697)    9.6 mg/dL       8.4-10.2         

 

                EGFR (BEAKER) (test code=1092)    49 mL/min/1.73 sq m                    ESTIMATED GFR IS NOT AS 

ACCURATE AS CREATININE CLEARANCE IN PREDICTING GLOMERULAR FILTRATION RATE. 
ESTIMATED GFR IS NOT APPLICABLE FOR DIALYSIS PATIENTS.





CBC W/PLT COUNT & AUTO [id number] 05:35:00* 





                Test Item       Value           Reference Range    Comments

 

                WHITE BLOOD CELL COUNT (BEAKER) (test code=775)    7.6 K/ L        3.5-10.5         

 

                RED BLOOD CELL COUNT (BEAKER) (test code=761)    5.53 M/ L       4.63-6.08        

 

                HEMOGLOBIN (BEAKER) (test code=410)    15.9 GM/DL      13.7-17.5        

 

                HEMATOCRIT (BEAKER) (test code=411)    47.8 %          40.1-51.0        

 

                MEAN CORPUSCULAR VOLUME (BEAKER) (test code=753)    86.4 fL         79.0-92.2        

 

                MEAN CORPUSCULAR HEMOGLOBIN (BEAKER) (test code=751)    28.8 pg         25.7-32.2        

 

                    MEAN CORPUSCULAR HEMOGLOBIN CONC (BEAKER) (test code=752)    33.3 GM/DL          32.3-36.5

                                         

 

                RED CELL DISTRIBUTION WIDTH (BEAKER) (test code=412)    12.1 %          11.6-14.4        

 

                PLATELET COUNT (BEAKER) (test code=756)    190 K/CU MM     150-450          

 

                MEAN PLATELET VOLUME (BEAKER) (test code=754)    10.2 fL         9.4-12.4         

 

                NUCLEATED RED BLOOD CELLS (BEAKER) (test code=413)    0 /100 WBC      0-0              

 

                NEUTROPHILS RELATIVE PERCENT (BEAKER) (test code=429)    68 %                             

 

                LYMPHOCYTES RELATIVE PERCENT (BEAKER) (test code=430)    16 %                             

 

                MONOCYTES RELATIVE PERCENT (BEAKER) (test code=431)    12 %                             

 

                EOSINOPHILS RELATIVE PERCENT (BEAKER) (test code=432)    3 %                              

 

                BASOPHILS RELATIVE PERCENT (BEAKER) (test code=437)    0 %                              

 

                NEUTROPHILS ABSOLUTE COUNT (BEAKER) (test code=670)    5.23 K/ L       1.78-5.38        

 

                LYMPHOCYTES ABSOLUTE COUNT (BEAKER) (test code=414)    1.24 K/ L       1.32-3.57        

 

                MONOCYTES ABSOLUTE COUNT (BEAKER) (test code=415)    0.89 K/ L       0.30-0.82        

 

                EOSINOPHILS ABSOLUTE COUNT (BEAKER) (test code=416)    0.22 K/ L       0.04-0.54        

 

                BASOPHILS ABSOLUTE COUNT (BEAKER) (test code=417)    0.03 K/ L       0.01-0.08        

 

                IMMATURE GRANULOCYTES-RELATIVE PERCENT (BEAKER) (test code=2801)    0 %             0-1              





POCT-GLUCOSE [id number] 22:05:00* 





                Test Item       Value           Reference Range    Comments

 

                POC-GLUCOSE METER (BEAKER) (test code=1538)    395 mg/dL                 Will Repeat Test/TESTED

 AT [place] [id number]





POCT-GLUCOSE [id number] 20:57:00* 





                Test Item       Value           Reference Range    Comments

 

                POC-GLUCOSE METER (BEAKER) (test code=1538)    462 mg/dL                 Will Repeat Test/TESTED

 AT [address] [address]





POCT-GLUCOSE [id number] 18:03:00* 





                Test Item       Value           Reference Range    Comments

 

                POC-GLUCOSE METER (BEAKER) (test code=1538)    289 mg/dL                 TESTED AT [address] [address]





POCT-GLUCOSE [id number] 13:29:00* 





                Test Item       Value           Reference Range    Comments

 

                POC-GLUCOSE METER (BEAKER) (test code=1538)    252 mg/dL                 TESTED AT [address] [address]





HEMOGLOBIN [id number] 09:31:00* 





                Test Item       Value           Reference Range    Comments

 

                HEMOGLOBIN A1C (BEAKER) (test code=368)    7.8 %           4.3-6.1          





POCT-GLUCOSE [id number] 09:26:00* 





                Test Item       Value           Reference Range    Comments

 

                POC-GLUCOSE METER (BEAKER) (test code=1538)    238 mg/dL                 TESTED AT [address] [address]





BASIC METABOLIC [id number] 04:54:00* 





                Test Item       Value           Reference Range    Comments

 

                SODIUM (BEAKER) (test code=381)    140 meq/L       136-145          

 

                POTASSIUM (BEAKER) (test code=379)    3.7 meq/L       3.5-5.1          

 

                CHLORIDE (BEAKER) (test code=382)    96 meq/L                   

 

                CO2 (BEAKER) (test code=355)    33 meq/L        22-29            

 

                BLOOD UREA NITROGEN (BEAKER) (test code=354)    28 mg/dL        7-21             

 

                CREATININE (BEAKER) (test code=358)    1.62 mg/dL      0.57-1.25        

 

                GLUCOSE RANDOM (BEAKER) (test code=652)    254 mg/dL                  

 

                CALCIUM (BEAKER) (test code=697)    9.8 mg/dL       8.4-10.2         

 

                EGFR (BEAKER) (test code=1092)    45 mL/min/1.73 sq m                    ESTIMATED GFR IS NOT AS 

ACCURATE AS CREATININE CLEARANCE IN PREDICTING GLOMERULAR FILTRATION RATE. 
ESTIMATED GFR IS NOT APPLICABLE FOR DIALYSIS PATIENTS.





CBC W/PLT COUNT & AUTO [id number] 04:38:00* 





                Test Item       Value           Reference Range    Comments

 

                WHITE BLOOD CELL COUNT (BEAKER) (test code=775)    8.8 K/ L        3.5-10.5         

 

                RED BLOOD CELL COUNT (BEAKER) (test code=761)    5.92 M/ L       4.63-6.08        

 

                HEMOGLOBIN (BEAKER) (test code=410)    17.1 GM/DL      13.7-17.5        

 

                HEMATOCRIT (BEAKER) (test code=411)    52.7 %          40.1-51.0        

 

                MEAN CORPUSCULAR VOLUME (BEAKER) (test code=753)    89.0 fL         79.0-92.2        

 

                MEAN CORPUSCULAR HEMOGLOBIN (BEAKER) (test code=751)    28.9 pg         25.7-32.2        

 

                    MEAN CORPUSCULAR HEMOGLOBIN CONC (BEAKER) (test code=752)    32.4 GM/DL          32.3-36.5

                                         

 

                RED CELL DISTRIBUTION WIDTH (BEAKER) (test code=412)    12.2 %          11.6-14.4        

 

                PLATELET COUNT (BEAKER) (test code=756)    195 K/CU MM     150-450          

 

                MEAN PLATELET VOLUME (BEAKER) (test code=754)    10.0 fL         9.4-12.4         

 

                NUCLEATED RED BLOOD CELLS (BEAKER) (test code=413)    0 /100 WBC      0-0              

 

                NEUTROPHILS RELATIVE PERCENT (BEAKER) (test code=429)    70 %                             

 

                LYMPHOCYTES RELATIVE PERCENT (BEAKER) (test code=430)    16 %                             

 

                MONOCYTES RELATIVE PERCENT (BEAKER) (test code=431)    11 %                             

 

                EOSINOPHILS RELATIVE PERCENT (BEAKER) (test code=432)    3 %                              

 

                BASOPHILS RELATIVE PERCENT (BEAKER) (test code=437)    1 %                              

 

                NEUTROPHILS ABSOLUTE COUNT (BEAKER) (test code=670)    6.13 K/ L       1.78-5.38        

 

                LYMPHOCYTES ABSOLUTE COUNT (BEAKER) (test code=414)    1.43 K/ L       1.32-3.57        

 

                MONOCYTES ABSOLUTE COUNT (BEAKER) (test code=415)    0.94 K/ L       0.30-0.82        

 

                EOSINOPHILS ABSOLUTE COUNT (BEAKER) (test code=416)    0.23 K/ L       0.04-0.54        

 

                BASOPHILS ABSOLUTE COUNT (BEAKER) (test code=417)    0.04 K/ L       0.01-0.08        

 

                IMMATURE GRANULOCYTES-RELATIVE PERCENT (BEAKER) (test code=2801)    0 %             0-1              





POCT-GLUCOSE [id number] 23:06:00* 





                Test Item       Value           Reference Range    Comments

 

                POC-GLUCOSE METER (BEAKER) (test code=1538)    365 mg/dL                 TESTED AT [facility] 

[address] [facility] [address]





RAD, CHEST, 1 VIEW, NON [id number] 20:20:00Reason for exam:->sobShould this 
be performed at the bedside?->YesFINAL REPORT PATIENT ID:   [id number] Chest, 1 
view. History: Shortness of breath. Comparison: 12/3/2018. Discussion:  Left-
sided AICD with single transvenous lead identified in stable position. The 
trachea is midline. The lungs are symmetrically expanded without evidence for 
focal consolidation, pneumothorax, or significant pleural effusion. The 
cardiomediastinal silhouette is stable in appearance. No acute osseous 
abnormalities identified.  IMPRESSION: No acute cardiopulmonary process 
identified. Signed: Regis Cooley MDReport Verified Date/Time:  2019 
20:20:57 Reading Location: [facility] C013W Consult Reading Room      Electronically
signed by: REGIS COOLEY MD on 2019 08:20 PM B-TYPE NATRIURETIC FACTOR 
(BNP)2019 16:54:00* 





                Test Item       Value           Reference Range    Comments

 

                B-TYPE NATRIURETIC PEPTIDE (BEAKER) (test code=700)    521 pg/mL       0-100            





COMPREHENSIVE METABOLIC [id number] 16:47:00* 





                Test Item       Value           Reference Range    Comments

 

                TOTAL PROTEIN (BEAKER) (test code=770)    7.6 gm/dL       6.0-8.3         Specimen slightly hemolyzed



 

                ALBUMIN (BEAKER) (test code=1145)    4.2 g/dL        3.5-5.0         Specimen slightly hemolyzed



 

                ALKALINE PHOSPHATASE (BEAKER) (test code=346)    123 U/L                    

 

                BILIRUBIN TOTAL (BEAKER) (test code=377)    0.5 mg/dL       0.2-1.2         Specimen slightly 

hemolyzed

 

                SODIUM (BEAKER) (test code=381)    138 meq/L       136-145          

 

                POTASSIUM (BEAKER) (test code=379)    3.8 meq/L       3.5-5.1         Specimen slightly hemolyzed



 

                CHLORIDE (BEAKER) (test code=382)    99 meq/L                   

 

                CO2 (BEAKER) (test code=355)    27 meq/L        22-29            

 

                BLOOD UREA NITROGEN (BEAKER) (test code=354)    30 mg/dL        7-21             

 

                CREATININE (BEAKER) (test code=358)    1.42 mg/dL      0.57-1.25       Specimen slightly hemolyzed



 

                GLUCOSE RANDOM (BEAKER) (test code=652)    259 mg/dL                  

 

                CALCIUM (BEAKER) (test code=697)    9.1 mg/dL       8.4-10.2         

 

                AST (SGOT) (BEAKER) (test code=353)    25 U/L          5-34            Specimen slightly hemolyzed

 

                ALT (SGPT) (BEAKER) (test code=347)    19 U/L          6-55            Specimen slightly hemolyzed

 

                EGFR (BEAKER) (test code=1092)    52 mL/min/1.73 sq m                    ESTIMATED GFR IS NOT AS 

ACCURATE AS CREATININE CLEARANCE IN PREDICTING GLOMERULAR FILTRATION RATE. 
ESTIMATED GFR IS NOT APPLICABLE FOR DIALYSIS PATIENTS.





CBC W/PLT COUNT & AUTO [id number] 16:25:00* 





                Test Item       Value           Reference Range    Comments

 

                WHITE BLOOD CELL COUNT (BEAKER) (test code=775)    7.7 K/ L        3.5-10.5         

 

                RED BLOOD CELL COUNT (BEAKER) (test code=761)    5.17 M/ L       4.63-6.08        

 

                HEMOGLOBIN (BEAKER) (test code=410)    15.3 GM/DL      13.7-17.5        

 

                HEMATOCRIT (BEAKER) (test code=411)    45.2 %          40.1-51.0        

 

                MEAN CORPUSCULAR VOLUME (BEAKER) (test code=753)    87.4 fL         79.0-92.2        

 

                MEAN CORPUSCULAR HEMOGLOBIN (BEAKER) (test code=751)    29.6 pg         25.7-32.2        

 

                    MEAN CORPUSCULAR HEMOGLOBIN CONC (BEAKER) (test code=752)    33.8 GM/DL          32.3-36.5

                                         

 

                RED CELL DISTRIBUTION WIDTH (BEAKER) (test code=412)    12.4 %          11.6-14.4        

 

                PLATELET COUNT (BEAKER) (test code=756)    175 K/CU MM     150-450          

 

                MEAN PLATELET VOLUME (BEAKER) (test code=754)    10.4 fL         9.4-12.4         

 

                NUCLEATED RED BLOOD CELLS (BEAKER) (test code=413)    0 /100 WBC      0-0              

 

                NEUTROPHILS RELATIVE PERCENT (BEAKER) (test code=429)    71 %                             

 

                LYMPHOCYTES RELATIVE PERCENT (BEAKER) (test code=430)    14 %                             

 

                MONOCYTES RELATIVE PERCENT (BEAKER) (test code=431)    11 %                             

 

                EOSINOPHILS RELATIVE PERCENT (BEAKER) (test code=432)    4 %                              

 

                BASOPHILS RELATIVE PERCENT (BEAKER) (test code=437)    0 %                              

 

                NEUTROPHILS ABSOLUTE COUNT (BEAKER) (test code=670)    5.48 K/ L       1.78-5.38        

 

                LYMPHOCYTES ABSOLUTE COUNT (BEAKER) (test code=414)    1.10 K/ L       1.32-3.57        

 

                MONOCYTES ABSOLUTE COUNT (BEAKER) (test code=415)    0.83 K/ L       0.30-0.82        

 

                EOSINOPHILS ABSOLUTE COUNT (BEAKER) (test code=416)    0.27 K/ L       0.04-0.54        

 

                BASOPHILS ABSOLUTE COUNT (BEAKER) (test code=417)    0.03 K/ L       0.01-0.08        

 

                IMMATURE GRANULOCYTES-RELATIVE PERCENT (BEAKER) (test code=2801)    0 %             0-1              





POCT-GLUCOSE [id number] 15:17:00* 





                Test Item       Value           Reference Range    Comments

 

                POC-GLUCOSE METER (BEAKER) (test code=1538)    273 mg/dL                 TESTED AT [facility] 

[address] [facility] [address]





B-TYPE NATRIURETIC FACTOR (BNP)2019 12:05:00* 





                Test Item       Value           Reference Range    Comments

 

                B-TYPE NATRIURETIC PEPTIDE (BEAKER) (test code=700)    571 pg/mL       0-100            





BASIC METABOLIC [id number] 11:59:00* 





                Test Item       Value           Reference Range    Comments

 

                SODIUM (BEAKER) (test code=381)    137 meq/L       136-145          

 

                POTASSIUM (BEAKER) (test code=379)    3.7 meq/L       3.5-5.1          

 

                CHLORIDE (BEAKER) (test code=382)    98 meq/L                   

 

                CO2 (BEAKER) (test code=355)    29 meq/L        22-29            

 

                BLOOD UREA NITROGEN (BEAKER) (test code=354)    32 mg/dL        7-21             

 

                CREATININE (BEAKER) (test code=358)    1.39 mg/dL      0.57-1.25        

 

                GLUCOSE RANDOM (BEAKER) (test code=652)    256 mg/dL                  

 

                CALCIUM (BEAKER) (test code=697)    9.0 mg/dL       8.4-10.2         

 

                EGFR (BEAKER) (test code=1092)    53 mL/min/1.73 sq m                    ESTIMATED GFR IS NOT AS 

ACCURATE AS CREATININE CLEARANCE IN PREDICTING GLOMERULAR FILTRATION RATE. 
ESTIMATED GFR IS NOT APPLICABLE FOR DIALYSIS PATIENTS.





CBC W/PLT COUNT & AUTO [id number] 11:41:00* 





                Test Item       Value           Reference Range    Comments

 

                WHITE BLOOD CELL COUNT (BEAKER) (test code=775)    7.2 K/ L        3.5-10.5         

 

                RED BLOOD CELL COUNT (BEAKER) (test code=761)    5.02 M/ L       4.63-6.08        

 

                HEMOGLOBIN (BEAKER) (test code=410)    15.0 GM/DL      13.7-17.5        

 

                HEMATOCRIT (BEAKER) (test code=411)    44.3 %          40.1-51.0        

 

                MEAN CORPUSCULAR VOLUME (BEAKER) (test code=753)    88.2 fL         79.0-92.2        

 

                MEAN CORPUSCULAR HEMOGLOBIN (BEAKER) (test code=751)    29.9 pg         25.7-32.2        

 

                    MEAN CORPUSCULAR HEMOGLOBIN CONC (BEAKER) (test code=752)    33.9 GM/DL          32.3-36.5

                                         

 

                RED CELL DISTRIBUTION WIDTH (BEAKER) (test code=412)    12.5 %          11.6-14.4        

 

                PLATELET COUNT (BEAKER) (test code=756)    165 K/CU MM     150-450          

 

                MEAN PLATELET VOLUME (BEAKER) (test code=754)    10.2 fL         9.4-12.4         

 

                NUCLEATED RED BLOOD CELLS (BEAKER) (test code=413)    0 /100 WBC      0-0              

 

                NEUTROPHILS RELATIVE PERCENT (BEAKER) (test code=429)    66 %                             

 

                LYMPHOCYTES RELATIVE PERCENT (BEAKER) (test code=430)    18 %                             

 

                MONOCYTES RELATIVE PERCENT (BEAKER) (test code=431)    12 %                             

 

                EOSINOPHILS RELATIVE PERCENT (BEAKER) (test code=432)    4 %                              

 

                BASOPHILS RELATIVE PERCENT (BEAKER) (test code=437)    0 %                              

 

                NEUTROPHILS ABSOLUTE COUNT (BEAKER) (test code=670)    4.74 K/ L       1.78-5.38        

 

                LYMPHOCYTES ABSOLUTE COUNT (BEAKER) (test code=414)    1.31 K/ L       1.32-3.57        

 

                MONOCYTES ABSOLUTE COUNT (BEAKER) (test code=415)    0.88 K/ L       0.30-0.82        

 

                EOSINOPHILS ABSOLUTE COUNT (BEAKER) (test code=416)    0.25 K/ L       0.04-0.54        

 

                BASOPHILS ABSOLUTE COUNT (BEAKER) (test code=417)    0.02 K/ L       0.01-0.08        

 

                IMMATURE GRANULOCYTES-RELATIVE PERCENT (BEAKER) (test code=2801)    0 %             0-1              





POCT-GLUCOSE [id number] 17:52:00* 





                Test Item       Value           Reference Range    Comments

 

                POC-GLUCOSE METER (BEAKER) (test code=1538)    334 mg/dL                 TESTED AT [facility] 

[address] [facility] [address]





CBC W/PLT COUNT & AUTO [id number] 12:11:00* 





                Test Item       Value           Reference Range    Comments

 

                WHITE BLOOD CELL COUNT (BEAKER) (test code=775)    9.8 K/ L        3.5-10.5         

 

                RED BLOOD CELL COUNT (BEAKER) (test code=761)    5.59 M/ L       4.63-6.08        

 

                HEMOGLOBIN (BEAKER) (test code=410)    17.1 GM/DL      13.7-17.5        

 

                HEMATOCRIT (BEAKER) (test code=411)    49.7 %          40.1-51.0        

 

                MEAN CORPUSCULAR VOLUME (BEAKER) (test code=753)    88.9 fL         79.0-92.2        

 

                MEAN CORPUSCULAR HEMOGLOBIN (BEAKER) (test code=751)    30.6 pg         25.7-32.2        

 

                    MEAN CORPUSCULAR HEMOGLOBIN CONC (BEAKER) (test code=752)    34.4 GM/DL          32.3-36.5

                                         

 

                RED CELL DISTRIBUTION WIDTH (BEAKER) (test code=412)    13.0 %          11.6-14.4        

 

                PLATELET COUNT (BEAKER) (test code=756)    218 K/CU MM     150-450          

 

                MEAN PLATELET VOLUME (BEAKER) (test code=754)    10.0 fL         9.4-12.4         

 

                NUCLEATED RED BLOOD CELLS (BEAKER) (test code=413)    0 /100 WBC      0-0              





(CELLAVISION MANUAL DIFF)2018 12:11:00* 





                Test Item       Value           Reference Range    Comments

 

                NEUTROPHILS - REL (CELLAVISION)(BEAKER) (test code=2816)    74 %                             

 

                LYMPHOCYTES - REL (CELLAVISION)(BEAKER) (test code=2817)    12 %                             

 

                MONOCYTES - REL (CELLAVISION)(BEAKER) (test code=2818)    8 %                              

 

                EOSINOPHILS - REL (CELLAVISION)(BEAKER) (test code=2819)    5 %                              

 

                ATYPICAL LYMPHOCYTES - REL (CELLAVISION)(BEAKER) (test code=2829)    1 %             0-0              

 

                NEUTROPHILS - ABS (CELLAVISION)(BEAKER) (test code=2830)    7.25 K/ul       1.78-5.38       

 

 

                LYMPHOCYTES - ABS (CELLAVISION)(BEAKER) (test code=2831)    1.18 K/ul       1.32-3.57       

 

 

                MONOCYTES - ABS (CELLAVISION)(BEAKER) (test code=2832)    0.78 K/uL       0.30-0.82        

 

                EOSINOPHILS - ABS (CELLAVISION)(BEAKER) (test code=2834)    0.49 K/uL       0.04-0.54       

 

 

                    ATYPICAL LYMPHOCYTES - ABS (CELLAVISION)(BEAKER) (test code=2858)    0.10 K/uL           0.00-0.00

                                         

 

                TOTAL COUNTED (BEAKER) (test code=1351)    100                              

 

                WBC MORPHOLOGY (BEAKER) (test code=487)    Normal                           

 

                PLT MORPHOLOGY (BEAKER) (test code=486)    Normal                           

 

                ANISOCYTOSIS (BEAKER) (test code=961)    1+ few                           

 

                MICROCYTES (BEAKER) (test code=965)    1+ few                           

 

                POIKILOCYTES (BEAKER) (test code=966)    1+ few                           

 

                ARTIFACT (CELLAVISION)(BEAKER) (test code=3432)    Present                          

 

                PLATELET CONCENTRATION (CELLAVISION)(BEAKER) (test code=3438)    Adequate                         





Received comment: User comments: Slide comments: POCT-GLUCOSE [id number] 
10:17:00* 





                Test Item       Value           Reference Range    Comments

 

                POC-GLUCOSE METER (BEAKER) (test code=1538)    198 mg/dL                 TESTED AT [facility] 

[address] [place] [address]





BASIC METABOLIC [id number] 06:26:00* 





                Test Item       Value           Reference Range    Comments

 

                SODIUM (BEAKER) (test code=381)    135 meq/L       136-145          

 

                POTASSIUM (BEAKER) (test code=379)    3.1 meq/L       3.5-5.1          

 

                CHLORIDE (BEAKER) (test code=382)    91 meq/L                   

 

                CO2 (BEAKER) (test code=355)    31 meq/L        22-29            

 

                BLOOD UREA NITROGEN (BEAKER) (test code=354)    42 mg/dL        7-21             

 

                CREATININE (BEAKER) (test code=358)    1.73 mg/dL      0.57-1.25        

 

                GLUCOSE RANDOM (BEAKER) (test code=652)    208 mg/dL                  

 

                CALCIUM (BEAKER) (test code=697)    9.9 mg/dL       8.4-10.2         

 

                EGFR (BEAKER) (test code=1092)    42 mL/min/1.73 sq m                    ESTIMATED GFR IS NOT AS 

ACCURATE AS CREATININE CLEARANCE IN PREDICTING GLOMERULAR FILTRATION RATE. 
ESTIMATED GFR IS NOT APPLICABLE FOR DIALYSIS PATIENTS.





POCT-GLUCOSE [id number] 20:59:00* 





                Test Item       Value           Reference Range    Comments

 

                POC-GLUCOSE METER (BEAKER) (test code=1538)    349 mg/dL                 TESTED AT [facility] 

[address] [facility] [address]





POCT-GLUCOSE [id number] 19:14:00* 





                Test Item       Value           Reference Range    Comments

 

                POC-GLUCOSE METER (BEAKER) (test code=1538)    383 mg/dL                 TESTED AT [place] [id number] [facility] [address]





POCT-GLUCOSE [id number] 09:51:00* 





                Test Item       Value           Reference Range    Comments

 

                POC-GLUCOSE METER (BEAKER) (test code=1538)    220 mg/dL                 TESTED AT [place] [id number] [facility] [address]





BASIC METABOLIC [id number] 07:04:00* 





                Test Item       Value           Reference Range    Comments

 

                SODIUM (BEAKER) (test code=381)    138 meq/L       136-145          

 

                POTASSIUM (BEAKER) (test code=379)    2.9 meq/L       3.5-5.1          

 

                CHLORIDE (BEAKER) (test code=382)    94 meq/L                   

 

                CO2 (BEAKER) (test code=355)    33 meq/L        22-29            

 

                BLOOD UREA NITROGEN (BEAKER) (test code=354)    36 mg/dL        7-21             

 

                CREATININE (BEAKER) (test code=358)    1.78 mg/dL      0.57-1.25        

 

                GLUCOSE RANDOM (BEAKER) (test code=652)    177 mg/dL                  

 

                CALCIUM (BEAKER) (test code=697)    9.4 mg/dL       8.4-10.2         

 

                EGFR (BEAKER) (test code=1092)    40 mL/min/1.73 sq m                    ESTIMATED GFR IS NOT AS 

ACCURATE AS CREATININE CLEARANCE IN PREDICTING GLOMERULAR FILTRATION RATE. 
ESTIMATED GFR IS NOT APPLICABLE FOR DIALYSIS PATIENTS.





CBC W/PLT COUNT & AUTO [id number] 06:33:00* 





                Test Item       Value           Reference Range    Comments

 

                WHITE BLOOD CELL COUNT (BEAKER) (test code=775)    9.2 K/ L        3.5-10.5         

 

                RED BLOOD CELL COUNT (BEAKER) (test code=761)    5.62 M/ L       4.63-6.08        

 

                HEMOGLOBIN (BEAKER) (test code=410)    16.7 GM/DL      13.7-17.5        

 

                HEMATOCRIT (BEAKER) (test code=411)    49.9 %          40.1-51.0        

 

                MEAN CORPUSCULAR VOLUME (BEAKER) (test code=753)    88.8 fL         79.0-92.2        

 

                MEAN CORPUSCULAR HEMOGLOBIN (BEAKER) (test code=751)    29.7 pg         25.7-32.2        

 

                    MEAN CORPUSCULAR HEMOGLOBIN CONC (BEAKER) (test code=752)    33.5 GM/DL          32.3-36.5

                                         

 

                RED CELL DISTRIBUTION WIDTH (BEAKER) (test code=412)    13.4 %          11.6-14.4        

 

                PLATELET COUNT (BEAKER) (test code=756)    211 K/CU MM     150-450          

 

                MEAN PLATELET VOLUME (BEAKER) (test code=754)    10.3 fL         9.4-12.4         

 

                NUCLEATED RED BLOOD CELLS (BEAKER) (test code=413)    0 /100 WBC      0-0              

 

                NEUTROPHILS RELATIVE PERCENT (BEAKER) (test code=429)    72 %                             

 

                LYMPHOCYTES RELATIVE PERCENT (BEAKER) (test code=430)    12 %                             

 

                MONOCYTES RELATIVE PERCENT (BEAKER) (test code=431)    14 %                             

 

                EOSINOPHILS RELATIVE PERCENT (BEAKER) (test code=432)    2 %                              

 

                BASOPHILS RELATIVE PERCENT (BEAKER) (test code=437)    0 %                              

 

                NEUTROPHILS ABSOLUTE COUNT (BEAKER) (test code=670)    6.62 K/ L       1.78-5.38        

 

                LYMPHOCYTES ABSOLUTE COUNT (BEAKER) (test code=414)    1.06 K/ L       1.32-3.57        

 

                MONOCYTES ABSOLUTE COUNT (BEAKER) (test code=415)    1.25 K/ L       0.30-0.82        

 

                EOSINOPHILS ABSOLUTE COUNT (BEAKER) (test code=416)    0.21 K/ L       0.04-0.54        

 

                BASOPHILS ABSOLUTE COUNT (BEAKER) (test code=417)    0.04 K/ L       0.01-0.08        

 

                IMMATURE GRANULOCYTES-RELATIVE PERCENT (BEAKER) (test code=2801)    0 %             0-1              





POCT-GLUCOSE [id number] 22:33:00* 





                Test Item       Value           Reference Range    Comments

 

                POC-GLUCOSE METER (BEAKER) (test code=1538)    222 mg/dL                 TESTED AT [facility] 

[address] [facility] [address]





POCT-GLUCOSE [id number] 18:06:00* 





                Test Item       Value           Reference Range    Comments

 

                POC-GLUCOSE METER (BEAKER) (test code=1538)    227 mg/dL                 TESTED AT [facility] 

[address] [facility] [address]





POCT-GLUCOSE [id number] 13:55:00* 





                Test Item       Value           Reference Range    Comments

 

                POC-GLUCOSE METER (BEAKER) (test code=1538)    176 mg/dL                 TESTED AT [address] [address]





POCT-GLUCOSE [id number] 08:36:00* 





                Test Item       Value           Reference Range    Comments

 

                POC-GLUCOSE METER (BEAKER) (test code=1538)    332 mg/dL                 TESTED AT [place] [id number] [facility] [address]





BASIC METABOLIC [id number] 04:30:00* 





                Test Item       Value           Reference Range    Comments

 

                SODIUM (BEAKER) (test code=381)    138 meq/L       136-145          

 

                POTASSIUM (BEAKER) (test code=379)    3.5 meq/L       3.5-5.1         Specimen slightly hemolyzed



 

                CHLORIDE (BEAKER) (test code=382)    96 meq/L                   

 

                CO2 (BEAKER) (test code=355)    31 meq/L        22-29            

 

                BLOOD UREA NITROGEN (BEAKER) (test code=354)    28 mg/dL        7-21             

 

                CREATININE (BEAKER) (test code=358)    1.71 mg/dL      0.57-1.25       Specimen slightly hemolyzed



 

                GLUCOSE RANDOM (BEAKER) (test code=652)    286 mg/dL                  

 

                CALCIUM (BEAKER) (test code=697)    9.5 mg/dL       8.4-10.2         

 

                EGFR (BEAKER) (test code=1092)    42 mL/min/1.73 sq m                    ESTIMATED GFR IS NOT AS 

ACCURATE AS CREATININE CLEARANCE IN PREDICTING GLOMERULAR FILTRATION RATE. 
ESTIMATED GFR IS NOT APPLICABLE FOR DIALYSIS PATIENTS.





CBC W/PLT COUNT & AUTO [id number] 04:02:00* 





                Test Item       Value           Reference Range    Comments

 

                WHITE BLOOD CELL COUNT (BEAKER) (test code=775)    8.5 K/ L        3.5-10.5         

 

                RED BLOOD CELL COUNT (BEAKER) (test code=761)    4.87 M/ L       4.63-6.08        

 

                HEMOGLOBIN (BEAKER) (test code=410)    14.6 GM/DL      13.7-17.5        

 

                HEMATOCRIT (BEAKER) (test code=411)    44.3 %          40.1-51.0        

 

                MEAN CORPUSCULAR VOLUME (BEAKER) (test code=753)    91.0 fL         79.0-92.2        

 

                MEAN CORPUSCULAR HEMOGLOBIN (BEAKER) (test code=751)    30.0 pg         25.7-32.2        

 

                    MEAN CORPUSCULAR HEMOGLOBIN CONC (BEAKER) (test code=752)    33.0 GM/DL          32.3-36.5

                                         

 

                RED CELL DISTRIBUTION WIDTH (BEAKER) (test code=412)    13.3 %          11.6-14.4        

 

                PLATELET COUNT (BEAKER) (test code=756)    184 K/CU MM     150-450          

 

                MEAN PLATELET VOLUME (BEAKER) (test code=754)    9.9 fL          9.4-12.4         

 

                NUCLEATED RED BLOOD CELLS (BEAKER) (test code=413)    0 /100 WBC      0-0              

 

                NEUTROPHILS RELATIVE PERCENT (BEAKER) (test code=429)    70 %                             

 

                LYMPHOCYTES RELATIVE PERCENT (BEAKER) (test code=430)    15 %                             

 

                MONOCYTES RELATIVE PERCENT (BEAKER) (test code=431)    12 %                             

 

                EOSINOPHILS RELATIVE PERCENT (BEAKER) (test code=432)    3 %                              

 

                BASOPHILS RELATIVE PERCENT (BEAKER) (test code=437)    1 %                              

 

                NEUTROPHILS ABSOLUTE COUNT (BEAKER) (test code=670)    5.92 K/ L       1.78-5.38        

 

                LYMPHOCYTES ABSOLUTE COUNT (BEAKER) (test code=414)    1.27 K/ L       1.32-3.57        

 

                MONOCYTES ABSOLUTE COUNT (BEAKER) (test code=415)    0.99 K/ L       0.30-0.82        

 

                EOSINOPHILS ABSOLUTE COUNT (BEAKER) (test code=416)    0.25 K/ L       0.04-0.54        

 

                BASOPHILS ABSOLUTE COUNT (BEAKER) (test code=417)    0.04 K/ L       0.01-0.08        

 

                IMMATURE GRANULOCYTES-RELATIVE PERCENT (BEAKER) (test code=2801)    0 %             0-1              





POCT-GLUCOSE [id number] 21:33:00* 





                Test Item       Value           Reference Range    Comments

 

                POC-GLUCOSE METER (BEAKER) (test code=1538)    193 mg/dL                 TESTED AT [facility] 

[address] [facility] [address]





POCT-GLUCOSE [id number] 19:04:00* 





                Test Item       Value           Reference Range    Comments

 

                POC-GLUCOSE METER (BEAKER) (test code=1538)    306 mg/dL                 TESTED AT [facility] 

[address] [facility] [address]





POCT-GLUCOSE [id number] 15:42:00* 





                Test Item       Value           Reference Range    Comments

 

                POC-GLUCOSE METER (BEAKER) (test code=1538)    205 mg/dL                 TESTED AT [place] [id number] [facility] [address]





HEMOGLOBIN [id number] 10:10:00* 





                Test Item       Value           Reference Range    Comments

 

                HEMOGLOBIN A1C (BEAKER) (test code=368)    7.2 %           4.3-6.1          





POCT-GLUCOSE [id number] 09:17:00* 





                Test Item       Value           Reference Range    Comments

 

                POC-GLUCOSE METER (BEAKER) (test code=1538)    254 mg/dL                 TESTED AT [address] [address]





TROPONIN -09-97 06:24:00* 





                Test Item       Value           Reference Range    Comments

 

                TROPONIN I (BEAKER) (test code=397)    0.03 ng/mL      0.00-0.03        





Troponin I (TnI) levels must be interpreted in the context of the presenting sym
ptoms and the clinical findings. Elevated TnI levels indicate myocardial damage,
but are not specific for ischemic heart disease. Elevated TnI levels are seen in
patients with other cardiac conditions (including myocarditis and congestive h
eart failure), and slight TnI elevations occur in patients with other conditions
, including sepsis, renal failure, acidosis, acute neurological disease, and per
sistent tachyarrhythmia.TROPONIN -34-47 00:41:00* 





                Test Item       Value           Reference Range    Comments

 

                TROPONIN I (BEAKER) (test code=397)    0.03 ng/mL      0.00-0.03        





Troponin I (TnI) levels must be interpreted in the context of the presenting sym
ptoms and the clinical findings. Elevated TnI levels indicate myocardial damage,
but are not specific for ischemic heart disease. Elevated TnI levels are seen in
patients with other cardiac conditions (including myocarditis and congestive h
eart failure), and slight TnI elevations occur in patients with other conditions
, including sepsis, renal failure, acidosis, acute neurological disease, and per
sistent tachyarrhythmia.COMPREHENSIVE METABOLIC [id number] 00:35:00* 





                Test Item       Value           Reference Range    Comments

 

                TOTAL PROTEIN (BEAKER) (test code=770)    7.5 gm/dL       6.0-8.3          

 

                ALBUMIN (BEAKER) (test code=1145)    4.3 g/dL        3.5-5.0          

 

                ALKALINE PHOSPHATASE (BEAKER) (test code=346)    119 U/L                    

 

                BILIRUBIN TOTAL (BEAKER) (test code=377)    0.6 mg/dL       0.2-1.2          

 

                SODIUM (BEAKER) (test code=381)    136 meq/L       136-145          

 

                POTASSIUM (BEAKER) (test code=379)    3.7 meq/L       3.5-5.1          

 

                CHLORIDE (BEAKER) (test code=382)    98 meq/L                   

 

                CO2 (BEAKER) (test code=355)    26 meq/L        22-29            

 

                BLOOD UREA NITROGEN (BEAKER) (test code=354)    24 mg/dL        7-21             

 

                CREATININE (BEAKER) (test code=358)    1.53 mg/dL      0.57-1.25        

 

                GLUCOSE RANDOM (BEAKER) (test code=652)    238 mg/dL                  

 

                CALCIUM (BEAKER) (test code=697)    9.1 mg/dL       8.4-10.2         

 

                AST (SGOT) (BEAKER) (test code=353)    16 U/L          5-34             

 

                ALT (SGPT) (BEAKER) (test code=347)    12 U/L          6-55             

 

                EGFR (BEAKER) (test code=1092)    48 mL/min/1.73 sq m                    ESTIMATED GFR IS NOT AS 

ACCURATE AS CREATININE CLEARANCE IN PREDICTING GLOMERULAR FILTRATION RATE. 
ESTIMATED GFR IS NOT APPLICABLE FOR DIALYSIS PATIENTS.





RAPID DRUG SCREEN, [id number] 22:06:00* 





                Test Item       Value           Reference Range    Comments

 

                BARBITURATE URINE (BEAKER) (test code=725)    Negative        Negative         

 

                BENZODIAZEPINE SCREEN URINE (BEAKER) (test code=726)    Negative        Negative         

 

                COCAINE (METAB.) SCREEN (BEAKER) (test code=1164)    Negative        Negative         

 

                METHADONE SCREEN (BEAKER) (test code=1436)    Negative        Negative         

 

                OPIATE SCREEN URINE (BEAKER) (test code=734)    Negative        Negative         

 

                CANNABINOID SCREEN URINE (BEAKER) (test code=727)    Negative        Negative         

 

                AMPH/METHAMPH SCREEN (BEAKER) (test code=1438)    Negative        Negative         

 

                PHENCYCLIDINE SCREEN URINE (BEAKER) (test code=608)    Negative        Negative         

 

                OXYCODONE SCREEN URINE (BEAKER) (test code=2761)    Negative        Negative         





DRUG                CUTOFF CONC.Cocaine               300 ng/mL Cannabinoid     
      50 ng/mL Benzodiazepine        200 ng/mLBarbiturate           200 ng/mLPh
encyclidine          25 ng/mLOpiate                300 ng/mLMethadone           
 300 ng/mLAmphetamine/         1000 ng/mL  MethamphetamineOxycodone             
300 ng/mLThis assay provides an unconfirmed qualitative test result for the cli
nical management of patients in emergency situations. Chain of custody not maint
ained. Some over-the-counter medications, as well as adulterants, may cause inac
curate results. Clinical correlation should be applied. A more comprehensive mee
g screen or confirmation of a detected drug may be performed upon request.POCT-
GLUCOSE [id number] 21:31:00* 





                Test Item       Value           Reference Range    Comments

 

                POC-GLUCOSE METER (LOU) (test code=1538)    209 mg/dL                 TESTED AT [facility] 

[address] [facility] [address]





POCT-GLUCOSE [id number] 18:44:00* 





                Test Item       Value           Reference Range    Comments

 

                POC-GLUCOSE METER (LOU) (test code=1538)    165 mg/dL                 TESTED AT [place] [id number] [facility] [address]





B-TYPE NATRIURETIC FACTOR (BNP)2018 17:42:00* 





                Test Item       Value           Reference Range    Comments

 

                B-TYPE NATRIURETIC PEPTIDE (LOU) (test code=700)    572 pg/mL       0-100            





RAD, CHEST, 1 VIEW, NON [id number] 16:56:00Reason for exam:->sobShould this 
be performed at the bedside?->YesFINAL REPORT PATIENT ID:   [id number] Comparison:
2018 TECHNIQUE: Single view of the chest FINDINGS: There is atelectasis in 
the left lung base. Otherwise lungs are clear. Cardiac silhouette is enlarged. 
Left-sided pacing device noted. No acute skeletal abnormality. Signed: Tonny De La O Verified Date/Time:  2018 16:56:51 Reading Location: [facility] 
Radiology Reading Room      Electronically signed by: TONNY DE LA O M.D. on 
2018 04:56 PM B-TYPE NATRIURETIC FACTOR (BNP)2018 10:35:00* 





                Test Item       Value           Reference Range    Comments

 

                B-TYPE NATRIURETIC PEPTIDE (BEAKER) (test code=700)    1033 pg/mL      0-100            





BASIC METABOLIC [id number] 10:29:00* 





                Test Item       Value           Reference Range    Comments

 

                SODIUM (BEAKER) (test code=381)    142 meq/L       136-145          

 

                POTASSIUM (BEAKER) (test code=379)    4.1 meq/L       3.5-5.1          

 

                CHLORIDE (BEAKER) (test code=382)    102 meq/L                  

 

                CO2 (BEAKER) (test code=355)    34 meq/L        22-29            

 

                BLOOD UREA NITROGEN (BEAKER) (test code=354)    25 mg/dL        7-21             

 

                CREATININE (BEAKER) (test code=358)    1.46 mg/dL      0.57-1.25        

 

                GLUCOSE RANDOM (BEAKER) (test code=652)    121 mg/dL                  

 

                CALCIUM (BEAKER) (test code=697)    9.4 mg/dL       8.4-10.2         

 

                EGFR (BEAKER) (test code=1092)    51 mL/min/1.73 sq m                    ESTIMATED GFR IS NOT AS 

ACCURATE AS CREATININE CLEARANCE IN PREDICTING GLOMERULAR FILTRATION RATE. 
ESTIMATED GFR IS NOT APPLICABLE FOR DIALYSIS PATIENTS.





CBC W/PLT COUNT & AUTO [id number] 10:11:00* 





                Test Item       Value           Reference Range    Comments

 

                WHITE BLOOD CELL COUNT (BEAKER) (test code=775)    7.9 K/ L        3.5-10.5         

 

                RED BLOOD CELL COUNT (BEAKER) (test code=761)    4.54 M/ L       4.63-6.08        

 

                HEMOGLOBIN (BEAKER) (test code=410)    13.7 GM/DL      13.7-17.5        

 

                HEMATOCRIT (BEAKER) (test code=411)    42.3 %          40.1-51.0        

 

                MEAN CORPUSCULAR VOLUME (BEAKER) (test code=753)    93.2 fL         79.0-92.2        

 

                MEAN CORPUSCULAR HEMOGLOBIN (BEAKER) (test code=751)    30.2 pg         25.7-32.2        

 

                    MEAN CORPUSCULAR HEMOGLOBIN CONC (BEAKER) (test code=752)    32.4 GM/DL          32.3-36.5

                                         

 

                RED CELL DISTRIBUTION WIDTH (BEAKER) (test code=412)    13.8 %          11.6-14.4        

 

                PLATELET COUNT (BEAKER) (test code=756)    168 K/CU MM     150-450          

 

                MEAN PLATELET VOLUME (BEAKER) (test code=754)    9.9 fL          9.4-12.4         

 

                NUCLEATED RED BLOOD CELLS (BEAKER) (test code=413)    0 /100 WBC      0-0              

 

                NEUTROPHILS RELATIVE PERCENT (BEAKER) (test code=429)    73 %                             

 

                LYMPHOCYTES RELATIVE PERCENT (BEAKER) (test code=430)    13 %                             

 

                MONOCYTES RELATIVE PERCENT (BEAKER) (test code=431)    11 %                             

 

                EOSINOPHILS RELATIVE PERCENT (BEAKER) (test code=432)    3 %                              

 

                BASOPHILS RELATIVE PERCENT (BEAKER) (test code=437)    0 %                              

 

                NEUTROPHILS ABSOLUTE COUNT (BEAKER) (test code=670)    5.73 K/ L       1.78-5.38        

 

                LYMPHOCYTES ABSOLUTE COUNT (BEAKER) (test code=414)    1.02 K/ L       1.32-3.57        

 

                MONOCYTES ABSOLUTE COUNT (BEAKER) (test code=415)    0.84 K/ L       0.30-0.82        

 

                EOSINOPHILS ABSOLUTE COUNT (BEAKER) (test code=416)    0.25 K/ L       0.04-0.54        

 

                BASOPHILS ABSOLUTE COUNT (BEAKER) (test code=417)    0.03 K/ L       0.01-0.08        

 

                IMMATURE GRANULOCYTES-RELATIVE PERCENT (BEAKER) (test code=2801)    0 %             0-1              





LIPID [id number] 11:22:00* 





                Test Item       Value           Reference Range    Comments

 

                TRIGLYCERIDES (BEAKER) (test code=540)    176 mg/dL                        

 

                CHOLESTEROL (BEAKER) (test code=631)    121 mg/dL                        

 

                HDL CHOLESTEROL (BEAKER) (test code=976)    29 mg/dL                         

 

                LDL CHOLESTEROL CALCULATED (BEAKER) (test code=633)    57 mg/dL                         





Triglyceride Reference Range:   Low Risk         <150   Borderline    150-199   
High Risk     200-499   Very High Risk  >=500Cholesterol Reference Range:   Low 
Risk         <200   Borderline    200-239    High Risk        >240HDL 
Cholesterol Reference Range:   Low Risk         >=60   High Risk         <40LDL 
Cholesterol Reference Range:   Optimal          <100   Near Optimal  100-129   
Borderline    130-159   High          160-189   Very High       >=190   BASIC 
METABOLIC [id number] 11:22:00* 





                Test Item       Value           Reference Range    Comments

 

                SODIUM (BEAKER) (test code=381)    139 meq/L       136-145          

 

                POTASSIUM (BEAKER) (test code=379)    4.2 meq/L       3.5-5.1          

 

                CHLORIDE (BEAKER) (test code=382)    97 meq/L                   

 

                CO2 (BEAKER) (test code=355)    34 meq/L        22-29            

 

                BLOOD UREA NITROGEN (BEAKER) (test code=354)    35 mg/dL        7-21             

 

                CREATININE (BEAKER) (test code=358)    1.74 mg/dL      0.57-1.25        

 

                GLUCOSE RANDOM (BEAKER) (test code=652)    239 mg/dL                  

 

                CALCIUM (BEAKER) (test code=697)    9.1 mg/dL       8.4-10.2         

 

                EGFR (BEAKER) (test code=1092)    41 mL/min/1.73 sq m                    ESTIMATED GFR IS NOT AS 

ACCURATE AS CREATININE CLEARANCE IN PREDICTING GLOMERULAR FILTRATION RATE. 
ESTIMATED GFR IS NOT APPLICABLE FOR DIALYSIS PATIENTS.





HEPATIC FUNCTION [id number] 11:22:00* 





                Test Item       Value           Reference Range    Comments

 

                TOTAL PROTEIN (BEAKER) (test code=770)    7.8 gm/dL       6.0-8.3          

 

                ALBUMIN (BEAKER) (test code=1145)    4.6 g/dL        3.5-5.0          

 

                BILIRUBIN TOTAL (BEAKER) (test code=377)    0.8 mg/dL       0.2-1.2          

 

                BILIRUBIN DIRECT (BEAKER) (test code=706)    0.3 mg/dL       0.1-0.5          

 

                ALKALINE PHOSPHATASE (BEAKER) (test code=346)    119 U/L                    

 

                AST (SGOT) (BEAKER) (test code=353)    21 U/L          5-34             

 

                ALT (SGPT) (BEAKER) (test code=347)    22 U/L          6-55             





B-TYPE NATRIURETIC FACTOR (BNP)2018 11:22:00* 





                Test Item       Value           Reference Range    Comments

 

                B-TYPE NATRIURETIC PEPTIDE (BEAKER) (test code=700)    985 pg/mL       0-100            





CBC W/PLT COUNT & AUTO [id number] 10:55:00* 





                Test Item       Value           Reference Range    Comments

 

                WHITE BLOOD CELL COUNT (BEAKER) (test code=775)    7.0 K/ L        3.5-10.5         

 

                RED BLOOD CELL COUNT (BEAKER) (test code=761)    4.60 M/ L       4.63-6.08        

 

                HEMOGLOBIN (BEAKER) (test code=410)    13.9 GM/DL      13.7-17.5        

 

                HEMATOCRIT (BEAKER) (test code=411)    42.5 %          40.1-51.0        

 

                MEAN CORPUSCULAR VOLUME (BEAKER) (test code=753)    92.4 fL         79.0-92.2        

 

                MEAN CORPUSCULAR HEMOGLOBIN (BEAKER) (test code=751)    30.2 pg         25.7-32.2        

 

                    MEAN CORPUSCULAR HEMOGLOBIN CONC (BEAKER) (test code=752)    32.7 GM/DL          32.3-36.5

                                         

 

                RED CELL DISTRIBUTION WIDTH (BEAKER) (test code=412)    13.6 %          11.6-14.4        

 

                PLATELET COUNT (BEAKER) (test code=756)    169 K/CU MM     150-450          

 

                MEAN PLATELET VOLUME (BEAKER) (test code=754)    9.6 fL          9.4-12.4         

 

                NUCLEATED RED BLOOD CELLS (BEAKER) (test code=413)    0 /100 WBC      0-0              

 

                NEUTROPHILS RELATIVE PERCENT (BEAKER) (test code=429)    72 %                             

 

                LYMPHOCYTES RELATIVE PERCENT (BEAKER) (test code=430)    13 %                             

 

                MONOCYTES RELATIVE PERCENT (BEAKER) (test code=431)    11 %                             

 

                EOSINOPHILS RELATIVE PERCENT (BEAKER) (test code=432)    4 %                              

 

                BASOPHILS RELATIVE PERCENT (BEAKER) (test code=437)    0 %                              

 

                NEUTROPHILS ABSOLUTE COUNT (BEAKER) (test code=670)    5.08 K/ L       1.78-5.38        

 

                LYMPHOCYTES ABSOLUTE COUNT (BEAKER) (test code=414)    0.89 K/ L       1.32-3.57        

 

                MONOCYTES ABSOLUTE COUNT (BEAKER) (test code=415)    0.75 K/ L       0.30-0.82        

 

                EOSINOPHILS ABSOLUTE COUNT (BEAKER) (test code=416)    0.25 K/ L       0.04-0.54        

 

                BASOPHILS ABSOLUTE COUNT (BEAKER) (test code=417)    0.03 K/ L       0.01-0.08        

 

                IMMATURE GRANULOCYTES-RELATIVE PERCENT (BEAKER) (test code=2801)    0 %             0-1              





POCT-GLUCOSE [id number] 13:15:00* 





                Test Item       Value           Reference Range    Comments

 

                POC-GLUCOSE METER (BEAKER) (test code=1538)    322 mg/dL                 TESTED AT [facility] 

[address] [facility] [address]





POCT-GLUCOSE [id number] 09:17:00* 





                Test Item       Value           Reference Range    Comments

 

                POC-GLUCOSE METER (BEAKER) (test code=1538)    281 mg/dL                 TESTED AT [place] [id number] [facility] [address]





BASIC METABOLIC [id number] 05:02:00* 





                Test Item       Value           Reference Range    Comments

 

                SODIUM (BEAKER) (test code=381)    134 meq/L       136-145          

 

                POTASSIUM (BEAKER) (test code=379)    4.1 meq/L       3.5-5.1          

 

                CHLORIDE (BEAKER) (test code=382)    94 meq/L                   

 

                CO2 (BEAKER) (test code=355)    29 meq/L        22-29            

 

                BLOOD UREA NITROGEN (BEAKER) (test code=354)    31 mg/dL        7-21             

 

                CREATININE (BEAKER) (test code=358)    1.74 mg/dL      0.57-1.25        

 

                GLUCOSE RANDOM (BEAKER) (test code=652)    406 mg/dL                  

 

                CALCIUM (BEAKER) (test code=697)    9.2 mg/dL       8.4-10.2         

 

                EGFR (BEAKER) (test code=1092)    41 mL/min/1.73 sq m                    ESTIMATED GFR IS NOT AS 

ACCURATE AS CREATININE CLEARANCE IN PREDICTING GLOMERULAR FILTRATION RATE. 
ESTIMATED GFR IS NOT APPLICABLE FOR DIALYSIS PATIENTS.





CBC W/PLT COUNT & AUTO [id number] 04:29:00* 





                Test Item       Value           Reference Range    Comments

 

                WHITE BLOOD CELL COUNT (BEAKER) (test code=775)    9.4 K/ L        3.5-10.5         

 

                RED BLOOD CELL COUNT (BEAKER) (test code=761)    5.59 M/ L       4.63-6.08        

 

                HEMOGLOBIN (BEAKER) (test code=410)    17.0 GM/DL      13.7-17.5        

 

                HEMATOCRIT (BEAKER) (test code=411)    49.5 %          40.1-51.0        

 

                MEAN CORPUSCULAR VOLUME (BEAKER) (test code=753)    88.6 fL         79.0-92.2        

 

                MEAN CORPUSCULAR HEMOGLOBIN (BEAKER) (test code=751)    30.4 pg         25.7-32.2        

 

                    MEAN CORPUSCULAR HEMOGLOBIN CONC (BEAKER) (test code=752)    34.3 GM/DL          32.3-36.5

                                         

 

                RED CELL DISTRIBUTION WIDTH (BEAKER) (test code=412)    12.6 %          11.6-14.4        

 

                PLATELET COUNT (BEAKER) (test code=756)    212 K/CU MM     150-450          

 

                MEAN PLATELET VOLUME (BEAKER) (test code=754)    9.9 fL          9.4-12.4         

 

                NUCLEATED RED BLOOD CELLS (BEAKER) (test code=413)    0 /100 WBC      0-0              

 

                NEUTROPHILS RELATIVE PERCENT (BEAKER) (test code=429)    72 %                             

 

                LYMPHOCYTES RELATIVE PERCENT (BEAKER) (test code=430)    15 %                             

 

                MONOCYTES RELATIVE PERCENT (BEAKER) (test code=431)    10 %                             

 

                EOSINOPHILS RELATIVE PERCENT (BEAKER) (test code=432)    3 %                              

 

                BASOPHILS RELATIVE PERCENT (BEAKER) (test code=437)    0 %                              

 

                NEUTROPHILS ABSOLUTE COUNT (BEAKER) (test code=670)    6.73 K/ L       1.78-5.38        

 

                LYMPHOCYTES ABSOLUTE COUNT (BEAKER) (test code=414)    1.39 K/ L       1.32-3.57        

 

                MONOCYTES ABSOLUTE COUNT (BEAKER) (test code=415)    0.96 K/ L       0.30-0.82        

 

                EOSINOPHILS ABSOLUTE COUNT (BEAKER) (test code=416)    0.23 K/ L       0.04-0.54        

 

                BASOPHILS ABSOLUTE COUNT (BEAKER) (test code=417)    0.03 K/ L       0.01-0.08        

 

                IMMATURE GRANULOCYTES-RELATIVE PERCENT (BEAKER) (test code=2801)    0 %             0-1              





POCT-GLUCOSE [id number] 21:40:00* 





                Test Item       Value           Reference Range    Comments

 

                POC-GLUCOSE METER (BEAKER) (test code=1538)    268 mg/dL                 TESTED AT [address] [address]





POCT-GLUCOSE [id number] 18:24:00* 





                Test Item       Value           Reference Range    Comments

 

                POC-GLUCOSE METER (BEAKER) (test code=1538)    301 mg/dL                 TESTED AT [address] [address]





POCT-GLUCOSE [id number] 13:48:00* 





                Test Item       Value           Reference Range    Comments

 

                POC-GLUCOSE METER (BEAKER) (test code=1538)    237 mg/dL                 TESTED AT [facility] 

[address] [facility] [address]





POCT-GLUCOSE [id number] 09:33:00* 





                Test Item       Value           Reference Range    Comments

 

                POC-GLUCOSE METER (BEAKER) (test code=1538)    292 mg/dL                 TESTED AT [place] [id number] [facility] [address]





COMPREHENSIVE METABOLIC [id number] 07:22:00* 





                Test Item       Value           Reference Range    Comments

 

                TOTAL PROTEIN (BEAKER) (test code=770)    7.0 gm/dL       6.0-8.3          

 

                ALBUMIN (BEAKER) (test code=1145)    3.9 g/dL        3.5-5.0          

 

                ALKALINE PHOSPHATASE (BEAKER) (test code=346)    134 U/L                    

 

                BILIRUBIN TOTAL (BEAKER) (test code=377)    0.8 mg/dL       0.2-1.2          

 

                SODIUM (BEAKER) (test code=381)    137 meq/L       136-145          

 

                POTASSIUM (BEAKER) (test code=379)    3.7 meq/L       3.5-5.1          

 

                CHLORIDE (BEAKER) (test code=382)    96 meq/L                   

 

                CO2 (BEAKER) (test code=355)    30 meq/L        22-29            

 

                BLOOD UREA NITROGEN (BEAKER) (test code=354)    26 mg/dL        7-21             

 

                CREATININE (BEAKER) (test code=358)    1.53 mg/dL      0.57-1.25        

 

                GLUCOSE RANDOM (BEAKER) (test code=652)    303 mg/dL                  

 

                CALCIUM (BEAKER) (test code=697)    9.3 mg/dL       8.4-10.2         

 

                AST (SGOT) (BEAKER) (test code=353)    15 U/L          5-34             

 

                ALT (SGPT) (BEAKER) (test code=347)    19 U/L          6-55             

 

                EGFR (BEAKER) (test code=1092)    48 mL/min/1.73 sq m                    ESTIMATED GFR IS NOT AS 

ACCURATE AS CREATININE CLEARANCE IN PREDICTING GLOMERULAR FILTRATION RATE. 
ESTIMATED GFR IS NOT APPLICABLE FOR DIALYSIS PATIENTS.





BASIC METABOLIC [id number] 07:22:00* 





                Test Item       Value           Reference Range    Comments

 

                SODIUM (BEAKER) (test code=381)    137 meq/L       136-145          

 

                POTASSIUM (BEAKER) (test code=379)    3.7 meq/L       3.5-5.1          

 

                CHLORIDE (BEAKER) (test code=382)    96 meq/L                   

 

                CO2 (BEAKER) (test code=355)    30 meq/L        22-29            

 

                BLOOD UREA NITROGEN (BEAKER) (test code=354)    26 mg/dL        7-21             

 

                CREATININE (BEAKER) (test code=358)    1.53 mg/dL      0.57-1.25        

 

                GLUCOSE RANDOM (BEAKER) (test code=652)    303 mg/dL                  

 

                CALCIUM (BEAKER) (test code=697)    9.3 mg/dL       8.4-10.2         

 

                EGFR (BEAKER) (test code=1092)    48 mL/min/1.73 sq m                    ESTIMATED GFR IS NOT AS 

ACCURATE AS CREATININE CLEARANCE IN PREDICTING GLOMERULAR FILTRATION RATE. 
ESTIMATED GFR IS NOT APPLICABLE FOR DIALYSIS PATIENTS.





CBC W/PLT COUNT & AUTO [id number] 05:17:00* 





                Test Item       Value           Reference Range    Comments

 

                WHITE BLOOD CELL COUNT (BEAKER) (test code=775)    7.4 K/ L        3.5-10.5         

 

                RED BLOOD CELL COUNT (BEAKER) (test code=761)    4.78 M/ L       4.63-6.08        

 

                HEMOGLOBIN (BEAKER) (test code=410)    14.7 GM/DL      13.7-17.5        

 

                HEMATOCRIT (BEAKER) (test code=411)    42.3 %          40.1-51.0        

 

                MEAN CORPUSCULAR VOLUME (BEAKER) (test code=753)    88.5 fL         79.0-92.2        

 

                MEAN CORPUSCULAR HEMOGLOBIN (BEAKER) (test code=751)    30.8 pg         25.7-32.2        

 

                    MEAN CORPUSCULAR HEMOGLOBIN CONC (BEAKER) (test code=752)    34.8 GM/DL          32.3-36.5

                                         

 

                RED CELL DISTRIBUTION WIDTH (BEAKER) (test code=412)    12.8 %          11.6-14.4        

 

                PLATELET COUNT (BEAKER) (test code=756)    186 K/CU MM     150-450          

 

                MEAN PLATELET VOLUME (BEAKER) (test code=754)    10.7 fL         9.4-12.4         

 

                NUCLEATED RED BLOOD CELLS (BEAKER) (test code=413)    0 /100 WBC      0-0              

 

                NEUTROPHILS RELATIVE PERCENT (BEAKER) (test code=429)    68 %                             

 

                LYMPHOCYTES RELATIVE PERCENT (BEAKER) (test code=430)    19 %                             

 

                MONOCYTES RELATIVE PERCENT (BEAKER) (test code=431)    11 %                             

 

                EOSINOPHILS RELATIVE PERCENT (BEAKER) (test code=432)    2 %                              

 

                BASOPHILS RELATIVE PERCENT (BEAKER) (test code=437)    0 %                              

 

                NEUTROPHILS ABSOLUTE COUNT (BEAKER) (test code=670)    5.02 K/ L       1.78-5.38        

 

                LYMPHOCYTES ABSOLUTE COUNT (BEAKER) (test code=414)    1.38 K/ L       1.32-3.57        

 

                MONOCYTES ABSOLUTE COUNT (BEAKER) (test code=415)    0.84 K/ L       0.30-0.82        

 

                EOSINOPHILS ABSOLUTE COUNT (BEAKER) (test code=416)    0.13 K/ L       0.04-0.54        

 

                BASOPHILS ABSOLUTE COUNT (BEAKER) (test code=417)    0.03 K/ L       0.01-0.08        

 

                IMMATURE GRANULOCYTES-RELATIVE PERCENT (BEAKER) (test code=2801)    0 %             0-1              





POCT-GLUCOSE METER2018-09-10 23:57:00* 





                Test Item       Value           Reference Range    Comments

 

                POC-GLUCOSE METER (BEAKER) (test code=1538)    379 mg/dL                 TESTED AT [address] [address]





POCT-GLUCOSE METER2018-09-10 20:53:00* 





                Test Item       Value           Reference Range    Comments

 

                POC-GLUCOSE METER (BEAKER) (test code=1538)    283 mg/dL                 TESTED AT [address] [address]





POCT-GLUCOSE METER2018-09-10 17:34:00* 





                Test Item       Value           Reference Range    Comments

 

                POC-GLUCOSE METER (BEAKER) (test code=1538)    257 mg/dL                 TESTED AT [address] [address]





HEMOGLOBIN A1C2018-09-10 14:37:00* 





                Test Item       Value           Reference Range    Comments

 

                HEMOGLOBIN A1C (BEAKER) (test code=368)    10.5 %          4.3-6.1          





POCT-GLUCOSE METER2018-09-10 13:29:00* 





                Test Item       Value           Reference Range    Comments

 

                POC-GLUCOSE METER (BEAKER) (test code=1538)    268 mg/dL                 TESTED AT [facility] 

[address] [facility] [address]





BASIC METABOLIC PANEL2018-09-10 10:53:00* 





                Test Item       Value           Reference Range    Comments

 

                SODIUM (BEAKER) (test code=381)    135 meq/L       136-145          

 

                POTASSIUM (BEAKER) (test code=379)    4.3 meq/L       3.5-5.1          

 

                CHLORIDE (BEAKER) (test code=382)    93 meq/L                   

 

                CO2 (BEAKER) (test code=355)    34 meq/L        22-29            

 

                BLOOD UREA NITROGEN (BEAKER) (test code=354)    29 mg/dL        7-21             

 

                CREATININE (BEAKER) (test code=358)    1.79 mg/dL      0.57-1.25        

 

                GLUCOSE RANDOM (BEAKER) (test code=652)    406 mg/dL                  

 

                CALCIUM (BEAKER) (test code=697)    9.8 mg/dL       8.4-10.2         

 

                EGFR (BEAKER) (test code=1092)    40 mL/min/1.73 sq m                    ESTIMATED GFR IS NOT AS 

ACCURATE AS CREATININE CLEARANCE IN PREDICTING GLOMERULAR FILTRATION RATE. 
ESTIMATED GFR IS NOT APPLICABLE FOR DIALYSIS PATIENTS.





B-TYPE NATRIURETIC FACTOR (BNP)2018-09-10 10:41:00* 





                Test Item       Value           Reference Range    Comments

 

                B-TYPE NATRIURETIC PEPTIDE (BEAKER) (test code=700)    287 pg/mL       0-100            





CBC W/PLT COUNT & AUTO DIFFERENTIAL2018-09-10 10:18:00* 





                Test Item       Value           Reference Range    Comments

 

                WHITE BLOOD CELL COUNT (BEAKER) (test code=775)    7.7 K/ L        3.5-10.5         

 

                RED BLOOD CELL COUNT (BEAKER) (test code=761)    5.57 M/ L       4.63-6.08        

 

                HEMOGLOBIN (BEAKER) (test code=410)    17.0 GM/DL      13.7-17.5        

 

                HEMATOCRIT (BEAKER) (test code=411)    49.7 %          40.1-51.0        

 

                MEAN CORPUSCULAR VOLUME (BEAKER) (test code=753)    89.2 fL         79.0-92.2        

 

                MEAN CORPUSCULAR HEMOGLOBIN (BEAKER) (test code=751)    30.5 pg         25.7-32.2        

 

                    MEAN CORPUSCULAR HEMOGLOBIN CONC (BEAKER) (test code=752)    34.2 GM/DL          32.3-36.5

                                         

 

                RED CELL DISTRIBUTION WIDTH (BEAKER) (test code=412)    12.6 %          11.6-14.4        

 

                PLATELET COUNT (BEAKER) (test code=756)    218 K/CU MM     150-450          

 

                MEAN PLATELET VOLUME (BEAKER) (test code=754)    9.8 fL          9.4-12.4         

 

                NUCLEATED RED BLOOD CELLS (BEAKER) (test code=413)    0 /100 WBC      0-0              

 

                NEUTROPHILS RELATIVE PERCENT (BEAKER) (test code=429)    72 %                             

 

                LYMPHOCYTES RELATIVE PERCENT (BEAKER) (test code=430)    15 %                             

 

                MONOCYTES RELATIVE PERCENT (BEAKER) (test code=431)    10 %                             

 

                EOSINOPHILS RELATIVE PERCENT (BEAKER) (test code=432)    3 %                              

 

                BASOPHILS RELATIVE PERCENT (BEAKER) (test code=437)    0 %                              

 

                NEUTROPHILS ABSOLUTE COUNT (BEAKER) (test code=670)    5.53 K/ L       1.78-5.38        

 

                LYMPHOCYTES ABSOLUTE COUNT (BEAKER) (test code=414)    1.12 K/ L       1.32-3.57        

 

                MONOCYTES ABSOLUTE COUNT (BEAKER) (test code=415)    0.77 K/ L       0.30-0.82        

 

                EOSINOPHILS ABSOLUTE COUNT (BEAKER) (test code=416)    0.20 K/ L       0.04-0.54        

 

                BASOPHILS ABSOLUTE COUNT (BEAKER) (test code=417)    0.03 K/ L       0.01-0.08        

 

                IMMATURE GRANULOCYTES-RELATIVE PERCENT (BEAKER) (test code=2801)    0 %             0-1              





B-TYPE NATRIURETIC FACTOR (BNP)2018 11:38:00* 





                Test Item       Value           Reference Range    Comments

 

                B-TYPE NATRIURETIC PEPTIDE (BEAKER) (test code=700)    358 pg/mL       0-100            





BASIC METABOLIC [id number] 11:29:00* 





                Test Item       Value           Reference Range    Comments

 

                SODIUM (BEAKER) (test code=381)    134 meq/L       136-145          

 

                POTASSIUM (BEAKER) (test code=379)    4.1 meq/L       3.5-5.1          

 

                CHLORIDE (BEAKER) (test code=382)    93 meq/L                   

 

                CO2 (BEAKER) (test code=355)    34 meq/L        22-29            

 

                BLOOD UREA NITROGEN (BEAKER) (test code=354)    25 mg/dL        7-21             

 

                CREATININE (BEAKER) (test code=358)    1.78 mg/dL      0.57-1.25        

 

                GLUCOSE RANDOM (BEAKER) (test code=652)    306 mg/dL                  

 

                CALCIUM (BEAKER) (test code=697)    9.1 mg/dL       8.4-10.2         

 

                EGFR (BEAKER) (test code=1092)    40 mL/min/1.73 sq m                    ESTIMATED GFR IS NOT AS 

ACCURATE AS CREATININE CLEARANCE IN PREDICTING GLOMERULAR FILTRATION RATE. 
ESTIMATED GFR IS NOT APPLICABLE FOR DIALYSIS PATIENTS.





CBC W/PLT COUNT & AUTO [id number] 11:09:00* 





                Test Item       Value           Reference Range    Comments

 

                WHITE BLOOD CELL COUNT (BEAKER) (test code=775)    6.4 K/ L        3.5-10.5         

 

                RED BLOOD CELL COUNT (BEAKER) (test code=761)    5.33 M/ L       4.63-6.08        

 

                HEMOGLOBIN (BEAKER) (test code=410)    15.6 GM/DL      13.7-17.5        

 

                HEMATOCRIT (BEAKER) (test code=411)    48.0 %          40.1-51.0        

 

                MEAN CORPUSCULAR VOLUME (BEAKER) (test code=753)    90.1 fL         79.0-92.2        

 

                MEAN CORPUSCULAR HEMOGLOBIN (BEAKER) (test code=751)    29.3 pg         25.7-32.2        

 

                    MEAN CORPUSCULAR HEMOGLOBIN CONC (BEAKER) (test code=752)    32.5 GM/DL          32.3-36.5

                                         

 

                RED CELL DISTRIBUTION WIDTH (BEAKER) (test code=412)    14.6 %          11.6-14.4        

 

                PLATELET COUNT (BEAKER) (test code=756)    247 K/CU MM     150-450          

 

                MEAN PLATELET VOLUME (BEAKER) (test code=754)    9.9 fL          9.4-12.4         

 

                NUCLEATED RED BLOOD CELLS (BEAKER) (test code=413)    0 /100 WBC      0-0              

 

                NEUTROPHILS RELATIVE PERCENT (BEAKER) (test code=429)    72 %                             

 

                LYMPHOCYTES RELATIVE PERCENT (BEAKER) (test code=430)    15 %                             

 

                MONOCYTES RELATIVE PERCENT (BEAKER) (test code=431)    10 %                             

 

                EOSINOPHILS RELATIVE PERCENT (BEAKER) (test code=432)    3 %                              

 

                BASOPHILS RELATIVE PERCENT (BEAKER) (test code=437)    1 %                              

 

                NEUTROPHILS ABSOLUTE COUNT (BEAKER) (test code=670)    4.61 K/ L       1.78-5.38        

 

                LYMPHOCYTES ABSOLUTE COUNT (BEAKER) (test code=414)    0.94 K/ L       1.32-3.57        

 

                MONOCYTES ABSOLUTE COUNT (BEAKER) (test code=415)    0.63 K/ L       0.30-0.82        

 

                EOSINOPHILS ABSOLUTE COUNT (BEAKER) (test code=416)    0.17 K/ L       0.04-0.54        

 

                BASOPHILS ABSOLUTE COUNT (BEAKER) (test code=417)    0.03 K/ L       0.01-0.08        

 

                IMMATURE GRANULOCYTES-RELATIVE PERCENT (BEAKER) (test code=2801)    0 %             0-1              





BASIC METABOLIC [id number] 12:42:00* 





                Test Item       Value           Reference Range    Comments

 

                SODIUM (BEAKER) (test code=381)    134 meq/L       136-145          

 

                POTASSIUM (BEAKER) (test code=379)    4.3 meq/L       3.5-5.1          

 

                CHLORIDE (BEAKER) (test code=382)    90 meq/L                   

 

                CO2 (BEAKER) (test code=355)    34 meq/L        22-29            

 

                BLOOD UREA NITROGEN (BEAKER) (test code=354)    38 mg/dL        7-21             

 

                CREATININE (BEAKER) (test code=358)    1.91 mg/dL      0.57-1.25        

 

                GLUCOSE RANDOM (BEAKER) (test code=652)    355 mg/dL                  

 

                CALCIUM (BEAKER) (test code=697)    9.7 mg/dL       8.4-10.2         

 

                EGFR (BEAKER) (test code=1092)    37 mL/min/1.73 sq m                    ESTIMATED GFR IS NOT AS 

ACCURATE AS CREATININE CLEARANCE IN PREDICTING GLOMERULAR FILTRATION RATE. 
ESTIMATED GFR IS NOT APPLICABLE FOR DIALYSIS PATIENTS.





B-TYPE NATRIURETIC FACTOR (BNP)2018 12:28:00* 





                Test Item       Value           Reference Range    Comments

 

                B-TYPE NATRIURETIC PEPTIDE (BEAKER) (test code=700)    416 pg/mL       0-100            





CBC W/PLT COUNT & AUTO [id number] 12:10:00* 





                Test Item       Value           Reference Range    Comments

 

                WHITE BLOOD CELL COUNT (BEAKER) (test code=775)    7.3 K/ L        3.5-10.5         

 

                RED BLOOD CELL COUNT (BEAKER) (test code=761)    4.98 M/ L       4.63-6.08        

 

                HEMOGLOBIN (BEAKER) (test code=410)    14.5 GM/DL      13.7-17.5        

 

                HEMATOCRIT (BEAKER) (test code=411)    45.4 %          40.1-51.0        

 

                MEAN CORPUSCULAR VOLUME (BEAKER) (test code=753)    91.2 fL         79.0-92.2        

 

                MEAN CORPUSCULAR HEMOGLOBIN (BEAKER) (test code=751)    29.1 pg         25.7-32.2        

 

                    MEAN CORPUSCULAR HEMOGLOBIN CONC (BEAKER) (test code=752)    31.9 GM/DL          32.3-36.5

                                         

 

                RED CELL DISTRIBUTION WIDTH (BEAKER) (test code=412)    17.4 %          11.6-14.4        

 

                PLATELET COUNT (BEAKER) (test code=756)    248 K/CU MM     150-450          

 

                MEAN PLATELET VOLUME (BEAKER) (test code=754)    10.2 fL         9.4-12.4         

 

                NUCLEATED RED BLOOD CELLS (BEAKER) (test code=413)    0 /100 WBC      0-0              

 

                NEUTROPHILS RELATIVE PERCENT (BEAKER) (test code=429)    64 %                             

 

                LYMPHOCYTES RELATIVE PERCENT (BEAKER) (test code=430)    16 %                             

 

                MONOCYTES RELATIVE PERCENT (BEAKER) (test code=431)    15 %                             

 

                EOSINOPHILS RELATIVE PERCENT (BEAKER) (test code=432)    4 %                              

 

                BASOPHILS RELATIVE PERCENT (BEAKER) (test code=437)    0 %                              

 

                NEUTROPHILS ABSOLUTE COUNT (BEAKER) (test code=670)    4.67 K/ L       1.78-5.38        

 

                LYMPHOCYTES ABSOLUTE COUNT (BEAKER) (test code=414)    1.15 K/ L       1.32-3.57        

 

                MONOCYTES ABSOLUTE COUNT (BEAKER) (test code=415)    1.09 K/ L       0.30-0.82        

 

                EOSINOPHILS ABSOLUTE COUNT (BEAKER) (test code=416)    0.30 K/ L       0.04-0.54        

 

                BASOPHILS ABSOLUTE COUNT (BEAKER) (test code=417)    0.02 K/ L       0.01-0.08        

 

                IMMATURE GRANULOCYTES-RELATIVE PERCENT (BEAKER) (test code=2801)    0 %             0-1              





POCT-GLUCOSE [id number] 17:41:00* 





                Test Item       Value           Reference Range    Comments

 

                POC-GLUCOSE METER (BEAKER) (test code=1538)    395 mg/dL                 TESTED AT [place] [id number] [facility] [address]





POCT-GLUCOSE [id number] 16:59:00* 





                Test Item       Value           Reference Range    Comments

 

                POC-GLUCOSE METER (BEAKER) (test code=1538)    369 mg/dL                 Will Repeat Test/TESTED

 AT [address] [address]





POCT-GLUCOSE [id number] 11:57:00* 





                Test Item       Value           Reference Range    Comments

 

                POC-GLUCOSE METER (BEAKER) (test code=1538)    279 mg/dL                 TESTED AT [address] [address]





[id number] 08:57:00* 





                Test Item       Value           Reference Range    Comments

 

                MAGNESIUM (BEAKER) (test code=627)    2.0 mg/dL       1.6-2.6          





BASIC METABOLIC [id number] 08:57:00* 





                Test Item       Value           Reference Range    Comments

 

                SODIUM (BEAKER) (test code=381)    131 meq/L       136-145          

 

                POTASSIUM (BEAKER) (test code=379)    3.6 meq/L       3.5-5.1          

 

                CHLORIDE (BEAKER) (test code=382)    83 meq/L                   

 

                CO2 (BEAKER) (test code=355)    34 meq/L        22-29            

 

                BLOOD UREA NITROGEN (BEAKER) (test code=354)    30 mg/dL        7-21             

 

                CREATININE (BEAKER) (test code=358)    1.77 mg/dL      0.57-1.25        

 

                GLUCOSE RANDOM (BEAKER) (test code=652)    294 mg/dL                  

 

                CALCIUM (BEAKER) (test code=697)    9.0 mg/dL       8.4-10.2         

 

                EGFR (BEAKER) (test code=1092)    40 mL/min/1.73 sq m                    ESTIMATED GFR IS NOT AS 

ACCURATE AS CREATININE CLEARANCE IN PREDICTING GLOMERULAR FILTRATION RATE. 
ESTIMATED GFR IS NOT APPLICABLE FOR DIALYSIS PATIENTS.





POCT-GLUCOSE [id number] 07:42:00* 





                Test Item       Value           Reference Range    Comments

 

                POC-GLUCOSE METER (BEAKER) (test code=1538)    239 mg/dL                 TESTED AT [address] [address]





CBC W/PLT COUNT & AUTO [id number] 07:38:00* 





                Test Item       Value           Reference Range    Comments

 

                WHITE BLOOD CELL COUNT (BEAKER) (test code=775)    10.1 K/ L       3.5-10.5         

 

                RED BLOOD CELL COUNT (BEAKER) (test code=761)    4.96 M/ L       4.63-6.08        

 

                HEMOGLOBIN (BEAKER) (test code=410)    14.6 GM/DL      13.7-17.5        

 

                HEMATOCRIT (BEAKER) (test code=411)    43.3 %          40.1-51.0        

 

                MEAN CORPUSCULAR VOLUME (BEAKER) (test code=753)    87.3 fL         79.0-92.2        

 

                MEAN CORPUSCULAR HEMOGLOBIN (BEAKER) (test code=751)    29.4 pg         25.7-32.2        

 

                    MEAN CORPUSCULAR HEMOGLOBIN CONC (BEAKER) (test code=752)    33.7 GM/DL          32.3-36.5

                                         

 

                RED CELL DISTRIBUTION WIDTH (BEAKER) (test code=412)    18.2 %          11.6-14.4        

 

                PLATELET COUNT (BEAKER) (test code=756)    197 K/CU MM     150-450          

 

                MEAN PLATELET VOLUME (BEAKER) (test code=754)    10.2 fL         9.4-12.4         

 

                NUCLEATED RED BLOOD CELLS (BEAKER) (test code=413)    0 /100 WBC      0-0              

 

                NEUTROPHILS RELATIVE PERCENT (BEAKER) (test code=429)    68 %                             

 

                LYMPHOCYTES RELATIVE PERCENT (BEAKER) (test code=430)    14 %                             

 

                MONOCYTES RELATIVE PERCENT (BEAKER) (test code=431)    12 %                             

 

                EOSINOPHILS RELATIVE PERCENT (BEAKER) (test code=432)    5 %                              

 

                BASOPHILS RELATIVE PERCENT (BEAKER) (test code=437)    0 %                              

 

                NEUTROPHILS ABSOLUTE COUNT (BEAKER) (test code=670)    6.87 K/ L       1.78-5.38        

 

                LYMPHOCYTES ABSOLUTE COUNT (BEAKER) (test code=414)    1.42 K/ L       1.32-3.57        

 

                MONOCYTES ABSOLUTE COUNT (BEAKER) (test code=415)    1.23 K/ L       0.30-0.82        

 

                EOSINOPHILS ABSOLUTE COUNT (BEAKER) (test code=416)    0.45 K/ L       0.04-0.54        

 

                BASOPHILS ABSOLUTE COUNT (BEAKER) (test code=417)    0.04 K/ L       0.01-0.08        

 

                IMMATURE GRANULOCYTES-RELATIVE PERCENT (BEAKER) (test code=2801)    1 %             0-1              





POCT-GLUCOSE [id number] 21:34:00* 





                Test Item       Value           Reference Range    Comments

 

                POC-GLUCOSE METER (BEAKER) (test code=1538)    271 mg/dL                 TESTED AT [address] [address]





POCT-GLUCOSE [id number] 17:07:00* 





                Test Item       Value           Reference Range    Comments

 

                POC-GLUCOSE METER (BEAKER) (test code=1538)    367 mg/dL                 Notified RN MD/TESTED

 AT [address] [address]





POCT-GLUCOSE [id number] 12:27:00* 





                Test Item       Value           Reference Range    Comments

 

                POC-GLUCOSE METER (BEAKER) (test code=1538)    282 mg/dL                 TESTED AT [address] [address]





POCT-GLUCOSE [id number] 08:03:00* 





                Test Item       Value           Reference Range    Comments

 

                POC-GLUCOSE METER (BEAKER) (test code=1538)    163 mg/dL                 TESTED AT [address] [address]





B-TYPE NATRIURETIC FACTOR (BNP)2018 07:24:00* 





                Test Item       Value           Reference Range    Comments

 

                B-TYPE NATRIURETIC PEPTIDE (BEAKER) (test code=700)    1319 pg/mL      0-100            





[id number] 07:15:00* 





                Test Item       Value           Reference Range    Comments

 

                MAGNESIUM (BEAKER) (test code=627)    1.9 mg/dL       1.6-2.6         Specimen slightly hemolyzed







BASIC METABOLIC [id number] 07:15:00* 





                Test Item       Value           Reference Range    Comments

 

                SODIUM (BEAKER) (test code=381)    132 meq/L       136-145          

 

                POTASSIUM (BEAKER) (test code=379)    3.5 meq/L       3.5-5.1         Specimen slightly hemolyzed



 

                CHLORIDE (BEAKER) (test code=382)    90 meq/L                   

 

                CO2 (BEAKER) (test code=355)    30 meq/L        22-29            

 

                BLOOD UREA NITROGEN (BEAKER) (test code=354)    20 mg/dL        7-21             

 

                CREATININE (BEAKER) (test code=358)    1.37 mg/dL      0.57-1.25       Specimen slightly hemolyzed



 

                GLUCOSE RANDOM (BEAKER) (test code=652)    194 mg/dL                  

 

                CALCIUM (BEAKER) (test code=697)    8.8 mg/dL       8.4-10.2         

 

                EGFR (BEAKER) (test code=1092)    54 mL/min/1.73 sq m                    ESTIMATED GFR IS NOT AS 

ACCURATE AS CREATININE CLEARANCE IN PREDICTING GLOMERULAR FILTRATION RATE. 
ESTIMATED GFR IS NOT APPLICABLE FOR DIALYSIS PATIENTS.





CBC W/PLT COUNT & AUTO [id number] 07:01:00* 





                Test Item       Value           Reference Range    Comments

 

                WHITE BLOOD CELL COUNT (BEAKER) (test code=775)    7.8 K/ L        3.5-10.5         

 

                RED BLOOD CELL COUNT (BEAKER) (test code=761)    4.21 M/ L       4.63-6.08        

 

                HEMOGLOBIN (BEAKER) (test code=410)    12.7 GM/DL      13.7-17.5        

 

                HEMATOCRIT (BEAKER) (test code=411)    38.2 %          40.1-51.0        

 

                MEAN CORPUSCULAR VOLUME (BEAKER) (test code=753)    90.7 fL         79.0-92.2        

 

                MEAN CORPUSCULAR HEMOGLOBIN (BEAKER) (test code=751)    30.2 pg         25.7-32.2        

 

                    MEAN CORPUSCULAR HEMOGLOBIN CONC (BEAKER) (test code=752)    33.2 GM/DL          32.3-36.5

                                         

 

                RED CELL DISTRIBUTION WIDTH (BEAKER) (test code=412)    18.3 %          11.6-14.4        

 

                PLATELET COUNT (BEAKER) (test code=756)    175 K/CU MM     150-450          

 

                MEAN PLATELET VOLUME (BEAKER) (test code=754)    11.3 fL         9.4-12.4         

 

                NUCLEATED RED BLOOD CELLS (BEAKER) (test code=413)    0 /100 WBC      0-0              

 

                NEUTROPHILS RELATIVE PERCENT (BEAKER) (test code=429)    73 %                             

 

                LYMPHOCYTES RELATIVE PERCENT (BEAKER) (test code=430)    15 %                             

 

                MONOCYTES RELATIVE PERCENT (BEAKER) (test code=431)    8 %                              

 

                EOSINOPHILS RELATIVE PERCENT (BEAKER) (test code=432)    4 %                              

 

                BASOPHILS RELATIVE PERCENT (BEAKER) (test code=437)    0 %                              

 

                NEUTROPHILS ABSOLUTE COUNT (BEAKER) (test code=670)    5.72 K/ L       1.78-5.38        

 

                LYMPHOCYTES ABSOLUTE COUNT (BEAKER) (test code=414)    1.14 K/ L       1.32-3.57        

 

                MONOCYTES ABSOLUTE COUNT (BEAKER) (test code=415)    0.59 K/ L       0.30-0.82        

 

                EOSINOPHILS ABSOLUTE COUNT (BEAKER) (test code=416)    0.32 K/ L       0.04-0.54        

 

                BASOPHILS ABSOLUTE COUNT (BEAKER) (test code=417)    0.02 K/ L       0.01-0.08        

 

                IMMATURE GRANULOCYTES-RELATIVE PERCENT (BEAKER) (test code=2801)    0 %             0-1              





RAD, CHEST, 2 [id number] 22:42:00Reason for exam:->Chest PainFINAL REPORT 
PATIENT ID:   [id number] Chest, PA and lateral. History: Chest pain. Comparison: 
2018. Impression:The heart is mildly enlarged, stable. A left subclavian 
AICD is in place. The lungs are clear. There is no pleural effusion or 
pneumothorax. Signed: Dreyer, Stephen MDReport Verified Date/Time:  2018 
22:42:49 Reading Location: [facility] C013W Consult Reading Room     Electronically 
signed by: STEPHEN EDWARD DREYER on 2018 10:42 PM POCT-GLUCOSE METER
2018 22:07:00* 





                Test Item       Value           Reference Range    Comments

 

                POC-GLUCOSE METER (BEAKER) (test code=1538)    204 mg/dL                 TESTED AT [address] [address]





POCT-GLUCOSE [id number] 17:22:00* 





                Test Item       Value           Reference Range    Comments

 

                POC-GLUCOSE METER (BEAKER) (test code=1538)    197 mg/dL                 TESTED AT [address] [address]





POCT-GLUCOSE [id number] 11:42:00* 





                Test Item       Value           Reference Range    Comments

 

                POC-GLUCOSE METER (BEAKER) (test code=1538)    322 mg/dL                 Notified RN MD/TESTED

 AT [address] [address]





POCT-GLUCOSE [id number] 09:58:00* 





                Test Item       Value           Reference Range    Comments

 

                POC-GLUCOSE METER (BEAKER) (test code=1538)    219 mg/dL                 TESTED AT [place] [id number] [facility] [address]





DIGOXIN [id number] 04:16:00* 





                Test Item       Value           Reference Range    Comments

 

                DIGOXIN LEVEL (BEAKER) (test code=669)    0.4 ng/mL       0.8-2.0          





[id number] 04:14:00* 





                Test Item       Value           Reference Range    Comments

 

                MAGNESIUM (BEAKER) (test code=627)    1.7 mg/dL       1.6-2.6          





BASIC METABOLIC [id number] 04:14:00* 





                Test Item       Value           Reference Range    Comments

 

                SODIUM (BEAKER) (test code=381)    136 meq/L       136-145          

 

                POTASSIUM (BEAKER) (test code=379)    3.6 meq/L       3.5-5.1          

 

                CHLORIDE (BEAKER) (test code=382)    98 meq/L                   

 

                CO2 (BEAKER) (test code=355)    28 meq/L        22-29            

 

                BLOOD UREA NITROGEN (BEAKER) (test code=354)    20 mg/dL        7-21             

 

                CREATININE (BEAKER) (test code=358)    1.47 mg/dL      0.57-1.25        

 

                GLUCOSE RANDOM (BEAKER) (test code=652)    279 mg/dL                  

 

                CALCIUM (BEAKER) (test code=697)    8.6 mg/dL       8.4-10.2         

 

                EGFR (BEAKER) (test code=1092)    50 mL/min/1.73 sq m                    ESTIMATED GFR IS NOT AS 

ACCURATE AS CREATININE CLEARANCE IN PREDICTING GLOMERULAR FILTRATION RATE. 
ESTIMATED GFR IS NOT APPLICABLE FOR DIALYSIS PATIENTS.





CBC W/PLT COUNT & AUTO [id number] 03:48:00* 





                Test Item       Value           Reference Range    Comments

 

                WHITE BLOOD CELL COUNT (BEAKER) (test code=775)    6.9 K/ L        3.5-10.5         

 

                RED BLOOD CELL COUNT (BEAKER) (test code=761)    3.86 M/ L       4.63-6.08        

 

                HEMOGLOBIN (BEAKER) (test code=410)    11.4 GM/DL      13.7-17.5        

 

                HEMATOCRIT (BEAKER) (test code=411)    34.2 %          40.1-51.0        

 

                MEAN CORPUSCULAR VOLUME (BEAKER) (test code=753)    88.6 fL         79.0-92.2        

 

                MEAN CORPUSCULAR HEMOGLOBIN (BEAKER) (test code=751)    29.5 pg         25.7-32.2        

 

                    MEAN CORPUSCULAR HEMOGLOBIN CONC (BEAKER) (test code=752)    33.3 GM/DL          32.3-36.5

                                         

 

                RED CELL DISTRIBUTION WIDTH (BEAKER) (test code=412)    17.9 %          11.6-14.4        

 

                PLATELET COUNT (BEAKER) (test code=756)    147 K/CU MM     150-450          

 

                MEAN PLATELET VOLUME (BEAKER) (test code=754)    10.2 fL         9.4-12.4         

 

                NUCLEATED RED BLOOD CELLS (BEAKER) (test code=413)    0 /100 WBC      0-0              

 

                NEUTROPHILS RELATIVE PERCENT (BEAKER) (test code=429)    75 %                             

 

                LYMPHOCYTES RELATIVE PERCENT (BEAKER) (test code=430)    14 %                             

 

                MONOCYTES RELATIVE PERCENT (BEAKER) (test code=431)    7 %                              

 

                EOSINOPHILS RELATIVE PERCENT (BEAKER) (test code=432)    3 %                              

 

                BASOPHILS RELATIVE PERCENT (BEAKER) (test code=437)    0 %                              

 

                NEUTROPHILS ABSOLUTE COUNT (BEAKER) (test code=670)    5.15 K/ L       1.78-5.38        

 

                LYMPHOCYTES ABSOLUTE COUNT (BEAKER) (test code=414)    0.95 K/ L       1.32-3.57        

 

                MONOCYTES ABSOLUTE COUNT (BEAKER) (test code=415)    0.50 K/ L       0.30-0.82        

 

                EOSINOPHILS ABSOLUTE COUNT (BEAKER) (test code=416)    0.23 K/ L       0.04-0.54        

 

                BASOPHILS ABSOLUTE COUNT (BEAKER) (test code=417)    0.03 K/ L       0.01-0.08        

 

                IMMATURE GRANULOCYTES-RELATIVE PERCENT (BEAKER) (test code=2801)    0 %             0-1              





POCT-GLUCOSE METER2018-07-10 21:10:00* 





                Test Item       Value           Reference Range    Comments

 

                POC-GLUCOSE METER (BEAKER) (test code=1538)    187 mg/dL                 TESTED AT [facility] 

[address] [facility] [address]





POCT-GLUCOSE METER2018-07-10 17:11:00* 





                Test Item       Value           Reference Range    Comments

 

                POC-GLUCOSE METER (BEAKER) (test code=1538)    282 mg/dL                 TESTED AT [place] [id number] [facility] [address]





HEMOGLOBIN A1C2018-07-10 13:27:00* 





                Test Item       Value           Reference Range    Comments

 

                HEMOGLOBIN A1C (BEAKER) (test code=368)    10.0 %          4.3-6.1          





B-TYPE NATRIURETIC FACTOR (BNP)2018-07-10 13:08:00* 





                Test Item       Value           Reference Range    Comments

 

                B-TYPE NATRIURETIC PEPTIDE (BEAKER) (test code=700)    2558 pg/mL      0-100            





MAGNESIUM2018-07-10 13:03:00* 





                Test Item       Value           Reference Range    Comments

 

                MAGNESIUM (BEAKER) (test code=627)    1.8 mg/dL       1.6-2.6          





BASIC METABOLIC PANEL2018-07-10 13:03:00* 





                Test Item       Value           Reference Range    Comments

 

                SODIUM (BEAKER) (test code=381)    139 meq/L       136-145          

 

                POTASSIUM (BEAKER) (test code=379)    3.7 meq/L       3.5-5.1          

 

                CHLORIDE (BEAKER) (test code=382)    101 meq/L                  

 

                CO2 (BEAKER) (test code=355)    29 meq/L        22-29            

 

                BLOOD UREA NITROGEN (BEAKER) (test code=354)    15 mg/dL        7-21             

 

                CREATININE (BEAKER) (test code=358)    1.43 mg/dL      0.57-1.25        

 

                GLUCOSE RANDOM (BEAKER) (test code=652)    233 mg/dL                  

 

                CALCIUM (BEAKER) (test code=697)    8.8 mg/dL       8.4-10.2         

 

                EGFR (BEAKER) (test code=1092)    52 mL/min/1.73 sq m                    ESTIMATED GFR IS NOT AS 

ACCURATE AS CREATININE CLEARANCE IN PREDICTING GLOMERULAR FILTRATION RATE. 
ESTIMATED GFR IS NOT APPLICABLE FOR DIALYSIS PATIENTS.





CBC W/PLT COUNT & AUTO DIFFERENTIAL2018-07-10 12:51:00* 





                Test Item       Value           Reference Range    Comments

 

                WHITE BLOOD CELL COUNT (BEAKER) (test code=775)    7.4 K/ L        3.5-10.5         

 

                RED BLOOD CELL COUNT (BEAKER) (test code=761)    4.06 M/ L       4.63-6.08        

 

                HEMOGLOBIN (BEAKER) (test code=410)    11.9 GM/DL      13.7-17.5        

 

                HEMATOCRIT (BEAKER) (test code=411)    36.6 %          40.1-51.0        

 

                MEAN CORPUSCULAR VOLUME (BEAKER) (test code=753)    90.1 fL         79.0-92.2        

 

                MEAN CORPUSCULAR HEMOGLOBIN (BEAKER) (test code=751)    29.3 pg         25.7-32.2        

 

                    MEAN CORPUSCULAR HEMOGLOBIN CONC (BEAKER) (test code=752)    32.5 GM/DL          32.3-36.5

                                         

 

                RED CELL DISTRIBUTION WIDTH (BEAKER) (test code=412)    19.0 %          11.6-14.4        

 

                PLATELET COUNT (BEAKER) (test code=756)    146 K/CU MM     150-450          

 

                MEAN PLATELET VOLUME (BEAKER) (test code=754)    10.4 fL         9.4-12.4         

 

                NUCLEATED RED BLOOD CELLS (BEAKER) (test code=413)    1 /100 WBC      0-0              

 

                NEUTROPHILS RELATIVE PERCENT (BEAKER) (test code=429)    80 %                             

 

                LYMPHOCYTES RELATIVE PERCENT (BEAKER) (test code=430)    12 %                             

 

                MONOCYTES RELATIVE PERCENT (BEAKER) (test code=431)    6 %                              

 

                EOSINOPHILS RELATIVE PERCENT (BEAKER) (test code=432)    2 %                              

 

                BASOPHILS RELATIVE PERCENT (BEAKER) (test code=437)    0 %                              

 

                NEUTROPHILS ABSOLUTE COUNT (BEAKER) (test code=670)    5.91 K/ L       1.78-5.38        

 

                LYMPHOCYTES ABSOLUTE COUNT (BEAKER) (test code=414)    0.88 K/ L       1.32-3.57        

 

                MONOCYTES ABSOLUTE COUNT (BEAKER) (test code=415)    0.42 K/ L       0.30-0.82        

 

                EOSINOPHILS ABSOLUTE COUNT (BEAKER) (test code=416)    0.14 K/ L       0.04-0.54        

 

                BASOPHILS ABSOLUTE COUNT (BEAKER) (test code=417)    0.03 K/ L       0.01-0.08        

 

                IMMATURE GRANULOCYTES-RELATIVE PERCENT (BEAKER) (test code=2801)    1 %             0-1              





POCT-GLUCOSE METER2018-07-10 10:59:00* 





                Test Item       Value           Reference Range    Comments

 

                POC-GLUCOSE METER (BEAKER) (test code=1538)    249 mg/dL                 TESTED AT [address] [address]





POCT-GLUCOSE METER2018-07-10 06:55:00* 





                Test Item       Value           Reference Range    Comments

 

                POC-GLUCOSE METER (BEAKER) (test code=1538)    238 mg/dL                 TESTED AT [address] [address]





CREATINE KINASE (CK), TOTAL AND [id number] 22:47:00* 





                Test Item       Value           Reference Range    Comments

 

                CREATINE KINASE TOTAL (BEAKER) (test code=380)    203 U/L                    

 

                CREATINE KINASE-MB (BEAKER) (test code=750)    2.1 ng/mL       0.0-6.6          

 

                CREATINE KINASE-MB INDEX (BEAKER) (test code=395)    1.0 %                            





CK-MB Reference Range:<6.7      Normal6.7-10.0  Borderline>10.0     Abnormal
TROPONIN -68-71 22:47:00* 





                Test Item       Value           Reference Range    Comments

 

                TROPONIN I (BEAKER) (test code=397)    0.02 ng/mL      0.00-0.03        





Troponin I (TnI) levels must be interpreted in the context of the presenting sym
ptoms and the clinical findings. Elevated TnI levels indicate myocardial damage,
but are not specific for ischemic heart disease. Elevated TnI levels are seen in
patients with other cardiac conditions (including myocarditis and congestive h
eart failure), and slight TnI elevations occur in patients with other conditions
, including sepsis, renal failure, acidosis, acute neurological disease, and per
sistent tachyarrhythmia.B-TYPE NATRIURETIC FACTOR (BNP)2018 22:47:00* 





                Test Item       Value           Reference Range    Comments

 

                B-TYPE NATRIURETIC PEPTIDE (BEAKER) (test code=700)    2506 pg/mL      0-100            





RAD, CHEST, 1 VIEW, NON [id number] 22:47:00Reason for exam:->CHEST PAINFINAL
REPORT PATIENT ID:   [id number] Chest, single frontal view History: Chest pain 
Comparison: 2017 IMPRESSION: There is mild cardiomegaly. A left subclavian
AICD is in place. Mild interstitial prominence is present bilaterally which may 
reflect a component of edema. Duration, sizable pleural effusion, or pneumot
horax. Signed: Dreyer, Stephen MDReport Verified Date/Time:  2018 22:47:11
Reading Location: [facility] B1 C013W Consult Reading Room     Electronically signed b
y: STEPHEN EDWARD DREYER on 2018 10:47 PM [id number] 22:39:00* 





                Test Item       Value           Reference Range    Comments

 

                MAGNESIUM (BEAKER) (test code=627)    2.1 mg/dL       1.6-2.6          





BASIC METABOLIC [id number] 22:39:00* 





                Test Item       Value           Reference Range    Comments

 

                SODIUM (BEAKER) (test code=381)    140 meq/L       136-145          

 

                POTASSIUM (BEAKER) (test code=379)    4.2 meq/L       3.5-5.1          

 

                CHLORIDE (BEAKER) (test code=382)    106 meq/L                  

 

                CO2 (BEAKER) (test code=355)    26 meq/L        22-29            

 

                BLOOD UREA NITROGEN (BEAKER) (test code=354)    15 mg/dL        7-21             

 

                CREATININE (BEAKER) (test code=358)    1.37 mg/dL      0.57-1.25        

 

                GLUCOSE RANDOM (BEAKER) (test code=652)    139 mg/dL                  

 

                CALCIUM (BEAKER) (test code=697)    8.6 mg/dL       8.4-10.2         

 

                EGFR (BEAKER) (test code=1092)    54 mL/min/1.73 sq m                    ESTIMATED GFR IS NOT AS 

ACCURATE AS CREATININE CLEARANCE IN PREDICTING GLOMERULAR FILTRATION RATE. 
ESTIMATED GFR IS NOT APPLICABLE FOR DIALYSIS PATIENTS.





[id number] 22:36:00* 





                Test Item       Value           Reference Range    Comments

 

                PROTIME (BEAKER) (test code=759)    21.8 seconds    11.7-14.7        

 

                INR (BEAKER) (test code=370)    1.9             <=5.9            

 

                PARTIAL THROMBOPLASTIN TIME (BEAKER) (test code=760)    36.6 seconds    22.5-36.0        







RECOMMENDED COUMADIN/WARFARIN INR THERAPY RANGESSTANDARD DOSE: 2.0 - 3.0   Inclu
rosette: PROPHYLAXIS for venous thrombosis, systemic embolization; TREATMENT for nesha
ous thrombosis and/or pulmonary embolus.HIGH RISK: Target INR is 2.5-3.5 for pat
ients with mechanical heart valves.CBC W/PLT COUNT & AUTO [id number]
22:26:00* 





                Test Item       Value           Reference Range    Comments

 

                WHITE BLOOD CELL COUNT (BEAKER) (test code=775)    7.9 K/ L        3.5-10.5         

 

                RED BLOOD CELL COUNT (BEAKER) (test code=761)    4.29 M/ L       4.63-6.08        

 

                HEMOGLOBIN (BEAKER) (test code=410)    12.5 GM/DL      13.7-17.5        

 

                HEMATOCRIT (BEAKER) (test code=411)    39.4 %          40.1-51.0        

 

                MEAN CORPUSCULAR VOLUME (BEAKER) (test code=753)    91.8 fL         79.0-92.2        

 

                MEAN CORPUSCULAR HEMOGLOBIN (BEAKER) (test code=751)    29.1 pg         25.7-32.2        

 

                    MEAN CORPUSCULAR HEMOGLOBIN CONC (BEAKER) (test code=752)    31.7 GM/DL          32.3-36.5

                                         

 

                RED CELL DISTRIBUTION WIDTH (BEAKER) (test code=412)    18.4 %          11.6-14.4        

 

                PLATELET COUNT (BEAKER) (test code=756)    166 K/CU MM     150-450          

 

                MEAN PLATELET VOLUME (BEAKER) (test code=754)    10.0 fL         9.4-12.4         

 

                NUCLEATED RED BLOOD CELLS (BEAKER) (test code=413)    1 /100 WBC      0-0              

 

                NEUTROPHILS RELATIVE PERCENT (BEAKER) (test code=429)    78 %                             

 

                LYMPHOCYTES RELATIVE PERCENT (BEAKER) (test code=430)    13 %                             

 

                MONOCYTES RELATIVE PERCENT (BEAKER) (test code=431)    6 %                              

 

                EOSINOPHILS RELATIVE PERCENT (BEAKER) (test code=432)    2 %                              

 

                BASOPHILS RELATIVE PERCENT (BEAKER) (test code=437)    0 %                              

 

                NEUTROPHILS ABSOLUTE COUNT (BEAKER) (test code=670)    6.13 K/ L       1.78-5.38        

 

                LYMPHOCYTES ABSOLUTE COUNT (BEAKER) (test code=414)    1.04 K/ L       1.32-3.57        

 

                MONOCYTES ABSOLUTE COUNT (BEAKER) (test code=415)    0.50 K/ L       0.30-0.82        

 

                EOSINOPHILS ABSOLUTE COUNT (BEAKER) (test code=416)    0.14 K/ L       0.04-0.54        

 

                BASOPHILS ABSOLUTE COUNT (BEAKER) (test code=417)    0.02 K/ L       0.01-0.08        

 

                IMMATURE GRANULOCYTES-RELATIVE PERCENT (BEAKER) (test code=2801)    0 %             0-1              





HEMOGLOBIN [id number] 13:33:00* 





                Test Item       Value           Reference Range    Comments

 

                HEMOGLOBIN A1C (BEAKER) (test code=368)    8.8 %           4.3-6.1          





B-TYPE NATRIURETIC FACTOR (BNP)2018 10:46:00* 





                Test Item       Value           Reference Range    Comments

 

                B-TYPE NATRIURETIC PEPTIDE (BEAKER) (test code=700)    232 pg/mL       0-100            





BASIC METABOLIC [id number] 10:37:00* 





                Test Item       Value           Reference Range    Comments

 

                SODIUM (BEAKER) (test code=381)    135 meq/L       136-145          

 

                POTASSIUM (BEAKER) (test code=379)    4.3 meq/L       3.5-5.1          

 

                CHLORIDE (BEAKER) (test code=382)    96 meq/L                   

 

                CO2 (BEAKER) (test code=355)    29 meq/L        22-29            

 

                BLOOD UREA NITROGEN (BEAKER) (test code=354)    41 mg/dL        7-21             

 

                CREATININE (BEAKER) (test code=358)    2.00 mg/dL      0.57-1.25        

 

                GLUCOSE RANDOM (BEAKER) (test code=652)    262 mg/dL                  

 

                CALCIUM (BEAKER) (test code=697)    9.5 mg/dL       8.4-10.2         

 

                EGFR (BEAKER) (test code=1092)    35 mL/min/1.73 sq m                    ESTIMATED GFR IS NOT AS 

ACCURATE AS CREATININE CLEARANCE IN PREDICTING GLOMERULAR FILTRATION RATE. 
ESTIMATED GFR IS NOT APPLICABLE FOR DIALYSIS PATIENTS.





CBC W/PLT COUNT & AUTO [id number] 10:19:00* 





                Test Item       Value           Reference Range    Comments

 

                WHITE BLOOD CELL COUNT (BEAKER) (test code=775)    7.2 K/ L        3.5-10.5         

 

                RED BLOOD CELL COUNT (BEAKER) (test code=761)    5.26 M/ L       4.63-6.08        

 

                HEMOGLOBIN (BEAKER) (test code=410)    15.3 GM/DL      13.7-17.5        

 

                HEMATOCRIT (BEAKER) (test code=411)    44.8 %          40.1-51.0        

 

                MEAN CORPUSCULAR VOLUME (BEAKER) (test code=753)    85.2 fL         79.0-92.2        

 

                MEAN CORPUSCULAR HEMOGLOBIN (BEAKER) (test code=751)    29.1 pg         25.7-32.2        

 

                    MEAN CORPUSCULAR HEMOGLOBIN CONC (BEAKER) (test code=752)    34.2 GM/DL          32.3-36.5

                                         

 

                RED CELL DISTRIBUTION WIDTH (BEAKER) (test code=412)    14.6 %          11.6-14.4        

 

                PLATELET COUNT (BEAKER) (test code=756)    185 K/CU MM     150-450          

 

                MEAN PLATELET VOLUME (BEAKER) (test code=754)    9.8 fL          9.4-12.4         

 

                NUCLEATED RED BLOOD CELLS (BEAKER) (test code=413)    0 /100 WBC      0-0              

 

                NEUTROPHILS RELATIVE PERCENT (BEAKER) (test code=429)    71 %                             

 

                LYMPHOCYTES RELATIVE PERCENT (BEAKER) (test code=430)    16 %                             

 

                MONOCYTES RELATIVE PERCENT (BEAKER) (test code=431)    10 %                             

 

                EOSINOPHILS RELATIVE PERCENT (BEAKER) (test code=432)    3 %                              

 

                BASOPHILS RELATIVE PERCENT (BEAKER) (test code=437)    0 %                              

 

                NEUTROPHILS ABSOLUTE COUNT (BEAKER) (test code=670)    5.13 K/ L       1.78-5.38        

 

                LYMPHOCYTES ABSOLUTE COUNT (BEAKER) (test code=414)    1.15 K/ L       1.32-3.57        

 

                MONOCYTES ABSOLUTE COUNT (BEAKER) (test code=415)    0.70 K/ L       0.30-0.82        

 

                EOSINOPHILS ABSOLUTE COUNT (BEAKER) (test code=416)    0.18 K/ L       0.04-0.54        

 

                BASOPHILS ABSOLUTE COUNT (BEAKER) (test code=417)    0.03 K/ L       0.01-0.08        

 

                IMMATURE GRANULOCYTES-RELATIVE PERCENT (BEAKER) (test code=2801)    0 %             0-1              





LIPID [id number] 11:12:00* 





                Test Item       Value           Reference Range    Comments

 

                TRIGLYCERIDES (BEAKER) (test code=540)    249 mg/dL                        

 

                CHOLESTEROL (BEAKER) (test code=631)    153 mg/dL                        

 

                HDL CHOLESTEROL (BEAKER) (test code=976)    31 mg/dL                         

 

                LDL CHOLESTEROL CALCULATED (BEAKER) (test code=633)    72 mg/dL                         





Triglyceride Reference Range:   Low Risk         <150   Borderline    150-199   
High Risk     200-499   Very High Risk  >=500Cholesterol Reference Range:   Low 
Risk         <200   Borderline    200-239    High Risk        >240HDL 
Cholesterol Reference Range:   Low Risk         >=60   High Risk         <40LDL 
Cholesterol Reference Range:   Optimal          <100   Near Optimal  100-129   
Borderline    130-159   High          160-189   Very High       >=190   BASIC 
METABOLIC [id number] 11:12:00* 





                Test Item       Value           Reference Range    Comments

 

                SODIUM (BEAKER) (test code=381)    137 meq/L       136-145          

 

                POTASSIUM (BEAKER) (test code=379)    3.6 meq/L       3.5-5.1          

 

                CHLORIDE (BEAKER) (test code=382)    94 meq/L                   

 

                CO2 (BEAKER) (test code=355)    31 meq/L        22-29            

 

                BLOOD UREA NITROGEN (BEAKER) (test code=354)    25 mg/dL        7-21             

 

                CREATININE (BEAKER) (test code=358)    1.90 mg/dL      0.57-1.25        

 

                GLUCOSE RANDOM (BEAKER) (test code=652)    266 mg/dL                  

 

                CALCIUM (BEAKER) (test code=697)    9.5 mg/dL       8.4-10.2         

 

                EGFR (BEAKER) (test code=1092)    37 mL/min/1.73 sq m                    ESTIMATED GFR IS NOT AS 

ACCURATE AS CREATININE CLEARANCE IN PREDICTING GLOMERULAR FILTRATION RATE. 
ESTIMATED GFR IS NOT APPLICABLE FOR DIALYSIS PATIENTS.





HEPATIC FUNCTION [id number] 11:12:00* 





                Test Item       Value           Reference Range    Comments

 

                TOTAL PROTEIN (BEAKER) (test code=770)    8.3 gm/dL       6.0-8.3          

 

                ALBUMIN (BEAKER) (test code=1145)    4.6 g/dL        3.5-5.0          

 

                BILIRUBIN TOTAL (BEAKER) (test code=377)    0.7 mg/dL       0.2-1.2          

 

                BILIRUBIN DIRECT (BEAKER) (test code=706)    0.3 mg/dL       0.1-0.5          

 

                ALKALINE PHOSPHATASE (BEAKER) (test code=346)    142 U/L                    

 

                AST (SGOT) (BEAKER) (test code=353)    23 U/L          5-34             

 

                ALT (SGPT) (BEAKER) (test code=347)    19 U/L          6-55             





B-TYPE NATRIURETIC FACTOR (BNP)2018 11:12:00* 





                Test Item       Value           Reference Range    Comments

 

                B-TYPE NATRIURETIC PEPTIDE (BEAKER) (test code=700)    317 pg/mL       0-100            





CBC W/PLT COUNT & AUTO [id number] 10:55:00* 





                Test Item       Value           Reference Range    Comments

 

                WHITE BLOOD CELL COUNT (BEAKER) (test code=775)    7.9 K/ L        3.5-10.5         

 

                RED BLOOD CELL COUNT (BEAKER) (test code=761)    5.72 M/ L       4.63-6.08        

 

                HEMOGLOBIN (BEAKER) (test code=410)    16.5 GM/DL      13.7-17.5        

 

                HEMATOCRIT (BEAKER) (test code=411)    50.2 %          40.1-51.0        

 

                MEAN CORPUSCULAR VOLUME (BEAKER) (test code=753)    87.8 fL         79.0-92.2        

 

                MEAN CORPUSCULAR HEMOGLOBIN (BEAKER) (test code=751)    28.8 pg         25.7-32.2        

 

                    MEAN CORPUSCULAR HEMOGLOBIN CONC (BEAKER) (test code=752)    32.9 GM/DL          32.3-36.5

                                         

 

                RED CELL DISTRIBUTION WIDTH (BEAKER) (test code=412)    13.2 %          11.6-14.4        

 

                PLATELET COUNT (BEAKER) (test code=756)    190 K/CU MM     150-450          

 

                MEAN PLATELET VOLUME (BEAKER) (test code=754)    10.5 fL         9.4-12.4         

 

                NUCLEATED RED BLOOD CELLS (BEAKER) (test code=413)    0 /100 WBC      0-0              

 

                NEUTROPHILS RELATIVE PERCENT (BEAKER) (test code=429)    72 %                             

 

                LYMPHOCYTES RELATIVE PERCENT (BEAKER) (test code=430)    14 %                             

 

                MONOCYTES RELATIVE PERCENT (BEAKER) (test code=431)    9 %                              

 

                EOSINOPHILS RELATIVE PERCENT (BEAKER) (test code=432)    4 %                              

 

                BASOPHILS RELATIVE PERCENT (BEAKER) (test code=437)    0 %                              

 

                NEUTROPHILS ABSOLUTE COUNT (BEAKER) (test code=670)    5.67 K/ L       1.78-5.38        

 

                LYMPHOCYTES ABSOLUTE COUNT (BEAKER) (test code=414)    1.10 K/ L       1.32-3.57        

 

                MONOCYTES ABSOLUTE COUNT (BEAKER) (test code=415)    0.74 K/ L       0.30-0.82        

 

                EOSINOPHILS ABSOLUTE COUNT (BEAKER) (test code=416)    0.34 K/ L       0.04-0.54        

 

                BASOPHILS ABSOLUTE COUNT (BEAKER) (test code=417)    0.02 K/ L       0.01-0.08        

 

                IMMATURE GRANULOCYTES-RELATIVE PERCENT (BEAKER) (test code=2801)    0 %             0-1              





CBC W/PLT COUNT & AUTO [id number] 10:57:00* 





                Test Item       Value           Reference Range    Comments

 

                WHITE BLOOD CELL COUNT (BEAKER) (test code=775)    10.1 K/ L       3.5-10.5         

 

                RED BLOOD CELL COUNT (BEAKER) (test code=761)    4.20 M/ L       4.63-6.08        

 

                HEMOGLOBIN (BEAKER) (test code=410)    13.0 GM/DL      13.7-17.5        

 

                HEMATOCRIT (BEAKER) (test code=411)    40.5 %          40.1-51.0        

 

                MEAN CORPUSCULAR VOLUME (BEAKER) (test code=753)    96.4 fL         79.0-92.2        

 

                MEAN CORPUSCULAR HEMOGLOBIN (BEAKER) (test code=751)    31.0 pg         25.7-32.2        

 

                    MEAN CORPUSCULAR HEMOGLOBIN CONC (BEAKER) (test code=752)    32.1 GM/DL          32.3-36.5

                                         

 

                RED CELL DISTRIBUTION WIDTH (BEAKER) (test code=412)    14.1 %          11.6-14.4        

 

                PLATELET COUNT (BEAKER) (test code=756)    176 K/CU MM     150-450          

 

                MEAN PLATELET VOLUME (BEAKER) (test code=754)    10.3 fL         9.4-12.4         

 

                NUCLEATED RED BLOOD CELLS (BEAKER) (test code=413)    0 /100 WBC      0-0              

 

                NEUTROPHILS RELATIVE PERCENT (BEAKER) (test code=429)    80 %                             

 

                LYMPHOCYTES RELATIVE PERCENT (BEAKER) (test code=430)    10 %                             

 

                MONOCYTES RELATIVE PERCENT (BEAKER) (test code=431)    6 %                              

 

                EOSINOPHILS RELATIVE PERCENT (BEAKER) (test code=432)    3 %                              

 

                BASOPHILS RELATIVE PERCENT (BEAKER) (test code=437)    0 %                              

 

                NEUTROPHILS ABSOLUTE COUNT (BEAKER) (test code=670)    8.09 K/ L       1.78-5.38        

 

                LYMPHOCYTES ABSOLUTE COUNT (BEAKER) (test code=414)    1.02 K/ L       1.32-3.57        

 

                MONOCYTES ABSOLUTE COUNT (BEAKER) (test code=415)    0.61 K/ L       0.30-0.82        

 

                EOSINOPHILS ABSOLUTE COUNT (BEAKER) (test code=416)    0.27 K/ L       0.04-0.54        

 

                BASOPHILS ABSOLUTE COUNT (BEAKER) (test code=417)    0.04 K/ L       0.01-0.08        

 

                IMMATURE GRANULOCYTES-RELATIVE PERCENT (BEAKER) (test code=2801)    1 %             0-1              





B-TYPE NATRIURETIC FACTOR (BNP)2018 10:51:00* 





                Test Item       Value           Reference Range    Comments

 

                B-TYPE NATRIURETIC PEPTIDE (BEAKER) (test code=700)    1737 pg/mL      0-100            





BASIC METABOLIC [id number] 10:46:00* 





                Test Item       Value           Reference Range    Comments

 

                SODIUM (BEAKER) (test code=381)    138 meq/L       136-145          

 

                POTASSIUM (BEAKER) (test code=379)    4.1 meq/L       3.5-5.1          

 

                CHLORIDE (BEAKER) (test code=382)    99 meq/L                   

 

                CO2 (BEAKER) (test code=355)    28 meq/L        22-29            

 

                BLOOD UREA NITROGEN (BEAKER) (test code=354)    33 mg/dL        7-21             

 

                CREATININE (BEAKER) (test code=358)    1.61 mg/dL      0.57-1.25        

 

                GLUCOSE RANDOM (BEAKER) (test code=652)    209 mg/dL                  

 

                CALCIUM (BEAKER) (test code=697)    8.7 mg/dL       8.4-10.2         

 

                EGFR (BEAKER) (test code=1092)    45 mL/min/1.73 sq m                    ESTIMATED GFR IS NOT AS 

ACCURATE AS CREATININE CLEARANCE IN PREDICTING GLOMERULAR FILTRATION RATE. 
ESTIMATED GFR IS NOT APPLICABLE FOR DIALYSIS PATIENTS.





BASIC METABOLIC [id number] 12:11:00* 





                Test Item       Value           Reference Range    Comments

 

                SODIUM (BEAKER) (test code=381)    138 meq/L       136-145          

 

                POTASSIUM (BEAKER) (test code=379)    4.2 meq/L       3.5-5.1         Specimen slightly hemolyzed



 

                CHLORIDE (BEAKER) (test code=382)    100 meq/L                  

 

                CO2 (BEAKER) (test code=355)    27 meq/L        22-29            

 

                BLOOD UREA NITROGEN (BEAKER) (test code=354)    26 mg/dL        7-21             

 

                CREATININE (BEAKER) (test code=358)    1.88 mg/dL      0.57-1.25       Specimen slightly hemolyzed



 

                GLUCOSE RANDOM (BEAKER) (test code=652)    160 mg/dL                  

 

                CALCIUM (BEAKER) (test code=697)    8.4 mg/dL       8.4-10.2         

 

                EGFR (BEAKER) (test code=1092)    38 mL/min/1.73 sq m                    ESTIMATED GFR IS NOT AS 

ACCURATE AS CREATININE CLEARANCE IN PREDICTING GLOMERULAR FILTRATION RATE. 
ESTIMATED GFR IS NOT APPLICABLE FOR DIALYSIS PATIENTS.





B-TYPE NATRIURETIC FACTOR (BNP)2018 12:10:00* 





                Test Item       Value           Reference Range    Comments

 

                B-TYPE NATRIURETIC PEPTIDE (BEAKER) (test code=700)    349 pg/mL       0-100            





CBC W/PLT COUNT & AUTO [id number] 11:37:00* 





                Test Item       Value           Reference Range    Comments

 

                WHITE BLOOD CELL COUNT (BEAKER) (test code=775)    6.4 K/ L        3.5-10.5         

 

                RED BLOOD CELL COUNT (BEAKER) (test code=761)    4.47 M/ L       4.63-6.08        

 

                HEMOGLOBIN (BEAKER) (test code=410)    14.4 GM/DL      13.7-17.5        

 

                HEMATOCRIT (BEAKER) (test code=411)    43.1 %          40.1-51.0        

 

                MEAN CORPUSCULAR VOLUME (BEAKER) (test code=753)    96.4 fL         79.0-92.2        

 

                MEAN CORPUSCULAR HEMOGLOBIN (BEAKER) (test code=751)    32.2 pg         25.7-32.2        

 

                    MEAN CORPUSCULAR HEMOGLOBIN CONC (BEAKER) (test code=752)    33.4 GM/DL          32.3-36.5

                                         

 

                RED CELL DISTRIBUTION WIDTH (BEAKER) (test code=412)    13.5 %          11.6-14.4        

 

                PLATELET COUNT (BEAKER) (test code=756)    182 K/CU MM     150-450          

 

                MEAN PLATELET VOLUME (BEAKER) (test code=754)    9.9 fL          9.4-12.4         

 

                NUCLEATED RED BLOOD CELLS (BEAKER) (test code=413)    0 /100 WBC      0-0              

 

                NEUTROPHILS RELATIVE PERCENT (BEAKER) (test code=429)    63 %                             

 

                LYMPHOCYTES RELATIVE PERCENT (BEAKER) (test code=430)    18 %                             

 

                MONOCYTES RELATIVE PERCENT (BEAKER) (test code=431)    15 %                             

 

                EOSINOPHILS RELATIVE PERCENT (BEAKER) (test code=432)    4 %                              

 

                BASOPHILS RELATIVE PERCENT (BEAKER) (test code=437)    1 %                              

 

                NEUTROPHILS ABSOLUTE COUNT (BEAKER) (test code=670)    4.03 K/ L       1.78-5.38        

 

                LYMPHOCYTES ABSOLUTE COUNT (BEAKER) (test code=414)    1.17 K/ L       1.32-3.57        

 

                MONOCYTES ABSOLUTE COUNT (BEAKER) (test code=415)    0.93 K/ L       0.30-0.82        

 

                EOSINOPHILS ABSOLUTE COUNT (BEAKER) (test code=416)    0.25 K/ L       0.04-0.54        

 

                BASOPHILS ABSOLUTE COUNT (BEAKER) (test code=417)    0.03 K/ L       0.01-0.08        

 

                IMMATURE GRANULOCYTES-RELATIVE PERCENT (BEAKER) (test code=2801)    0 %             0-1              





BASIC METABOLIC [id number] 12:48:00* 





                Test Item       Value           Reference Range    Comments

 

                SODIUM (BEAKER) (test code=381)    136 meq/L       136-145          

 

                POTASSIUM (BEAKER) (test code=379)    4.3 meq/L       3.5-5.1          

 

                CHLORIDE (BEAKER) (test code=382)    101 meq/L                  

 

                CO2 (BEAKER) (test code=355)    27 meq/L        22-29            

 

                BLOOD UREA NITROGEN (BEAKER) (test code=354)    29 mg/dL        7-21             

 

                CREATININE (BEAKER) (test code=358)    1.46 mg/dL      0.57-1.25        

 

                GLUCOSE RANDOM (BEAKER) (test code=652)    210 mg/dL                  

 

                CALCIUM (BEAKER) (test code=697)    8.9 mg/dL       8.4-10.2         

 

                EGFR (BEAKER) (test code=1092)    51 mL/min/1.73 sq m                    ESTIMATED GFR IS NOT AS 

ACCURATE AS CREATININE CLEARANCE IN PREDICTING GLOMERULAR FILTRATION RATE. 
ESTIMATED GFR IS NOT APPLICABLE FOR DIALYSIS PATIENTS.





B-TYPE NATRIURETIC FACTOR (BNP)2018 12:27:00* 





                Test Item       Value           Reference Range    Comments

 

                B-TYPE NATRIURETIC PEPTIDE (BEAKER) (test code=700)    1208 pg/mL      0-100            





CBC W/PLT COUNT & AUTO [id number] 11:55:00* 





                Test Item       Value           Reference Range    Comments

 

                WHITE BLOOD CELL COUNT (BEAKER) (test code=775)    8.5 K/ L        3.5-10.5         

 

                RED BLOOD CELL COUNT (BEAKER) (test code=761)    3.76 M/ L       4.63-6.08        

 

                HEMOGLOBIN (BEAKER) (test code=410)    11.7 GM/DL      13.7-17.5        

 

                HEMATOCRIT (BEAKER) (test code=411)    34.0 %          40.1-51.0        

 

                MEAN CORPUSCULAR VOLUME (BEAKER) (test code=753)    90.4 fL         79.0-92.2        

 

                MEAN CORPUSCULAR HEMOGLOBIN (BEAKER) (test code=751)    31.1 pg         25.7-32.2        

 

                    MEAN CORPUSCULAR HEMOGLOBIN CONC (BEAKER) (test code=752)    34.4 GM/DL          32.3-36.5

                                         

 

                RED CELL DISTRIBUTION WIDTH (BEAKER) (test code=412)    17.2 %          11.6-14.4        

 

                PLATELET COUNT (BEAKER) (test code=756)    150 K/CU MM     150-450          

 

                MEAN PLATELET VOLUME (BEAKER) (test code=754)    9.8 fL          9.4-12.4         

 

                NUCLEATED RED BLOOD CELLS (BEAKER) (test code=413)    0 /100 WBC      0-0              

 

                NEUTROPHILS RELATIVE PERCENT (BEAKER) (test code=429)    75 %                             

 

                LYMPHOCYTES RELATIVE PERCENT (BEAKER) (test code=430)    12 %                             

 

                MONOCYTES RELATIVE PERCENT (BEAKER) (test code=431)    9 %                              

 

                EOSINOPHILS RELATIVE PERCENT (BEAKER) (test code=432)    4 %                              

 

                BASOPHILS RELATIVE PERCENT (BEAKER) (test code=437)    0 %                              

 

                NEUTROPHILS ABSOLUTE COUNT (BEAKER) (test code=670)    6.35 K/ L       1.78-5.38        

 

                LYMPHOCYTES ABSOLUTE COUNT (BEAKER) (test code=414)    1.05 K/ L       1.32-3.57        

 

                MONOCYTES ABSOLUTE COUNT (BEAKER) (test code=415)    0.74 K/ L       0.30-0.82        

 

                EOSINOPHILS ABSOLUTE COUNT (BEAKER) (test code=416)    0.30 K/ L       0.04-0.54        

 

                BASOPHILS ABSOLUTE COUNT (BEAKER) (test code=417)    0.02 K/ L       0.01-0.08        

 

                IMMATURE GRANULOCYTES-RELATIVE PERCENT (BEAKER) (test code=2801)    1 %             0-1              





BASIC METABOLIC [id number] 13:13:00* 





                Test Item       Value           Reference Range    Comments

 

                SODIUM (BEAKER) (test code=381)    134 meq/L       136-145          

 

                POTASSIUM (BEAKER) (test code=379)    3.9 meq/L       3.5-5.1          

 

                CHLORIDE (BEAKER) (test code=382)    92 meq/L                   

 

                CO2 (BEAKER) (test code=355)    30 meq/L        22-29            

 

                BLOOD UREA NITROGEN (BEAKER) (test code=354)    58 mg/dL        7-21             

 

                CREATININE (BEAKER) (test code=358)    2.79 mg/dL      0.57-1.25        

 

                GLUCOSE RANDOM (BEAKER) (test code=652)    154 mg/dL                  

 

                CALCIUM (BEAKER) (test code=697)    8.9 mg/dL       8.4-10.2         

 

                EGFR (BEAKER) (test code=1092)    24 mL/min/1.73 sq m                    ESTIMATED GFR IS NOT AS 

ACCURATE AS CREATININE CLEARANCE IN PREDICTING GLOMERULAR FILTRATION RATE. 
ESTIMATED GFR IS NOT APPLICABLE FOR DIALYSIS PATIENTS.





B-TYPE NATRIURETIC FACTOR (BNP)2018 13:03:00* 





                Test Item       Value           Reference Range    Comments

 

                B-TYPE NATRIURETIC PEPTIDE (BEAKER) (test code=700)    75 pg/mL        0-100            





CBC W/PLT COUNT & AUTO [id number] 11:02:00* 





                Test Item       Value           Reference Range    Comments

 

                WHITE BLOOD CELL COUNT (BEAKER) (test code=775)    7.2 K/ L        3.5-10.5         

 

                RED BLOOD CELL COUNT (BEAKER) (test code=761)    4.57 M/ L       4.63-6.08        

 

                HEMOGLOBIN (BEAKER) (test code=410)    13.4 GM/DL      13.7-17.5        

 

                HEMATOCRIT (BEAKER) (test code=411)    38.7 %          40.1-51.0        

 

                MEAN CORPUSCULAR VOLUME (BEAKER) (test code=753)    84.7 fL         79.0-92.2        

 

                MEAN CORPUSCULAR HEMOGLOBIN (BEAKER) (test code=751)    29.3 pg         25.7-32.2        

 

                    MEAN CORPUSCULAR HEMOGLOBIN CONC (BEAKER) (test code=752)    34.6 GM/DL          32.3-36.5

                                         

 

                RED CELL DISTRIBUTION WIDTH (BEAKER) (test code=412)    16.3 %          11.6-14.4        

 

                PLATELET COUNT (BEAKER) (test code=756)    191 K/CU MM     150-450          

 

                MEAN PLATELET VOLUME (BEAKER) (test code=754)    9.8 fL          9.4-12.4         

 

                NUCLEATED RED BLOOD CELLS (BEAKER) (test code=413)    0 /100 WBC      0-0              

 

                NEUTROPHILS RELATIVE PERCENT (BEAKER) (test code=429)    66 %                             

 

                LYMPHOCYTES RELATIVE PERCENT (BEAKER) (test code=430)    18 %                             

 

                MONOCYTES RELATIVE PERCENT (BEAKER) (test code=431)    13 %                             

 

                EOSINOPHILS RELATIVE PERCENT (BEAKER) (test code=432)    3 %                              

 

                BASOPHILS RELATIVE PERCENT (BEAKER) (test code=437)    0 %                              

 

                NEUTROPHILS ABSOLUTE COUNT (BEAKER) (test code=670)    4.79 K/ L       1.78-5.38        

 

                LYMPHOCYTES ABSOLUTE COUNT (BEAKER) (test code=414)    1.27 K/ L       1.32-3.57        

 

                MONOCYTES ABSOLUTE COUNT (BEAKER) (test code=415)    0.91 K/ L       0.30-0.82        

 

                EOSINOPHILS ABSOLUTE COUNT (BEAKER) (test code=416)    0.21 K/ L       0.04-0.54        

 

                BASOPHILS ABSOLUTE COUNT (BEAKER) (test code=417)    0.03 K/ L       0.01-0.08        

 

                IMMATURE GRANULOCYTES-RELATIVE PERCENT (BEAKER) (test code=2801)    0 %             0-1              





B-TYPE NATRIURETIC FACTOR (BNP)2017 10:45:00* 





                Test Item       Value           Reference Range    Comments

 

                B-TYPE NATRIURETIC PEPTIDE (BEAKER) (test code=700)    262 pg/mL       0-100            





CBC W/PLT COUNT & AUTO [id number] 10:22:00* 





                Test Item       Value           Reference Range    Comments

 

                WHITE BLOOD CELL COUNT (BEAKER) (test code=775)    10.0 K/ L       3.5-10.5         

 

                RED BLOOD CELL COUNT (BEAKER) (test code=761)    6.02 M/ L       4.63-6.08        

 

                HEMOGLOBIN (BEAKER) (test code=410)    17.2 GM/DL      13.7-17.5        

 

                HEMATOCRIT (BEAKER) (test code=411)    51.1 %          40.1-51.0        

 

                MEAN CORPUSCULAR VOLUME (BEAKER) (test code=753)    84.9 fL         79.0-92.2        

 

                MEAN CORPUSCULAR HEMOGLOBIN (BEAKER) (test code=751)    28.6 pg         25.7-32.2        

 

                    MEAN CORPUSCULAR HEMOGLOBIN CONC (BEAKER) (test code=752)    33.7 GM/DL          32.3-36.5

                                         

 

                RED CELL DISTRIBUTION WIDTH (BEAKER) (test code=412)    13.7 %          11.6-14.4        

 

                PLATELET COUNT (BEAKER) (test code=756)    207 K/CU MM     150-450          

 

                MEAN PLATELET VOLUME (BEAKER) (test code=754)    9.8 fL          9.4-12.4         

 

                NUCLEATED RED BLOOD CELLS (BEAKER) (test code=413)    0 /100 WBC      0-0              

 

                NEUTROPHILS RELATIVE PERCENT (BEAKER) (test code=429)    78 %                             

 

                LYMPHOCYTES RELATIVE PERCENT (BEAKER) (test code=430)    12 %                             

 

                MONOCYTES RELATIVE PERCENT (BEAKER) (test code=431)    7 %                              

 

                EOSINOPHILS RELATIVE PERCENT (BEAKER) (test code=432)    2 %                              

 

                BASOPHILS RELATIVE PERCENT (BEAKER) (test code=437)    0 %                              

 

                NEUTROPHILS ABSOLUTE COUNT (BEAKER) (test code=670)    7.76 K/ L       1.78-5.38        

 

                LYMPHOCYTES ABSOLUTE COUNT (BEAKER) (test code=414)    1.21 K/ L       1.32-3.57        

 

                MONOCYTES ABSOLUTE COUNT (BEAKER) (test code=415)    0.70 K/ L       0.30-0.82        

 

                EOSINOPHILS ABSOLUTE COUNT (BEAKER) (test code=416)    0.23 K/ L       0.04-0.54        

 

                BASOPHILS ABSOLUTE COUNT (BEAKER) (test code=417)    0.04 K/ L       0.01-0.08        

 

                IMMATURE GRANULOCYTES-RELATIVE PERCENT (BEAKER) (test code=2801)    0 %             0-1              





BASIC METABOLIC [id number] 10:15:00* 





                Test Item       Value           Reference Range    Comments

 

                SODIUM (BEAKER) (test code=381)    139 meq/L       136-145          

 

                POTASSIUM (BEAKER) (test code=379)    5.5 meq/L       3.5-5.1          

 

                CHLORIDE (BEAKER) (test code=382)    96 meq/L                   

 

                CO2 (BEAKER) (test code=355)    32 meq/L        22-29            

 

                BLOOD UREA NITROGEN (BEAKER) (test code=354)    29 mg/dL        7-21             

 

                CREATININE (BEAKER) (test code=358)    2.08 mg/dL      0.57-1.25        

 

                GLUCOSE RANDOM (BEAKER) (test code=652)    265 mg/dL                  

 

                CALCIUM (BEAKER) (test code=697)    10.0 mg/dL      8.4-10.2         

 

                EGFR (BEAKER) (test code=1092)    34 mL/min/1.73 sq m                    ESTIMATED GFR IS NOT AS 

ACCURATE AS CREATININE CLEARANCE IN PREDICTING GLOMERULAR FILTRATION RATE. 
ESTIMATED GFR IS NOT APPLICABLE FOR DIALYSIS PATIENTS.





POCT-GLUCOSE [id number] 12:37:00* 





                Test Item       Value           Reference Range    Comments

 

                POC-GLUCOSE METER (BEAKER) (test code=1538)    127 mg/dL                 TESTED AT \[facility]\"" [address]

 [facility] [address]





POCT-GLUCOSE [id number] 08:43:00* 





                Test Item       Value           Reference Range    Comments

 

                POC-GLUCOSE METER (BEAKER) (test code=1538)    188 mg/dL                 TESTED AT [address] [address]





BASIC METABOLIC [id number] 06:30:00* 





                Test Item       Value           Reference Range    Comments

 

                SODIUM (BEAKER) (test code=381)    137 meq/L       135-148          

 

                POTASSIUM (BEAKER) (test code=379)    4.2 meq/L       3.6-5.5         Specimen slightly hemolyzed



 

                CHLORIDE (BEAKER) (test code=382)    98 meq/L                   

 

                CO2 (BEAKER) (test code=355)    30 meq/L        20-29            

 

                BLOOD UREA NITROGEN (BEAKER) (test code=354)    17 mg/dL        10-26            

 

                CREATININE (BEAKER) (test code=358)    1.30 mg/dL      0.50-1.20       Specimen slightly hemolyzed



 

                GLUCOSE RANDOM (BEAKER) (test code=652)    177 mg/dL                  

 

                CALCIUM (BEAKER) (test code=697)    8.7 mg/dL       8.5-10.5         

 

                EGFR (BEAKER) (test code=1092)    58 mL/min/1.73 sq m                    ESTIMATED GFR IS NOT AS 

ACCURATE AS CREATININE CLEARANCE IN PREDICTING GLOMERULAR FILTRATION RATE. 
ESTIMATED GFR IS NOT APPLICABLE FOR DIALYSIS PATIENTS.





[id number] 06:24:00* 





                Test Item       Value           Reference Range    Comments

 

                MAGNESIUM (BEAKER) (test code=627)    2.0 mg/dL       1.5-3.0         Specimen slightly hemolyzed







POCT-GLUCOSE [id number] 21:03:00* 





                Test Item       Value           Reference Range    Comments

 

                POC-GLUCOSE METER (BEAKER) (test code=1538)    156 mg/dL                 TESTED AT \[facility]\"" [address]

 [facility] [address]





POCT-GLUCOSE [id number] 17:47:00* 





                Test Item       Value           Reference Range    Comments

 

                POC-GLUCOSE METER (BEAKER) (test code=1538)    293 mg/dL                 TESTED AT \[facility]\"" [address]

 [facility] [address]





POCT-GLUCOSE [id number] 13:07:00* 





                Test Item       Value           Reference Range    Comments

 

                POC-GLUCOSE METER (BEAKER) (test code=1538)    147 mg/dL                 TESTED AT \[facility]\"" [address]

 [facility] [address]





POCT-GLUCOSE [id number] 12:02:00* 





                Test Item       Value           Reference Range    Comments

 

                POC-GLUCOSE METER (BEAKER) (test code=1538)    135 mg/dL                 TESTED AT \[facility]\"" [address]

 [facility] [address]





POCT-GLUCOSE [id number] 10:04:00* 





                Test Item       Value           Reference Range    Comments

 

                POC-GLUCOSE METER (BEAKER) (test code=1538)    169 mg/dL                 TESTED AT \[facility]\"" [address]

 [facility] [address]





BASIC METABOLIC [id number] 07:21:00* 





                Test Item       Value           Reference Range    Comments

 

                SODIUM (BEAKER) (test code=381)    136 meq/L       135-148          

 

                POTASSIUM (BEAKER) (test code=379)    3.7 meq/L       3.6-5.5          

 

                CHLORIDE (BEAKER) (test code=382)    99 meq/L                   

 

                CO2 (BEAKER) (test code=355)    27 meq/L        20-29            

 

                BLOOD UREA NITROGEN (BEAKER) (test code=354)    23 mg/dL        10-26            

 

                CREATININE (BEAKER) (test code=358)    1.40 mg/dL      0.50-1.20        

 

                GLUCOSE RANDOM (BEAKER) (test code=652)    274 mg/dL                  

 

                CALCIUM (BEAKER) (test code=697)    8.6 mg/dL       8.5-10.5         

 

                EGFR (BEAKER) (test code=1092)    53 mL/min/1.73 sq m                    ESTIMATED GFR IS NOT AS 

ACCURATE AS CREATININE CLEARANCE IN PREDICTING GLOMERULAR FILTRATION RATE. 
ESTIMATED GFR IS NOT APPLICABLE FOR DIALYSIS PATIENTS.





[id number] 07:15:00* 





                Test Item       Value           Reference Range    Comments

 

                MAGNESIUM (BEAKER) (test code=627)    1.9 mg/dL       1.5-3.0          





POCT-GLUCOSE [id number] 05:56:00* 





                Test Item       Value           Reference Range    Comments

 

                POC-GLUCOSE METER (BEAKER) (test code=1538)    274 mg/dL                 TESTED AT [address] [address]





POCT-GLUCOSE [id number] 20:57:00* 





                Test Item       Value           Reference Range    Comments

 

                POC-GLUCOSE METER (BEAKER) (test code=1538)    216 mg/dL                 TESTED AT [place] [address]





POCT-GLUCOSE [id number] 16:46:00* 





                Test Item       Value           Reference Range    Comments

 

                POC-GLUCOSE METER (BEAKER) (test code=1538)    237 mg/dL                 TESTED AT [address] [address]





POCT-GLUCOSE [id number] 11:44:00* 





                Test Item       Value           Reference Range    Comments

 

                POC-GLUCOSE METER (BEAKER) (test code=1538)    183 mg/dL                 TESTED AT [address] [address]





BASIC METABOLIC [id number] 07:14:00* 





                Test Item       Value           Reference Range    Comments

 

                SODIUM (BEAKER) (test code=381)    137 meq/L       135-148          

 

                POTASSIUM (BEAKER) (test code=379)    3.1 meq/L       3.6-5.5          

 

                CHLORIDE (BEAKER) (test code=382)    97 meq/L                   

 

                CO2 (BEAKER) (test code=355)    29 meq/L        20-29            

 

                BLOOD UREA NITROGEN (BEAKER) (test code=354)    27 mg/dL        10-26            

 

                CREATININE (BEAKER) (test code=358)    1.50 mg/dL      0.50-1.20        

 

                GLUCOSE RANDOM (BEAKER) (test code=652)    232 mg/dL                  

 

                CALCIUM (BEAKER) (test code=697)    8.9 mg/dL       8.5-10.5         

 

                EGFR (BEAKER) (test code=1092)    49 mL/min/1.73 sq m                    ESTIMATED GFR IS NOT AS 

ACCURATE AS CREATININE CLEARANCE IN PREDICTING GLOMERULAR FILTRATION RATE. 
ESTIMATED GFR IS NOT APPLICABLE FOR DIALYSIS PATIENTS.





POCT-GLUCOSE [id number] 06:56:00* 





                Test Item       Value           Reference Range    Comments

 

                POC-GLUCOSE METER (BEAKER) (test code=1538)    241 mg/dL                 TESTED AT [address] [address]





[id number] 06:51:00* 





                Test Item       Value           Reference Range    Comments

 

                MAGNESIUM (BEAKER) (test code=627)    2.0 mg/dL       1.5-3.0          





[id number] 22:22:00* 





                Test Item       Value           Reference Range    Comments

 

                POTASSIUM (BEAKER) (test code=379)    4.5 meq/L       3.6-5.5          





POCT-GLUCOSE [id number] 20:43:00* 





                Test Item       Value           Reference Range    Comments

 

                POC-GLUCOSE METER (BEAKER) (test code=1538)    189 mg/dL                 TESTED AT [place] [address]





POCT-GLUCOSE [id number] 16:59:00* 





                Test Item       Value           Reference Range    Comments

 

                POC-GLUCOSE METER (BEAKER) (test code=1538)    153 mg/dL                 TESTED AT [place] [address]





POCT-GLUCOSE [id number] 16:58:00* 





                Test Item       Value           Reference Range    Comments

 

                POC-GLUCOSE METER (BEAKER) (test code=1538)    327 mg/dL                 TESTED AT [place] [address]





POCT-GLUCOSE [id number] 12:44:00* 





                Test Item       Value           Reference Range    Comments

 

                POC-GLUCOSE METER (BEAKER) (test code=1538)    222 mg/dL                 TESTED AT [place] [address]





POCT-GLUCOSE [id number] 10:43:00* 





                Test Item       Value           Reference Range    Comments

 

                POC-GLUCOSE METER (BEAKER) (test code=1538)    155 mg/dL                 TESTED AT [place] [address]





POCT-GLUCOSE [id number] 10:43:00* 





                Test Item       Value           Reference Range    Comments

 

                POC-GLUCOSE METER (BEAKER) (test code=1538)    128 mg/dL                 TESTED AT \[facility]\"" [address]

 [facility] [address]





[id number] 08:22:00* 





                Test Item       Value           Reference Range    Comments

 

                MAGNESIUM (BEAKER) (test code=627)    1.9 mg/dL       1.5-3.0          





TROPONIN -64-51 07:24:00* 





                Test Item       Value           Reference Range    Comments

 

                TROPONIN I (BEAKER) (test code=397)    0.04 ng/mL      0.00-0.15        





Troponin I (TnI) levels must be interpreted in the context of the presenting sym
ptoms and the clinical findings. Elevated TnI levels indicate myocardial damage,
but are not specific for ischemic heart disease. Elevated TnI levels are seen in
patients with other cardiac conditions (including myocarditis and congestive h
eart failure), and slight TnI elevations occur in patients with other conditions
, including sepsis, renal failure, acidosis, acute neurological disease, and per
sistent tachyarrhythmia.BASIC METABOLIC [id number] 07:24:00* 





                Test Item       Value           Reference Range    Comments

 

                SODIUM (BEAKER) (test code=381)    137 meq/L       135-148          

 

                POTASSIUM (BEAKER) (test code=379)    2.6 meq/L       3.6-5.5          

 

                CHLORIDE (BEAKER) (test code=382)    91 meq/L                   

 

                CO2 (BEAKER) (test code=355)    31 meq/L        20-29            

 

                BLOOD UREA NITROGEN (BEAKER) (test code=354)    40 mg/dL        10-26            

 

                CREATININE (BEAKER) (test code=358)    1.70 mg/dL      0.50-1.20        

 

                GLUCOSE RANDOM (BEAKER) (test code=652)    126 mg/dL                  

 

                CALCIUM (BEAKER) (test code=697)    9.7 mg/dL       8.5-10.5         

 

                EGFR (BEAKER) (test code=1092)    43 mL/min/1.73 sq m                    ESTIMATED GFR IS NOT AS 

ACCURATE AS CREATININE CLEARANCE IN PREDICTING GLOMERULAR FILTRATION RATE. 
ESTIMATED GFR IS NOT APPLICABLE FOR DIALYSIS PATIENTS.





POCT-GLUCOSE [id number] 06:46:00* 





                Test Item       Value           Reference Range    Comments

 

                POC-GLUCOSE METER (BEAKER) (test code=1538)    119 mg/dL                 TESTED AT \[facility]\"" [address] [place] [address]





POCT-GLUCOSE [id number] 20:43:00* 





                Test Item       Value           Reference Range    Comments

 

                POC-GLUCOSE METER (BEAKER) (test code=1538)    197 mg/dL                 TESTED AT [address] [address]





TROPONIN -48-25 19:28:00* 





                Test Item       Value           Reference Range    Comments

 

                TROPONIN I (BEAKER) (test code=397)    0.03 ng/mL      0.00-0.15        





Troponin I (TnI) levels must be interpreted in the context of the presenting sym
ptoms and the clinical findings. Elevated TnI levels indicate myocardial damage,
but are not specific for ischemic heart disease. Elevated TnI levels are seen in
patients with other cardiac conditions (including myocarditis and congestive h
eart failure), and slight TnI elevations occur in patients with other conditions
, including sepsis, renal failure, acidosis, acute neurological disease, and per
sistent tachyarrhythmia.POCT-GLUCOSE [id number] 16:55:00* 





                Test Item       Value           Reference Range    Comments

 

                POC-GLUCOSE METER (BEAKER) (test code=1538)    186 mg/dL                 TESTED AT [place] [id number]





POCT-GLUCOSE [id number] 12:24:00* 





                Test Item       Value           Reference Range    Comments

 

                POC-GLUCOSE METER (BEAKER) (test code=1538)    392 mg/dL                 TESTED AT [address] [address]





TROPONIN -30-91 07:16:00* 





                Test Item       Value           Reference Range    Comments

 

                TROPONIN I (BEAKER) (test code=397)    0.04 ng/mL      0.00-0.15        





Troponin I (TnI) levels must be interpreted in the context of the presenting sym
ptoms and the clinical findings. Elevated TnI levels indicate myocardial damage,
but are not specific for ischemic heart disease. Elevated TnI levels are seen in
patients with other cardiac conditions (including myocarditis and congestive h
eart failure), and slight TnI elevations occur in patients with other conditions
, including sepsis, renal failure, acidosis, acute neurological disease, and per
sistent tachyarrhythmia.COMPREHENSIVE METABOLIC [id number] 07:09:00* 





                Test Item       Value           Reference Range    Comments

 

                TOTAL PROTEIN (BEAKER) (test code=770)    7.9 gm/dL       6.0-8.5          

 

                ALBUMIN (BEAKER) (test code=1145)    4.3 g/dL        3.5-5.0          

 

                ALKALINE PHOSPHATASE (BEAKER) (test code=346)    126 U/L                    

 

                BILIRUBIN TOTAL (BEAKER) (test code=377)    1.1 mg/dL       0.1-1.2          

 

                SODIUM (BEAKER) (test code=381)    138 meq/L       135-148          

 

                POTASSIUM (BEAKER) (test code=379)    2.5 meq/L       3.6-5.5          

 

                CHLORIDE (BEAKER) (test code=382)    92 meq/L                   

 

                CO2 (BEAKER) (test code=355)    31 meq/L        20-29            

 

                BLOOD UREA NITROGEN (BEAKER) (test code=354)    39 mg/dL        10-26            

 

                CREATININE (BEAKER) (test code=358)    1.80 mg/dL      0.50-1.20        

 

                GLUCOSE RANDOM (BEAKER) (test code=652)    159 mg/dL                  

 

                CALCIUM (BEAKER) (test code=697)    9.9 mg/dL       8.5-10.5         

 

                AST (SGOT) (BEAKER) (test code=353)    19 U/L          5-40             

 

                ALT (SGPT) (BEAKER) (test code=347)    19 U/L          5-50             

 

                EGFR (BEAKER) (test code=1092)    40 mL/min/1.73 sq m                    ESTIMATED GFR IS NOT AS 

ACCURATE AS CREATININE CLEARANCE IN PREDICTING GLOMERULAR FILTRATION RATE. 
ESTIMATED GFR IS NOT APPLICABLE FOR DIALYSIS PATIENTS.





[id number] 07:02:00* 





                Test Item       Value           Reference Range    Comments

 

                MAGNESIUM (BEAKER) (test code=627)    1.9 mg/dL       1.5-3.0          





POCT-GLUCOSE [id number] 06:25:00* 





                Test Item       Value           Reference Range    Comments

 

                POC-GLUCOSE METER (BEAKER) (test code=1538)    154 mg/dL                 TESTED AT \[facility]\"" [address] [place] [address]





POCT-GLUCOSE [id number] 20:54:00* 





                Test Item       Value           Reference Range    Comments

 

                POC-GLUCOSE METER (BEAKER) (test code=1538)    160 mg/dL                 TESTED AT [place] [id number]

 [facility] [address]





PROTEIN, RANDOM [id number] 17:55:00* 





                Test Item       Value           Reference Range    Comments

 

                PROTEIN, URINE (BEAKER) (test code=1569)    3 mg/dL         0-14             





CREATININE, RANDOM [id number] 17:54:00* 





                Test Item       Value           Reference Range    Comments

 

                CREATININE URINE (BEAKER) (test code=375)    60.3 mg/dL                       





Reference Range: No NormalsPOCT-GLUCOSE [id number] 16:57:00* 





                Test Item       Value           Reference Range    Comments

 

                POC-GLUCOSE METER (BEAKER) (test code=1538)    134 mg/dL                 TESTED AT \[facility]\"" [address] [place] [address]





U/S, RENAL, [id number] 16:11:00Reason for exam:->arfFINAL REPORT PATIENT
ID:   [id number]  Comparison: None Discussion: Sonographic evaluation of the 
kidneys is performed. The right kidney measures 11.1 cm in length, with cortical
thickness of 1.5 cm. The left kidney measures 12.5 cm in length, with cortical 
thickness of 1.2 cm. There is no focal renal mass, hydronephrosis, or shadowing 
renal calculus.     Survey images of the bladder demonstrate no abnormality. 
Impression: Normal sonographic evaluation of the kidneys. Signed: Tonny De La O 
[facility] Verified Date/Time:  2017 16:11:50 Reading Location: [facility] 
Radiology Reading Room      Electronically signed by: TONNY DE LA O M.D. on 
2017 04:11 PM TROPONIN -30-72 13:58:00* 





                Test Item       Value           Reference Range    Comments

 

                TROPONIN I (BEAKER) (test code=397)    0.04 ng/mL      0.00-0.15        





Troponin I (TnI) levels must be interpreted in the context of the presenting sym
ptoms and the clinical findings. Elevated TnI levels indicate myocardial damage,
but are not specific for ischemic heart disease. Elevated TnI levels are seen in
patients with other cardiac conditions (including myocarditis and congestive h
eart failure), and slight TnI elevations occur in patients with other conditions
, including sepsis, renal failure, acidosis, acute neurological disease, and per
sistent tachyarrhythmia.CREATINE KINASE (CK), TOTAL AND [id number] 13:57:00* 





                Test Item       Value           Reference Range    Comments

 

                CREATINE KINASE TOTAL (BEAKER) (test code=380)    109 U/L                    

 

                CREATINE KINASE-MB (BEAKER) (test code=750)    0.9 ng/mL       0.0-4.9          

 

                CREATINE KINASE-MB INDEX (BEAKER) (test code=395)    0.8 %                            





CK-MB Reference Range:<5   Normal5-10 Borderline>10  AbnormalPOCT-GLUCOSE METER
2017 12:07:00* 





                Test Item       Value           Reference Range    Comments

 

                POC-GLUCOSE METER (BEAKER) (test code=1538)    231 mg/dL                 TESTED AT \[facility]\"" [address]

 [facility] [address]





POCT-GLUCOSE [id number] 07:26:00* 





                Test Item       Value           Reference Range    Comments

 

                POC-GLUCOSE METER (BEAKER) (test code=1538)    195 mg/dL                 TESTED AT \[facility]\"" [address]

 [facility] [address]





COMPREHENSIVE METABOLIC [id number] 06:06:00* 





                Test Item       Value           Reference Range    Comments

 

                TOTAL PROTEIN (BEAKER) (test code=770)    7.9 gm/dL       6.0-8.5          

 

                ALBUMIN (BEAKER) (test code=1145)    4.3 g/dL        3.5-5.0          

 

                ALKALINE PHOSPHATASE (BEAKER) (test code=346)    128 U/L                    

 

                BILIRUBIN TOTAL (BEAKER) (test code=377)    1.3 mg/dL       0.1-1.2          

 

                SODIUM (BEAKER) (test code=381)    138 meq/L       135-148          

 

                POTASSIUM (BEAKER) (test code=379)    2.8 meq/L       3.6-5.5          

 

                CHLORIDE (BEAKER) (test code=382)    94 meq/L                   

 

                CO2 (BEAKER) (test code=355)    30 meq/L        20-29            

 

                BLOOD UREA NITROGEN (BEAKER) (test code=354)    38 mg/dL        10-26            

 

                CREATININE (BEAKER) (test code=358)    2.00 mg/dL      0.50-1.20        

 

                GLUCOSE RANDOM (BEAKER) (test code=652)    138 mg/dL                  

 

                CALCIUM (BEAKER) (test code=697)    9.8 mg/dL       8.5-10.5         

 

                AST (SGOT) (BEAKER) (test code=353)    23 U/L          5-40             

 

                ALT (SGPT) (BEAKER) (test code=347)    22 U/L          5-50             

 

                EGFR (BEAKER) (test code=1092)    35 mL/min/1.73 sq m                    ESTIMATED GFR IS NOT AS 

ACCURATE AS CREATININE CLEARANCE IN PREDICTING GLOMERULAR FILTRATION RATE. 
ESTIMATED GFR IS NOT APPLICABLE FOR DIALYSIS PATIENTS.





TROPONIN -29-19 05:55:00* 





                Test Item       Value           Reference Range    Comments

 

                TROPONIN I (BEAKER) (test code=397)    0.03 ng/mL      0.00-0.15        





Troponin I (TnI) levels must be interpreted in the context of the presenting sym
ptoms and the clinical findings. Elevated TnI levels indicate myocardial damage,
but are not specific for ischemic heart disease. Elevated TnI levels are seen in
patients with other cardiac conditions (including myocarditis and congestive h
eart failure), and slight TnI elevations occur in patients with other conditions
, including sepsis, renal failure, acidosis, acute neurological disease, and per
sistent tachyarrhythmia.CREATINE KINASE (CK), TOTAL AND [id number] 05:54:00* 





                Test Item       Value           Reference Range    Comments

 

                CREATINE KINASE TOTAL (BEAKER) (test code=380)    119 U/L                    

 

                CREATINE KINASE-MB (BEAKER) (test code=750)    0.8 ng/mL       0.0-4.9          

 

                CREATINE KINASE-MB INDEX (BEAKER) (test code=395)    0.7 %                            





CK-MB Reference Range:<5   Normal5-10 Borderline>10  AbnormalCBC W/PLT COUNT & 
AUTO [id number] 05:31:00* 





                Test Item       Value           Reference Range    Comments

 

                WHITE BLOOD CELL COUNT (BEAKER) (test code=775)    8.1 K/ L        4.0-10.0         

 

                RED BLOOD CELL COUNT (BEAKER) (test code=761)    6.28 M/ L       4.20-5.80        

 

                HEMOGLOBIN (BEAKER) (test code=410)    18.9 GM/DL      13.0-16.8        

 

                HEMATOCRIT (BEAKER) (test code=411)    54.4 %          40.0-50.0        

 

                MEAN CORPUSCULAR VOLUME (BEAKER) (test code=753)    86.7 fL         82.0-98.0        

 

                MEAN CORPUSCULAR HEMOGLOBIN (BEAKER) (test code=751)    30.0 pg         27.0-33.0        

 

                    MEAN CORPUSCULAR HEMOGLOBIN CONC (BEAKER) (test code=752)    34.7 GM/DL          32.0-36.0

                                         

 

                RED CELL DISTRIBUTION WIDTH (BEAKER) (test code=412)    16.4 %          10.3-14.2        

 

                PLATELET COUNT (BEAKER) (test code=756)    184 K/CU MM     150-430          

 

                MEAN PLATELET VOLUME (BEAKER) (test code=754)    8.8 fL          6.5-10.5         

 

                NUCLEATED RED BLOOD CELLS (BEAKER) (test code=413)    0 /100 WBC      0-0              

 

                NEUTROPHILS RELATIVE PERCENT (BEAKER) (test code=429)    66 %                             

 

                LYMPHOCYTES RELATIVE PERCENT (BEAKER) (test code=430)    20 %                             

 

                MONOCYTES RELATIVE PERCENT (BEAKER) (test code=431)    11 %                             

 

                EOSINOPHILS RELATIVE PERCENT (BEAKER) (test code=432)    2 %                              

 

                BASOPHILS RELATIVE PERCENT (BEAKER) (test code=437)    0 %                              

 

                NEUTROPHILS ABSOLUTE COUNT (BEAKER) (test code=670)    5.40 K/ L       1.80-8.00        

 

                LYMPHOCYTES ABSOLUTE COUNT (BEAKER) (test code=414)    1.70 K/ L       1.48-4.50        

 

                MONOCYTES ABSOLUTE COUNT (BEAKER) (test code=415)    0.90 K/ L       0.00-1.30        

 

                EOSINOPHILS ABSOLUTE COUNT (BEAKER) (test code=416)    0.20 K/ L       0.00-0.50        

 

                BASOPHILS ABSOLUTE COUNT (BEAKER) (test code=417)    0.00 K/ L       0.00-0.20        





POCT-GLUCOSE [id number] 21:59:00* 





                Test Item       Value           Reference Range    Comments

 

                POC-GLUCOSE METER (BEAKER) (test code=1538)    87 mg/dL                  TESTED AT [address] [address]





RAD, CHEST, 1 VIEW, NON [id number] 18:55:00Reason for exam:->CHEST 
PAINShould this be performed at the bedside?->YesFINAL REPORT PATIENT ID:   
[id number] Chest, portable AP view History: Chest pain Comparison: 2017 
IMPRESSION: The heart is mildly enlarged, stable. Left subclavian AICD is in 
place. There is no focal consolidation or pneumothorax. There is redemonstration
of blunting of the left costophrenic angle which may be secondary to prominent 
pericardial fat. Signed: Dreyer, Stephen MDReport Verified Date/Time:  
2017 18:55:43 Reading Location: [facility] C013W Consult Reading Room     
Electronically signed by: STEPHEN EDWARD DREYER on 2017 06:55 PM B-TYPE 
NATRIURETIC FACTOR (BNP)2017 18:37:00* 





                Test Item       Value           Reference Range    Comments

 

                B-TYPE NATRIURETIC PEPTIDE (BEAKER) (test code=700)    148 pg/mL       0-100            





CREATINE KINASE (CK), TOTAL AND [id number] 18:36:00* 





                Test Item       Value           Reference Range    Comments

 

                CREATINE KINASE TOTAL (BEAKER) (test code=380)    131 U/L                    

 

                CREATINE KINASE-MB (BEAKER) (test code=750)    1.1 ng/mL       0.0-4.9          

 

                CREATINE KINASE-MB INDEX (BEAKER) (test code=395)    0.8 %                            





CK-MB Reference Range:<5   Normal5-10 Borderline>10  AbnormalTROPONIN I
2017 18:36:00* 





                Test Item       Value           Reference Range    Comments

 

                TROPONIN I (BEAKER) (test code=397)    0.03 ng/mL      0.00-0.15        





Troponin I (TnI) levels must be interpreted in the context of the presenting sym
ptoms and the clinical findings. Elevated TnI levels indicate myocardial damage,
but are not specific for ischemic heart disease. Elevated TnI levels are seen in
patients with other cardiac conditions (including myocarditis and congestive h
eart failure), and slight TnI elevations occur in patients with other conditions
, including sepsis, renal failure, acidosis, acute neurological disease, and per
sistent tachyarrhythmia.COMPREHENSIVE METABOLIC [id number] 18:29:00* 





                Test Item       Value           Reference Range    Comments

 

                TOTAL PROTEIN (BEAKER) (test code=770)    8.3 gm/dL       6.0-8.5         Specimen markedly hemolyzed



 

                ALBUMIN (BEAKER) (test code=1145)    4.1 g/dL        3.5-5.0         Specimen markedly hemolyzed



 

                ALKALINE PHOSPHATASE (BEAKER) (test code=346)    116 U/L                    

 

                BILIRUBIN TOTAL (BEAKER) (test code=377)    1.1 mg/dL       0.1-1.2         Specimen markedly 

hemolyzed

 

                SODIUM (BEAKER) (test code=381)    138 meq/L       135-148          

 

                POTASSIUM (BEAKER) (test code=379)    4.3 meq/L       3.6-5.5         Specimen markedly hemolyzed



 

                CHLORIDE (BEAKER) (test code=382)    96 meq/L                   

 

                CO2 (BEAKER) (test code=355)    27 meq/L        20-29            

 

                BLOOD UREA NITROGEN (BEAKER) (test code=354)    35 mg/dL        10-26            

 

                CREATININE (BEAKER) (test code=358)    2.00 mg/dL      0.50-1.20       Specimen markedly hemolyzed



 

                GLUCOSE RANDOM (BEAKER) (test code=652)    97 mg/dL                   

 

                CALCIUM (BEAKER) (test code=697)    9.3 mg/dL       8.5-10.5         

 

                AST (SGOT) (BEAKER) (test code=353)    44 U/L          5-40            Specimen markedly hemolyzed

 

                ALT (SGPT) (BEAKER) (test code=347)    22 U/L          5-50            Specimen markedly hemolyzed

 

                EGFR (BEAKER) (test code=1092)    35 mL/min/1.73 sq m                    ESTIMATED GFR IS NOT AS 

ACCURATE AS CREATININE CLEARANCE IN PREDICTING GLOMERULAR FILTRATION RATE. 
ESTIMATED GFR IS NOT APPLICABLE FOR DIALYSIS PATIENTS.





[id number] 18:22:00* 





                Test Item       Value           Reference Range    Comments

 

                PROTIME (BEAKER) (test code=759)    16.9 seconds    9.3-12.0         

 

                INR (BEAKER) (test code=370)    1.6             <=5.9            

 

                PARTIAL THROMBOPLASTIN TIME (BEAKER) (test code=760)    37.8 seconds    23.0-35.0        







RECOMMENDED COUMADIN/WARFARIN INR THERAPY RANGESSTANDARD DOSE: 2.0 - 3.0   Inclu
rosette: PROPHYLAXIS for venous thrombosis, systemic embolization; TREATMENT for nesha
ous thrombosis and/or pulmonary embolus.HIGH RISK: Target INR is 2.5-3.5 for pat
ients with mechanical heart valves.CBC W/PLT COUNT & AUTO [id number]
18:19:00* 





                Test Item       Value           Reference Range    Comments

 

                WHITE BLOOD CELL COUNT (BEAKER) (test code=775)    10.2 K/ L       4.0-10.0         

 

                RED BLOOD CELL COUNT (BEAKER) (test code=761)    6.08 M/ L       4.20-5.80        

 

                HEMOGLOBIN (BEAKER) (test code=410)    17.4 GM/DL      13.0-16.8        

 

                HEMATOCRIT (BEAKER) (test code=411)    52.3 %          40.0-50.0        

 

                MEAN CORPUSCULAR VOLUME (BEAKER) (test code=753)    86.0 fL         82.0-98.0        

 

                MEAN CORPUSCULAR HEMOGLOBIN (BEAKER) (test code=751)    28.7 pg         27.0-33.0        

 

                    MEAN CORPUSCULAR HEMOGLOBIN CONC (BEAKER) (test code=752)    33.3 GM/DL          32.0-36.0

                                         

 

                RED CELL DISTRIBUTION WIDTH (BEAKER) (test code=412)    15.5 %          10.3-14.2        

 

                PLATELET COUNT (BEAKER) (test code=756)    210 K/CU MM     150-430          

 

                MEAN PLATELET VOLUME (BEAKER) (test code=754)    8.7 fL          6.5-10.5         

 

                NUCLEATED RED BLOOD CELLS (BEAKER) (test code=413)    0 /100 WBC      0-0              

 

                NEUTROPHILS RELATIVE PERCENT (BEAKER) (test code=429)    77 %                             

 

                LYMPHOCYTES RELATIVE PERCENT (BEAKER) (test code=430)    13 %                             

 

                MONOCYTES RELATIVE PERCENT (BEAKER) (test code=431)    9 %                              

 

                EOSINOPHILS RELATIVE PERCENT (BEAKER) (test code=432)    2 %                              

 

                BASOPHILS RELATIVE PERCENT (BEAKER) (test code=437)    0 %                              

 

                NEUTROPHILS ABSOLUTE COUNT (BEAKER) (test code=670)    7.80 K/ L       1.80-8.00        

 

                LYMPHOCYTES ABSOLUTE COUNT (BEAKER) (test code=414)    1.30 K/ L       1.48-4.50        

 

                MONOCYTES ABSOLUTE COUNT (BEAKER) (test code=415)    0.90 K/ L       0.00-1.30        

 

                EOSINOPHILS ABSOLUTE COUNT (BEAKER) (test code=416)    0.20 K/ L       0.00-0.50        

 

                BASOPHILS ABSOLUTE COUNT (BEAKER) (test code=417)    0.00 K/ L       0.00-0.20        





B-TYPE NATRIURETIC FACTOR (BNP)2017-10-23 10:46:00* 





                Test Item       Value           Reference Range    Comments

 

                B-TYPE NATRIURETIC PEPTIDE (BEAKER) (test code=700)    972 pg/mL       0-100            





BASIC METABOLIC PANEL2017-10-23 10:40:00* 





                Test Item       Value           Reference Range    Comments

 

                SODIUM (BEAKER) (test code=381)    142 meq/L       136-145          

 

                POTASSIUM (BEAKER) (test code=379)    3.5 meq/L       3.5-5.1         Specimen slightly hemolyzed



 

                CHLORIDE (BEAKER) (test code=382)    103 meq/L                  

 

                CO2 (BEAKER) (test code=355)    25 meq/L        22-29            

 

                BLOOD UREA NITROGEN (BEAKER) (test code=354)    27 mg/dL        7-21             

 

                CREATININE (BEAKER) (test code=358)    1.55 mg/dL      0.57-1.25       Specimen slightly hemolyzed



 

                GLUCOSE RANDOM (BEAKER) (test code=652)    82 mg/dL                   

 

                CALCIUM (BEAKER) (test code=697)    8.8 mg/dL       8.4-10.2         

 

                EGFR (BEAKER) (test code=1092)    47 mL/min/1.73 sq m                    ESTIMATED GFR IS NOT AS 

ACCURATE AS CREATININE CLEARANCE IN PREDICTING GLOMERULAR FILTRATION RATE. 
ESTIMATED GFR IS NOT APPLICABLE FOR DIALYSIS PATIENTS.





LIPID PANEL2017-10-23 10:40:00* 





                Test Item       Value           Reference Range    Comments

 

                TRIGLYCERIDES (BEAKER) (test code=540)    122 mg/dL                       Specimen slightly hemolyzed



 

                CHOLESTEROL (BEAKER) (test code=631)    126 mg/dL                       Specimen slightly hemolyzed

 

                HDL CHOLESTEROL (BEAKER) (test code=976)    29 mg/dL                         

 

                LDL CHOLESTEROL CALCULATED (BEAKER) (test code=633)    73 mg/dL                         





Triglyceride Reference Range:   Low Risk         <150   Borderline    150-199   
High Risk     200-499   Very High Risk  >=500Cholesterol Reference Range:   Low 
Risk         <200   Borderline    200-239    High Risk        >240HDL 
Cholesterol Reference Range:   Low Risk         >=60   High Risk         <40LDL 
Cholesterol Reference Range:   Optimal          <100   Near Optimal  100-129   
Borderline    130-159   High          160-189   Very High       >=190   CBC 
W/PLT COUNT & AUTO DIFFERENTIAL2017-10-23 10:22:00* 





                Test Item       Value           Reference Range    Comments

 

                WHITE BLOOD CELL COUNT (BEAKER) (test code=775)    6.9 K/ L        3.5-10.5         

 

                RED BLOOD CELL COUNT (BEAKER) (test code=761)    5.23 M/ L       4.63-6.08        

 

                HEMOGLOBIN (BEAKER) (test code=410)    14.5 GM/DL      13.7-17.5        

 

                HEMATOCRIT (BEAKER) (test code=411)    45.8 %          40.1-51.0        

 

                MEAN CORPUSCULAR VOLUME (BEAKER) (test code=753)    87.6 fL         79.0-92.2        

 

                MEAN CORPUSCULAR HEMOGLOBIN (BEAKER) (test code=751)    27.7 pg         25.7-32.2        

 

                    MEAN CORPUSCULAR HEMOGLOBIN CONC (BEAKER) (test code=752)    31.7 GM/DL          32.3-36.5

                                         

 

                RED CELL DISTRIBUTION WIDTH (BEAKER) (test code=412)    14.1 %          11.6-14.4        

 

                PLATELET COUNT (BEAKER) (test code=756)    179 K/CU MM     150-450          

 

                MEAN PLATELET VOLUME (BEAKER) (test code=754)    10.2 fL         9.4-12.4         

 

                NUCLEATED RED BLOOD CELLS (BEAKER) (test code=413)    0 /100 WBC      0-0              

 

                NEUTROPHILS RELATIVE PERCENT (BEAKER) (test code=429)    70 %                             

 

                LYMPHOCYTES RELATIVE PERCENT (BEAKER) (test code=430)    17 %                             

 

                MONOCYTES RELATIVE PERCENT (BEAKER) (test code=431)    10 %                             

 

                EOSINOPHILS RELATIVE PERCENT (BEAKER) (test code=432)    2 %                              

 

                BASOPHILS RELATIVE PERCENT (BEAKER) (test code=437)    0 %                              

 

                NEUTROPHILS ABSOLUTE COUNT (BEAKER) (test code=670)    4.87 K/ L       1.78-5.38        

 

                LYMPHOCYTES ABSOLUTE COUNT (BEAKER) (test code=414)    1.18 K/ L       1.32-3.57        

 

                MONOCYTES ABSOLUTE COUNT (BEAKER) (test code=415)    0.68 K/ L       0.30-0.82        

 

                EOSINOPHILS ABSOLUTE COUNT (BEAKER) (test code=416)    0.13 K/ L       0.04-0.54        

 

                BASOPHILS ABSOLUTE COUNT (BEAKER) (test code=417)    0.03 K/ L       0.01-0.08        

 

                IMMATURE GRANULOCYTES-RELATIVE PERCENT (BEAKER) (test code=2801)    0 %             0-1              





B-TYPE NATRIURETIC FACTOR (BNP)2017-10-02 19:28:00* 





                Test Item       Value           Reference Range    Comments

 

                B-TYPE NATRIURETIC PEPTIDE (BEAKER) (test code=700)    1171 pg/mL      0-100            





MAGNESIUM2017-10-02 16:49:00* 





                Test Item       Value           Reference Range    Comments

 

                MAGNESIUM (BEAKER) (test code=627)    1.7 mg/dL       1.6-2.6          





BASIC METABOLIC PANEL2017-10-02 16:49:00* 





                Test Item       Value           Reference Range    Comments

 

                SODIUM (BEAKER) (test code=381)    143 meq/L       136-145          

 

                POTASSIUM (BEAKER) (test code=379)    4.0 meq/L       3.5-5.1          

 

                CHLORIDE (BEAKER) (test code=382)    105 meq/L                  

 

                CO2 (BEAKER) (test code=355)    26 meq/L        22-29            

 

                BLOOD UREA NITROGEN (BEAKER) (test code=354)    22 mg/dL        7-21             

 

                CREATININE (BEAKER) (test code=358)    1.75 mg/dL      0.57-1.25        

 

                GLUCOSE RANDOM (BEAKER) (test code=652)    111 mg/dL                  

 

                CALCIUM (BEAKER) (test code=697)    9.4 mg/dL       8.4-10.2         

 

                EGFR (BEAKER) (test code=1092)    41 mL/min/1.73 sq m                    ESTIMATED GFR IS NOT AS 

ACCURATE AS CREATININE CLEARANCE IN PREDICTING GLOMERULAR FILTRATION RATE. 
ESTIMATED GFR IS NOT APPLICABLE FOR DIALYSIS PATIENTS.





Pro-[id number] 09:59:00* 





                Test Item       Value           Reference Range    Comments

 

                NT ProBnp (test code=PBNP)    7375 pg/mL      0-124            





XR CHEST 1 [id number] 06:38:58XR CHEST 1 VIEW, 2017 6:22 AMReason For 
Examination: Chest painComparison: 2017Location: [id number]  
LUNGS:No consolidation or definite pulmonary edema. There is 
questionablevascular congestion versus bronchovascular crowding secondary to 
lowlung volumes.PLEURA: No pleural effusions  CARDIOMEDIASTINUM:Similar to 
priorIMPRESSION:Questionable vascular congestion versus bronchovascular 
crowdingsecondary to low lung volumes, otherwise no plain film evidence of 
anacute cardiopulmonary abnormality.CK [id number] 05:59:00* 





                Test Item       Value           Reference Range    Comments

 

                CK (test code=CK)    4313 U/L                   

 

                CKMB (test code=CKMB)    10.0 ng/mL      0.0-4.9          

 

                CKMB% (test code=CKMBP)    0.2 %           0.0-3.4          





CK [id number] 05:51:00* 





                Test Item       Value           Reference Range    Comments

 

                CK (test code=CK)    na U/L                     





Troponin -76-32 05:49:00* 





                Test Item       Value           Reference Range    Comments

 

                Troponin T (test code=HARSHIL)    <0.010 ng/mL    0.000-0.090      





Comprehensive Metabolic [id number] 05:48:00* 





                Test Item       Value           Reference Range    Comments

 

                Sodium (test code=NA)    139 mmol/L      135-145          

 

                Potassium (test code=K)    5.0 mmol/L      3.5-5.1         hemolyzed

 

                Chloride (test code=CL)    99 mmol/L                  

 

                Carbon Dioxide (test code=CO2)    27 mmol/L       22-29            

 

                Glucose (test code=GLU)    208 mg/dL                  

 

                Blood Urea Nitrogen (test code=BUN)    31 mg/dL        6-20             

 

                Creatinine (test code=CREAT)    1.9 mg/dL       0.7-1.2          

 

                Calcium (test code=CA)    8.7 mg/dL       8.3-10.5         

 

                Prot Total (test code=TP)    6.9 g/dL        6.4-8.3          

 

                Albumin (test code=ALB)    4.0 g/dL        3.5-5.2          

 

                A/G Ratio (test code=AGRATIO)    1.4 Ratio                        

 

                Globulin (test code=GLOB)    2.9             2.9-3.1          

 

                Bili Total (test code=TBIL)    1.0 mg/dL       0.1-0.9          

 

                Alk Phos (test code=APHOS)    139 U/L                    

 

                AST (test code=AST)    133 U/L         1-40            hemolyzed

 

                ALT (test code=ALT)    62 U/L          1-41             

 

                BUN/Creatinine Ratio (test code=BCRATIO)    16.3                             

 

                Anion Gap (test code=AGAP)    13 mmol/L       7-16             

 

                Estimated GFR (test code=GFR)    40 mL/min/1.73m2                    eGFR (estimated Glomerular Filtration

 Rate) is an estimated value,calculated from the patient's serum creatinine 
using the MDRD equation.It is NOT the patient's actual GFR. The eGFR provides a 
more clinicallyuseful measure of kidney disease than serum creatinine 
alone.***This calculation takes sex and race into account, if the informationis 
provided. If the race is not provided, and the patient isAfrican-American, 
multiply by 1.212. If sex is not provided, and thepatient is female, multiply by
0.742. Results for patients <18 years ofage have not been validated by the MDRD 
study and should be interpretedwith caution.eGFR Result Interpretation:eGFR > 
or=60 is in the Normal RangeeGFR < 60 may mean kidney diseaseeGFR < 15 may mean 
kidney failure***Ranges recommended by the National Kidney Foundat
ion,http://nkdep.nih.gov





CBC with [id number] 05:29:00* 





                Test Item       Value           Reference Range    Comments

 

                WBC (test code=WBC)    14.0 K/cumm     4.4-10.5         

 

                RBC (test code=RBC)    4.40 M/cumm     4.10-5.70        

 

                Hemoglobin (test code=HGB)    13.4 gm/dL      13.4-17.4        

 

                Hematocrit (test code=HCT)    40.7 %          38.7-52.0        

 

                MCV (test code=MCV)    92.6 fL                    

 

                MCH (test code=MCH)    30.4 pg         27.0-32.5        

 

                MCHC (test code=MCHC)    32.8 g/dL       32.0-37.5        

 

                RDW (test code=RDW)    15.1 %          11.5-14.5        

 

                Platelet Count (test code=PLTCT)    199 K/cumm      140-440          

 

                MPV (test code=MPV)    7.9 fL                           

 

                Diff Method (test code=DIFFM)    Auto                             

 

                Neutrophil (test code=NEUT)    83.4 %          36-70            

 

                Lymphocyte (test code=LYMPH)    7.8 %           12-44            

 

                Monocyte (test code=MONO)    8.2 %           0-11             

 

                Eosinophil (test code=EOS)    0.3 %           0-7              

 

                Basophil (test code=BASO)    0.2 %           0-2              

 

                Neutro Abs (test code=ANEUT)    11.7 K/cumm     1.6-7.4          

 

                Lymph Abs (test code=ALYMPH)    1.1 K/cumm      0.5-4.6          

 

                Mono Abs (test code=AMONO)    1.2 K/cumm      0.0-1.2          

 

                Eos Abs (test code=AEOS)    0.04 K/cumm     0.00-0.74        

 

                Baso Abs (test code=ABASO)    0.0 K/cumm      0.00-0.21        





POCT-GLUCOSE [id number] 08:22:00* 





                Test Item       Value           Reference Range    Comments

 

                POC-GLUCOSE METER (BEAKER) (test code=1538)    138 mg/dL                 TESTED AT [address] [address]





BASIC METABOLIC [id number] 06:10:00* 





                Test Item       Value           Reference Range    Comments

 

                SODIUM (BEAKER) (test code=381)    141 meq/L       136-145          

 

                POTASSIUM (BEAKER) (test code=379)    3.5 meq/L       3.5-5.1          

 

                CHLORIDE (BEAKER) (test code=382)    103 meq/L                  

 

                CO2 (BEAKER) (test code=355)    30 meq/L        22-29            

 

                BLOOD UREA NITROGEN (BEAKER) (test code=354)    29 mg/dL        7-21             

 

                CREATININE (BEAKER) (test code=358)    1.67 mg/dL      0.57-1.25        

 

                GLUCOSE RANDOM (BEAKER) (test code=652)    189 mg/dL                  

 

                CALCIUM (BEAKER) (test code=697)    8.5 mg/dL       8.4-10.2         

 

                EGFR (BEAKER) (test code=1092)    43 mL/min/1.73 sq m                    ESTIMATED GFR IS NOT AS 

ACCURATE AS CREATININE CLEARANCE IN PREDICTING GLOMERULAR FILTRATION RATE. 
ESTIMATED GFR IS NOT APPLICABLE FOR DIALYSIS PATIENTS.





CREATINE KINASE (CK)2017 06:10:00* 





                Test Item       Value           Reference Range    Comments

 

                CREATINE KINASE TOTAL (BEAKER) (test code=380)    111 U/L                    





POCT-GLUCOSE [id number] 20:29:00* 





                Test Item       Value           Reference Range    Comments

 

                POC-GLUCOSE METER (BEAKER) (test code=1538)    274 mg/dL                 TESTED AT [address] [address]





POCT-GLUCOSE [id number] 16:55:00* 





                Test Item       Value           Reference Range    Comments

 

                POC-GLUCOSE METER (BEAKER) (test code=1538)    433 mg/dL                 Notified RN MD/TESTED

 AT [address] [address]





POCT-GLUCOSE [id number] 11:40:00* 





                Test Item       Value           Reference Range    Comments

 

                POC-GLUCOSE METER (BEAKER) (test code=1538)    189 mg/dL                 TESTED AT [address] [address]





POCT-GLUCOSE [id number] 08:23:00* 





                Test Item       Value           Reference Range    Comments

 

                POC-GLUCOSE METER (BEAKER) (test code=1538)    105 mg/dL                 TESTED AT [address] [address]





BASIC METABOLIC [id number] 05:53:00* 





                Test Item       Value           Reference Range    Comments

 

                SODIUM (BEAKER) (test code=381)    140 meq/L       136-145          

 

                POTASSIUM (BEAKER) (test code=379)    3.6 meq/L       3.5-5.1          

 

                CHLORIDE (BEAKER) (test code=382)    104 meq/L                  

 

                CO2 (BEAKER) (test code=355)    25 meq/L        22-29            

 

                BLOOD UREA NITROGEN (BEAKER) (test code=354)    38 mg/dL        7-21             

 

                CREATININE (BEAKER) (test code=358)    1.69 mg/dL      0.57-1.25        

 

                GLUCOSE RANDOM (BEAKER) (test code=652)    119 mg/dL                  

 

                CALCIUM (BEAKER) (test code=697)    8.4 mg/dL       8.4-10.2         

 

                EGFR (BEAKER) (test code=1092)    43 mL/min/1.73 sq m                    ESTIMATED GFR IS NOT AS 

ACCURATE AS CREATININE CLEARANCE IN PREDICTING GLOMERULAR FILTRATION RATE. 
ESTIMATED GFR IS NOT APPLICABLE FOR DIALYSIS PATIENTS.





CREATINE KINASE (CK)2017 05:53:00* 





                Test Item       Value           Reference Range    Comments

 

                CREATINE KINASE TOTAL (BEAKER) (test code=380)    211 U/L                    





POCT-GLUCOSE [id number] 20:51:00* 





                Test Item       Value           Reference Range    Comments

 

                POC-GLUCOSE METER (BEAKER) (test code=1538)    206 mg/dL                 TESTED AT [facility] 

[address] [facility] [address]





POCT-GLUCOSE [id number] 16:58:00* 





                Test Item       Value           Reference Range    Comments

 

                POC-GLUCOSE METER (BEAKER) (test code=1538)    231 mg/dL                 TESTED AT [place] [id number] [facility] [address]





POCT-GLUCOSE [id number] 12:44:00* 





                Test Item       Value           Reference Range    Comments

 

                POC-GLUCOSE METER (BEAKER) (test code=1538)    247 mg/dL                 TESTED AT [address] [address]





POCT-GLUCOSE [id number] 08:59:00* 





                Test Item       Value           Reference Range    Comments

 

                POC-GLUCOSE METER (BEAKER) (test code=1538)    126 mg/dL                 TESTED AT [place] [id number] [facility] [address]





BASIC METABOLIC [id number] 05:33:00* 





                Test Item       Value           Reference Range    Comments

 

                SODIUM (BEAKER) (test code=381)    139 meq/L       136-145          

 

                POTASSIUM (BEAKER) (test code=379)    4.3 meq/L       3.5-5.1         Specimen slightly hemolyzed



 

                CHLORIDE (BEAKER) (test code=382)    105 meq/L                  

 

                CO2 (BEAKER) (test code=355)    24 meq/L        22-29            

 

                BLOOD UREA NITROGEN (BEAKER) (test code=354)    40 mg/dL        7-21             

 

                CREATININE (BEAKER) (test code=358)    1.67 mg/dL      0.57-1.25       Specimen slightly hemolyzed



 

                GLUCOSE RANDOM (BEAKER) (test code=652)    102 mg/dL                  

 

                CALCIUM (BEAKER) (test code=697)    8.5 mg/dL       8.4-10.2         

 

                EGFR (BEAKER) (test code=1092)    43 mL/min/1.73 sq m                    ESTIMATED GFR IS NOT AS 

ACCURATE AS CREATININE CLEARANCE IN PREDICTING GLOMERULAR FILTRATION RATE. 
ESTIMATED GFR IS NOT APPLICABLE FOR DIALYSIS PATIENTS.





CREATINE KINASE (CK)2017 05:33:00* 





                Test Item       Value           Reference Range    Comments

 

                CREATINE KINASE TOTAL (BEAKER) (test code=380)    397 U/L                    





POCT-GLUCOSE [id number] 21:46:00* 





                Test Item       Value           Reference Range    Comments

 

                POC-GLUCOSE METER (BEAKER) (test code=1538)    135 mg/dL                 TESTED AT [facility] 

[address] [facility] [address]





POCT-GLUCOSE [id number] 21:28:00* 





                Test Item       Value           Reference Range    Comments

 

                POC-GLUCOSE METER (BEAKER) (test code=1538)    295 mg/dL                 TESTED AT [address] [address]





POCT-GLUCOSE [id number] 17:44:00* 





                Test Item       Value           Reference Range    Comments

 

                POC-GLUCOSE METER (BEAKER) (test code=1538)    148 mg/dL                 TESTED AT [address] [address]





POCT-GLUCOSE [id number] 07:49:00* 





                Test Item       Value           Reference Range    Comments

 

                POC-GLUCOSE METER (BEAKER) (test code=1538)    164 mg/dL                 TESTED AT [address] [address]





BASIC METABOLIC [id number] 06:02:00* 





                Test Item       Value           Reference Range    Comments

 

                SODIUM (BEAKER) (test code=381)    138 meq/L       136-145          

 

                POTASSIUM (BEAKER) (test code=379)    3.8 meq/L       3.5-5.1          

 

                CHLORIDE (BEAKER) (test code=382)    103 meq/L                  

 

                CO2 (BEAKER) (test code=355)    22 meq/L        22-29            

 

                BLOOD UREA NITROGEN (BEAKER) (test code=354)    41 mg/dL        7-21             

 

                CREATININE (BEAKER) (test code=358)    1.87 mg/dL      0.57-1.25        

 

                GLUCOSE RANDOM (BEAKER) (test code=652)    173 mg/dL                  

 

                CALCIUM (BEAKER) (test code=697)    8.7 mg/dL       8.4-10.2         

 

                EGFR (BEAKER) (test code=1092)    38 mL/min/1.73 sq m                    ESTIMATED GFR IS NOT AS 

ACCURATE AS CREATININE CLEARANCE IN PREDICTING GLOMERULAR FILTRATION RATE. 
ESTIMATED GFR IS NOT APPLICABLE FOR DIALYSIS PATIENTS.





CREATINE KINASE (CK)2017 06:02:00* 





                Test Item       Value           Reference Range    Comments

 

                CREATINE KINASE TOTAL (BEAKER) (test code=380)    486 U/L                    





POCT-GLUCOSE [id number] 21:49:00* 





                Test Item       Value           Reference Range    Comments

 

                POC-GLUCOSE METER (BEAKER) (test code=1538)    176 mg/dL                 TESTED AT [facility] 

[address] [facility] [address]





POCT-GLUCOSE [id number] 17:17:00* 





                Test Item       Value           Reference Range    Comments

 

                POC-GLUCOSE METER (BEAKER) (test code=1538)    102 mg/dL                 TESTED AT [address] [address]





POCT-GLUCOSE [id number] 12:22:00* 





                Test Item       Value           Reference Range    Comments

 

                POC-GLUCOSE METER (BEAKER) (test code=1538)    247 mg/dL                 TESTED AT [address] [address]





POCT-GLUCOSE [id number] 08:40:00* 





                Test Item       Value           Reference Range    Comments

 

                POC-GLUCOSE METER (BEAKER) (test code=1538)    138 mg/dL                 TESTED AT [address] [address]





BASIC METABOLIC [id number] 07:33:00* 





                Test Item       Value           Reference Range    Comments

 

                SODIUM (BEAKER) (test code=381)    140 meq/L       136-145          

 

                POTASSIUM (BEAKER) (test code=379)    3.7 meq/L       3.5-5.1          

 

                CHLORIDE (BEAKER) (test code=382)    104 meq/L                  

 

                CO2 (BEAKER) (test code=355)    24 meq/L        22-29            

 

                BLOOD UREA NITROGEN (BEAKER) (test code=354)    32 mg/dL        7-21             

 

                CREATININE (BEAKER) (test code=358)    1.67 mg/dL      0.57-1.25        

 

                GLUCOSE RANDOM (BEAKER) (test code=652)    141 mg/dL                  

 

                CALCIUM (BEAKER) (test code=697)    9.0 mg/dL       8.4-10.2         

 

                EGFR (BEAKER) (test code=1092)    43 mL/min/1.73 sq m                    ESTIMATED GFR IS NOT AS 

ACCURATE AS CREATININE CLEARANCE IN PREDICTING GLOMERULAR FILTRATION RATE. 
ESTIMATED GFR IS NOT APPLICABLE FOR DIALYSIS PATIENTS.





CREATINE KINASE (CK)2017 07:33:00* 





                Test Item       Value           Reference Range    Comments

 

                CREATINE KINASE TOTAL (BEAKER) (test code=380)    411 U/L                    





POCT-GLUCOSE METER2017-09-15 21:37:00* 





                Test Item       Value           Reference Range    Comments

 

                POC-GLUCOSE METER (BEAKER) (test code=1538)    224 mg/dL                 TESTED AT [address] [address]





POCT-GLUCOSE METER2017-09-15 18:56:00* 





                Test Item       Value           Reference Range    Comments

 

                POC-GLUCOSE METER (BEAKER) (test code=1538)    99 mg/dL                  TESTED AT [address] [address]





POCT-GLUCOSE METER2017-09-15 18:20:00* 





                Test Item       Value           Reference Range    Comments

 

                POC-GLUCOSE METER (BEAKER) (test code=1538)    75 mg/dL                  TESTED AT [address] [address]





POCT-GLUCOSE METER2017-09-15 18:05:00* 





                Test Item       Value           Reference Range    Comments

 

                POC-GLUCOSE METER (BEAKER) (test code=1538)    67 mg/dL                  TESTED AT [address] [address]





POCT-GLUCOSE METER2017-09-15 12:55:00* 





                Test Item       Value           Reference Range    Comments

 

                POC-GLUCOSE METER (BEAKER) (test code=1538)    118 mg/dL                 TESTED AT [address] [address]





RAPID DRUG SCREEN, URINE2017-09-15 10:02:00* 





                Test Item       Value           Reference Range    Comments

 

                BARBITURATE URINE (BEAKER) (test code=725)    Negative        Negative         

 

                BENZODIAZEPINE SCREEN URINE (BEAKER) (test code=726)    Positive        Negative         

 

                COCAINE (METAB.) SCREEN (BEAKER) (test code=1164)    Negative        Negative         

 

                METHADONE SCREEN (BEAKER) (test code=1436)    Negative        Negative         

 

                OPIATE SCREEN URINE (BEAKER) (test code=734)    Positive        Negative         

 

                CANNABINOID SCREEN URINE (BEAKER) (test code=727)    Negative        Negative         

 

                AMPH/METHAMPH SCREEN (BEAKER) (test code=1438)    Negative        Negative         

 

                PHENCYCLIDINE SCREEN URINE (BEAKER) (test code=608)    Negative        Negative         

 

                OXYCODONE SCREEN URINE (BEAKER) (test code=2761)    Negative        Negative         





DRUG                CUTOFF CONC.Cocaine               300 ng/mL Cannabinoid     
      50 ng/mL Benzodiazepine        200 ng/mLBarbiturate           200 ng/mLPh
encyclidine          25 ng/mLOpiate                300 ng/mLMethadone           
 300 ng/mLAmphetamine/         1000 ng/mL  MethamphetamineOxycodone             
300 ng/mLThis assay provides an unconfirmed qualitative test result for the cli
nical management of patients in emergency situations. Chain of custody not maint
ained. Some over-the-counter medications, as well as adulterants, may cause inac
curate results. Clinical correlation should be applied. A more comprehensive mee
g screen or confirmation of a detected drug may be performed upon request.
CREATINE KINASE (CK)2017-09-15 09:59:00* 





                Test Item       Value           Reference Range    Comments

 

                CREATINE KINASE TOTAL (BEAKER) (test code=380)    185 U/L                    





POCT-GLUCOSE METER2017-09-15 08:07:00* 





                Test Item       Value           Reference Range    Comments

 

                POC-GLUCOSE METER (BEAKER) (test code=1538)    129 mg/dL                 TESTED AT [facility] 

[address] [facility] [address]





BASIC METABOLIC PANEL2017-09-15 07:21:00* 





                Test Item       Value           Reference Range    Comments

 

                SODIUM (BEAKER) (test code=381)    141 meq/L       136-145          

 

                POTASSIUM (BEAKER) (test code=379)    3.4 meq/L       3.5-5.1          

 

                CHLORIDE (BEAKER) (test code=382)    105 meq/L                  

 

                CO2 (BEAKER) (test code=355)    26 meq/L        22-29            

 

                BLOOD UREA NITROGEN (BEAKER) (test code=354)    25 mg/dL        7-21             

 

                CREATININE (BEAKER) (test code=358)    1.52 mg/dL      0.57-1.25        

 

                GLUCOSE RANDOM (BEAKER) (test code=652)    142 mg/dL                  

 

                CALCIUM (BEAKER) (test code=697)    8.8 mg/dL       8.4-10.2         

 

                EGFR (BEAKER) (test code=1092)    48 mL/min/1.73 sq m                    ESTIMATED GFR IS NOT AS 

ACCURATE AS CREATININE CLEARANCE IN PREDICTING GLOMERULAR FILTRATION RATE. 
ESTIMATED GFR IS NOT APPLICABLE FOR DIALYSIS PATIENTS.





TSH/FREE T4 IF INDICATED2017-09-15 02:39:00* 





                Test Item       Value           Reference Range    Comments

 

                THYROID STIMULATING HORMONE (BEAKER) (test code=772)    2.57 uIU/mL     0.35-4.94        





TROPONIN -09-15 01:31:00* 





                Test Item       Value           Reference Range    Comments

 

                TROPONIN I (BEAKER) (test code=397)    0.03 ng/mL      0.00-0.03        





Troponin I (TnI) levels must be interpreted in the context of the presenting sym
ptoms and the clinical findings. Elevated TnI levels indicate myocardial damage,
but are not specific for ischemic heart disease. Elevated TnI levels are seen in
patients with other cardiac conditions (including myocarditis and congestive h
eart failure), and slight TnI elevations occur in patients with other conditions
, including sepsis, renal failure, acidosis, acute neurological disease, and per
sistent tachyarrhythmia.POCT-GLUCOSE [id number] 21:34:00* 





                Test Item       Value           Reference Range    Comments

 

                POC-GLUCOSE METER (BEAKER) (test code=1538)    179 mg/dL                 TESTED AT [address] [address]





TROPONIN -17-18 19:29:00* 





                Test Item       Value           Reference Range    Comments

 

                TROPONIN I (BEAKER) (test code=397)    0.02 ng/mL      0.00-0.03        





Troponin I (TnI) levels must be interpreted in the context of the presenting sym
ptoms and the clinical findings. Elevated TnI levels indicate myocardial damage,
but are not specific for ischemic heart disease. Elevated TnI levels are seen in
patients with other cardiac conditions (including myocarditis and congestive h
eart failure), and slight TnI elevations occur in patients with other conditions
, including sepsis, renal failure, acidosis, acute neurological disease, and per
sistent tachyarrhythmia.HEMOGLOBIN [id number] 16:13:00* 





                Test Item       Value           Reference Range    Comments

 

                HEMOGLOBIN A1C (BEAKER) (test code=368)    7.7 %           4.3-6.1          





POCT-GLUCOSE [id number] 16:08:00* 





                Test Item       Value           Reference Range    Comments

 

                POC-GLUCOSE METER ([place]) (test code=1538)    93 mg/dL                  TESTED AT [place] [id number]

 [facility] [address]





RAD, CHEST, 1 VIEW, NON [id number] 11:57:00Reason for exam:->chest painFINAL
REPORT PATIENT ID:   [id number] Chest one view compared to  Discussion: 
Cardiomegaly and pulmonary congestion. No effusion or pneumothorax. Left chest 
ICD in place. Bones and soft tissues unremarkable. IMPRESSIONS: Suspected CHF 
with pulmonary congestion. Signed: Myron Chaves Verified Date/Time:   11:57:31 [facility]      El
ectronically signed by: MYRON CHAVES M.D. on 2017 11:57 AM CREATINE 
KINASE (CK), TOTAL AND [id number] 11:53:00* 





                Test Item       Value           Reference Range    Comments

 

                CREATINE KINASE TOTAL (BEAKER) (test code=380)    165 U/L                    

 

                CREATINE KINASE-MB (BEAKER) (test code=750)    2.1 ng/mL       0.0-6.6          

 

                CREATINE KINASE-MB INDEX (BEAKER) (test code=395)    1.3 %                            





CK-MB Reference Range:<6.7      Normal6.7-10.0  Borderline>10.0     Abnormal
TROPONIN -[id number] 11:53:00* 





                Test Item       Value           Reference Range    Comments

 

                TROPONIN I (BEAKER) (test code=397)    0.02 ng/mL      0.00-0.03        





Troponin I (TnI) levels must be interpreted in the context of the presenting sym
ptoms and the clinical findings. Elevated TnI levels indicate myocardial damage,
but are not specific for ischemic heart disease. Elevated TnI levels are seen in
patients with other cardiac conditions (including myocarditis and congestive h
eart failure), and slight TnI elevations occur in patients with other conditions
, including sepsis, renal failure, acidosis, acute neurological disease, and per
sistent tachyarrhythmia.B-TYPE NATRIURETIC FACTOR (BNP)2017 11:51:00* 





                Test Item       Value           Reference Range    Comments

 

                B-TYPE NATRIURETIC PEPTIDE (BEAKER) (test code=700)    1486 pg/mL      0-100            





[id number] 11:45:00* 





                Test Item       Value           Reference Range    Comments

 

                MAGNESIUM (BEAKER) (test code=627)    1.7 mg/dL       1.6-2.6          





BASIC METABOLIC [id number] 11:45:00* 





                Test Item       Value           Reference Range    Comments

 

                SODIUM (BEAKER) (test code=381)    143 meq/L       136-145          

 

                POTASSIUM (BEAKER) (test code=379)    3.7 meq/L       3.5-5.1          

 

                CHLORIDE (BEAKER) (test code=382)    104 meq/L                  

 

                CO2 (BEAKER) (test code=355)    27 meq/L        22-29            

 

                BLOOD UREA NITROGEN (BEAKER) (test code=354)    22 mg/dL        7-21             

 

                CREATININE (BEAKER) (test code=358)    1.76 mg/dL      0.57-1.25        

 

                GLUCOSE RANDOM (BEAKER) (test code=652)    131 mg/dL                  

 

                CALCIUM (BEAKER) (test code=697)    9.4 mg/dL       8.4-10.2         

 

                EGFR (BEAKER) (test code=1092)    41 mL/min/1.73 sq m                    ESTIMATED GFR IS NOT AS 

ACCURATE AS CREATININE CLEARANCE IN PREDICTING GLOMERULAR FILTRATION RATE. 
ESTIMATED GFR IS NOT APPLICABLE FOR DIALYSIS PATIENTS.





HEPATIC FUNCTION [id number] 11:45:00* 





                Test Item       Value           Reference Range    Comments

 

                TOTAL PROTEIN (BEAKER) (test code=770)    7.9 gm/dL       6.0-8.3          

 

                ALBUMIN (BEAKER) (test code=1145)    4.2 g/dL        3.5-5.0          

 

                BILIRUBIN TOTAL (BEAKER) (test code=377)    1.5 mg/dL       0.2-1.2          

 

                BILIRUBIN DIRECT (BEAKER) (test code=706)    0.6 mg/dL       0.1-0.5          

 

                ALKALINE PHOSPHATASE (BEAKER) (test code=346)    126 U/L                    

 

                AST (SGOT) (BEAKER) (test code=353)    16 U/L          5-34             

 

                ALT (SGPT) (BEAKER) (test code=347)    12 U/L          6-55             





[id number] 11:45:00* 





                Test Item       Value           Reference Range    Comments

 

                AMYLASE (BEAKER) (test code=349)    28 U/L                     





[id number] 11:45:00* 





                Test Item       Value           Reference Range    Comments

 

                LIPASE (BEAKER) (test code=749)    22 U/L          8-78             





CBC W/PLT COUNT & AUTO [id number] 11:45:00* 





                Test Item       Value           Reference Range    Comments

 

                WHITE BLOOD CELL COUNT (BEAKER) (test code=775)    9.2 K/ L        3.5-10.5         

 

                RED BLOOD CELL COUNT (BEAKER) (test code=761)    4.82 M/ L       4.63-6.08        

 

                HEMOGLOBIN (BEAKER) (test code=410)    14.7 GM/DL      13.7-17.5        

 

                HEMATOCRIT (BEAKER) (test code=411)    44.8 %          40.1-51.0        

 

                MEAN CORPUSCULAR VOLUME (BEAKER) (test code=753)    92.9 fL         79.0-92.2        

 

                MEAN CORPUSCULAR HEMOGLOBIN (BEAKER) (test code=751)    30.5 pg         25.7-32.2        

 

                    MEAN CORPUSCULAR HEMOGLOBIN CONC (BEAKER) (test code=752)    32.8 GM/DL          32.3-36.5

                                         

 

                RED CELL DISTRIBUTION WIDTH (BEAKER) (test code=412)    15.2 %          11.6-14.4        

 

                PLATELET COUNT (BEAKER) (test code=756)    286 K/CU MM     150-450          

 

                MEAN PLATELET VOLUME (BEAKER) (test code=754)    9.6 fL          9.4-12.4         

 

                NUCLEATED RED BLOOD CELLS (BEAKER) (test code=413)    0 /100 WBC      0-0              

 

                NEUTROPHILS RELATIVE PERCENT (BEAKER) (test code=429)    81 %                             

 

                LYMPHOCYTES RELATIVE PERCENT (BEAKER) (test code=430)    10 %                             

 

                MONOCYTES RELATIVE PERCENT (BEAKER) (test code=431)    7 %                              

 

                EOSINOPHILS RELATIVE PERCENT (BEAKER) (test code=432)    1 %                              

 

                BASOPHILS RELATIVE PERCENT (BEAKER) (test code=437)    0 %                              

 

                NEUTROPHILS ABSOLUTE COUNT (BEAKER) (test code=670)    7.44 K/ L       1.78-5.38        

 

                LYMPHOCYTES ABSOLUTE COUNT (BEAKER) (test code=414)    0.95 K/ L       1.32-3.57        

 

                MONOCYTES ABSOLUTE COUNT (BEAKER) (test code=415)    0.61 K/ L       0.30-0.82        

 

                EOSINOPHILS ABSOLUTE COUNT (BEAKER) (test code=416)    0.08 K/ L       0.04-0.54        

 

                BASOPHILS ABSOLUTE COUNT (BEAKER) (test code=417)    0.03 K/ L       0.01-0.08        

 

                IMMATURE GRANULOCYTES-RELATIVE PERCENT (BEAKER) (test code=2801)    0 %             0-1              





POC Glucose, [id number] 12:40:00* 





                Test Item       Value           Reference Range    Comments

 

                POC Glucose (test code=POCGLUC)    147 mg/dL                 Notify RN or MDIf you consider

 your patient critically ill, the Roche Accu-Chek InformII metershould not be 
used for Glucose determinations.Draw a venous Glucose and send to the Main Lab 
for Analysis.





POC Glucose, [id number] 06:09:00* 





                Test Item       Value           Reference Range    Comments

 

                POC Glucose (test code=POCGLUC)    202 mg/dL                 Notify RN or MDIf you consider

 your patient critically ill, the Roche Accu-Chek InformII metershould not be 
used for Glucose determinations.Draw a venous Glucose and send to the Main Lab 
for Analysis.





POC Glucose, [id number] 19:38:00* 





                Test Item       Value           Reference Range    Comments

 

                POC Glucose (test code=POCGLUC)    239 mg/dL                 Notify RN or MDIf you consider

 your patient critically ill, the Roche Accu-Chek InformII metershould not be 
used for Glucose determinations.Draw a venous Glucose and send to the Main Lab 
for Analysis.





POC Glucose, [id number] 16:46:00* 





                Test Item       Value           Reference Range    Comments

 

                POC Glucose (test code=POCGLUC)    229 mg/dL                 Notify RN or MDIf you consider

 your patient critically ill, the Roche Accu-Chek InformII metershould not be 
used for Glucose determinations.Draw a venous Glucose and send to the Main Lab 
for Analysis.





POC Glucose, [id number] 12:30:00* 





                Test Item       Value           Reference Range    Comments

 

                POC Glucose (test code=POCGLUC)    136 mg/dL                 Notify RN or MDIf you consider

 your patient critically ill, the Roche Accu-Chek InformII metershould not be 
used for Glucose determinations.Draw a venous Glucose and send to the Main Lab 
for Analysis.





POC Glucose, [id number] 08:33:00* 





                Test Item       Value           Reference Range    Comments

 

                POC Glucose (test code=POCGLUC)    102 mg/dL                 If you consider your patient

 critically ill, the Roche Accu-Chek InformII metershould not be used for 
Glucose determinations.Draw a venous Glucose and send to the Main Lab for 
Analysis.





POC Glucose, [id number] 05:28:00* 





                Test Item       Value           Reference Range    Comments

 

                POC Glucose (test code=POCGLUC)    122 mg/dL                 If you consider your patient

 critically ill, the Roche Accu-Chek InformII metershould not be used for 
Glucose determinations.Draw a venous Glucose and send to the Main Lab for 
Analysis.





POC Glucose, [id number] 19:54:00* 





                Test Item       Value           Reference Range    Comments

 

                POC Glucose (test code=POCGLUC)    270 mg/dL                 If you consider your patient

 critically ill, the Roche Accu-Chek InformII metershould not be used for 
Glucose determinations.Draw a venous Glucose and send to the Main Lab for 
Analysis.





POC Glucose, [id number] 16:18:00* 





                Test Item       Value           Reference Range    Comments

 

                POC Glucose (test code=POCGLUC)    227 mg/dL                 If you consider your patient

 critically ill, the Roche Accu-Chek InformII metershould not be used for 
Glucose determinations.Draw a venous Glucose and send to the Main Lab for 
Analysis.





POC Glucose, [id number] 12:30:00* 





                Test Item       Value           Reference Range    Comments

 

                POC Glucose (test code=POCGLUC)    316 mg/dL                 If you consider your patient

 critically ill, the Roche Accu-Chek InformII metershould not be used for 
Glucose determinations.Draw a venous Glucose and send to the Main Lab for 
Analysis.





XR CHEST 1 [id number] 12:17:40EXAM: Portable AP chest x-rayLOCATION: R16 
INDICATION: chf    COMPARISON: 2017FINDINGS:The cardiomediastinal 
silhouette is unremarkable. Single-lead left chestwall AICD. There is no focal 
consolidation.  There is no pleuraleffusion or pneumothorax.  IMPRESSION:No 
acute cardiopulmonary disease.Basic Metabolic [id number] 05:50:00* 





                Test Item       Value           Reference Range    Comments

 

                Sodium (test code=NA)    141 mmol/L      135-145          

 

                Potassium (test code=K)    4.1 mmol/L      3.5-5.1          

 

                Chloride (test code=CL)    101 mmol/L                 

 

                Carbon Dioxide (test code=CO2)    29 mmol/L       22-29            

 

                Glucose (test code=GLU)    112 mg/dL                  

 

                Blood Urea Nitrogen (test code=BUN)    21 mg/dL        6-20             

 

                Creatinine (test code=CREAT)    1.6 mg/dL       0.7-1.2          

 

                Calcium (test code=CA)    8.6 mg/dL       8.3-10.5         

 

                BUN/Creatinine Ratio (test code=BCRATIO)    13.1                             

 

                Anion Gap (test code=AGAP)    11 mmol/L       7-16             

 

                Estimated GFR (test code=GFR)    48 mL/min/1.73m2                    eGFR (estimated Glomerular Filtration

 Rate) is an estimated value,calculated from the patient's serum creatinine 
using the MDRD equation.It is NOT the patient's actual GFR. The eGFR provides a 
more clinicallyuseful measure of kidney disease than serum creatinine 
alone.***This calculation takes sex and race into account, if the informationis 
provided. If the race is not provided, and the patient isAfrican-American, 
multiply by 1.212. If sex is not provided, and thepatient is female, multiply by
0.742. Results for patients <18 years ofage have not been validated by the MDRD 
study and should be interpretedwith caution.eGFR Result Interpretation:eGFR > 
or=60 is in the Normal RangeeGFR < 60 may mean kidney diseaseeGFR < 15 may mean 
kidney failure***Ranges recommended by the National Kidney Foundat
ion,http://nkdep.nih.gov





Lipid [id number] 05:50:00* 





                Test Item       Value           Reference Range    Comments

 

                Cholesterol (test code=CHOL)    147 mg/dL       0-200            

 

                Triglycerides (test code=TRIG)    91 mg/dL        9-200            

 

                HDL (test code=HDL)    25 mg/dL        40-60            

 

                Chol/HDL (test code=CHOLPHDL)    5.9 Ratio       0.0-5.0          

 

                LDL, Calculated (test code=LDLC)    104 mg/dL       0-130           (NOTE)RISK OF HEART DISEASEPublished

 by American Heart AssociationAnalyte                 Optimal            
Boderline            Increased RiskCHOL                  <200                 
200-239                   >240TRIG                    <150                 150-
199                   >200HDL Male:           >60                               
                 <40HDL Female:       &gt;60                                    
              <50LDL                     <100                130-159            
       >160LDL                      NEAR OPTIMAL -129

 

                VLDL (test code=VLDL)    18 mg/dL        5-40             

 

                LDL/HDL (test code=LDLPHDL)    4                                





POC Glucose, [id number] 05:49:00* 





                Test Item       Value           Reference Range    Comments

 

                POC Glucose (test code=POCGLUC)    121 mg/dL                 If you consider your patient

 critically ill, the Roche Accu-Chek InformII metershould not be used for 
Glucose determinations.Draw a venous Glucose and send to the Main Lab for 
Analysis.





POC Glucose, [id number] 00:25:00* 





                Test Item       Value           Reference Range    Comments

 

                POC Glucose (test code=POCGLUC)    197 mg/dL                 If you consider your patient

 critically ill, the Roche Accu-Chek InformII metershould not be used for 
Glucose determinations.Draw a venous Glucose and send to the Main Lab for 
Analysis.





POC Glucose, [id number] 20:02:00* 





                Test Item       Value           Reference Range    Comments

 

                POC Glucose (test code=POCGLUC)    271 mg/dL                 If you consider your patient

 critically ill, the Roche Accu-Chek InformII metershould not be used for 
Glucose determinations.Draw a venous Glucose and send to the Main Lab for 
Analysis.





POC Glucose, [id number] 16:07:00* 





                Test Item       Value           Reference Range    Comments

 

                POC Glucose (test code=POCGLUC)    247 mg/dL                 Notify RN or MDIf you consider

 your patient critically ill, the Roche Accu-Chek InformII metershould not be 
used for Glucose determinations.Draw a venous Glucose and send to the Main Lab 
for Analysis.





POC Glucose, [id number] 10:58:00* 





                Test Item       Value           Reference Range    Comments

 

                POC Glucose (test code=POCGLUC)    222 mg/dL                 Notify RN or MDIf you consider

 your patient critically ill, the Roche Accu-Chek InformII metershould not be 
used for Glucose determinations.Draw a venous Glucose and send to the Main Lab 
for Analysis.





POC Glucose, [id number] 07:05:00* 





                Test Item       Value           Reference Range    Comments

 

                POC Glucose (test code=POCGLUC)    164 mg/dL                 Notify RN or MDIf you consider

 your patient critically ill, the Roche Accu-Chek InformII metershould not be 
used for Glucose determinations.Draw a venous Glucose and send to the Main Lab 
for Analysis.





POC Glucose, [id number] 19:54:00* 





                Test Item       Value           Reference Range    Comments

 

                POC Glucose (test code=POCGLUC)    314 mg/dL                 If you consider your patient

 critically ill, the Roche Accu-Chek InformII metershould not be used for 
Glucose determinations.Draw a venous Glucose and send to the Main Lab for 
Analysis.





POC Glucose, [id number] 16:10:00* 





                Test Item       Value           Reference Range    Comments

 

                POC Glucose (test code=POCGLUC)    244 mg/dL                 If you consider your patient

 critically ill, the Roche Accu-Chek InformII metershould not be used for 
Glucose determinations.Draw a venous Glucose and send to the Main Lab for 
Analysis.





POC Glucose, [id number] 11:58:00* 





                Test Item       Value           Reference Range    Comments

 

                POC Glucose (test code=POCGLUC)    270 mg/dL                 If you consider your patient

 critically ill, the Roche Accu-Chek InformII metershould not be used for 
Glucose determinations.Draw a venous Glucose and send to the Main Lab for 
Analysis.





POC Glucose, [id number] 08:50:00* 





                Test Item       Value           Reference Range    Comments

 

                POC Glucose (test code=POCGLUC)    168 mg/dL                 If you consider your patient

 critically ill, the Roche Accu-Chek InformII metershould not be used for 
Glucose determinations.Draw a venous Glucose and send to the Main Lab for 
Analysis.





Renal [id number] 07:27:00* 





                Test Item       Value           Reference Range    Comments

 

                Sodium (test code=NA)    133 mmol/L      135-145          

 

                Potassium (test code=K)    3.7 mmol/L      3.5-5.1          

 

                Chloride (test code=CL)    96 mmol/L                  

 

                Carbon Dioxide (test code=CO2)    32 mmol/L       22-29            

 

                Glucose (test code=GLU)    247 mg/dL                  

 

                Blood Urea Nitrogen (test code=BUN)    21 mg/dL        6-20             

 

                Creatinine (test code=CREAT)    1.4 mg/dL       0.7-1.2          

 

                Calcium (test code=CA)    8.5 mg/dL       8.3-10.5         

 

                Albumin (test code=ALB)    3.6 g/dL        3.5-5.2          

 

                Phosphorus (test code=PO4)    3.7 mg/dL       2.70-4.50        

 

                BUN/Creatinine Ratio (test code=BCRATIO)    15.0                             

 

                Anion Gap (test code=AGAP)    5 mmol/L        7-16             

 

                Estimated GFR (test code=GFR)    57 mL/min/1.73m2                    eGFR (estimated Glomerular Filtration

 Rate) is an estimated value,calculated from the patient's serum creatinine 
using the MDRD equation.It is NOT the patient's actual GFR. The eGFR provides a 
more clinicallyuseful measure of kidney disease than serum creatinine 
alone.***This calculation takes sex and race into account, if the informationis 
provided. If the race is not provided, and the patient isAfrican-American, 
multiply by 1.212. If sex is not provided, and thepatient is female, multiply by
0.742. Results for patients <18 years ofage have not been validated by the MDRD 
study and should be interpretedwith caution.eGFR Result Interpretation:eGFR > 
or=60 is in the Normal RangeeGFR < 60 may mean kidney diseaseeGFR < 15 may mean 
kidney failure***Ranges recommended by the National Kidney Foundat
ion,http://nkdep.nih.gov





Magnesium, [id number] 07:22:00* 





                Test Item       Value           Reference Range    Comments

 

                Magnesium (test code=MG)    2.1 mg/dL       1.7-2.5          





CBC with [id number] 07:20:00* 





                Test Item       Value           Reference Range    Comments

 

                WBC (test code=WBC)    7.2 K/cumm      4.4-10.5         

 

                RBC (test code=RBC)    4.21 M/cumm     4.10-5.70        

 

                Hemoglobin (test code=HGB)    13.3 gm/dL      13.4-17.4        

 

                Hematocrit (test code=HCT)    37.8 %          38.7-52.0        

 

                MCV (test code=MCV)    89.7 fL                    

 

                MCH (test code=MCH)    31.6 pg         27.0-32.5        

 

                MCHC (test code=MCHC)    35.3 g/dL       32.0-37.5        

 

                RDW (test code=RDW)    15.7 %          11.5-14.5        

 

                Platelet Count (test code=PLTCT)    179 K/cumm      140-440          

 

                MPV (test code=MPV)    7.8 fL                           

 

                Diff Method (test code=DIFFM)    Auto                             

 

                Neutrophil (test code=NEUT)    70.2 %          36-70            

 

                Lymphocyte (test code=LYMPH)    18.3 %          12-44            

 

                Monocyte (test code=MONO)    8.1 %           0-11             

 

                Eosinophil (test code=EOS)    3.1 %           0-7              

 

                Basophil (test code=BASO)    0.3 %           0-2              

 

                Neutro Abs (test code=ANEUT)    5.1 K/cumm      1.6-7.4          

 

                Lymph Abs (test code=ALYMPH)    1.3 K/cumm      0.5-4.6          

 

                Mono Abs (test code=AMONO)    0.6 K/cumm      0.0-1.2          

 

                Eos Abs (test code=AEOS)    0.22 K/cumm     0.00-0.74        

 

                Baso Abs (test code=ABASO)    0.0 K/cumm      0.00-0.21        





POC Glucose, [id number] 20:29:00* 





                Test Item       Value           Reference Range    Comments

 

                POC Glucose (test code=POCGLUC)    293 mg/dL                 If you consider your patient

 critically ill, the Roche Accu-Chek InformII metershould not be used for 
Glucose determinations.Draw a venous Glucose and send to the Main Lab for 
Analysis.





POC Glucose, [id number] 16:34:00* 





                Test Item       Value           Reference Range    Comments

 

                POC Glucose (test code=POCGLUC)    346 mg/dL                 Notify RN or MDIf you consider

 your patient critically ill, the Roche Accu-Chek InformII metershould not be 
used for Glucose determinations.Draw a venous Glucose and send to the Main Lab 
for Analysis.





POC Glucose, [id number] 12:17:00* 





                Test Item       Value           Reference Range    Comments

 

                POC Glucose (test code=POCGLUC)    303 mg/dL                 Notify RN or MDIf you consider

 your patient critically ill, the Roche Accu-Chek InformII metershould not be 
used for Glucose determinations.Draw a venous Glucose and send to the Main Lab 
for Analysis.





CBC with [id number] 08:37:00* 





                Test Item       Value           Reference Range    Comments

 

                WBC (test code=WBC)    6.4 K/cumm      4.4-10.5         

 

                RBC (test code=RBC)    4.31 M/cumm     4.10-5.70        

 

                Hemoglobin (test code=HGB)    13.4 gm/dL      13.4-17.4        

 

                Hematocrit (test code=HCT)    38.8 %          38.7-52.0        

 

                MCV (test code=MCV)    90.0 fL                    

 

                MCH (test code=MCH)    31.1 pg         27.0-32.5        

 

                MCHC (test code=MCHC)    34.5 g/dL       32.0-37.5        

 

                RDW (test code=RDW)    15.9 %          11.5-14.5        

 

                Platelet Count (test code=PLTCT)    187 K/cumm      140-440          

 

                MPV (test code=MPV)    7.7 fL                           

 

                Diff Method (test code=DIFFM)    Auto                             

 

                Neutrophil (test code=NEUT)    71.1 %          36-70            

 

                Lymphocyte (test code=LYMPH)    17.7 %          12-44            

 

                Monocyte (test code=MONO)    7.6 %           0-11             

 

                Eosinophil (test code=EOS)    3.2 %           0-7              

 

                Basophil (test code=BASO)    0.5 %           0-2              

 

                Neutro Abs (test code=ANEUT)    4.6 K/cumm      1.6-7.4          

 

                Lymph Abs (test code=ALYMPH)    1.1 K/cumm      0.5-4.6          

 

                Mono Abs (test code=AMONO)    0.5 K/cumm      0.0-1.2          

 

                Eos Abs (test code=AEOS)    0.20 K/cumm     0.00-0.74        

 

                Baso Abs (test code=ABASO)    0.0 K/cumm      0.00-0.21        





Renal [id number] 08:26:00* 





                Test Item       Value           Reference Range    Comments

 

                Sodium (test code=NA)    139 mmol/L      135-145          

 

                Potassium (test code=K)    3.5 mmol/L      3.5-5.1          

 

                Chloride (test code=CL)    98 mmol/L                  

 

                Carbon Dioxide (test code=CO2)    34 mmol/L       22-29            

 

                Glucose (test code=GLU)    166 mg/dL                  

 

                Blood Urea Nitrogen (test code=BUN)    21 mg/dL        6-20             

 

                Creatinine (test code=CREAT)    1.5 mg/dL       0.7-1.2          

 

                Calcium (test code=CA)    8.5 mg/dL       8.3-10.5         

 

                Albumin (test code=ALB)    3.7 g/dL        3.5-5.2          

 

                Phosphorus (test code=PO4)    4.5 mg/dL       2.70-4.50        

 

                BUN/Creatinine Ratio (test code=BCRATIO)    14.0                             

 

                Anion Gap (test code=AGAP)    7 mmol/L        7-16             

 

                Estimated GFR (test code=GFR)    52 mL/min/1.73m2                    eGFR (estimated Glomerular Filtration

 Rate) is an estimated value,calculated from the patient's serum creatinine 
using the MDRD equation.It is NOT the patient's actual GFR. The eGFR provides a 
more clinicallyuseful measure of kidney disease than serum creatinine 
alone.***This calculation takes sex and race into account, if the informationis 
provided. If the race is not provided, and the patient isAfrican-American, 
multiply by 1.212. If sex is not provided, and thepatient is female, multiply by
0.742. Results for patients <18 years ofage have not been validated by the MDRD 
study and should be interpretedwith caution.eGFR Result Interpretation:eGFR > 
or=60 is in the Normal RangeeGFR < 60 may mean kidney diseaseeGFR < 15 may mean 
kidney failure***Ranges recommended by the National Kidney Foundat
ion,http://nkdep.nih.gov





Basic Metabolic [id number] 08:25:00* 





                Test Item       Value           Reference Range    Comments

 

                Sodium (test code=NA)    138 mmol/L      135-145          

 

                Potassium (test code=K)    3.4 mmol/L      3.5-5.1          

 

                Chloride (test code=CL)    97 mmol/L                  

 

                Carbon Dioxide (test code=CO2)    33 mmol/L       22-29            

 

                Glucose (test code=GLU)    160 mg/dL                  

 

                Blood Urea Nitrogen (test code=BUN)    21 mg/dL        6-20             

 

                Creatinine (test code=CREAT)    1.5 mg/dL       0.7-1.2          

 

                Calcium (test code=CA)    8.6 mg/dL       8.3-10.5         

 

                BUN/Creatinine Ratio (test code=BCRATIO)    14.0                             

 

                Anion Gap (test code=AGAP)    8 mmol/L        7-16             

 

                Estimated GFR (test code=GFR)    52 mL/min/1.73m2                    eGFR (estimated Glomerular Filtration

 Rate) is an estimated value,calculated from the patient's serum creatinine 
using the MDRD equation.It is NOT the patient's actual GFR. The eGFR provides a 
more clinicallyuseful measure of kidney disease than serum creatinine 
alone.***This calculation takes sex and race into account, if the informationis 
provided. If the race is not provided, and the patient isAfrican-American, 
multiply by 1.212. If sex is not provided, and thepatient is female, multiply by
0.742. Results for patients <18 years ofage have not been validated by the MDRD 
study and should be interpretedwith caution.eGFR Result Interpretation:eGFR > 
or=60 is in the Normal RangeeGFR < 60 may mean kidney diseaseeGFR < 15 may mean 
kidney failure***Ranges recommended by the National Kidney Foundat
ion,http://nkdep.nih.gov





Magnesium, [id number] 08:23:00* 





                Test Item       Value           Reference Range    Comments

 

                Magnesium (test code=MG)    2.0 mg/dL       1.7-2.5          





POC Glucose, [id number] 07:58:00* 





                Test Item       Value           Reference Range    Comments

 

                POC Glucose (test code=POCGLUC)    146 mg/dL                 Notify RN or MDIf you consider

 your patient critically ill, the Roche Accu-Chek InformII metershould not be 
used for Glucose determinations.Draw a venous Glucose and send to the Main Lab 
for Analysis.





POC Glucose, [id number] 06:29:00* 





                Test Item       Value           Reference Range    Comments

 

                POC Glucose (test code=POCGLUC)    181 mg/dL                 If you consider your patient

 critically ill, the Roche Accu-Chek InformII metershould not be used for 
Glucose determinations.Draw a venous Glucose and send to the Main Lab for 
Analysis.





POC Glucose, [id number] 20:12:00* 





                Test Item       Value           Reference Range    Comments

 

                POC Glucose (test code=POCGLUC)    242 mg/dL                 If you consider your patient

 critically ill, the Roche Accu-Chek InformII metershould not be used for 
Glucose determinations.Draw a venous Glucose and send to the Main Lab for 
Analysis.





POC Glucose, [id number] 20:12:00* 





                Test Item       Value           Reference Range    Comments

 

                POC Glucose (test code=POCGLUC)    124 mg/dL                 If you consider your patient

 critically ill, the Roche Accu-Chek InformII metershould not be used for 
Glucose determinations.Draw a venous Glucose and send to the Main Lab for 
Analysis.





POC Glucose, [id number] 16:21:00* 





                Test Item       Value           Reference Range    Comments

 

                POC Glucose (test code=POCGLUC)    271 mg/dL                 Notify RN or MDIf you consider

 your patient critically ill, the Roche Accu-Chek InformII metershould not be 
used for Glucose determinations.Draw a venous Glucose and send to the Main Lab 
for Analysis.





POC Glucose, [id number] 12:27:00* 





                Test Item       Value           Reference Range    Comments

 

                POC Glucose (test code=POCGLUC)    288 mg/dL                 If you consider your patient

 critically ill, the Roche Accu-Chek InformII metershould not be used for 
Glucose determinations.Draw a venous Glucose and send to the Main Lab for 
Analysis.





Renal [id number] 08:29:00* 





                Test Item       Value           Reference Range    Comments

 

                Sodium (test code=NA)    138 mmol/L      135-145          

 

                Potassium (test code=K)    3.7 mmol/L      3.5-5.1          

 

                Chloride (test code=CL)    96 mmol/L                  

 

                Carbon Dioxide (test code=CO2)    31 mmol/L       22-29            

 

                Glucose (test code=GLU)    180 mg/dL                  

 

                Blood Urea Nitrogen (test code=BUN)    21 mg/dL        6-20             

 

                Creatinine (test code=CREAT)    1.5 mg/dL       0.7-1.2          

 

                Calcium (test code=CA)    8.8 mg/dL       8.3-10.5         

 

                Albumin (test code=ALB)    3.8 g/dL        3.5-5.2          

 

                Phosphorus (test code=PO4)    4.6 mg/dL       2.70-4.50        

 

                BUN/Creatinine Ratio (test code=BCRATIO)    14.0                             

 

                Anion Gap (test code=AGAP)    11 mmol/L       7-16             

 

                Estimated GFR (test code=GFR)    52 mL/min/1.73m2                    eGFR (estimated Glomerular Filtration

 Rate) is an estimated value,calculated from the patient's serum creatinine 
using the MDRD equation.It is NOT the patient's actual GFR. The eGFR provides a 
more clinicallyuseful measure of kidney disease than serum creatinine 
alone.***This calculation takes sex and race into account, if the informationis 
provided. If the race is not provided, and the patient isAfrican-American, 
multiply by 1.212. If sex is not provided, and thepatient is female, multiply by
0.742. Results for patients <18 years ofage have not been validated by the MDRD 
study and should be interpretedwith caution.eGFR Result Interpretation:eGFR > 
or=60 is in the Normal RangeeGFR < 60 may mean kidney diseaseeGFR < 15 may mean 
kidney failure***Ranges recommended by the National Kidney Foundat
ion,http://nkdep.nih.gov





Magnesium, [id number] 08:25:00* 





                Test Item       Value           Reference Range    Comments

 

                Magnesium (test code=MG)    1.9 mg/dL       1.7-2.5          





CBC with [id number] 08:21:00* 





                Test Item       Value           Reference Range    Comments

 

                WBC (test code=WBC)    7.4 K/cumm      4.4-10.5         

 

                RBC (test code=RBC)    4.54 M/cumm     4.10-5.70        

 

                Hemoglobin (test code=HGB)    14.0 gm/dL      13.4-17.4        

 

                Hematocrit (test code=HCT)    40.8 %          38.7-52.0        

 

                MCV (test code=MCV)    89.9 fL                    

 

                MCH (test code=MCH)    30.9 pg         27.0-32.5        

 

                MCHC (test code=MCHC)    34.4 g/dL       32.0-37.5        

 

                RDW (test code=RDW)    15.9 %          11.5-14.5        

 

                Platelet Count (test code=PLTCT)    169 K/cumm      140-440          

 

                MPV (test code=MPV)    8.0 fL                           

 

                Diff Method (test code=DIFFM)    Auto                             

 

                Neutrophil (test code=NEUT)    75.5 %          36-70            

 

                Lymphocyte (test code=LYMPH)    15.3 %          12-44            

 

                Monocyte (test code=MONO)    5.5 %           0-11             

 

                Eosinophil (test code=EOS)    3.4 %           0-7              

 

                Basophil (test code=BASO)    0.4 %           0-2              

 

                Neutro Abs (test code=ANEUT)    5.6 K/cumm      1.6-7.4          

 

                Lymph Abs (test code=ALYMPH)    1.1 K/cumm      0.5-4.6          

 

                Mono Abs (test code=AMONO)    0.4 K/cumm      0.0-1.2          

 

                Eos Abs (test code=AEOS)    0.25 K/cumm     0.00-0.74        

 

                Baso Abs (test code=ABASO)    0.0 K/cumm      0.00-0.21        





POC Glucose, [id number] 19:46:00* 





                Test Item       Value           Reference Range    Comments

 

                POC Glucose (test code=POCGLUC)    326 mg/dL                 If you consider your patient

 critically ill, the Roche Accu-Chek InformII metershould not be used for 
Glucose determinations.Draw a venous Glucose and send to the Main Lab for 
Analysis.





POC Glucose, [id number] 16:04:00* 





                Test Item       Value           Reference Range    Comments

 

                POC Glucose (test code=POCGLUC)    343 mg/dL                 Notify RN or MDIf you consider

 your patient critically ill, the Roche Accu-Chek InformII metershould not be 
used for Glucose determinations.Draw a venous Glucose and send to the Main Lab 
for Analysis.





XR CHEST 1 [id number] 14:17:49EXAM: Portable AP chest x-rayLOCATION: R16 
INDICATION: chf    COMPARISON: 17FINDINGS:The cardiac silhouette is 
enlarged. Left chest wall single leadpacemaker. There is no focal consolidation.
 There is no pleuraleffusion or pneumothorax.  IMPRESSION:Cardiomegaly.POC 
Glucose, [id number] 11:34:00* 





                Test Item       Value           Reference Range    Comments

 

                POC Glucose (test code=POCGLUC)    262 mg/dL                 If you consider your patient

 critically ill, the Roche Accu-Chek InformII metershould not be used for 
Glucose determinations.Draw a venous Glucose and send to the Main Lab for 
Analysis.





POC Glucose, [id number] 06:59:00* 





                Test Item       Value           Reference Range    Comments

 

                POC Glucose (test code=POCGLUC)    201 mg/dL                 If you consider your patient

 critically ill, the Roche Accu-Chek InformII metershould not be used for 
Glucose determinations.Draw a venous Glucose and send to the Main Lab for 
Analysis.





POC Glucose, [id number] 19:42:00* 





                Test Item       Value           Reference Range    Comments

 

                POC Glucose (test code=POCGLUC)    390 mg/dL                 If you consider your patient

 critically ill, the Roche Accu-Chek InformII metershould not be used for 
Glucose determinations.Draw a venous Glucose and send to the Main Lab for 
Analysis.





POC Glucose, [id number] 16:59:00* 





                Test Item       Value           Reference Range    Comments

 

                POC Glucose (test code=POCGLUC)    284 mg/dL                 If you consider your patient

 critically ill, the Roche Accu-Chek InformII metershould not be used for 
Glucose determinations.Draw a venous Glucose and send to the Main Lab for 
Analysis.





POC Glucose, [id number] 12:42:00* 





                Test Item       Value           Reference Range    Comments

 

                POC Glucose (test code=POCGLUC)    376 mg/dL                 Notify RN or MDIf you consider

 your patient critically ill, the Roche Accu-Chek InformII metershould not be 
used for Glucose determinations.Draw a venous Glucose and send to the Main Lab 
for Analysis.





POC Glucose, [id number] 08:55:00* 





                Test Item       Value           Reference Range    Comments

 

                POC Glucose (test code=POCGLUC)    238 mg/dL                 If you consider your patient

 critically ill, the Roche Accu-Chek InformII metershould not be used for 
Glucose determinations.Draw a venous Glucose and send to the Main Lab for 
Analysis.





Glycosylated [id number] 07:01:00* 





                Test Item       Value           Reference Range    Comments

 

                HBA1c (test code=HBA1C)    7.1 %           4.8-5.9          





Renal [id number] 06:51:00* 





                Test Item       Value           Reference Range    Comments

 

                Sodium (test code=NA)    138 mmol/L      135-145          

 

                Potassium (test code=K)    4.0 mmol/L      3.5-5.1          

 

                Chloride (test code=CL)    98 mmol/L                  

 

                Carbon Dioxide (test code=CO2)    28 mmol/L       22-29            

 

                Glucose (test code=GLU)    246 mg/dL                  

 

                Blood Urea Nitrogen (test code=BUN)    14 mg/dL        6-20             

 

                Creatinine (test code=CREAT)    1.5 mg/dL       0.7-1.2          

 

                Calcium (test code=CA)    8.5 mg/dL       8.3-10.5         

 

                Albumin (test code=ALB)    3.8 g/dL        3.5-5.2          

 

                Phosphorus (test code=PO4)    3.8 mg/dL       2.70-4.50        

 

                BUN/Creatinine Ratio (test code=BCRATIO)    9.3                              

 

                Anion Gap (test code=AGAP)    12 mmol/L       7-16             

 

                Estimated GFR (test code=GFR)    52 mL/min/1.73m2                    eGFR (estimated Glomerular Filtration

 Rate) is an estimated value,calculated from the patient's serum creatinine 
using the MDRD equation.It is NOT the patient's actual GFR. The eGFR provides a 
more clinicallyuseful measure of kidney disease than serum creatinine 
alone.***This calculation takes sex and race into account, if the informationis 
provided. If the race is not provided, and the patient isAfrican-American, 
multiply by 1.212. If sex is not provided, and thepatient is female, multiply by
0.742. Results for patients <18 years ofage have not been validated by the MDRD 
study and should be interpretedwith caution.eGFR Result Interpretation:eGFR > 
or=60 is in the Normal RangeeGFR < 60 may mean kidney diseaseeGFR < 15 may mean 
kidney failure***Ranges recommended by the National Kidney Foundat
ion,http://nkdep.nih.gov





CBC with [id number] 06:37:00* 





                Test Item       Value           Reference Range    Comments

 

                WBC (test code=WBC)    9.0 K/cumm      4.4-10.5         

 

                RBC (test code=RBC)    4.22 M/cumm     4.10-5.70        

 

                Hemoglobin (test code=HGB)    13.1 gm/dL      13.4-17.4        

 

                Hematocrit (test code=HCT)    37.9 %          38.7-52.0        

 

                MCV (test code=MCV)    89.9 fL                    

 

                MCH (test code=MCH)    31.0 pg         27.0-32.5        

 

                MCHC (test code=MCHC)    34.5 g/dL       32.0-37.5        

 

                RDW (test code=RDW)    15.5 %          11.5-14.5        

 

                Platelet Count (test code=PLTCT)    178 K/cumm      140-440          

 

                MPV (test code=MPV)    8.0 fL                           

 

                Diff Method (test code=DIFFM)    Auto                             

 

                Neutrophil (test code=NEUT)    83.0 %          36-70            

 

                Lymphocyte (test code=LYMPH)    12.7 %          12-44            

 

                Monocyte (test code=MONO)    3.2 %           0-11             

 

                Eosinophil (test code=EOS)    0.8 %           0-7              

 

                Basophil (test code=BASO)    0.2 %           0-2              

 

                Neutro Abs (test code=ANEUT)    7.5 K/cumm      1.6-7.4          

 

                Lymph Abs (test code=ALYMPH)    1.1 K/cumm      0.5-4.6          

 

                Mono Abs (test code=AMONO)    0.3 K/cumm      0.0-1.2          

 

                Eos Abs (test code=AEOS)    0.07 K/cumm     0.00-0.74        

 

                Baso Abs (test code=ABASO)    0.0 K/cumm      0.00-0.21        





Prothrombin [id number] 06:34:00* 





                Test Item       Value           Reference Range    Comments

 

                PT (test code=PT)    19.50 seconds    9.78-13.35       

 

                INR (test code=INR)    1.74 Ratio      0.6-1.2          





[id number] 06:32:00* 





                Test Item       Value           Reference Range    Comments

 

                NT ProBnp (test code=PBNP)    6683 pg/mL      0-124            





Troponin -89-21 06:32:00* 





                Test Item       Value           Reference Range    Comments

 

                Troponin T (test code=HARSHIL)    <0.010 ng/mL    0.000-0.090      





CK [id number] 06:32:00* 





                Test Item       Value           Reference Range    Comments

 

                CK (test code=CK)    125 U/L                    





CK [id number] 06:32:00* 





                Test Item       Value           Reference Range    Comments

 

                CK (test code=CK)    125 U/L                    

 

                CKMB (test code=CKMB)    2.4 ng/mL       0.0-4.9          

 

                CKMB% (test code=CKMBP)    1.9 %           0.0-3.4          





POC Glucose, [id number] 06:02:00* 





                Test Item       Value           Reference Range    Comments

 

                POC Glucose (test code=POCGLUC)    226 mg/dL                 If you consider your patient

 critically ill, the Roche Accu-Chek InformII metershould not be used for 
Glucose determinations.Draw a venous Glucose and send to the Main Lab for 
Analysis.





CK [id number] 23:41:00* 





                Test Item       Value           Reference Range    Comments

 

                CK (test code=CK)    175 U/L                    





CK [id number] 23:41:00* 





                Test Item       Value           Reference Range    Comments

 

                CK (test code=CK)    175 U/L                    

 

                CKMB (test code=CKMB)    3.1 ng/mL       0.0-4.9          

 

                CKMB% (test code=CKMBP)    1.8 %           0.0-3.4          





Troponin -43-04 23:39:00* 





                Test Item       Value           Reference Range    Comments

 

                Troponin T (test code=HARSHIL)    <0.010 ng/mL    0.000-0.090      





POC Glucose, [id number] 20:07:00* 





                Test Item       Value           Reference Range    Comments

 

                POC Glucose (test code=POCGLUC)    310 mg/dL                 If you consider your patient

 critically ill, the Roche Accu-Chek InformII metershould not be used for 
Glucose determinations.Draw a venous Glucose and send to the Main Lab for 
Analysis.





POC Glucose, [id number] 15:22:00* 





                Test Item       Value           Reference Range    Comments

 

                POC Glucose (test code=POCGLUC)    314 mg/dL                 Notify RN or MDIf you consider

 your patient critically ill, the Roche Accu-Chek InformII metershould not be 
used for Glucose determinations.Draw a venous Glucose and send to the Main Lab 
for Analysis.





POC Glucose, [id number] 12:14:00* 





                Test Item       Value           Reference Range    Comments

 

                POC Glucose (test code=POCGLUC)    375 mg/dL                 Notify RN or MDIf you consider

 your patient critically ill, the Roche Accu-Chek InformII metershould not be 
used for Glucose determinations.Draw a venous Glucose and send to the Main Lab 
for Analysis.





POC Glucose, [id number] 10:53:00* 





                Test Item       Value           Reference Range    Comments

 

                POC Glucose (test code=POCGLUC)    362 mg/dL                 Notify RN or MDIf you consider

 your patient critically ill, the Roche Accu-Chek InformII metershould not be 
used for Glucose determinations.Draw a venous Glucose and send to the Main Lab 
for Analysis.





POC Glucose, [id number] 06:21:00* 





                Test Item       Value           Reference Range    Comments

 

                POC Glucose (test code=POCGLUC)    243 mg/dL                 If you consider your patient

 critically ill, the Roche Accu-Chek InformII metershould not be used for 
Glucose determinations.Draw a venous Glucose and send to the Main Lab for 
Analysis.





POC Glucose, [id number] 20:44:00* 





                Test Item       Value           Reference Range    Comments

 

                POC Glucose (test code=POCGLUC)    424 mg/dL                 Notify RN or MD





POC Glucose, [id number] 20:44:00* 





                Test Item       Value           Reference Range    Comments

 

                POC Glucose (test code=POCGLUC)    405 mg/dL                 Repeat  Test





POC Glucose, [id number] 15:32:00* 





                Test Item       Value           Reference Range    Comments

 

                POC Glucose (test code=POCGLUC)    389 mg/dL                 Notify RN or MDIf you consider

 your patient critically ill, the Roche Accu-Chek InformII metershould not be 
used for Glucose determinations.Draw a venous Glucose and send to the Main Lab 
for Analysis.





POC Glucose, [id number] 15:32:00* 





                Test Item       Value           Reference Range    Comments

 

                POC Glucose (test code=POCGLUC)    441 mg/dL                 Notify RN or MD





POC Glucose, [id number] 11:19:00* 





                Test Item       Value           Reference Range    Comments

 

                POC Glucose (test code=POCGLUC)    360 mg/dL                 Notify RN or MDIf you consider

 your patient critically ill, the Roche Accu-Chek InformII metershould not be 
used for Glucose determinations.Draw a venous Glucose and send to the Main Lab 
for Analysis.





POC Glucose, [id number] 06:09:00* 





                Test Item       Value           Reference Range    Comments

 

                POC Glucose (test code=POCGLUC)    198 mg/dL                 If you consider your patient

 critically ill, the Roche Accu-Chek InformII metershould not be used for 
Glucose determinations.Draw a venous Glucose and send to the Main Lab for 
Analysis.





CK [id number] 02:02:00* 





                Test Item       Value           Reference Range    Comments

 

                CK (test code=CK)    244 U/L                    





[id number] 02:02:00* 





                Test Item       Value           Reference Range    Comments

 

                CK (test code=CK)    244 U/L                    

 

                CKMB (test code=CKMB)    4.7 ng/mL       0.0-4.9          

 

                CKMB% (test code=CKMBP)    1.9 %           0.0-3.4          





Troponin -01-34 02:02:00* 





                Test Item       Value           Reference Range    Comments

 

                Troponin T (test code=HARSHIL)    <0.010 ng/mL    0.000-0.090      





POC Glucose, [id number] 20:03:00* 





                Test Item       Value           Reference Range    Comments

 

                POC Glucose (test code=POCGLUC)    358 mg/dL                 If you consider your patient

 critically ill, the Roche Accu-Chek InformII metershould not be used for 
Glucose determinations.Draw a venous Glucose and send to the Main Lab for 
Analysis.





POC Glucose, [id number] 15:43:00* 





                Test Item       Value           Reference Range    Comments

 

                POC Glucose (test code=POCGLUC)    347 mg/dL                 Notify RN or MDIf you consider

 your patient critically ill, the Roche Accu-Chek InformII metershould not be 
used for Glucose determinations.Draw a venous Glucose and send to the Main Lab 
for Analysis.





POC Glucose, [id number] 11:10:00* 





                Test Item       Value           Reference Range    Comments

 

                POC Glucose (test code=POCGLUC)    355 mg/dL                 If you consider your patient

 critically ill, the Roche Accu-Chek InformII metershould not be used for 
Glucose determinations.Draw a venous Glucose and send to the Main Lab for 
Analysis.





POC Glucose, [id number] 06:37:00* 





                Test Item       Value           Reference Range    Comments

 

                POC Glucose (test code=POCGLUC)    314 mg/dL                 If you consider your patient

 critically ill, the Roche Accu-Chek InformII metershould not be used for 
Glucose determinations.Draw a venous Glucose and send to the Main Lab for 
Analysis.





POC Glucose, [id number] 19:18:00* 





                Test Item       Value           Reference Range    Comments

 

                POC Glucose (test code=POCGLUC)    408 mg/dL                 Notify RN or MD





POC Glucose, [id number] 15:06:00* 





                Test Item       Value           Reference Range    Comments

 

                POC Glucose (test code=POCGLUC)    361 mg/dL                 Notify RN or MDIf you consider

 your patient critically ill, the Roche Accu-Chek InformII metershould not be 
used for Glucose determinations.Draw a venous Glucose and send to the Main Lab 
for Analysis.





POC Glucose, [id number] 11:04:00* 





                Test Item       Value           Reference Range    Comments

 

                POC Glucose (test code=POCGLUC)    336 mg/dL                 If you consider your patient

 critically ill, the Roche Accu-Chek InformII metershould not be used for 
Glucose determinations.Draw a venous Glucose and send to the Main Lab for 
Analysis.





POC Glucose, [id number] 06:13:00* 





                Test Item       Value           Reference Range    Comments

 

                POC Glucose (test code=POCGLUC)    197 mg/dL                 If you consider your patient

 critically ill, the Roche Accu-Chek InformII metershould not be used for 
Glucose determinations.Draw a venous Glucose and send to the Main Lab for 
Analysis.





POC Glucose, [id number] 19:22:00* 





                Test Item       Value           Reference Range    Comments

 

                POC Glucose (test code=POCGLUC)    343 mg/dL                 Notify RN or MDIf you consider

 your patient critically ill, the Roche Accu-Chek InformII metershould not be 
used for Glucose determinations.Draw a venous Glucose and send to the Main Lab 
for Analysis.





POC Glucose, [id number] 15:21:00* 





                Test Item       Value           Reference Range    Comments

 

                POC Glucose (test code=POCGLUC)    286 mg/dL                 If you consider your patient

 critically ill, the Roche Accu-Chek InformII metershould not be used for 
Glucose determinations.Draw a venous Glucose and send to the Main Lab for 
Analysis.





POC Glucose, [id number] 06:12:00* 





                Test Item       Value           Reference Range    Comments

 

                POC Glucose (test code=POCGLUC)    241 mg/dL                 Notify RN or MDIf you consider

 your patient critically ill, the Roche Accu-Chek InformII metershould not be 
used for Glucose determinations.Draw a venous Glucose and send to the Main Lab 
for Analysis.





POC Glucose, [id number] 20:11:00* 





                Test Item       Value           Reference Range    Comments

 

                POC Glucose (test code=POCGLUC)    388 mg/dL                 If you consider your patient

 critically ill, the Roche Accu-Chek InformII metershould not be used for 
Glucose determinations.Draw a venous Glucose and send to the Main Lab for 
Analysis.





POC Glucose, [id number] 15:33:00* 





                Test Item       Value           Reference Range    Comments

 

                POC Glucose (test code=POCGLUC)    345 mg/dL                 If you consider your patient

 critically ill, the Roche Accu-Chek InformII metershould not be used for 
Glucose determinations.Draw a venous Glucose and send to the Main Lab for 
Analysis.





POC Glucose, [id number] 12:22:00* 





                Test Item       Value           Reference Range    Comments

 

                POC Glucose (test code=POCGLUC)    289 mg/dL                 Notify RN or MDIf you consider

 your patient critically ill, the Roche Accu-Chek InformII metershould not be 
used for Glucose determinations.Draw a venous Glucose and send to the Main Lab 
for Analysis.





POC Glucose, [id number] 06:46:00* 





                Test Item       Value           Reference Range    Comments

 

                POC Glucose (test code=POCGLUC)    206 mg/dL                 If you consider your patient

 critically ill, the Roche Accu-Chek InformII metershould not be used for 
Glucose determinations.Draw a venous Glucose and send to the Main Lab for 
Analysis.





POC Glucose, [id number] 20:05:00* 





                Test Item       Value           Reference Range    Comments

 

                POC Glucose (test code=POCGLUC)    338 mg/dL                 If you consider your patient

 critically ill, the Roche Accu-Chek InformII metershould not be used for 
Glucose determinations.Draw a venous Glucose and send to the Main Lab for 
Analysis.





POC Glucose, [id number] 18:00:00* 





                Test Item       Value           Reference Range    Comments

 

                POC Glucose (test code=POCGLUC)    282 mg/dL                 Notify RN or MDIf you consider

 your patient critically ill, the Roche Accu-Chek InformII metershould not be 
used for Glucose determinations.Draw a venous Glucose and send to the Main Lab 
for Analysis.





POC Glucose, [id number] 12:40:00* 





                Test Item       Value           Reference Range    Comments

 

                POC Glucose (test code=POCGLUC)    311 mg/dL                 Notify RN or MDIf you consider

 your patient critically ill, the Roche Accu-Chek InformII metershould not be 
used for Glucose determinations.Draw a venous Glucose and send to the Main Lab 
for Analysis.





POC Glucose, [id number] 05:19:00* 





                Test Item       Value           Reference Range    Comments

 

                POC Glucose (test code=POCGLUC)    220 mg/dL                 Notify RN or MDIf you consider

 your patient critically ill, the Roche Accu-Chek InformII metershould not be 
used for Glucose determinations.Draw a venous Glucose and send to the Main Lab 
for Analysis.





POC Glucose, [id number] 19:43:00* 





                Test Item       Value           Reference Range    Comments

 

                POC Glucose (test code=POCGLUC)    300 mg/dL                 If you consider your patient

 critically ill, the Roche Accu-Chek InformII metershould not be used for 
Glucose determinations.Draw a venous Glucose and send to the Main Lab for 
Analysis.





POC Glucose, [id number] 15:26:00* 





                Test Item       Value           Reference Range    Comments

 

                POC Glucose (test code=POCGLUC)    245 mg/dL                 Notify RN or MDIf you consider

 your patient critically ill, the Roche Accu-Chek InformII metershould not be 
used for Glucose determinations.Draw a venous Glucose and send to the Main Lab 
for Analysis.





POC Glucose, [id number] 11:09:00* 





                Test Item       Value           Reference Range    Comments

 

                POC Glucose (test code=POCGLUC)    256 mg/dL                 Notify RN or MDIf you consider

 your patient critically ill, the Roche Accu-Chek InformII metershould not be 
used for Glucose determinations.Draw a venous Glucose and send to the Main Lab 
for Analysis.





POC Glucose, [id number] 05:49:00* 





                Test Item       Value           Reference Range    Comments

 

                POC Glucose (test code=POCGLUC)    146 mg/dL                 Notify RN or MDIf you consider

 your patient critically ill, the Roche Accu-Chek InformII metershould not be 
used for Glucose determinations.Draw a venous Glucose and send to the Main Lab 
for Analysis.





POC Glucose, [id number] 19:43:00* 





                Test Item       Value           Reference Range    Comments

 

                POC Glucose (test code=POCGLUC)    207 mg/dL                 Notify RN or MDIf you consider

 your patient critically ill, the Roche Accu-Chek InformII metershould not be 
used for Glucose determinations.Draw a venous Glucose and send to the Main Lab 
for Analysis.





POC Glucose, [id number] 15:41:00* 





                Test Item       Value           Reference Range    Comments

 

                POC Glucose (test code=POCGLUC)    191 mg/dL                 If you consider your patient

 critically ill, the Roche Accu-Chek InformII metershould not be used for 
Glucose determinations.Draw a venous Glucose and send to the Main Lab for 
Analysis.





POC Glucose, [id number] 11:45:00* 





                Test Item       Value           Reference Range    Comments

 

                POC Glucose (test code=POCGLUC)    204 mg/dL                 Notify RN or MDIf you consider

 your patient critically ill, the Roche Accu-Chek InformII metershould not be 
used for Glucose determinations.Draw a venous Glucose and send to the Main Lab 
for Analysis.





POC Glucose, [id number] 06:05:00* 





                Test Item       Value           Reference Range    Comments

 

                POC Glucose (test code=POCGLUC)    148 mg/dL                 Notify RN or MDIf you consider

 your patient critically ill, the Roche Accu-Chek InformII metershould not be 
used for Glucose determinations.Draw a venous Glucose and send to the Main Lab 
for Analysis.





POC Glucose, [id number] 19:44:00* 





                Test Item       Value           Reference Range    Comments

 

                POC Glucose (test code=POCGLUC)    198 mg/dL                 Notify RN or MDIf you consider

 your patient critically ill, the Roche Accu-Chek InformII metershould not be 
used for Glucose determinations.Draw a venous Glucose and send to the Main Lab 
for Analysis.





POC Glucose, [id number] 16:04:00* 





                Test Item       Value           Reference Range    Comments

 

                POC Glucose (test code=POCGLUC)    176 mg/dL                 Notify RN or MDIf you consider

 your patient critically ill, the Roche Accu-Chek InformII metershould not be 
used for Glucose determinations.Draw a venous Glucose and send to the Main Lab 
for Analysis.





POC Glucose, [id number] 11:03:00* 





                Test Item       Value           Reference Range    Comments

 

                POC Glucose (test code=POCGLUC)    217 mg/dL                 Notify RN or MDIf you consider

 your patient critically ill, the Roche Accu-Chek InformII metershould not be 
used for Glucose determinations.Draw a venous Glucose and send to the Main Lab 
for Analysis.





POC Glucose, [id number] 05:08:00* 





                Test Item       Value           Reference Range    Comments

 

                POC Glucose (test code=POCGLUC)    127 mg/dL                 Notify RN or MDIf you consider

 your patient critically ill, the Roche Accu-Chek InformII metershould not be 
used for Glucose determinations.Draw a venous Glucose and send to the Main Lab 
for Analysis.





POC Glucose, [id number] 23:52:00* 





                Test Item       Value           Reference Range    Comments

 

                POC Glucose (test code=POCGLUC)    166 mg/dL                 Notify RN or MDIf you consider

 your patient critically ill, the Roche Accu-Chek InformII metershould not be 
used for Glucose determinations.Draw a venous Glucose and send to the Main Lab 
for Analysis.





POC Glucose, [id number] 19:15:00* 





                Test Item       Value           Reference Range    Comments

 

                POC Glucose (test code=POCGLUC)    197 mg/dL                 Notify RN or MDIf you consider

 your patient critically ill, the Roche Accu-Chek InformII metershould not be 
used for Glucose determinations.Draw a venous Glucose and send to the Main Lab 
for Analysis.





POC Glucose, [id number] 14:57:00* 





                Test Item       Value           Reference Range    Comments

 

                POC Glucose (test code=POCGLUC)    130 mg/dL                 Notify RN or MDIf you consider

 your patient critically ill, the Roche Accu-Chek InformII metershould not be 
used for Glucose determinations.Draw a venous Glucose and send to the Main Lab 
for Analysis.





POC Glucose, [id number] 12:02:00* 





                Test Item       Value           Reference Range    Comments

 

                POC Glucose (test code=POCGLUC)    210 mg/dL                 Notify RN or MDIf you consider

 your patient critically ill, the Roche Accu-Chek InformII metershould not be 
used for Glucose determinations.Draw a venous Glucose and send to the Main Lab 
for Analysis.





RPR, [id number] 03:46:00* 





                Test Item       Value           Reference Range    Comments

 

                RPR (test code=RPR)    Non-Reactive    Non-Reactive     





POC Glucose, [id number] 00:00:00* 





                Test Item       Value           Reference Range    Comments

 

                POC Glucose (test code=POCGLUC)    88 mg/dL                  If you consider your patient 

critically ill, the Roche Accu-Chek InformII metershould not be used for Glucose
determinations.Draw a venous Glucose and send to the Main Lab for Analysis.





Thyroid Stimulating Hormone (TSH)2017 22:06:00* 





                Test Item       Value           Reference Range    Comments

 

                TSH (test code=TSH)    1.13 mIU/mL     0.270-4.200      





Lipid [id number] 22:04:00* 





                Test Item       Value           Reference Range    Comments

 

                Cholesterol (test code=CHOL)    199 mg/dL       0-200            

 

                Triglycerides (test code=TRIG)    244 mg/dL       9-200            

 

                HDL (test code=HDL)    30 mg/dL        40-60            

 

                Chol/HDL (test code=CHOLPHDL)    6.6 Ratio       0.0-5.0          

 

                LDL, Calculated (test code=LDLC)    120             0-130           (NOTE)RISK OF HEART DISEASEPublished

 by American Heart AssociationAnalyte                 Optimal            
Boderline            Increased RiskCHOL                  <200                 
200-239                   >240TRIG                    <150                 150-
199                   >200HDL Male:           >60                               
                 <40HDL Female:       &gt;60                                    
              <50LDL                     <100                130-159            
       >160LDL                      NEAR OPTIMAL -129

 

                VLDL (test code=VLDL)    49 mg/dL        5-40             

 

                LDL/HDL (test code=LDLPHDL)    4                                





[id number] 22:04:00* 





                Test Item       Value           Reference Range    Comments

 

                NT ProBnp (test code=PBNP)    1865 pg/mL      0-124            





Alcohol/Ethanol, [id number] 22:03:00* 





                Test Item       Value           Reference Range    Comments

 

                Alcohol, Ethyl (test code=ETOH)    <0.01 g/dL      0.00-0.01       Intoxicated    0.080 g/dL

 or more





Comprehensive Metabolic [id number] 22:03:00* 





                Test Item       Value           Reference Range    Comments

 

                Sodium (test code=NA)    139 mmol/L      135-145          

 

                Potassium (test code=K)    3.9 mmol/L      3.5-5.1          

 

                Chloride (test code=CL)    100 mmol/L                 

 

                Carbon Dioxide (test code=CO2)    24 mmol/L       22-29            

 

                Glucose (test code=GLU)    71 mg/dL                   

 

                Blood Urea Nitrogen (test code=BUN)    20 mg/dL        6-20             

 

                Creatinine (test code=CREAT)    1.6 mg/dL       0.7-1.2          

 

                Calcium (test code=CA)    9.4 mg/dL       8.3-10.5         

 

                Prot Total (test code=TP)    7.3 g/dL        6.4-8.3          

 

                Albumin (test code=ALB)    4.3 g/dL        3.5-5.2          

 

                A/G Ratio (test code=AGRATIO)    1.4 Ratio                        

 

                Globulin (test code=GLOB)    3.0             2.9-3.1          

 

                Bili Total (test code=TBIL)    0.3 mg/dL       0.1-0.9          

 

                Alk Phos (test code=APHOS)    90 U/L                     

 

                AST (test code=AST)    36 U/L          1-40            HEMOLYZED

 

                ALT (test code=ALT)    22 U/L          1-41             

 

                BUN/Creatinine Ratio (test code=BCRATIO)    12.5                             

 

                Anion Gap (test code=AGAP)    15 mmol/L       7-16             

 

                Estimated GFR (test code=GFR)    48 mL/min/1.73m2                    eGFR (estimated Glomerular Filtration

 Rate) is an estimated value,calculated from the patient's serum creatinine 
using the MDRD equation.It is NOT the patient's actual GFR. The eGFR provides a 
more clinicallyuseful measure of kidney disease than serum creatinine 
alone.***This calculation takes sex and race into account, if the informationis 
provided. If the race is not provided, and the patient isAfrican-American, 
multiply by 1.212. If sex is not provided, and thepatient is female, multiply by
0.742. Results for patients <18 years ofage have not been validated by the MDRD 
study and should be interpretedwith caution.eGFR Result Interpretation:eGFR > 
or=60 is in the Normal RangeeGFR < 60 may mean kidney diseaseeGFR < 15 may mean 
kidney failure***Ranges recommended by the National Kidney Foundat
ion,http://nkdep.nih.gov





Urinalysis [id number] 21:50:00* 





                Test Item       Value           Reference Range    Comments

 

                Color (test code=COLOR)    Yellow          Yellow,Straw,Pl yellow     

 

                Clarity (test code=CLAR)    Cloudy          Clear            

 

                Specific Gravity (test code=SPGR)    1.029           1.001-1.035      

 

                pH (test code=PH)    5.0             5.0-9.0          

 

                Ketone (test code=KET)    5 mg/dL         Negative         

 

                Glucose (test code=GLUCUR)    Negative mg/dL    Negative         

 

                Protein (test code=PROT)    25 mg/dL        Negative         

 

                Bilirubin (test code=BILI)    Negative mg/dL    Negative         

 

                Occult Blood (test code=UDOB)    Negative        Negative         

 

                Urobilinogen (test code=UROB)    0.2 mg/dL       0.2-1.0          

 

                Nitrite (test code=NIT)    Negative        Negative         

 

                Leuk Esterase (test code=LEUK)    Negative        Negative         

 

                Micros Exam (test code=MEXAM)    Indicated                        

 

                Epithelial Cells (test code=EPI)    None /LPF       0-30             

 

                WBC, Urine (test code=UWBC)    None seen /HPF    0-5              

 

                RBC, Urine (test code=URBC)    None Seen /HPF    0-5              

 

                Amorph Deposit (test code=AMORD)    Mod /HPF                         

 

                Bacteria (test code=BACT)    Few /HPF                         





[id number] 21:49:00* 





                Test Item       Value           Reference Range    Comments

 

                Amphetamine (test code=AMPH)    Negative        Negative        For diagnostic purposes only, 

positive results should always be assessedin conjunctionwith the patient's 
medical history,clinical examination and otherfindings.To fulfill legal 
requirements, a more specific alternate chemical methodmust be used inorder to 
obtain a Confirmed analytical result. GC/MS is the preferred confirmatory 
method.

 

                Barbiturates (test code=REGLA)    Negative        Negative         

 

                Benzodiazepine (test code=KAY)    Negative        Negative         

 

                Cocaine (test code=COCA)    Negative        Negative         

 

                Methadone (test code=MTHD)    Negative        Negative         

 

                Opiates (test code=OPIA)    Negative        Negative         

 

                PCP (test code=PCP)    Negative        Negative         

 

                Propoxyphene (test code=PROPOX)    Negative        Negative         

 

                THC (test code=THC)    Negative        Negative         

 

                Alcohol, Urine (test code=ETOHU)    <0.01 g/dL      0.00-0.01        





Prothrombin [id number] 21:49:00* 





                Test Item       Value           Reference Range    Comments

 

                PT (test code=PT)    26.60 seconds    9.78-13.35       

 

                INR (test code=INR)    2.35 Ratio      0.6-1.2          





Partial Thromboplastin [id number] 21:49:00* 





                Test Item       Value           Reference Range    Comments

 

                aPTT (test code=PTT)    42.40 seconds    24.39-37.25      





CBC with [id number] 21:43:00* 





                Test Item       Value           Reference Range    Comments

 

                WBC (test code=WBC)    8.5 K/cumm      4.4-10.5         

 

                RBC (test code=RBC)    4.94 M/cumm     4.10-5.70        

 

                Hemoglobin (test code=HGB)    15.5 gm/dL      13.4-17.4        

 

                Hematocrit (test code=HCT)    43.3 %          38.7-52.0        

 

                MCV (test code=MCV)    87.8 fL                    

 

                MCH (test code=MCH)    31.3 pg         27.0-32.5        

 

                MCHC (test code=MCHC)    35.7 g/dL       32.0-37.5        

 

                RDW (test code=RDW)    14.0 %          11.5-14.5        

 

                Platelet Count (test code=PLTCT)    201 K/cumm      140-440          

 

                MPV (test code=MPV)    7.3 fL                           

 

                Diff Method (test code=DIFFM)    Auto                             

 

                Neutrophil (test code=NEUT)    75.4 %          36-70            

 

                Lymphocyte (test code=LYMPH)    14.1 %          12-44            

 

                Monocyte (test code=MONO)    8.7 %           0-11             

 

                Eosinophil (test code=EOS)    1.4 %           0-7              

 

                Basophil (test code=BASO)    0.4 %           0-2              

 

                Neutro Abs (test code=ANEUT)    6.4 K/cumm      1.6-7.4          

 

                Lymph Abs (test code=ALYMPH)    1.2 K/cumm      0.5-4.6          

 

                Mono Abs (test code=AMONO)    0.7 K/cumm      0.0-1.2          

 

                Eos Abs (test code=AEOS)    0.12 K/cumm     0.00-0.74        

 

                Baso Abs (test code=ABASO)    0.0 K/cumm      0.00-0.21        





XR CHEST 1 [id number] 21:00:53LOCATION: S 17HISTORY: 51-year-old male 
presents with a history of CHF.COMMENT:A frontal chest radiograph was obtained 
at 8:28 p.m. and compared to anolder study of 2007.The lungs are 
clear. The cardiac silhouette, ginger, and mediastinum areunremarkable. The 
skeleton and soft tissues are unremarkable.A single lead left-sided cardiac 
pacemaker is present.IMPRESSION:There is no radiographic evidence of acute 
cardiopulmonary disease.CREATINE KINASE (CK), TOTAL AND [id number] 14:38:00* 





                Test Item       Value           Reference Range    Comments

 

                CREATINE KINASE TOTAL (BEAKER) (test code=380)    86205 U/L                  

 

                CREATINE KINASE-MB (BEAKER) (test code=750)    12.7 ng/mL      0.0-6.6          

 

                CREATINE KINASE-MB INDEX (BEAKER) (test code=395)    0.1 %                            





Effective 2014: CK-MB Reference Range ChangeNew: 0.0-6.6    Previous: 0.0-
4.9CK-MB Reference Range:<6.7      Normal6.7-10.0  Borderline>10.0     Abnormal
TROPONIN -11-51 13:34:00* 





                Test Item       Value           Reference Range    Comments

 

                TROPONIN I (BEAKER) (test code=397)    0.08 ng/mL      0.00-0.03        





Effective 2014: Reference Range ChangeNew: 0.00-0.03   Previous 0.00-0.15T
roponin I (TnI) levels must be interpreted in the context of the presenting symp
toms and the clinical findings. Elevated TnI levels indicate myocardial damage, 
but are not specific for ischemic heart disease. Elevated TnI levels are seen in
patients with other cardiac conditions (including myocarditis and congestive he
art failure), and slight TnI elevations occur in patients with other conditions,
including sepsis, renal failure, acidosis, acute neurological disease, and pers
istent tachyarrhythmia.BASIC METABOLIC [id number] 13:28:00* 





                Test Item       Value           Reference Range    Comments

 

                SODIUM (BEAKER) (test code=381)    142 meq/L       136-145          

 

                POTASSIUM (BEAKER) (test code=379)    4.0 meq/L       3.5-5.1          

 

                CHLORIDE (BEAKER) (test code=382)    102 meq/L                  

 

                CO2 (BEAKER) (test code=355)    25 meq/L        22-29            

 

                BLOOD UREA NITROGEN (BEAKER) (test code=354)    30 mg/dL        7-21             

 

                CREATININE (BEAKER) (test code=358)    2.45 mg/dL      0.57-1.25        

 

                GLUCOSE RANDOM (BEAKER) (test code=652)    214 mg/dL                  

 

                CALCIUM (BEAKER) (test code=697)    9.6 mg/dL       8.4-10.2         

 

                EGFR (BEAKER) (test code=1092)    28 mL/min/1.73 sq m                    ESTIMATED GFR IS NOT AS 

ACCURATE AS CREATININE CLEARANCE IN PREDICTING GLOMERULAR FILTRATION RATE. 
ESTIMATED GFR IS NOT APPLICABLE FOR DIALYSIS PATIENTS.





CBC W/PLT COUNT & AUTO [id number] 13:07:00* 





                Test Item       Value           Reference Range    Comments

 

                WHITE BLOOD CELL COUNT (BEAKER) (test code=775)    11.7 K/ L       3.5-10.5         

 

                RED BLOOD CELL COUNT (BEAKER) (test code=761)    4.66 M/ L       4.63-6.08        

 

                HEMOGLOBIN (BEAKER) (test code=410)    14.4 GM/DL      13.7-17.5        

 

                HEMATOCRIT (BEAKER) (test code=411)    42.8 %          40.1-51.0        

 

                MEAN CORPUSCULAR VOLUME (BEAKER) (test code=753)    91.8 fL         79.0-92.2        

 

                MEAN CORPUSCULAR HEMOGLOBIN (BEAKER) (test code=751)    30.9 pg         25.7-32.2        

 

                    MEAN CORPUSCULAR HEMOGLOBIN CONC (BEAKER) (test code=752)    33.6 GM/DL          32.3-36.5

                                         

 

                RED CELL DISTRIBUTION WIDTH (BEAKER) (test code=412)    14.9 %          11.6-14.4        

 

                PLATELET COUNT (BEAKER) (test code=756)    199 K/CU MM     150-450          

 

                MEAN PLATELET VOLUME (BEAKER) (test code=754)    10.0 fL         9.4-12.4         

 

                NUCLEATED RED BLOOD CELLS (BEAKER) (test code=413)    0 /100 WBC      0-0              

 

                NEUTROPHILS RELATIVE PERCENT (BEAKER) (test code=429)    81 %                             

 

                LYMPHOCYTES RELATIVE PERCENT (BEAKER) (test code=430)    8 %                              

 

                MONOCYTES RELATIVE PERCENT (BEAKER) (test code=431)    11 %                             

 

                EOSINOPHILS RELATIVE PERCENT (BEAKER) (test code=432)    0 %                              

 

                BASOPHILS RELATIVE PERCENT (BEAKER) (test code=437)    0 %                              

 

                NEUTROPHILS ABSOLUTE COUNT (BEAKER) (test code=670)    9.43 K/ L       1.78-5.38        

 

                LYMPHOCYTES ABSOLUTE COUNT (BEAKER) (test code=414)    0.90 K/ L       1.32-3.57        

 

                MONOCYTES ABSOLUTE COUNT (BEAKER) (test code=415)    1.27 K/ L       0.30-0.82        

 

                EOSINOPHILS ABSOLUTE COUNT (BEAKER) (test code=416)    0.01 K/ L       0.04-0.54        

 

                BASOPHILS ABSOLUTE COUNT (BEAKER) (test code=417)    0.03 K/ L       0.01-0.08        

 

                IMMATURE GRANULOCYTES-RELATIVE PERCENT (BEAKER) (test code=2801)    1 %             0-1              





TROPONIN -25-24 22:07:00* 





                Test Item       Value           Reference Range    Comments

 

                TROPONIN I (BEAKER) (test code=397)    0.02 ng/mL      0.00-0.03        





Effective 2014: Reference Range ChangeNew: 0.00-0.03   Previous 0.00-0.15T
roponin I (TnI) levels must be interpreted in the context of the presenting symp
toms and the clinical findings. Elevated TnI levels indicate myocardial damage, 
but are not specific for ischemic heart disease. Elevated TnI levels are seen in
patients with other cardiac conditions (including myocarditis and congestive he
art failure), and slight TnI elevations occur in patients with other conditions,
including sepsis, renal failure, acidosis, acute neurological disease, and pers
istent tachyarrhythmia.BASIC METABOLIC [id number] 22:06:00* 





                Test Item       Value           Reference Range    Comments

 

                SODIUM (BEAKER) (test code=381)    138 meq/L       136-145          

 

                POTASSIUM (BEAKER) (test code=379)    4.0 meq/L       3.5-5.1          

 

                CHLORIDE (BEAKER) (test code=382)    99 meq/L                   

 

                CO2 (BEAKER) (test code=355)    25 meq/L        22-29            

 

                BLOOD UREA NITROGEN (BEAKER) (test code=354)    17 mg/dL        7-21             

 

                CREATININE (BEAKER) (test code=358)    1.60 mg/dL      0.57-1.25        

 

                GLUCOSE RANDOM (BEAKER) (test code=652)    155 mg/dL                  

 

                CALCIUM (BEAKER) (test code=697)    9.1 mg/dL       8.4-10.2         

 

                EGFR (BEAKER) (test code=1092)    46 mL/min/1.73 sq m                    ESTIMATED GFR IS NOT AS 

ACCURATE AS CREATININE CLEARANCE IN PREDICTING GLOMERULAR FILTRATION RATE. 
ESTIMATED GFR IS NOT APPLICABLE FOR DIALYSIS PATIENTS.





CREATINE KINASE (CK), TOTAL AND [id number] 22:06:00* 





                Test Item       Value           Reference Range    Comments

 

                CREATINE KINASE TOTAL (BEAKER) (test code=380)    315 U/L                    

 

                CREATINE KINASE-MB (BEAKER) (test code=750)    1.8 ng/mL       0.0-6.6          

 

                CREATINE KINASE-MB INDEX (BEAKER) (test code=395)    0.6 %                            





Effective 2014: CK-MB Reference Range ChangeNew: 0.0-6.6    Previous: 0.0-
4.9CK-MB Reference Range:<6.7      Normal6.7-10.0  Borderline>10.0     Abnormal
[id number] 22:06:00* 





                Test Item       Value           Reference Range    Comments

 

                MAGNESIUM (BEAKER) (test code=627)    2.1 mg/dL       1.6-2.6          





B-TYPE NATRIURETIC FACTOR (BNP)2017 21:58:00* 





                Test Item       Value           Reference Range    Comments

 

                B-TYPE NATRIURETIC PEPTIDE (BEAKER) (test code=700)    465 pg/mL       0-100            





[id number] 21:46:00* 





                Test Item       Value           Reference Range    Comments

 

                PROTIME (BEAKER) (test code=759)    20.0 seconds    11.7-14.7        

 

                INR (BEAKER) (test code=370)    1.7             <=5.9            

 

                PARTIAL THROMBOPLASTIN TIME (BEAKER) (test code=760)    41.7 seconds    22.5-36.0        







RECOMMENDED COUMADIN/WARFARIN INR THERAPY RANGESSTANDARD DOSE: 2.0 - 3.0   Inclu
rosette: PROPHYLAXIS for venous thrombosis, systemic embolization; TREATMENT for nesha
ous thrombosis and/or pulmonary embolus.HIGH RISK: Target INR is 2.5-3.5 for pat
ients with mechanical heart valves.CBC W/PLT COUNT & AUTO [id number]
21:33:00* 





                Test Item       Value           Reference Range    Comments

 

                WHITE BLOOD CELL COUNT (BEAKER) (test code=775)    10.8 K/ L       3.5-10.5         

 

                RED BLOOD CELL COUNT (BEAKER) (test code=761)    4.98 M/ L       4.63-6.08        

 

                HEMOGLOBIN (BEAKER) (test code=410)    15.4 GM/DL      13.7-17.5        

 

                HEMATOCRIT (BEAKER) (test code=411)    44.6 %          40.1-51.0        

 

                MEAN CORPUSCULAR VOLUME (BEAKER) (test code=753)    89.6 fL         79.0-92.2        

 

                MEAN CORPUSCULAR HEMOGLOBIN (BEAKER) (test code=751)    30.9 pg         25.7-32.2        

 

                    MEAN CORPUSCULAR HEMOGLOBIN CONC (BEAKER) (test code=752)    34.5 GM/DL          32.3-36.5

                                         

 

                RED CELL DISTRIBUTION WIDTH (BEAKER) (test code=412)    13.8 %          11.6-14.4        

 

                PLATELET COUNT (BEAKER) (test code=756)    192 K/CU MM     150-450          

 

                MEAN PLATELET VOLUME (BEAKER) (test code=754)    9.8 fL          9.4-12.4         

 

                NUCLEATED RED BLOOD CELLS (BEAKER) (test code=413)    0 /100 WBC      0-0              

 

                NEUTROPHILS RELATIVE PERCENT (BEAKER) (test code=429)    80 %                             

 

                LYMPHOCYTES RELATIVE PERCENT (BEAKER) (test code=430)    10 %                             

 

                MONOCYTES RELATIVE PERCENT (BEAKER) (test code=431)    8 %                              

 

                EOSINOPHILS RELATIVE PERCENT (BEAKER) (test code=432)    1 %                              

 

                BASOPHILS RELATIVE PERCENT (BEAKER) (test code=437)    0 %                              

 

                NEUTROPHILS ABSOLUTE COUNT (BEAKER) (test code=670)    8.63 K/ L       1.78-5.38        

 

                LYMPHOCYTES ABSOLUTE COUNT (BEAKER) (test code=414)    1.09 K/ L       1.32-3.57        

 

                MONOCYTES ABSOLUTE COUNT (BEAKER) (test code=415)    0.82 K/ L       0.30-0.82        

 

                EOSINOPHILS ABSOLUTE COUNT (BEAKER) (test code=416)    0.14 K/ L       0.04-0.54        

 

                BASOPHILS ABSOLUTE COUNT (BEAKER) (test code=417)    0.03 K/ L       0.01-0.08        

 

                IMMATURE GRANULOCYTES-RELATIVE PERCENT (BEAKER) (test code=2801)    0 %             0-1              





B-TYPE NATRIURETIC FACTOR (BNP)2017 11:11:00* 





                Test Item       Value           Reference Range    Comments

 

                B-TYPE NATRIURETIC PEPTIDE (BEAKER) (test code=700)    721 pg/mL       0-100            





BASIC METABOLIC [id number] 11:02:00* 





                Test Item       Value           Reference Range    Comments

 

                SODIUM (BEAKER) (test code=381)    138 meq/L       136-145          

 

                POTASSIUM (BEAKER) (test code=379)    4.0 meq/L       3.5-5.1          

 

                CHLORIDE (BEAKER) (test code=382)    106 meq/L                  

 

                CO2 (BEAKER) (test code=355)    22 meq/L        22-29            

 

                BLOOD UREA NITROGEN (BEAKER) (test code=354)    12 mg/dL        7-21             

 

                CREATININE (BEAKER) (test code=358)    1.30 mg/dL      0.57-1.25        

 

                GLUCOSE RANDOM (BEAKER) (test code=652)    197 mg/dL                  

 

                CALCIUM (BEAKER) (test code=697)    8.9 mg/dL       8.4-10.2         

 

                EGFR (BEAKER) (test code=1092)    58 mL/min/1.73 sq m                    ESTIMATED GFR IS NOT AS 

ACCURATE AS CREATININE CLEARANCE IN PREDICTING GLOMERULAR FILTRATION RATE. 
ESTIMATED GFR IS NOT APPLICABLE FOR DIALYSIS PATIENTS.





CBC W/PLT COUNT & AUTO [id number] 10:50:00* 





                Test Item       Value           Reference Range    Comments

 

                WHITE BLOOD CELL COUNT (BEAKER) (test code=775)    7.2 K/ L        4.0-10.0         

 

                RED BLOOD CELL COUNT (BEAKER) (test code=761)    4.61 M/ L       4.20-5.80        

 

                HEMOGLOBIN (BEAKER) (test code=410)    14.5 GM/DL      13.0-16.8        

 

                HEMATOCRIT (BEAKER) (test code=411)    42.3 %          40.0-50.0        

 

                MEAN CORPUSCULAR VOLUME (BEAKER) (test code=753)    91.7 fL         82.0-98.0        

 

                MEAN CORPUSCULAR HEMOGLOBIN (BEAKER) (test code=751)    31.5 pg         27.0-33.0        

 

                    MEAN CORPUSCULAR HEMOGLOBIN CONC (BEAKER) (test code=752)    34.4 GM/DL          32.0-36.0

                                         

 

                RED CELL DISTRIBUTION WIDTH (BEAKER) (test code=412)    13.2 %          10.3-14.2        

 

                PLATELET COUNT (BEAKER) (test code=756)    290 K/CU MM     150-430          

 

                MEAN PLATELET VOLUME (BEAKER) (test code=754)    6.9 fL          6.5-10.5         

 

                NUCLEATED RED BLOOD CELLS (BEAKER) (test code=413)    0 /100 WBC      0-0              

 

                NEUTROPHILS RELATIVE PERCENT (BEAKER) (test code=429)    69 %                             

 

                LYMPHOCYTES RELATIVE PERCENT (BEAKER) (test code=430)    18 %                             

 

                MONOCYTES RELATIVE PERCENT (BEAKER) (test code=431)    11 %                             

 

                EOSINOPHILS RELATIVE PERCENT (BEAKER) (test code=432)    2 %                              

 

                BASOPHILS RELATIVE PERCENT (BEAKER) (test code=437)    0 %                              

 

                NEUTROPHILS ABSOLUTE COUNT (BEAKER) (test code=670)    4.94 K/ L       1.80-8.00        

 

                LYMPHOCYTES ABSOLUTE COUNT (BEAKER) (test code=414)    1.30 K/ L       1.48-4.50        

 

                MONOCYTES ABSOLUTE COUNT (BEAKER) (test code=415)    0.77 K/ L       0.00-1.30        

 

                EOSINOPHILS ABSOLUTE COUNT (BEAKER) (test code=416)    0.15 K/ L       0.00-0.50        

 

                BASOPHILS ABSOLUTE COUNT (BEAKER) (test code=417)    0.01 K/ L       0.00-0.20        





0.00POCT-GLUCOSE [id number] 16:46:00* 





                Test Item       Value           Reference Range    Comments

 

                POC-GLUCOSE METER (BEAKER) (test code=1538)    229 mg/dL                 TESTED AT [address] [address]





POCT-GLUCOSE [id number] 12:46:00* 





                Test Item       Value           Reference Range    Comments

 

                POC-GLUCOSE METER (BEAKER) (test code=1538)    205 mg/dL                 TESTED AT [address] [address]





POCT-GLUCOSE [id number] 09:03:00* 





                Test Item       Value           Reference Range    Comments

 

                POC-GLUCOSE METER (BEAKER) (test code=1538)    235 mg/dL                 TESTED AT [address] [address]





BASIC METABOLIC [id number] 06:23:00* 





                Test Item       Value           Reference Range    Comments

 

                SODIUM (BEAKER) (test code=381)    139 meq/L       136-145          

 

                POTASSIUM (BEAKER) (test code=379)    4.1 meq/L       3.5-5.1          

 

                CHLORIDE (BEAKER) (test code=382)    104 meq/L                  

 

                CO2 (BEAKER) (test code=355)    27 meq/L        22-29            

 

                BLOOD UREA NITROGEN (BEAKER) (test code=354)    19 mg/dL        7-21             

 

                CREATININE (BEAKER) (test code=358)    1.36 mg/dL      0.57-1.25        

 

                GLUCOSE RANDOM (BEAKER) (test code=652)    182 mg/dL                  

 

                CALCIUM (BEAKER) (test code=697)    8.5 mg/dL       8.4-10.2         

 

                EGFR (BEAKER) (test code=1092)    55 mL/min/1.73 sq m                    ESTIMATED GFR IS NOT AS 

ACCURATE AS CREATININE CLEARANCE IN PREDICTING GLOMERULAR FILTRATION RATE. 
ESTIMATED GFR IS NOT APPLICABLE FOR DIALYSIS PATIENTS.





POCT-GLUCOSE [id number] 22:17:00* 





                Test Item       Value           Reference Range    Comments

 

                POC-GLUCOSE METER (BEAKER) (test code=1538)    184 mg/dL                 TESTED AT [address] [address]





POCT-GLUCOSE [id number] 17:44:00* 





                Test Item       Value           Reference Range    Comments

 

                POC-GLUCOSE METER (BEAKER) (test code=1538)    160 mg/dL                 TESTED AT [address] [address]





POCT-GLUCOSE [id number] 11:38:00* 





                Test Item       Value           Reference Range    Comments

 

                POC-GLUCOSE METER (BEAKER) (test code=1538)    228 mg/dL                 TESTED AT [address] [address]





POCT-GLUCOSE [id number] 08:22:00* 





                Test Item       Value           Reference Range    Comments

 

                POC-GLUCOSE METER (BEAKER) (test code=1538)    108 mg/dL                 TESTED AT [address] [address]





BASIC METABOLIC [id number] 05:35:00* 





                Test Item       Value           Reference Range    Comments

 

                SODIUM (BEAKER) (test code=381)    142 meq/L       136-145          

 

                POTASSIUM (BEAKER) (test code=379)    3.9 meq/L       3.5-5.1          

 

                CHLORIDE (BEAKER) (test code=382)    110 meq/L                  

 

                CO2 (BEAKER) (test code=355)    25 meq/L        22-29            

 

                BLOOD UREA NITROGEN (BEAKER) (test code=354)    15 mg/dL        7-21             

 

                CREATININE (BEAKER) (test code=358)    1.15 mg/dL      0.57-1.25        

 

                GLUCOSE RANDOM (BEAKER) (test code=652)    101 mg/dL                  

 

                CALCIUM (BEAKER) (test code=697)    8.0 mg/dL       8.4-10.2         

 

                EGFR (BEAKER) (test code=1092)    67 mL/min/1.73 sq m                    ESTIMATED GFR IS NOT AS 

ACCURATE AS CREATININE CLEARANCE IN PREDICTING GLOMERULAR FILTRATION RATE. 
ESTIMATED GFR IS NOT APPLICABLE FOR DIALYSIS PATIENTS.





POCT-GLUCOSE [id number] 22:01:00* 





                Test Item       Value           Reference Range    Comments

 

                POC-GLUCOSE METER (BEAKER) (test code=1538)    267 mg/dL                 TESTED AT [place] [id number] [facility] [address]





POCT-GLUCOSE [id number] 17:12:00* 





                Test Item       Value           Reference Range    Comments

 

                POC-GLUCOSE METER (BEAKER) (test code=1538)    321 mg/dL                 TESTED AT [address] [address]





POCT-GLUCOSE [id number] 11:28:00* 





                Test Item       Value           Reference Range    Comments

 

                POC-GLUCOSE METER (BEAKER) (test code=1538)    291 mg/dL                 TESTED AT [address] [address]





POCT-GLUCOSE [id number] 09:55:00* 





                Test Item       Value           Reference Range    Comments

 

                POC-GLUCOSE METER (BEAKER) (test code=1538)    130 mg/dL                 TESTED AT [address] [address]





BASIC METABOLIC [id number] 07:00:00* 





                Test Item       Value           Reference Range    Comments

 

                SODIUM (BEAKER) (test code=381)    141 meq/L       136-145          

 

                POTASSIUM (BEAKER) (test code=379)    4.1 meq/L       3.5-5.1          

 

                CHLORIDE (BEAKER) (test code=382)    109 meq/L                  

 

                CO2 (BEAKER) (test code=355)    25 meq/L        22-29            

 

                BLOOD UREA NITROGEN (BEAKER) (test code=354)    22 mg/dL        7-21             

 

                CREATININE (BEAKER) (test code=358)    1.43 mg/dL      0.57-1.25        

 

                GLUCOSE RANDOM (BEAKER) (test code=652)    156 mg/dL                  

 

                CALCIUM (BEAKER) (test code=697)    7.9 mg/dL       8.4-10.2         

 

                EGFR (BEAKER) (test code=1092)    52 mL/min/1.73 sq m                    ESTIMATED GFR IS NOT AS 

ACCURATE AS CREATININE CLEARANCE IN PREDICTING GLOMERULAR FILTRATION RATE. 
ESTIMATED GFR IS NOT APPLICABLE FOR DIALYSIS PATIENTS.





POCT-GLUCOSE [id number] 21:48:00* 





                Test Item       Value           Reference Range    Comments

 

                POC-GLUCOSE METER (BEAKER) (test code=1538)    184 mg/dL                 TESTED AT [address] [address]





POCT-GLUCOSE [id number] 17:22:00* 





                Test Item       Value           Reference Range    Comments

 

                POC-GLUCOSE METER (BEAKER) (test code=1538)    214 mg/dL                 TESTED AT [address] [address]





[id number] 14:17:00* 





                Test Item       Value           Reference Range    Comments

 

                MAGNESIUM (BEAKER) (test code=627)    2.0 mg/dL       1.6-2.6         Specimen moderately hemolyzed







[id number] 14:17:00* 





                Test Item       Value           Reference Range    Comments

 

                POTASSIUM (BEAKER) (test code=379)    4.1 meq/L       3.5-5.1         Specimen moderately hemolyzed







POCT-GLUCOSE [id number] 11:54:00* 





                Test Item       Value           Reference Range    Comments

 

                POC-GLUCOSE METER (BEAKER) (test code=1538)    179 mg/dL                 TESTED AT [address] [address]





POCT-GLUCOSE [id number] 08:17:00* 





                Test Item       Value           Reference Range    Comments

 

                POC-GLUCOSE METER (BEAKER) (test code=1538)    232 mg/dL                 TESTED AT [address] [address]





POCT-GLUCOSE [id number] 07:09:00* 





                Test Item       Value           Reference Range    Comments

 

                POC-GLUCOSE METER (BEAKER) (test code=1538)    227 mg/dL                 TESTED AT [address] [address]





BASIC METABOLIC [id number] 06:19:00* 





                Test Item       Value           Reference Range    Comments

 

                SODIUM (BEAKER) (test code=381)    137 meq/L       136-145          

 

                POTASSIUM (BEAKER) (test code=379)    3.8 meq/L       3.5-5.1          

 

                CHLORIDE (BEAKER) (test code=382)    102 meq/L                  

 

                CO2 (BEAKER) (test code=355)    24 meq/L        22-29            

 

                BLOOD UREA NITROGEN (BEAKER) (test code=354)    30 mg/dL        7-21             

 

                CREATININE (BEAKER) (test code=358)    1.58 mg/dL      0.57-1.25        

 

                GLUCOSE RANDOM (BEAKER) (test code=652)    277 mg/dL                  

 

                CALCIUM (BEAKER) (test code=697)    8.5 mg/dL       8.4-10.2         

 

                EGFR (BEAKER) (test code=1092)    46 mL/min/1.73 sq m                    ESTIMATED GFR IS NOT AS 

ACCURATE AS CREATININE CLEARANCE IN PREDICTING GLOMERULAR FILTRATION RATE. 
ESTIMATED GFR IS NOT APPLICABLE FOR DIALYSIS PATIENTS.





POCT-GLUCOSE [id number] 23:58:00* 





                Test Item       Value           Reference Range    Comments

 

                POC-GLUCOSE METER (BEAKER) (test code=1538)    244 mg/dL                 TESTED AT [address] [address]





POCT-GLUCOSE [id number] 20:21:00* 





                Test Item       Value           Reference Range    Comments

 

                POC-GLUCOSE METER (BEAKER) (test code=1538)    163 mg/dL                 TESTED AT [address] [address]





POCT-GLUCOSE [id number] 17:04:00* 





                Test Item       Value           Reference Range    Comments

 

                POC-GLUCOSE METER (BEAKER) (test code=1538)    229 mg/dL                 TESTED AT [address] [address]





HEMOGLOBIN [id number] 15:52:00* 





                Test Item       Value           Reference Range    Comments

 

                HEMOGLOBIN A1C (BEAKER) (test code=368)    10.7 %          4.3-6.1          





POCT-GLUCOSE [id number] 11:26:00* 





                Test Item       Value           Reference Range    Comments

 

                POC-GLUCOSE METER (BEAKER) (test code=1538)    223 mg/dL                 TESTED AT [address] [address]





RAPID DRUG SCREEN, [id number] 10:32:00* 





                Test Item       Value           Reference Range    Comments

 

                BARBITURATE URINE (BEAKER) (test code=725)    Negative        Negative         

 

                BENZODIAZEPINE SCREEN URINE (BEAKER) (test code=726)    Negative        Negative         

 

                COCAINE (METAB.) SCREEN (BEAKER) (test code=1164)    Positive        Negative         

 

                METHADONE SCREEN (BEAKER) (test code=1436)    Negative        Negative         

 

                OPIATE SCREEN URINE (BEAKER) (test code=734)    Negative        Negative         

 

                CANNABINOID SCREEN URINE (BEAKER) (test code=727)    Negative        Negative         

 

                AMPH/METHAMPH SCREEN (BEAKER) (test code=1438)    Negative        Negative         

 

                PHENCYCLIDINE SCREEN URINE (BEAKER) (test code=608)    Negative        Negative         

 

                OXYCODONE SCREEN URINE (BEAKER) (test code=2761)    Negative        Negative         





DRUG                CUTOFF CONC.Cocaine               300 ng/mL Cannabinoid     
      50 ng/mL Benzodiazepine        200 ng/mLBarbiturate           200 ng/mLPh
encyclidine          25 ng/mLOpiate                300 ng/mLMethadone           
 300 ng/mLAmphetamine/         1000 ng/mL  MethamphetamineOxycodone             
300 ng/mLThis assay provides an unconfirmed qualitative test result for the cli
nical management of patients in emergency situations. Chain of custody not maint
ained. Some over-the-counter medications, as well as adulterants, may cause inac
curate results. Clinical correlation should be applied. A more comprehensive mee
g screen or confirmation of a detected drug may be performed upon request.CBC 
W/PLT COUNT & AUTO [id number] 05:43:00* 





                Test Item       Value           Reference Range    Comments

 

                WHITE BLOOD CELL COUNT (BEAKER) (test code=775)    13.7 K/ L       4.0-10.0         

 

                RED BLOOD CELL COUNT (BEAKER) (test code=761)    4.94 M/ L       4.20-5.80        

 

                HEMOGLOBIN (BEAKER) (test code=410)    15.2 GM/DL      13.0-16.8        

 

                HEMATOCRIT (BEAKER) (test code=411)    45.5 %          40.0-50.0        

 

                MEAN CORPUSCULAR VOLUME (BEAKER) (test code=753)    92.2 fL         82.0-98.0        

 

                MEAN CORPUSCULAR HEMOGLOBIN (BEAKER) (test code=751)    30.9 pg         27.0-33.0        

 

                    MEAN CORPUSCULAR HEMOGLOBIN CONC (BEAKER) (test code=752)    33.5 GM/DL          32.0-36.0

                                         

 

                RED CELL DISTRIBUTION WIDTH (BEAKER) (test code=412)    13.5 %          10.3-14.2        

 

                PLATELET COUNT (BEAKER) (test code=756)    194 K/CU MM     150-430          

 

                MEAN PLATELET VOLUME (BEAKER) (test code=754)    7.7 fL          6.5-10.5         

 

                NUCLEATED RED BLOOD CELLS (BEAKER) (test code=413)    0 /100 WBC      0-0              

 

                NEUTROPHILS RELATIVE PERCENT (BEAKER) (test code=429)    78 %                             

 

                LYMPHOCYTES RELATIVE PERCENT (BEAKER) (test code=430)    11 %                             

 

                MONOCYTES RELATIVE PERCENT (BEAKER) (test code=431)    10 %                             

 

                EOSINOPHILS RELATIVE PERCENT (BEAKER) (test code=432)    0 %                              

 

                BASOPHILS RELATIVE PERCENT (BEAKER) (test code=437)    0 %                              

 

                NEUTROPHILS ABSOLUTE COUNT (BEAKER) (test code=670)    10.70 K/ L      1.80-8.00        

 

                LYMPHOCYTES ABSOLUTE COUNT (BEAKER) (test code=414)    1.55 K/ L       1.48-4.50        

 

                MONOCYTES ABSOLUTE COUNT (BEAKER) (test code=415)    1.42 K/ L       0.00-1.30        

 

                EOSINOPHILS ABSOLUTE COUNT (BEAKER) (test code=416)    0.01 K/ L       0.00-0.50        

 

                BASOPHILS ABSOLUTE COUNT (BEAKER) (test code=417)    0.01 K/ L       0.00-0.20        





0.00BASIC METABOLIC [id number] 05:42:00* 





                Test Item       Value           Reference Range    Comments

 

                SODIUM (BEAKER) (test code=381)    141 meq/L       136-145          

 

                POTASSIUM (BEAKER) (test code=379)    4.0 meq/L       3.5-5.1          

 

                CHLORIDE (BEAKER) (test code=382)    101 meq/L                  

 

                CO2 (BEAKER) (test code=355)    26 meq/L        22-29            

 

                BLOOD UREA NITROGEN (BEAKER) (test code=354)    32 mg/dL        7-21             

 

                CREATININE (BEAKER) (test code=358)    2.20 mg/dL      0.57-1.25        

 

                GLUCOSE RANDOM (BEAKER) (test code=652)    288 mg/dL                  

 

                CALCIUM (BEAKER) (test code=697)    9.4 mg/dL       8.4-10.2         

 

                EGFR (BEAKER) (test code=1092)    32 mL/min/1.73 sq m                    ESTIMATED GFR IS NOT AS 

ACCURATE AS CREATININE CLEARANCE IN PREDICTING GLOMERULAR FILTRATION RATE. 
ESTIMATED GFR IS NOT APPLICABLE FOR DIALYSIS PATIENTS.





B-TYPE NATRIURETIC FACTOR (BNP)2017 05:38:00* 





                Test Item       Value           Reference Range    Comments

 

                B-TYPE NATRIURETIC PEPTIDE (BEAKER) (test code=700)    797 pg/mL       0-100            





CREATINE KINASE (CK), TOTAL AND [id number] 05:38:00* 





                Test Item       Value           Reference Range    Comments

 

                CREATINE KINASE TOTAL (BEAKER) (test code=380)    2623 U/L                   

 

                CREATINE KINASE-MB (BEAKER) (test code=750)    5.4 ng/mL       0.0-6.6          

 

                CREATINE KINASE-MB INDEX (BEAKER) (test code=395)    0.2 %                            





Effective 2014: CK-MB Reference Range ChangeNew: 0.0-6.6    Previous: 0.0-
4.9CK-MB Reference Range:<6.7      Normal6.7-10.0  Borderline>10.0     Abnormal
TROPONIN -[id number] 05:38:00* 





                Test Item       Value           Reference Range    Comments

 

                TROPONIN I (BEAKER) (test code=397)    0.06 ng/mL      0.00-0.03        





Effective 2014: Reference Range ChangeNew: 0.00-0.03   Previous 0.00-0.15T
roponin I (TnI) levels must be interpreted in the context of the presenting symp
toms and the clinical findings. Elevated TnI levels indicate myocardial damage, 
but are not specific for ischemic heart disease. Elevated TnI levels are seen in
patients with other cardiac conditions (including myocarditis and congestive he
art failure), and slight TnI elevations occur in patients with other conditions,
including sepsis, renal failure, acidosis, acute neurological disease, and pers
istent tachyarrhythmia.PT/[id number] 05:36:00* 





                Test Item       Value           Reference Range    Comments

 

                PROTIME (BEAKER) (test code=759)    16.6 seconds    11.7-14.7        

 

                INR (BEAKER) (test code=370)    1.4             <=5.9            

 

                PARTIAL THROMBOPLASTIN TIME (BEAKER) (test code=760)    31.9 seconds    22.5-36.0        







RECOMMENDED COUMADIN/WARFARIN INR THERAPY RANGESSTANDARD DOSE: 2.0 - 3.0   Inclu
rosette: PROPHYLAXIS for venous thrombosis, systemic embolization; TREATMENT for nesha
ous thrombosis and/or pulmonary embolus.HIGH RISK: Target INR is 2.5-3.5 for pat
ients with mechanical heart valves.B-TYPE NATRIURETIC FACTOR (BNP)2017 
10:59:00* 





                Test Item       Value           Reference Range    Comments

 

                B-TYPE NATRIURETIC PEPTIDE (BEAKER) (test code=700)    266 pg/mL       0-100            





CBC W/PLT COUNT & AUTO [id number] 10:51:00* 





                Test Item       Value           Reference Range    Comments

 

                WHITE BLOOD CELL COUNT (BEAKER) (test code=775)    7.5 K/ L        4.0-10.0         

 

                RED BLOOD CELL COUNT (BEAKER) (test code=761)    5.55 M/ L       4.20-5.80        

 

                HEMOGLOBIN (BEAKER) (test code=410)    16.9 GM/DL      13.0-16.8        

 

                HEMATOCRIT (BEAKER) (test code=411)    50.3 %          40.0-50.0        

 

                MEAN CORPUSCULAR VOLUME (BEAKER) (test code=753)    90.7 fL         82.0-98.0        

 

                MEAN CORPUSCULAR HEMOGLOBIN (BEAKER) (test code=751)    30.5 pg         27.0-33.0        

 

                    MEAN CORPUSCULAR HEMOGLOBIN CONC (BEAKER) (test code=752)    33.7 GM/DL          32.0-36.0

                                         

 

                RED CELL DISTRIBUTION WIDTH (BEAKER) (test code=412)    14.1 %          10.3-14.2        

 

                PLATELET COUNT (BEAKER) (test code=756)    165 K/CU MM     150-430          

 

                MEAN PLATELET VOLUME (BEAKER) (test code=754)    7.5 fL          6.5-10.5         

 

                NUCLEATED RED BLOOD CELLS (BEAKER) (test code=413)    0 /100 WBC      0-0              

 

                NEUTROPHILS RELATIVE PERCENT (BEAKER) (test code=429)    64 %                             

 

                LYMPHOCYTES RELATIVE PERCENT (BEAKER) (test code=430)    25 %                             

 

                MONOCYTES RELATIVE PERCENT (BEAKER) (test code=431)    9 %                              

 

                EOSINOPHILS RELATIVE PERCENT (BEAKER) (test code=432)    2 %                              

 

                BASOPHILS RELATIVE PERCENT (BEAKER) (test code=437)    1 %                              

 

                NEUTROPHILS ABSOLUTE COUNT (BEAKER) (test code=670)    4.79 K/ L       1.80-8.00        

 

                LYMPHOCYTES ABSOLUTE COUNT (BEAKER) (test code=414)    1.86 K/ L       1.48-4.50        

 

                MONOCYTES ABSOLUTE COUNT (BEAKER) (test code=415)    0.68 K/ L       0.00-1.30        

 

                EOSINOPHILS ABSOLUTE COUNT (BEAKER) (test code=416)    0.15 K/ L       0.00-0.50        

 

                BASOPHILS ABSOLUTE COUNT (BEAKER) (test code=417)    0.05 K/ L       0.00-0.20        





0.00BASIC METABOLIC [id number] 10:49:00* 





                Test Item       Value           Reference Range    Comments

 

                SODIUM (BEAKER) (test code=381)    137 meq/L       136-145          

 

                POTASSIUM (BEAKER) (test code=379)    3.8 meq/L       3.5-5.1         Specimen slightly hemolyzed



 

                CHLORIDE (BEAKER) (test code=382)    100 meq/L                  

 

                CO2 (BEAKER) (test code=355)    26 meq/L        22-29            

 

                BLOOD UREA NITROGEN (BEAKER) (test code=354)    22 mg/dL        7-21             

 

                CREATININE (BEAKER) (test code=358)    1.37 mg/dL      0.57-1.25       Specimen slightly hemolyzed



 

                GLUCOSE RANDOM (BEAKER) (test code=652)    173 mg/dL                  

 

                CALCIUM (BEAKER) (test code=697)    9.0 mg/dL       8.4-10.2         

 

                EGFR (BEAKER) (test code=1092)    55 mL/min/1.73 sq m                    ESTIMATED GFR IS NOT AS 

ACCURATE AS CREATININE CLEARANCE IN PREDICTING GLOMERULAR FILTRATION RATE. 
ESTIMATED GFR IS NOT APPLICABLE FOR DIALYSIS PATIENTS.





BASIC METABOLIC [id number] 10:41:00* 





                Test Item       Value           Reference Range    Comments

 

                SODIUM (BEAKER) (test code=381)    137 meq/L       136-145          

 

                POTASSIUM (BEAKER) (test code=379)    4.1 meq/L       3.5-5.1         Specimen slightly hemolyzed



 

                CHLORIDE (BEAKER) (test code=382)    94 meq/L                   

 

                CO2 (BEAKER) (test code=355)    32 meq/L        22-29            

 

                BLOOD UREA NITROGEN (BEAKER) (test code=354)    19 mg/dL        7-21             

 

                CREATININE (BEAKER) (test code=358)    1.77 mg/dL      0.57-1.25       Specimen slightly hemolyzed



 

                GLUCOSE RANDOM (BEAKER) (test code=652)    444 mg/dL                  

 

                CALCIUM (BEAKER) (test code=697)    9.3 mg/dL       8.4-10.2         

 

                EGFR (BEAKER) (test code=1092)    41 mL/min/1.73 sq m                    ESTIMATED GFR IS NOT AS 

ACCURATE AS CREATININE CLEARANCE IN PREDICTING GLOMERULAR FILTRATION RATE. 
ESTIMATED GFR IS NOT APPLICABLE FOR DIALYSIS PATIENTS.





[id number] 10:30:00* 





                Test Item       Value           Reference Range    Comments

 

                MAGNESIUM (BEAKER) (test code=627)    2.3 mg/dL       1.6-2.6         Specimen slightly hemolyzed







HEPATIC FUNCTION [id number] 10:30:00* 





                Test Item       Value           Reference Range    Comments

 

                TOTAL PROTEIN (BEAKER) (test code=770)    8.2 gm/dL       6.0-8.3         Specimen slightly hemolyzed



 

                ALBUMIN (BEAKER) (test code=1145)    4.5 g/dL        3.5-5.0         Specimen slightly hemolyzed



 

                BILIRUBIN TOTAL (BEAKER) (test code=377)    1.0 mg/dL       0.2-1.2         Specimen slightly 

hemolyzed

 

                BILIRUBIN DIRECT (BEAKER) (test code=706)    0.3 mg/dL       0.1-0.5         Specimen slightly

 hemolyzed

 

                ALKALINE PHOSPHATASE (BEAKER) (test code=346)    138 U/L                    

 

                AST (SGOT) (BEAKER) (test code=353)    20 U/L          5-34            Specimen slightly hemolyzed

 

                ALT (SGPT) (BEAKER) (test code=347)    22 U/L          6-55            Specimen slightly hemolyzed





B-TYPE NATRIURETIC FACTOR (BNP)2017 10:25:00* 





                Test Item       Value           Reference Range    Comments

 

                B-TYPE NATRIURETIC PEPTIDE (BEAKER) (test code=700)    238 pg/mL       0-100            





CBC W/PLT COUNT & AUTO [id number] 10:01:00* 





                Test Item       Value           Reference Range    Comments

 

                WHITE BLOOD CELL COUNT (BEAKER) (test code=775)    8.7 K/ L        4.0-10.0         

 

                RED BLOOD CELL COUNT (BEAKER) (test code=761)    5.89 M/ L       4.20-5.80        

 

                HEMOGLOBIN (BEAKER) (test code=410)    18.2 GM/DL      13.0-16.8        

 

                HEMATOCRIT (BEAKER) (test code=411)    54.0 %          40.0-50.0        

 

                MEAN CORPUSCULAR VOLUME (BEAKER) (test code=753)    91.6 fL         82.0-98.0        

 

                MEAN CORPUSCULAR HEMOGLOBIN (BEAKER) (test code=751)    30.9 pg         27.0-33.0        

 

                    MEAN CORPUSCULAR HEMOGLOBIN CONC (BEAKER) (test code=752)    33.7 GM/DL          32.0-36.0

                                         

 

                RED CELL DISTRIBUTION WIDTH (BEAKER) (test code=412)    14.4 %          10.3-14.2        

 

                PLATELET COUNT (BEAKER) (test code=756)    168 K/CU MM     150-430          

 

                MEAN PLATELET VOLUME (BEAKER) (test code=754)    7.9 fL          6.5-10.5         

 

                NUCLEATED RED BLOOD CELLS (BEAKER) (test code=413)    0 /100 WBC      0-0              

 

                NEUTROPHILS RELATIVE PERCENT (BEAKER) (test code=429)    71 %                             

 

                LYMPHOCYTES RELATIVE PERCENT (BEAKER) (test code=430)    18 %                             

 

                MONOCYTES RELATIVE PERCENT (BEAKER) (test code=431)    8 %                              

 

                EOSINOPHILS RELATIVE PERCENT (BEAKER) (test code=432)    2 %                              

 

                BASOPHILS RELATIVE PERCENT (BEAKER) (test code=437)    0 %                              

 

                NEUTROPHILS ABSOLUTE COUNT (BEAKER) (test code=670)    6.12 K/ L       1.80-8.00        

 

                LYMPHOCYTES ABSOLUTE COUNT (BEAKER) (test code=414)    1.59 K/ L       1.48-4.50        

 

                MONOCYTES ABSOLUTE COUNT (BEAKER) (test code=415)    0.73 K/ L       0.00-1.30        

 

                EOSINOPHILS ABSOLUTE COUNT (BEAKER) (test code=416)    0.18 K/ L       0.00-0.50        

 

                BASOPHILS ABSOLUTE COUNT (BEAKER) (test code=417)    0.04 K/ L       0.00-0.20        





0.00B-TYPE NATRIURETIC FACTOR (BNP)2017 11:34:00* 





                Test Item       Value           Reference Range    Comments

 

                B-TYPE NATRIURETIC PEPTIDE (BEAKER) (test code=700)    714 pg/mL       0-100            





BASIC METABOLIC [id number] 11:18:00* 





                Test Item       Value           Reference Range    Comments

 

                SODIUM (BEAKER) (test code=381)    138 meq/L       136-145          

 

                POTASSIUM (BEAKER) (test code=379)    4.3 meq/L       3.5-5.1          

 

                CHLORIDE (BEAKER) (test code=382)    105 meq/L                  

 

                CO2 (BEAKER) (test code=355)    25 meq/L        22-29            

 

                BLOOD UREA NITROGEN (BEAKER) (test code=354)    28 mg/dL        7-21             

 

                CREATININE (BEAKER) (test code=358)    1.50 mg/dL      0.57-1.25        

 

                GLUCOSE RANDOM (BEAKER) (test code=652)    255 mg/dL                  

 

                CALCIUM (BEAKER) (test code=697)    9.0 mg/dL       8.4-10.2         

 

                EGFR (BEAKER) (test code=1092)    49 mL/min/1.73 sq m                    ESTIMATED GFR IS NOT AS 

ACCURATE AS CREATININE CLEARANCE IN PREDICTING GLOMERULAR FILTRATION RATE. 
ESTIMATED GFR IS NOT APPLICABLE FOR DIALYSIS PATIENTS.





CBC W/PLT COUNT & AUTO [id number] 11:07:00* 





                Test Item       Value           Reference Range    Comments

 

                WHITE BLOOD CELL COUNT (BEAKER) (test code=775)    7.0 K/ L        4.0-10.0         

 

                RED BLOOD CELL COUNT (BEAKER) (test code=761)    5.02 M/ L       4.20-5.80        

 

                HEMOGLOBIN (BEAKER) (test code=410)    15.4 GM/DL      13.0-16.8        

 

                HEMATOCRIT (BEAKER) (test code=411)    46.0 %          40.0-50.0        

 

                MEAN CORPUSCULAR VOLUME (BEAKER) (test code=753)    91.6 fL         82.0-98.0        

 

                MEAN CORPUSCULAR HEMOGLOBIN (BEAKER) (test code=751)    30.7 pg         27.0-33.0        

 

                    MEAN CORPUSCULAR HEMOGLOBIN CONC (BEAKER) (test code=752)    33.5 GM/DL          32.0-36.0

                                         

 

                RED CELL DISTRIBUTION WIDTH (BEAKER) (test code=412)    14.0 %          10.3-14.2        

 

                PLATELET COUNT (BEAKER) (test code=756)    185 K/CU MM     150-430          

 

                MEAN PLATELET VOLUME (BEAKER) (test code=754)    7.6 fL          6.5-10.5         

 

                NUCLEATED RED BLOOD CELLS (BEAKER) (test code=413)    0 /100 WBC      0-0              

 

                NEUTROPHILS RELATIVE PERCENT (BEAKER) (test code=429)    75 %                             

 

                LYMPHOCYTES RELATIVE PERCENT (BEAKER) (test code=430)    15 %                             

 

                MONOCYTES RELATIVE PERCENT (BEAKER) (test code=431)    7 %                              

 

                EOSINOPHILS RELATIVE PERCENT (BEAKER) (test code=432)    2 %                              

 

                BASOPHILS RELATIVE PERCENT (BEAKER) (test code=437)    0 %                              

 

                NEUTROPHILS ABSOLUTE COUNT (BEAKER) (test code=670)    5.25 K/ L       1.80-8.00        

 

                LYMPHOCYTES ABSOLUTE COUNT (BEAKER) (test code=414)    1.08 K/ L       1.48-4.50        

 

                MONOCYTES ABSOLUTE COUNT (BEAKER) (test code=415)    0.52 K/ L       0.00-1.30        

 

                EOSINOPHILS ABSOLUTE COUNT (BEAKER) (test code=416)    0.17 K/ L       0.00-0.50        

 

                BASOPHILS ABSOLUTE COUNT (BEAKER) (test code=417)    0.03 K/ L       0.00-0.20        





0.00POCT-GLUCOSE [id number] 11:42:00* 





                Test Item       Value           Reference Range    Comments

 

                POC-GLUCOSE METER (BEAKER) (test code=1538)    262 mg/dL                 TESTED AT [address] [address]





POCT-GLUCOSE [id number] 07:11:00* 





                Test Item       Value           Reference Range    Comments

 

                POC-GLUCOSE METER (BEAKER) (test code=1538)    159 mg/dL                 TESTED AT [address] [address]





BASIC METABOLIC [id number] 05:47:00* 





                Test Item       Value           Reference Range    Comments

 

                SODIUM (BEAKER) (test code=381)    144 meq/L       136-145          

 

                POTASSIUM (BEAKER) (test code=379)    3.8 meq/L       3.5-5.1          

 

                CHLORIDE (BEAKER) (test code=382)    107 meq/L                  

 

                CO2 (BEAKER) (test code=355)    29 meq/L        22-29            

 

                BLOOD UREA NITROGEN (BEAKER) (test code=354)    16 mg/dL        7-21             

 

                CREATININE (BEAKER) (test code=358)    1.25 mg/dL      0.57-1.25        

 

                GLUCOSE RANDOM (BEAKER) (test code=652)    164 mg/dL                  

 

                CALCIUM (BEAKER) (test code=697)    8.4 mg/dL       8.4-10.2         

 

                EGFR (BEAKER) (test code=1092)    61 mL/min/1.73 sq m                    ESTIMATED GFR IS NOT AS 

ACCURATE AS CREATININE CLEARANCE IN PREDICTING GLOMERULAR FILTRATION RATE. 
ESTIMATED GFR IS NOT APPLICABLE FOR DIALYSIS PATIENTS.





POCT-GLUCOSE [id number] 21:54:00* 





                Test Item       Value           Reference Range    Comments

 

                POC-GLUCOSE METER (BEAKER) (test code=1538)    237 mg/dL                 TESTED AT [address] [address]





POCT-GLUCOSE [id number] 17:28:00* 





                Test Item       Value           Reference Range    Comments

 

                POC-GLUCOSE METER (BEAKER) (test code=1538)    280 mg/dL                 TESTED AT [address] [address]





POCT-GLUCOSE [id number] 11:40:00* 





                Test Item       Value           Reference Range    Comments

 

                POC-GLUCOSE METER (BEAKER) (test code=1538)    119 mg/dL                 TESTED AT [address] [address]





POCT-GLUCOSE [id number] 08:00:00* 





                Test Item       Value           Reference Range    Comments

 

                POC-GLUCOSE METER (BEAKER) (test code=1538)    313 mg/dL                 Notified RN MD/TESTED

 AT [address] [address]





BASIC METABOLIC [id number] 05:47:00* 





                Test Item       Value           Reference Range    Comments

 

                SODIUM (BEAKER) (test code=381)    142 meq/L       136-145          

 

                POTASSIUM (BEAKER) (test code=379)    3.4 meq/L       3.5-5.1          

 

                CHLORIDE (BEAKER) (test code=382)    103 meq/L                  

 

                CO2 (BEAKER) (test code=355)    28 meq/L        22-29            

 

                BLOOD UREA NITROGEN (BEAKER) (test code=354)    19 mg/dL        7-21             

 

                CREATININE (BEAKER) (test code=358)    1.39 mg/dL      0.57-1.25        

 

                GLUCOSE RANDOM (BEAKER) (test code=652)    157 mg/dL                  

 

                CALCIUM (BEAKER) (test code=697)    8.7 mg/dL       8.4-10.2         

 

                EGFR (BEAKER) (test code=1092)    54 mL/min/1.73 sq m                    ESTIMATED GFR IS NOT AS 

ACCURATE AS CREATININE CLEARANCE IN PREDICTING GLOMERULAR FILTRATION RATE. 
ESTIMATED GFR IS NOT APPLICABLE FOR DIALYSIS PATIENTS.





POCT-GLUCOSE [id number] 21:46:00* 





                Test Item       Value           Reference Range    Comments

 

                POC-GLUCOSE METER (BEAKER) (test code=1538)    131 mg/dL                 TESTED AT [place] [id number]





POCT-GLUCOSE [id number] 17:17:00* 





                Test Item       Value           Reference Range    Comments

 

                POC-GLUCOSE METER (BEAKER) (test code=1538)    151 mg/dL                 TESTED AT [place] [id number]





POCT-GLUCOSE [id number] 11:50:00* 





                Test Item       Value           Reference Range    Comments

 

                POC-GLUCOSE METER (BEAKER) (test code=1538)    302 mg/dL                 Notified RN MD/TESTED

 AT [place] [id number]





POCT-GLUCOSE [id number] 07:54:00* 





                Test Item       Value           Reference Range    Comments

 

                POC-GLUCOSE METER (BEAKER) (test code=1538)    292 mg/dL                 TESTED AT [place] [address]





BASIC METABOLIC [id number] 06:43:00* 





                Test Item       Value           Reference Range    Comments

 

                SODIUM (BEAKER) (test code=381)    138 meq/L       136-145          

 

                POTASSIUM (BEAKER) (test code=379)    3.8 meq/L       3.5-5.1          

 

                CHLORIDE (BEAKER) (test code=382)    102 meq/L                  

 

                CO2 (BEAKER) (test code=355)    23 meq/L        22-29            

 

                BLOOD UREA NITROGEN (BEAKER) (test code=354)    29 mg/dL        7-21             

 

                CREATININE (BEAKER) (test code=358)    1.49 mg/dL      0.57-1.25        

 

                GLUCOSE RANDOM (BEAKER) (test code=652)    322 mg/dL                  

 

                CALCIUM (BEAKER) (test code=697)    8.6 mg/dL       8.4-10.2         

 

                EGFR (BEAKER) (test code=1092)    50 mL/min/1.73 sq m                    ESTIMATED GFR IS NOT AS 

ACCURATE AS CREATININE CLEARANCE IN PREDICTING GLOMERULAR FILTRATION RATE. 
ESTIMATED GFR IS NOT APPLICABLE FOR DIALYSIS PATIENTS.





POCT-GLUCOSE [id number] 20:29:00* 





                Test Item       Value           Reference Range    Comments

 

                POC-GLUCOSE METER (BEAKER) (test code=1538)    253 mg/dL                 TESTED AT [address] [address]





POCT-GLUCOSE [id number] 18:06:00* 





                Test Item       Value           Reference Range    Comments

 

                POC-GLUCOSE METER (BEAKER) (test code=1538)    316 mg/dL                 Notified RN MD/TESTED

 AT [facility] [address] [facility] [address]





POCT-GLUCOSE [id number] 11:51:00* 





                Test Item       Value           Reference Range    Comments

 

                POC-GLUCOSE METER (BEAKER) (test code=1538)    285 mg/dL                 TESTED AT [address] [address]





HEMOGLOBIN [id number] 11:26:00* 





                Test Item       Value           Reference Range    Comments

 

                HEMOGLOBIN A1C (BEAKER) (test code=368)    10.0 %          4.3-6.1          





POCT-GLUCOSE [id number] 08:58:00* 





                Test Item       Value           Reference Range    Comments

 

                POC-GLUCOSE METER (BEAKER) (test code=1538)    155 mg/dL                 TESTED AT [address] [address]





TROPONIN -58-04 04:32:00* 





                Test Item       Value           Reference Range    Comments

 

                TROPONIN I (BEAKER) (test code=397)    0.03 ng/mL      0.00-0.03        





Effective 2014: Reference Range ChangeNew: 0.00-0.03   Previous 0.00-0.15T
roponin I (TnI) levels must be interpreted in the context of the presenting symp
toms and the clinical findings. Elevated TnI levels indicate myocardial damage, 
but are not specific for ischemic heart disease. Elevated TnI levels are seen in
patients with other cardiac conditions (including myocarditis and congestive he
art failure), and slight TnI elevations occur in patients with other conditions,
including sepsis, renal failure, acidosis, acute neurological disease, and pers
istent tachyarrhythmia.BASIC METABOLIC [id number] 04:29:00* 





                Test Item       Value           Reference Range    Comments

 

                SODIUM (BEAKER) (test code=381)    134 meq/L       136-145          

 

                POTASSIUM (BEAKER) (test code=379)    3.3 meq/L       3.5-5.1          

 

                CHLORIDE (BEAKER) (test code=382)    97 meq/L                   

 

                CO2 (BEAKER) (test code=355)    28 meq/L        22-29            

 

                BLOOD UREA NITROGEN (BEAKER) (test code=354)    34 mg/dL        7-21             

 

                CREATININE (BEAKER) (test code=358)    1.36 mg/dL      0.57-1.25        

 

                GLUCOSE RANDOM (BEAKER) (test code=652)    180 mg/dL                  

 

                CALCIUM (BEAKER) (test code=697)    9.0 mg/dL       8.4-10.2         

 

                EGFR (BEAKER) (test code=1092)    55 mL/min/1.73 sq m                    ESTIMATED GFR IS NOT AS 

ACCURATE AS CREATININE CLEARANCE IN PREDICTING GLOMERULAR FILTRATION RATE. 
ESTIMATED GFR IS NOT APPLICABLE FOR DIALYSIS PATIENTS.





POCT-GLUCOSE [id number] 23:14:00* 





                Test Item       Value           Reference Range    Comments

 

                POC-GLUCOSE METER (LOU) (test code=1538)    243 mg/dL                 TESTED AT [facility] 

[address] [facility] [address]





CREATINE KINASE (CK), TOTAL AND [id number] 17:18:00* 





                Test Item       Value           Reference Range    Comments

 

                CREATINE KINASE TOTAL (LOU) (test code=380)    620 U/L                    

 

                CREATINE KINASE-MB (LOU) (test code=750)    4.4 ng/mL       0.0-6.6          

 

                CREATINE KINASE-MB INDEX (OTTOAKER) (test code=395)    0.7 %                            





Effective 2014: CK-MB Reference Range ChangeNew: 0.0-6.6    Previous: 0.0-
4.9CK-MB Reference Range:<6.7      Normal6.7-10.0  Borderline>10.0     Abnormal
TROPONIN -02-14 17:18:00* 





                Test Item       Value           Reference Range    Comments

 

                TROPONIN I (LOU) (test code=397)    0.04 ng/mL      0.00-0.03        





Effective 2014: Reference Range ChangeNew: 0.00-0.03   Previous 0.00-0.15T
roponin I (TnI) levels must be interpreted in the context of the presenting symp
toms and the clinical findings. Elevated TnI levels indicate myocardial damage, 
but are not specific for ischemic heart disease. Elevated TnI levels are seen in
patients with other cardiac conditions (including myocarditis and congestive he
art failure), and slight TnI elevations occur in patients with other conditions,
including sepsis, renal failure, acidosis, acute neurological disease, and pers
istent tachyarrhythmia.B-TYPE NATRIURETIC FACTOR (BNP)2017 17:16:00* 





                Test Item       Value           Reference Range    Comments

 

                B-TYPE NATRIURETIC PEPTIDE (BEAKER) (test code=700)    561 pg/mL       0-100            





BASIC METABOLIC [id number] 17:11:00* 





                Test Item       Value           Reference Range    Comments

 

                SODIUM (BEAKER) (test code=381)    136 meq/L       136-145          

 

                POTASSIUM (BEAKER) (test code=379)    3.7 meq/L       3.5-5.1          

 

                CHLORIDE (BEAKER) (test code=382)    96 meq/L                   

 

                CO2 (BEAKER) (test code=355)    26 meq/L        22-29            

 

                BLOOD UREA NITROGEN (BEAKER) (test code=354)    39 mg/dL        7-21             

 

                CREATININE (BEAKER) (test code=358)    1.99 mg/dL      0.57-1.25        

 

                GLUCOSE RANDOM (BEAKER) (test code=652)    308 mg/dL                  

 

                CALCIUM (BEAKER) (test code=697)    9.5 mg/dL       8.4-10.2         

 

                EGFR (BEAKER) (test code=1092)    35 mL/min/1.73 sq m                    ESTIMATED GFR IS NOT AS 

ACCURATE AS CREATININE CLEARANCE IN PREDICTING GLOMERULAR FILTRATION RATE. 
ESTIMATED GFR IS NOT APPLICABLE FOR DIALYSIS PATIENTS.





CBC W/PLT COUNT & AUTO [id number] 17:00:00* 





                Test Item       Value           Reference Range    Comments

 

                WHITE BLOOD CELL COUNT (BEAKER) (test code=775)    10.3 K/ L       4.0-10.0         

 

                RED BLOOD CELL COUNT (BEAKER) (test code=761)    5.54 M/ L       4.20-5.80        

 

                HEMOGLOBIN (BEAKER) (test code=410)    17.4 GM/DL      13.0-16.8        

 

                HEMATOCRIT (BEAKER) (test code=411)    49.9 %          40.0-50.0        

 

                MEAN CORPUSCULAR VOLUME (BEAKER) (test code=753)    90.0 fL         82.0-98.0        

 

                MEAN CORPUSCULAR HEMOGLOBIN (BEAKER) (test code=751)    31.4 pg         27.0-33.0        

 

                    MEAN CORPUSCULAR HEMOGLOBIN CONC (BEAKER) (test code=752)    34.9 GM/DL          32.0-36.0

                                         

 

                RED CELL DISTRIBUTION WIDTH (BEAKER) (test code=412)    13.8 %          10.3-14.2        

 

                PLATELET COUNT (BEAKER) (test code=756)    200 K/CU MM     150-430          

 

                MEAN PLATELET VOLUME (BEAKER) (test code=754)    7.9 fL          6.5-10.5         

 

                NUCLEATED RED BLOOD CELLS (BEAKER) (test code=413)    0 /100 WBC      0-0              

 

                NEUTROPHILS RELATIVE PERCENT (BEAKER) (test code=429)    77 %                             

 

                LYMPHOCYTES RELATIVE PERCENT (BEAKER) (test code=430)    13 %                             

 

                MONOCYTES RELATIVE PERCENT (BEAKER) (test code=431)    9 %                              

 

                EOSINOPHILS RELATIVE PERCENT (BEAKER) (test code=432)    1 %                              

 

                BASOPHILS RELATIVE PERCENT (BEAKER) (test code=437)    0 %                              

 

                NEUTROPHILS ABSOLUTE COUNT (BEAKER) (test code=670)    7.97 K/ L       1.80-8.00        

 

                LYMPHOCYTES ABSOLUTE COUNT (BEAKER) (test code=414)    1.30 K/ L       1.48-4.50        

 

                MONOCYTES ABSOLUTE COUNT (BEAKER) (test code=415)    0.95 K/ L       0.00-1.30        

 

                EOSINOPHILS ABSOLUTE COUNT (BEAKER) (test code=416)    0.08 K/ L       0.00-0.50        

 

                BASOPHILS ABSOLUTE COUNT (BEAKER) (test code=417)    0.04 K/ L       0.00-0.20        





0.00HEMOGLOBIN A1C2017-05-15 12:49:00* 





                Test Item       Value           Reference Range    Comments

 

                HEMOGLOBIN A1C (BEAKER) (test code=368)    9.7 %           4.3-6.1          





CBC W/PLT COUNT & AUTO DIFFERENTIAL2017-05-15 11:31:00* 





                Test Item       Value           Reference Range    Comments

 

                WHITE BLOOD CELL COUNT (BEAKER) (test code=775)    8.0 K/ L        4.0-10.0         

 

                RED BLOOD CELL COUNT (BEAKER) (test code=761)    5.69 M/ L       4.20-5.80        

 

                HEMOGLOBIN (BEAKER) (test code=410)    17.7 GM/DL      13.0-16.8        

 

                HEMATOCRIT (BEAKER) (test code=411)    52.1 %          40.0-50.0        

 

                MEAN CORPUSCULAR VOLUME (BEAKER) (test code=753)    91.5 fL         82.0-98.0        

 

                MEAN CORPUSCULAR HEMOGLOBIN (BEAKER) (test code=751)    31.2 pg         27.0-33.0        

 

                    MEAN CORPUSCULAR HEMOGLOBIN CONC (BEAKER) (test code=752)    34.1 GM/DL          32.0-36.0

                                         

 

                RED CELL DISTRIBUTION WIDTH (BEAKER) (test code=412)    12.7 %          10.3-14.2        

 

                PLATELET COUNT (BEAKER) (test code=756)    177 K/CU MM     150-430          

 

                MEAN PLATELET VOLUME (BEAKER) (test code=754)    7.6 fL          6.5-10.5         

 

                NUCLEATED RED BLOOD CELLS (BEAKER) (test code=413)    0 /100 WBC      0-0              

 

                NEUTROPHILS RELATIVE PERCENT (BEAKER) (test code=429)    74 %                             

 

                LYMPHOCYTES RELATIVE PERCENT (BEAKER) (test code=430)    15 %                             

 

                MONOCYTES RELATIVE PERCENT (BEAKER) (test code=431)    9 %                              

 

                EOSINOPHILS RELATIVE PERCENT (BEAKER) (test code=432)    1 %                              

 

                BASOPHILS RELATIVE PERCENT (BEAKER) (test code=437)    0 %                              

 

                NEUTROPHILS ABSOLUTE COUNT (BEAKER) (test code=670)    5.94 K/ L       1.80-8.00        

 

                LYMPHOCYTES ABSOLUTE COUNT (BEAKER) (test code=414)    1.22 K/ L       1.48-4.50        

 

                MONOCYTES ABSOLUTE COUNT (BEAKER) (test code=415)    0.72 K/ L       0.00-1.30        

 

                EOSINOPHILS ABSOLUTE COUNT (BEAKER) (test code=416)    0.10 K/ L       0.00-0.50        

 

                BASOPHILS ABSOLUTE COUNT (BEAKER) (test code=417)    0.02 K/ L       0.00-0.20        





0.00B-TYPE NATRIURETIC FACTOR (BNP)2017-05-15 11:20:00* 





                Test Item       Value           Reference Range    Comments

 

                B-TYPE NATRIURETIC PEPTIDE (BEAKER) (test code=700)    387 pg/mL       0-100            





BASIC METABOLIC PANEL2017-05-15 11:12:00* 





                Test Item       Value           Reference Range    Comments

 

                SODIUM (BEAKER) (test code=381)    135 meq/L       136-145          

 

                POTASSIUM (BEAKER) (test code=379)    3.7 meq/L       3.5-5.1          

 

                CHLORIDE (BEAKER) (test code=382)    95 meq/L                   

 

                CO2 (BEAKER) (test code=355)    29 meq/L        22-29            

 

                BLOOD UREA NITROGEN (BEAKER) (test code=354)    30 mg/dL        7-21             

 

                CREATININE (BEAKER) (test code=358)    1.67 mg/dL      0.57-1.25        

 

                GLUCOSE RANDOM (BEAKER) (test code=652)    258 mg/dL                  

 

                CALCIUM (BEAKER) (test code=697)    9.6 mg/dL       8.4-10.2         

 

                EGFR (BEAKER) (test code=1092)    43 mL/min/1.73 sq m                    ESTIMATED GFR IS NOT AS 

ACCURATE AS CREATININE CLEARANCE IN PREDICTING GLOMERULAR FILTRATION RATE. 
ESTIMATED GFR IS NOT APPLICABLE FOR DIALYSIS PATIENTS.





BASIC METABOLIC PANEL2017-05-10 14:05:00* 





                Test Item       Value           Reference Range    Comments

 

                SODIUM (BEAKER) (test code=381)    135 meq/L       136-145          

 

                POTASSIUM (BEAKER) (test code=379)    4.1 meq/L       3.5-5.1          

 

                CHLORIDE (BEAKER) (test code=382)    92 meq/L                   

 

                CO2 (BEAKER) (test code=355)    30 meq/L        22-29            

 

                BLOOD UREA NITROGEN (BEAKER) (test code=354)    42 mg/dL        7-21             

 

                CREATININE (BEAKER) (test code=358)    3.02 mg/dL      0.57-1.25        

 

                GLUCOSE RANDOM (BEAKER) (test code=652)    432 mg/dL                  

 

                CALCIUM (BEAKER) (test code=697)    9.6 mg/dL       8.4-10.2         

 

                EGFR (BEAKER) (test code=1092)    22 mL/min/1.73 sq m                    ESTIMATED GFR IS NOT AS 

ACCURATE AS CREATININE CLEARANCE IN PREDICTING GLOMERULAR FILTRATION RATE. 
ESTIMATED GFR IS NOT APPLICABLE FOR DIALYSIS PATIENTS.





B-TYPE NATRIURETIC FACTOR (BNP)2017-05-10 13:47:00* 





                Test Item       Value           Reference Range    Comments

 

                B-TYPE NATRIURETIC PEPTIDE (BEAKER) (test code=700)    198 pg/mL       0-100            





CBC W/PLT COUNT & AUTO DIFFERENTIAL2017-05-10 13:34:00* 





                Test Item       Value           Reference Range    Comments

 

                WHITE BLOOD CELL COUNT (BEAKER) (test code=775)    10.1 K/ L       4.0-10.0         

 

                RED BLOOD CELL COUNT (BEAKER) (test code=761)    5.96 M/ L       4.20-5.80        

 

                HEMOGLOBIN (BEAKER) (test code=410)    18.6 GM/DL      13.0-16.8        

 

                HEMATOCRIT (BEAKER) (test code=411)    55.2 %          40.0-50.0        

 

                MEAN CORPUSCULAR VOLUME (BEAKER) (test code=753)    92.7 fL         82.0-98.0        

 

                MEAN CORPUSCULAR HEMOGLOBIN (BEAKER) (test code=751)    31.2 pg         27.0-33.0        

 

                    MEAN CORPUSCULAR HEMOGLOBIN CONC (BEAKER) (test code=752)    33.7 GM/DL          32.0-36.0

                                         

 

                RED CELL DISTRIBUTION WIDTH (BEAKER) (test code=412)    13.1 %          10.3-14.2        

 

                PLATELET COUNT (BEAKER) (test code=756)    206 K/CU MM     150-430          

 

                MEAN PLATELET VOLUME (BEAKER) (test code=754)    7.5 fL          6.5-10.5         

 

                NUCLEATED RED BLOOD CELLS (BEAKER) (test code=413)    0 /100 WBC      0-0              

 

                NEUTROPHILS RELATIVE PERCENT (BEAKER) (test code=429)    82 %                             

 

                LYMPHOCYTES RELATIVE PERCENT (BEAKER) (test code=430)    10 %                             

 

                MONOCYTES RELATIVE PERCENT (BEAKER) (test code=431)    7 %                              

 

                EOSINOPHILS RELATIVE PERCENT (BEAKER) (test code=432)    1 %                              

 

                BASOPHILS RELATIVE PERCENT (BEAKER) (test code=437)    0 %                              

 

                NEUTROPHILS ABSOLUTE COUNT (BEAKER) (test code=670)    8.25 K/ L       1.80-8.00        

 

                LYMPHOCYTES ABSOLUTE COUNT (BEAKER) (test code=414)    0.97 K/ L       1.48-4.50        

 

                MONOCYTES ABSOLUTE COUNT (BEAKER) (test code=415)    0.74 K/ L       0.00-1.30        

 

                EOSINOPHILS ABSOLUTE COUNT (BEAKER) (test code=416)    0.13 K/ L       0.00-0.50        

 

                BASOPHILS ABSOLUTE COUNT (BEAKER) (test code=417)    0.02 K/ L       0.00-0.20        





0.00B-TYPE NATRIURETIC FACTOR (BNP)2017 11:13:00* 





                Test Item       Value           Reference Range    Comments

 

                B-TYPE NATRIURETIC PEPTIDE (BEAKER) (test code=700)    172 pg/mL       0-100            





CBC W/PLT COUNT & AUTO [id number] 11:12:00* 





                Test Item       Value           Reference Range    Comments

 

                WHITE BLOOD CELL COUNT (BEAKER) (test code=775)    7.7 K/ L        4.0-10.0         

 

                RED BLOOD CELL COUNT (BEAKER) (test code=761)    5.65 M/ L       4.20-5.80        

 

                HEMOGLOBIN (BEAKER) (test code=410)    16.9 GM/DL      13.0-16.8        

 

                HEMATOCRIT (BEAKER) (test code=411)    52.5 %          40.0-50.0        

 

                MEAN CORPUSCULAR VOLUME (BEAKER) (test code=753)    93.0 fL         82.0-98.0        

 

                MEAN CORPUSCULAR HEMOGLOBIN (BEAKER) (test code=751)    30.0 pg         27.0-33.0        

 

                    MEAN CORPUSCULAR HEMOGLOBIN CONC (BEAKER) (test code=752)    32.2 GM/DL          32.0-36.0

                                         

 

                RED CELL DISTRIBUTION WIDTH (BEAKER) (test code=412)    12.6 %          10.3-14.2        

 

                PLATELET COUNT (BEAKER) (test code=756)    170 K/CU MM     150-430          

 

                MEAN PLATELET VOLUME (BEAKER) (test code=754)    7.8 fL          6.5-10.5         

 

                NUCLEATED RED BLOOD CELLS (BEAKER) (test code=413)    0 /100 WBC      0-0              

 

                NEUTROPHILS RELATIVE PERCENT (BEAKER) (test code=429)    70 %                             

 

                LYMPHOCYTES RELATIVE PERCENT (BEAKER) (test code=430)    17 %                             

 

                MONOCYTES RELATIVE PERCENT (BEAKER) (test code=431)    9 %                              

 

                EOSINOPHILS RELATIVE PERCENT (BEAKER) (test code=432)    3 %                              

 

                BASOPHILS RELATIVE PERCENT (BEAKER) (test code=437)    0 %                              

 

                NEUTROPHILS ABSOLUTE COUNT (BEAKER) (test code=670)    5.40 K/ L       1.80-8.00        

 

                LYMPHOCYTES ABSOLUTE COUNT (BEAKER) (test code=414)    1.32 K/ L       1.48-4.50        

 

                MONOCYTES ABSOLUTE COUNT (BEAKER) (test code=415)    0.73 K/ L       0.00-1.30        

 

                EOSINOPHILS ABSOLUTE COUNT (BEAKER) (test code=416)    0.23 K/ L       0.00-0.50        

 

                BASOPHILS ABSOLUTE COUNT (BEAKER) (test code=417)    0.02 K/ L       0.00-0.20        





0.00BASI METABOLIC [id number] 11:08:00* 





                Test Item       Value           Reference Range    Comments

 

                SODIUM (BEAKER) (test code=381)    135 meq/L       136-145          

 

                POTASSIUM (BEAKER) (test code=379)    3.7 meq/L       3.5-5.1          

 

                CHLORIDE (BEAKER) (test code=382)    94 meq/L                   

 

                CO2 (BEAKER) (test code=355)    28 meq/L        22-29            

 

                BLOOD UREA NITROGEN (BEAKER) (test code=354)    42 mg/dL        7-21             

 

                CREATININE (BEAKER) (test code=358)    1.69 mg/dL      0.57-1.25        

 

                GLUCOSE RANDOM (BEAKER) (test code=652)    335 mg/dL                  

 

                CALCIUM (BEAKER) (test code=697)    9.8 mg/dL       8.4-10.2         

 

                EGFR (BEAKER) (test code=1092)    43 mL/min/1.73 sq m                    ESTIMATED GFR IS NOT AS 

ACCURATE AS CREATININE CLEARANCE IN PREDICTING GLOMERULAR FILTRATION RATE. 
ESTIMATED GFR IS NOT APPLICABLE FOR DIALYSIS PATIENTS.





B-TYPE NATRIURETIC FACTOR (BNP)2017 13:37:00* 





                Test Item       Value           Reference Range    Comments

 

                B-TYPE NATRIURETIC PEPTIDE (BEAKER) (test code=700)    918 pg/mL       0-100            





COMPREHENSIVE METABOLIC [id number] 13:31:00* 





                Test Item       Value           Reference Range    Comments

 

                TOTAL PROTEIN (BEAKER) (test code=770)    6.8 gm/dL       6.0-8.3          

 

                ALBUMIN (BEAKER) (test code=1145)    4.2 g/dL        3.5-5.0          

 

                ALKALINE PHOSPHATASE (BEAKER) (test code=346)    109 U/L                    

 

                BILIRUBIN TOTAL (BEAKER) (test code=377)    1.4 mg/dL       0.2-1.2          

 

                SODIUM (BEAKER) (test code=381)    138 meq/L       136-145          

 

                POTASSIUM (BEAKER) (test code=379)    3.9 meq/L       3.5-5.1          

 

                CHLORIDE (BEAKER) (test code=382)    102 meq/L                  

 

                CO2 (BEAKER) (test code=355)    24 meq/L        22-29            

 

                BLOOD UREA NITROGEN (BEAKER) (test code=354)    23 mg/dL        7-21             

 

                CREATININE (BEAKER) (test code=358)    1.36 mg/dL      0.57-1.25        

 

                GLUCOSE RANDOM (BEAKER) (test code=652)    157 mg/dL                  

 

                CALCIUM (BEAKER) (test code=697)    9.0 mg/dL       8.4-10.2         

 

                AST (SGOT) (BEAKER) (test code=353)    18 U/L          5-34             

 

                ALT (SGPT) (BEAKER) (test code=347)    29 U/L          6-55             

 

                EGFR (BEAKER) (test code=1092)    55 mL/min/1.73 sq m                    ESTIMATED GFR IS NOT AS 

ACCURATE AS CREATININE CLEARANCE IN PREDICTING GLOMERULAR FILTRATION RATE. 
ESTIMATED GFR IS NOT APPLICABLE FOR DIALYSIS PATIENTS.





CBC W/PLT COUNT & AUTO [id number] 13:08:00* 





                Test Item       Value           Reference Range    Comments

 

                WHITE BLOOD CELL COUNT (BEAKER) (test code=775)    7.5 K/ L        4.0-10.0         

 

                RED BLOOD CELL COUNT (BEAKER) (test code=761)    4.62 M/ L       4.20-5.80        

 

                HEMOGLOBIN (BEAKER) (test code=410)    14.9 GM/DL      13.0-16.8        

 

                HEMATOCRIT (BEAKER) (test code=411)    43.9 %          40.0-50.0        

 

                MEAN CORPUSCULAR VOLUME (BEAKER) (test code=753)    95.1 fL         82.0-98.0        

 

                MEAN CORPUSCULAR HEMOGLOBIN (BEAKER) (test code=751)    32.3 pg         27.0-33.0        

 

                    MEAN CORPUSCULAR HEMOGLOBIN CONC (BEAKER) (test code=752)    34.0 GM/DL          32.0-36.0

                                         

 

                RED CELL DISTRIBUTION WIDTH (BEAKER) (test code=412)    14.3 %          10.3-14.2        

 

                PLATELET COUNT (BEAKER) (test code=756)    174 K/CU MM     150-430          

 

                MEAN PLATELET VOLUME (BEAKER) (test code=754)    7.7 fL          6.5-10.5         

 

                NUCLEATED RED BLOOD CELLS (BEAKER) (test code=413)    0 /100 WBC      0-0              

 

                NEUTROPHILS RELATIVE PERCENT (BEAKER) (test code=429)    81 %                             

 

                LYMPHOCYTES RELATIVE PERCENT (BEAKER) (test code=430)    11 %                             

 

                MONOCYTES RELATIVE PERCENT (BEAKER) (test code=431)    6 %                              

 

                EOSINOPHILS RELATIVE PERCENT (BEAKER) (test code=432)    2 %                              

 

                BASOPHILS RELATIVE PERCENT (BEAKER) (test code=437)    0 %                              

 

                NEUTROPHILS ABSOLUTE COUNT (BEAKER) (test code=670)    6.14 K/ L       1.80-8.00        

 

                LYMPHOCYTES ABSOLUTE COUNT (BEAKER) (test code=414)    0.81 K/ L       1.48-4.50        

 

                MONOCYTES ABSOLUTE COUNT (BEAKER) (test code=415)    0.44 K/ L       0.00-1.30        

 

                EOSINOPHILS ABSOLUTE COUNT (BEAKER) (test code=416)    0.16 K/ L       0.00-0.50        

 

                BASOPHILS ABSOLUTE COUNT (BEAKER) (test code=417)    0.00 K/ L       0.00-0.20        





0.00RAPID INFLUENZA A&B [id number] 21:20:00* 





                Test Item       Value           Reference Range    Comments

 

                    RAPID INFLUENZA A AG (BEAKER) (test code=1622)    Negative            Negative, Inconclusive

                                         

 

                    RAPID INFLUENZA B AG (BEAKER) (test code=1623)    Negative            Negative, Inconclusive

                                         





CREATINE KINASE (CK), TOTAL AND [id number] 20:21:00* 





                Test Item       Value           Reference Range    Comments

 

                CREATINE KINASE TOTAL (BEAKER) (test code=380)    177 U/L                    

 

                CREATINE KINASE-MB (BEAKER) (test code=750)    2.1 ng/mL       0.0-6.6          

 

                CREATINE KINASE-MB INDEX (BEAKER) (test code=395)    1.2 %                            





Effective 2014: CK-MB Reference Range ChangeNew: 0.0-6.6    Previous: 0.0-
4.9CK-MB Reference Range:<6.7      Normal6.7-10.0  Borderline>10.0     Abnormal
[id number] 20:21:00* 





                Test Item       Value           Reference Range    Comments

 

                TROPONIN I (BEAKER) (test code=397)    0.02 ng/mL      0.00-0.03        





Effective 2014: Reference Range ChangeNew: 0.00-0.03   Previous 0.00-0.15T
roponin I (TnI) levels must be interpreted in the context of the presenting symp
toms and the clinical findings. Elevated TnI levels indicate myocardial damage, 
but are not specific for ischemic heart disease. Elevated TnI levels are seen in
patients with other cardiac conditions (including myocarditis and congestive he
art failure), and slight TnI elevations occur in patients with other conditions,
including sepsis, renal failure, acidosis, acute neurological disease, and pers
istent tachyarrhythmia.B-TYPE NATRIURETIC FACTOR (BNP)2017 20:21:00* 





                Test Item       Value           Reference Range    Comments

 

                B-TYPE NATRIURETIC PEPTIDE (BEAKER) (test code=700)    1421 pg/mL      0-100            





[id number] 20:15:00* 





                Test Item       Value           Reference Range    Comments

 

                MAGNESIUM (BEAKER) (test code=627)    2.0 mg/dL       1.6-2.6         Specimen slightly hemolyzed







BASIC METABOLIC [id number] 20:15:00* 





                Test Item       Value           Reference Range    Comments

 

                SODIUM (BEAKER) (test code=381)    139 meq/L       136-145          

 

                POTASSIUM (BEAKER) (test code=379)    4.1 meq/L       3.5-5.1         Specimen slightly hemolyzed



 

                CHLORIDE (BEAKER) (test code=382)    104 meq/L                  

 

                CO2 (BEAKER) (test code=355)    21 meq/L        22-29            

 

                BLOOD UREA NITROGEN (BEAKER) (test code=354)    21 mg/dL        7-21             

 

                CREATININE (BEAKER) (test code=358)    1.54 mg/dL      0.57-1.25       Specimen slightly hemolyzed



 

                GLUCOSE RANDOM (BEAKER) (test code=652)    196 mg/dL                  

 

                CALCIUM (BEAKER) (test code=697)    8.8 mg/dL       8.4-10.2         

 

                EGFR (BEAKER) (test code=1092)    48 mL/min/1.73 sq m                    ESTIMATED GFR IS NOT AS 

ACCURATE AS CREATININE CLEARANCE IN PREDICTING GLOMERULAR FILTRATION RATE. 
ESTIMATED GFR IS NOT APPLICABLE FOR DIALYSIS PATIENTS.





[id number] 20:09:00* 





                Test Item       Value           Reference Range    Comments

 

                PROTIME (BEAKER) (test code=759)    29.3 seconds    11.7-14.7        

 

                INR (BEAKER) (test code=370)    2.8             <=5.9            

 

                PARTIAL THROMBOPLASTIN TIME (BEAKER) (test code=760)    43.5 seconds    22.5-36.0        







RECOMMENDED COUMADIN/WARFARIN INR THERAPY RANGESSTANDARD DOSE: 2.0 - 3.0   Inclu
rosette: PROPHYLAXIS for venous thrombosis, systemic embolization; TREATMENT for nesha
ous thrombosis and/or pulmonary embolus.HIGH RISK: Target INR is 2.5-3.5 for pat
ients with mechanical heart valves.CBC W/PLT COUNT & AUTO [id number]
20:00:00* 





                Test Item       Value           Reference Range    Comments

 

                WHITE BLOOD CELL COUNT (BEAKER) (test code=775)    9.1 K/ L        4.0-10.0         

 

                RED BLOOD CELL COUNT (BEAKER) (test code=761)    4.67 M/ L       4.20-5.80        

 

                HEMOGLOBIN (BEAKER) (test code=410)    15.1 GM/DL      13.0-16.8        

 

                HEMATOCRIT (BEAKER) (test code=411)    44.9 %          40.0-50.0        

 

                MEAN CORPUSCULAR VOLUME (BEAKER) (test code=753)    96.1 fL         82.0-98.0        

 

                MEAN CORPUSCULAR HEMOGLOBIN (BEAKER) (test code=751)    32.4 pg         27.0-33.0        

 

                    MEAN CORPUSCULAR HEMOGLOBIN CONC (BEAKER) (test code=752)    33.7 GM/DL          32.0-36.0

                                         

 

                RED CELL DISTRIBUTION WIDTH (BEAKER) (test code=412)    13.8 %          10.3-14.2        

 

                PLATELET COUNT (BEAKER) (test code=756)    201 K/CU MM     150-430          

 

                MEAN PLATELET VOLUME (BEAKER) (test code=754)    7.9 fL          6.5-10.5         

 

                NUCLEATED RED BLOOD CELLS (BEAKER) (test code=413)    0 /100 WBC      0-0              

 

                NEUTROPHILS RELATIVE PERCENT (BEAKER) (test code=429)    81 %                             

 

                LYMPHOCYTES RELATIVE PERCENT (BEAKER) (test code=430)    12 %                             

 

                MONOCYTES RELATIVE PERCENT (BEAKER) (test code=431)    5 %                              

 

                EOSINOPHILS RELATIVE PERCENT (BEAKER) (test code=432)    1 %                              

 

                BASOPHILS RELATIVE PERCENT (BEAKER) (test code=437)    1 %                              

 

                NEUTROPHILS ABSOLUTE COUNT (BEAKER) (test code=670)    7.40 K/ L       1.80-8.00        

 

                LYMPHOCYTES ABSOLUTE COUNT (BEAKER) (test code=414)    1.13 K/ L       1.48-4.50        

 

                MONOCYTES ABSOLUTE COUNT (BEAKER) (test code=415)    0.45 K/ L       0.00-1.30        

 

                EOSINOPHILS ABSOLUTE COUNT (BEAKER) (test code=416)    0.08 K/ L       0.00-0.50        

 

                BASOPHILS ABSOLUTE COUNT (BEAKER) (test code=417)    0.06 K/ L       0.00-0.20        





0.00BASIC METABOLIC [id number] 10:52:00* 





                Test Item       Value           Reference Range    Comments

 

                SODIUM (BEAKER) (test code=381)    139 meq/L       136-145          

 

                POTASSIUM (BEAKER) (test code=379)    4.9 meq/L       3.5-5.1          

 

                CHLORIDE (BEAKER) (test code=382)    101 meq/L                  

 

                CO2 (BEAKER) (test code=355)    29 meq/L        22-29            

 

                BLOOD UREA NITROGEN (BEAKER) (test code=354)    28 mg/dL        7-21             

 

                CREATININE (BEAKER) (test code=358)    1.43 mg/dL      0.57-1.25        

 

                GLUCOSE RANDOM (BEAKER) (test code=652)    183 mg/dL                  

 

                CALCIUM (BEAKER) (test code=697)    9.3 mg/dL       8.4-10.2         

 

                EGFR (BEAKER) (test code=1092)    52 mL/min/1.73 sq m                    ESTIMATED GFR IS NOT AS 

ACCURATE AS CREATININE CLEARANCE IN PREDICTING GLOMERULAR FILTRATION RATE. 
ESTIMATED GFR IS NOT APPLICABLE FOR DIALYSIS PATIENTS.





B-TYPE NATRIURETIC FACTOR (BNP)2017 10:27:00* 





                Test Item       Value           Reference Range    Comments

 

                B-TYPE NATRIURETIC PEPTIDE (BEAKER) (test code=700)    1100 pg/mL      0-100            





CBC W/PLT COUNT & AUTO [id number] 09:55:00* 





                Test Item       Value           Reference Range    Comments

 

                WHITE BLOOD CELL COUNT (BEAKER) (test code=775)    7.2 K/ L        4.0-10.0         

 

                RED BLOOD CELL COUNT (BEAKER) (test code=761)    5.18 M/ L       4.20-5.80        

 

                HEMOGLOBIN (BEAKER) (test code=410)    16.1 GM/DL      13.0-16.8        

 

                HEMATOCRIT (BEAKER) (test code=411)    49.5 %          40.0-50.0        

 

                MEAN CORPUSCULAR VOLUME (BEAKER) (test code=753)    95.5 fL         82.0-98.0        

 

                MEAN CORPUSCULAR HEMOGLOBIN (BEAKER) (test code=751)    31.0 pg         27.0-33.0        

 

                    MEAN CORPUSCULAR HEMOGLOBIN CONC (BEAKER) (test code=752)    32.5 GM/DL          32.0-36.0

                                         

 

                RED CELL DISTRIBUTION WIDTH (BEAKER) (test code=412)    12.5 %          10.3-14.2        

 

                PLATELET COUNT (BEAKER) (test code=756)    186 K/CU MM     150-430          

 

                MEAN PLATELET VOLUME (BEAKER) (test code=754)    7.4 fL          6.5-10.5         

 

                NUCLEATED RED BLOOD CELLS (BEAKER) (test code=413)    0 /100 WBC      0-0              

 

                NEUTROPHILS RELATIVE PERCENT (BEAKER) (test code=429)    69 %                             

 

                LYMPHOCYTES RELATIVE PERCENT (BEAKER) (test code=430)    17 %                             

 

                MONOCYTES RELATIVE PERCENT (BEAKER) (test code=431)    12 %                             

 

                EOSINOPHILS RELATIVE PERCENT (BEAKER) (test code=432)    2 %                              

 

                BASOPHILS RELATIVE PERCENT (BEAKER) (test code=437)    0 %                              

 

                NEUTROPHILS ABSOLUTE COUNT (BEAKER) (test code=670)    4.98 K/ L       1.80-8.00        

 

                LYMPHOCYTES ABSOLUTE COUNT (BEAKER) (test code=414)    1.25 K/ L       1.48-4.50        

 

                MONOCYTES ABSOLUTE COUNT (BEAKER) (test code=415)    0.84 K/ L       0.00-1.30        

 

                EOSINOPHILS ABSOLUTE COUNT (BEAKER) (test code=416)    0.13 K/ L       0.00-0.50        

 

                BASOPHILS ABSOLUTE COUNT (BEAKER) (test code=417)    0.03 K/ L       0.00-0.20        





0.00POCT-GLUCOSE [id number] 12:53:00* 





                Test Item       Value           Reference Range    Comments

 

                POC-GLUCOSE METER (BEAKER) (test code=1538)    234 mg/dL                 TESTED AT [facility] 

[address] [facility] [address]





POCT-GLUCOSE [id number] 08:09:00* 





                Test Item       Value           Reference Range    Comments

 

                POC-GLUCOSE METER (BEAKER) (test code=1538)    137 mg/dL                 TESTED AT [facility] 

[address] [facility] [address]





POCT-GLUCOSE [id number] 20:56:00* 





                Test Item       Value           Reference Range    Comments

 

                POC-GLUCOSE METER (BEAKER) (test code=1538)    212 mg/dL                 TESTED AT [address] [address]





POCT-GLUCOSE [id number] 18:46:00* 





                Test Item       Value           Reference Range    Comments

 

                POC-GLUCOSE METER (BEAKER) (test code=1538)    210 mg/dL                 TESTED AT [address] [address]





POCT-GLUCOSE [id number] 11:51:00* 





                Test Item       Value           Reference Range    Comments

 

                POC-GLUCOSE METER (BEAKER) (test code=1538)    138 mg/dL                 TESTED AT [address] [address]





POCT-GLUCOSE [id number] 07:20:00* 





                Test Item       Value           Reference Range    Comments

 

                POC-GLUCOSE METER (BEAKER) (test code=1538)    263 mg/dL                 TESTED AT [address] [address]





POCT-GLUCOSE METER2017-03-15 20:54:00* 





                Test Item       Value           Reference Range    Comments

 

                POC-GLUCOSE METER (BEAKER) (test code=1538)    183 mg/dL                 TESTED AT [address] [address]





POCT-GLUCOSE METER2017-03-15 17:09:00* 





                Test Item       Value           Reference Range    Comments

 

                POC-GLUCOSE METER (BEAKER) (test code=1538)    123 mg/dL                 TESTED AT [address] [address]





POCT-GLUCOSE METER2017-03-15 12:16:00* 





                Test Item       Value           Reference Range    Comments

 

                POC-GLUCOSE METER (BEAKER) (test code=1538)    204 mg/dL                 TESTED AT [address] [address]





POCT-GLUCOSE METER2017-03-15 08:28:00* 





                Test Item       Value           Reference Range    Comments

 

                POC-GLUCOSE METER (BEAKER) (test code=1538)    159 mg/dL                 TESTED AT [address] [address]





MAGNESIUM2017-03-15 06:09:00* 





                Test Item       Value           Reference Range    Comments

 

                MAGNESIUM (BEAKER) (test code=627)    2.2 mg/dL       1.6-2.6          





BASIC METABOLIC PANEL2017-03-15 06:09:00* 





                Test Item       Value           Reference Range    Comments

 

                SODIUM (BEAKER) (test code=381)    140 meq/L       136-145          

 

                POTASSIUM (BEAKER) (test code=379)    4.0 meq/L       3.5-5.1          

 

                CHLORIDE (BEAKER) (test code=382)    101 meq/L                  

 

                CO2 (BEAKER) (test code=355)    26 meq/L        22-29            

 

                BLOOD UREA NITROGEN (BEAKER) (test code=354)    25 mg/dL        7-21             

 

                CREATININE (BEAKER) (test code=358)    1.31 mg/dL      0.57-1.25        

 

                GLUCOSE RANDOM (BEAKER) (test code=652)    185 mg/dL                  

 

                CALCIUM (BEAKER) (test code=697)    9.0 mg/dL       8.4-10.2         

 

                EGFR (BEAKER) (test code=1092)    58 mL/min/1.73 sq m                    ESTIMATED GFR IS NOT AS 

ACCURATE AS CREATININE CLEARANCE IN PREDICTING GLOMERULAR FILTRATION RATE. 
ESTIMATED GFR IS NOT APPLICABLE FOR DIALYSIS PATIENTS.





POCT-GLUCOSE [id number] 20:58:00* 





                Test Item       Value           Reference Range    Comments

 

                POC-GLUCOSE METER (BEAKER) (test code=1538)    163 mg/dL                 TESTED AT [address] [address]





POCT-GLUCOSE [id number] 16:58:00* 





                Test Item       Value           Reference Range    Comments

 

                POC-GLUCOSE METER (BEAKER) (test code=1538)    138 mg/dL                 TESTED AT [address] [address]





POCT-GLUCOSE [id number] 12:35:00* 





                Test Item       Value           Reference Range    Comments

 

                POC-GLUCOSE METER (BEAKER) (test code=1538)    223 mg/dL                 TESTED AT [address] [address]





POCT-GLUCOSE [id number] 08:51:00* 





                Test Item       Value           Reference Range    Comments

 

                POC-GLUCOSE METER (BEAKER) (test code=1538)    124 mg/dL                 TESTED AT [address] [address]





BASIC METABOLIC [id number] 05:42:00* 





                Test Item       Value           Reference Range    Comments

 

                SODIUM (BEAKER) (test code=381)    140 meq/L       136-145          

 

                POTASSIUM (BEAKER) (test code=379)    4.0 meq/L       3.5-5.1         Specimen slightly hemolyzed



 

                CHLORIDE (BEAKER) (test code=382)    101 meq/L                  

 

                CO2 (BEAKER) (test code=355)    27 meq/L        22-29            

 

                BLOOD UREA NITROGEN (BEAKER) (test code=354)    27 mg/dL        7-21             

 

                CREATININE (BEAKER) (test code=358)    1.27 mg/dL      0.57-1.25       Specimen slightly hemolyzed



 

                GLUCOSE RANDOM (BEAKER) (test code=652)    142 mg/dL                  

 

                CALCIUM (BEAKER) (test code=697)    8.8 mg/dL       8.4-10.2         

 

                EGFR (BEAKER) (test code=1092)    60 mL/min/1.73 sq m                    ESTIMATED GFR IS NOT AS 

ACCURATE AS CREATININE CLEARANCE IN PREDICTING GLOMERULAR FILTRATION RATE. 
ESTIMATED GFR IS NOT APPLICABLE FOR DIALYSIS PATIENTS.





POCT-GLUCOSE [id number] 20:47:00* 





                Test Item       Value           Reference Range    Comments

 

                POC-GLUCOSE METER (BEAKER) (test code=1538)    149 mg/dL                 TESTED AT [place] [address]





POCT-GLUCOSE [id number] 16:30:00* 





                Test Item       Value           Reference Range    Comments

 

                POC-GLUCOSE METER (BEAKER) (test code=1538)    134 mg/dL                 TESTED AT [place] [address]





POCT-GLUCOSE [id number] 12:46:00* 





                Test Item       Value           Reference Range    Comments

 

                POC-GLUCOSE METER (BEAKER) (test code=1538)    177 mg/dL                 TESTED AT [place] [id number]





POCT-GLUCOSE [id number] 08:33:00* 





                Test Item       Value           Reference Range    Comments

 

                POC-GLUCOSE METER (BEAKER) (test code=1538)    123 mg/dL                 TESTED AT [place] [address]





BASIC METABOLIC [id number] 05:04:00* 





                Test Item       Value           Reference Range    Comments

 

                SODIUM (BEAKER) (test code=381)    139 meq/L       136-145          

 

                POTASSIUM (BEAKER) (test code=379)    3.3 meq/L       3.5-5.1          

 

                CHLORIDE (BEAKER) (test code=382)    100 meq/L                  

 

                CO2 (BEAKER) (test code=355)    30 meq/L        22-29            

 

                BLOOD UREA NITROGEN (BEAKER) (test code=354)    31 mg/dL        7-21             

 

                CREATININE (BEAKER) (test code=358)    1.48 mg/dL      0.57-1.25        

 

                GLUCOSE RANDOM (BEAKER) (test code=652)    128 mg/dL                  

 

                CALCIUM (BEAKER) (test code=697)    8.0 mg/dL       8.4-10.2         

 

                EGFR (BEAKER) (test code=1092)    50 mL/min/1.73 sq m                    ESTIMATED GFR IS NOT AS 

ACCURATE AS CREATININE CLEARANCE IN PREDICTING GLOMERULAR FILTRATION RATE. 
ESTIMATED GFR IS NOT APPLICABLE FOR DIALYSIS PATIENTS.





POCT-GLUCOSE [id number] 20:52:00* 





                Test Item       Value           Reference Range    Comments

 

                POC-GLUCOSE METER (BEAKER) (test code=1538)    184 mg/dL                 TESTED AT [address] [address]





POCT-GLUCOSE [id number] 16:59:00* 





                Test Item       Value           Reference Range    Comments

 

                POC-GLUCOSE METER (BEAKER) (test code=1538)    115 mg/dL                 TESTED AT [address] [address]





POCT-GLUCOSE [id number] 12:38:00* 





                Test Item       Value           Reference Range    Comments

 

                POC-GLUCOSE METER (BEAKER) (test code=1538)    172 mg/dL                 TESTED AT [address] [address]





POCT-GLUCOSE [id number] 09:33:00* 





                Test Item       Value           Reference Range    Comments

 

                POC-GLUCOSE METER (BEAKER) (test code=1538)    130 mg/dL                 TESTED AT [address] [address]





HEMOGLOBIN [id number] 07:46:00* 





                Test Item       Value           Reference Range    Comments

 

                HEMOGLOBIN A1C (BEAKER) (test code=368)    7.6 %           4.3-6.1          





BASIC METABOLIC [id number] 05:54:00* 





                Test Item       Value           Reference Range    Comments

 

                SODIUM (BEAKER) (test code=381)    138 meq/L       136-145          

 

                POTASSIUM (BEAKER) (test code=379)    4.3 meq/L       3.5-5.1          

 

                CHLORIDE (BEAKER) (test code=382)    101 meq/L                  

 

                CO2 (BEAKER) (test code=355)    25 meq/L        22-29            

 

                BLOOD UREA NITROGEN (BEAKER) (test code=354)    34 mg/dL        7-21             

 

                CREATININE (BEAKER) (test code=358)    1.61 mg/dL      0.57-1.25        

 

                GLUCOSE RANDOM (BEAKER) (test code=652)    229 mg/dL                  

 

                CALCIUM (BEAKER) (test code=697)    8.6 mg/dL       8.4-10.2         

 

                EGFR (BEAKER) (test code=1092)    45 mL/min/1.73 sq m                    ESTIMATED GFR IS NOT AS 

ACCURATE AS CREATININE CLEARANCE IN PREDICTING GLOMERULAR FILTRATION RATE. 
ESTIMATED GFR IS NOT APPLICABLE FOR DIALYSIS PATIENTS.





POCT-GLUCOSE [id number] 21:06:00* 





                Test Item       Value           Reference Range    Comments

 

                POC-GLUCOSE METER (BEAKER) (test code=1538)    230 mg/dL                 TESTED AT [facility] 

[address] [facility] [address]





RAPID DRUG SCREEN, [id number] 16:32:00* 





                Test Item       Value           Reference Range    Comments

 

                BARBITURATE URINE (BEAKER) (test code=725)    Negative        Negative         

 

                BENZODIAZEPINE SCREEN URINE (BEAKER) (test code=726)    Positive        Negative         

 

                COCAINE (METAB.) SCREEN (BEAKER) (test code=1164)    Positive        Negative         

 

                METHADONE SCREEN (BEAKER) (test code=1436)    Negative        Negative         

 

                OPIATE SCREEN URINE (BEAKER) (test code=734)    Negative        Negative         

 

                CANNABINOID SCREEN URINE (BEAKER) (test code=727)    Negative        Negative         

 

                AMPH/METHAMPH SCREEN (BEAKER) (test code=1438)    Negative        Negative         

 

                PHENCYCLIDINE SCREEN URINE (BEAKER) (test code=608)    Negative        Negative         

 

                OXYCODONE SCREEN URINE (BEAKER) (test code=2761)    Negative        Negative         





DRUG                CUTOFF CONC.Cocaine               300 ng/mL Cannabinoid     
      50 ng/mL Benzodiazepine        200 ng/mLBarbiturate           200 ng/mLPh
encyclidine          25 ng/mLOpiate                300 ng/mLMethadone           
 300 ng/mLAmphetamine/         1000 ng/mL  MethamphetamineOxycodone             
300 ng/mLURINALYSIS W/ REFLEX URINE [id number] 16:16:00* 





                Test Item       Value           Reference Range    Comments

 

                COLOR (BEAKER) (test code=470)    Yellow                           

 

                CLARITY (BEAKER) (test code=469)    Clear                            

 

                SPECIFIC GRAVITY UA (BEAKER) (test code=468)    1.023           1.001-1.035      

 

                PH UA (BEAKER) (test code=467)    5.5             5.0-8.0          

 

                PROTEIN UA (BEAKER) (test code=464)    100 mg/dL       Negative         

 

                GLUCOSE UA (BEAKER) (test code=365)    Negative        Negative         

 

                KETONES UA (BEAKER) (test code=371)    10 mg/dL        Negative         

 

                BILIRUBIN UA (BEAKER) (test code=462)    Negative        Negative         

 

                BLOOD UA (BEAKER) (test code=461)    Small           Negative         

 

                NITRITE UA (BEAKER) (test code=465)    Negative        Negative         

 

                LEUKOCYTE ESTERASE UA (BEAKER) (test code=466)    Negative        Negative         

 

                UROBILINOGEN UA (BEAKER) (test code=463)    0.2 mg/dL       0.2-1.0          

 

                RBC UA (BEAKER) (test code=519)    < /HPF                           

 

                WBC UA (BEAKER) (test code=520)    1 /HPF                           

 

                MUCUS (BEAKER) (test code=1574)    Occasional                       

 

                SQUAMOUS EPITHELIAL (BEAKER) (test code=516)    1 /HPF                           

 

                HYALINE CASTS (BEAKER) (test code=514)    8 /LPF                           

 

                SOURCE(BEAKER) (test code=2795)                                     





CREATINE KINASE (CK), TOTAL AND [id number] 14:04:00* 





                Test Item       Value           Reference Range    Comments

 

                CREATINE KINASE TOTAL (BEAKER) (test code=380)    1036 U/L                   

 

                CREATINE KINASE-MB (BEAKER) (test code=750)    5.5 ng/mL       0.0-6.6          

 

                CREATINE KINASE-MB INDEX (BEAKER) (test code=395)    0.5 %                            





Effective 2014: CK-MB Reference Range ChangeNew: 0.0-6.6    Previous: 0.0-
4.9CK-MB Reference Range:<6.7      Normal6.7-10.0  Borderline>10.0     Abnormal
TROPONIN [id number] 14:04:00* 





                Test Item       Value           Reference Range    Comments

 

                TROPONIN I (BEAKER) (test code=397)    0.07 ng/mL      0.00-0.03        





Effective 2014: Reference Range ChangeNew: 0.00-0.03   Previous 0.00-0.15T
roponin I (TnI) levels must be interpreted in the context of the presenting symp
toms and the clinical findings. Elevated TnI levels indicate myocardial damage, 
but are not specific for ischemic heart disease. Elevated TnI levels are seen in
patients with other cardiac conditions (including myocarditis and congestive he
art failure), and slight TnI elevations occur in patients with other conditions,
including sepsis, renal failure, acidosis, acute neurological disease, and pers
istent tachyarrhythmia.B-TYPE NATRIURETIC FACTOR (BNP)2017 14:00:00* 





                Test Item       Value           Reference Range    Comments

 

                B-TYPE NATRIURETIC PEPTIDE (BEAKER) (test code=700)    1242 pg/mL      0-100            





[id number] 13:57:00* 





                Test Item       Value           Reference Range    Comments

 

                PHOSPHORUS (BEAKER) (test code=604)    4.1 mg/dL       2.3-4.7          





[id number] 13:57:00* 





                Test Item       Value           Reference Range    Comments

 

                MAGNESIUM (BEAKER) (test code=627)    1.8 mg/dL       1.6-2.6          





COMPREHENSIVE METABOLIC [id number] 13:57:00* 





                Test Item       Value           Reference Range    Comments

 

                TOTAL PROTEIN (BEAKER) (test code=770)    7.2 gm/dL       6.0-8.3          

 

                ALBUMIN (BEAKER) (test code=1145)    4.5 g/dL        3.5-5.0          

 

                ALKALINE PHOSPHATASE (BEAKER) (test code=346)    106 U/L                    

 

                BILIRUBIN TOTAL (BEAKER) (test code=377)    2.5 mg/dL       0.2-1.2          

 

                SODIUM (BEAKER) (test code=381)    140 meq/L       136-145          

 

                POTASSIUM (BEAKER) (test code=379)    4.6 meq/L       3.5-5.1          

 

                CHLORIDE (BEAKER) (test code=382)    103 meq/L                  

 

                CO2 (BEAKER) (test code=355)    20 meq/L        22-29            

 

                BLOOD UREA NITROGEN (BEAKER) (test code=354)    30 mg/dL        7-21             

 

                CREATININE (BEAKER) (test code=358)    1.78 mg/dL      0.57-1.25        

 

                GLUCOSE RANDOM (BEAKER) (test code=652)    189 mg/dL                  

 

                CALCIUM (BEAKER) (test code=697)    9.2 mg/dL       8.4-10.2         

 

                AST (SGOT) (BEAKER) (test code=353)    266 U/L         5-34             

 

                ALT (SGPT) (BEAKER) (test code=347)    324 U/L         6-55             

 

                EGFR (BEAKER) (test code=1092)    40 mL/min/1.73 sq m                    ESTIMATED GFR IS NOT AS 

ACCURATE AS CREATININE CLEARANCE IN PREDICTING GLOMERULAR FILTRATION RATE. 
ESTIMATED GFR IS NOT APPLICABLE FOR DIALYSIS PATIENTS.





Specimen slightly ictericPT/[id number] 13:55:00* 





                Test Item       Value           Reference Range    Comments

 

                PROTIME (BEAKER) (test code=759)    24.9 seconds    11.7-14.7        

 

                INR (BEAKER) (test code=370)    2.3             <=5.9            

 

                PARTIAL THROMBOPLASTIN TIME (BEAKER) (test code=760)    36.5 seconds    22.5-36.0        







RECOMMENDED COUMADIN/WARFARIN INR THERAPY RANGESSTANDARD DOSE: 2.0 - 3.0   Inclu
rosette: PROPHYLAXIS for venous thrombosis, systemic embolization; TREATMENT for nesha
ous thrombosis and/or pulmonary embolus.HIGH RISK: Target INR is 2.5-3.5 for pat
ients with mechanical heart valves.CBC W/PLT COUNT & AUTO [id number]
13:52:00* 





                Test Item       Value           Reference Range    Comments

 

                WHITE BLOOD CELL COUNT (BEAKER) (test code=775)    15.4 K/ L       4.0-10.0         

 

                RED BLOOD CELL COUNT (BEAKER) (test code=761)    4.71 M/ L       4.20-5.80        

 

                HEMOGLOBIN (BEAKER) (test code=410)    15.6 GM/DL      13.0-16.8        

 

                HEMATOCRIT (BEAKER) (test code=411)    45.0 %          40.0-50.0        

 

                MEAN CORPUSCULAR VOLUME (BEAKER) (test code=753)    95.7 fL         82.0-98.0        

 

                MEAN CORPUSCULAR HEMOGLOBIN (BEAKER) (test code=751)    33.1 pg         27.0-33.0        

 

                    MEAN CORPUSCULAR HEMOGLOBIN CONC (BEAKER) (test code=752)    34.6 GM/DL          32.0-36.0

                                         

 

                RED CELL DISTRIBUTION WIDTH (BEAKER) (test code=412)    13.1 %          10.3-14.2        

 

                PLATELET COUNT (BEAKER) (test code=756)    132 K/CU MM     150-430          

 

                MEAN PLATELET VOLUME (BEAKER) (test code=754)    7.7 fL          6.5-10.5         

 

                NUCLEATED RED BLOOD CELLS (BEAKER) (test code=413)    0 /100 WBC      0-0              

 

                NEUTROPHILS RELATIVE PERCENT (BEAKER) (test code=429)    88 %                             

 

                LYMPHOCYTES RELATIVE PERCENT (BEAKER) (test code=430)    7 %                              

 

                MONOCYTES RELATIVE PERCENT (BEAKER) (test code=431)    5 %                              

 

                EOSINOPHILS RELATIVE PERCENT (BEAKER) (test code=432)    0 %                              

 

                BASOPHILS RELATIVE PERCENT (BEAKER) (test code=437)    0 %                              

 

                NEUTROPHILS ABSOLUTE COUNT (BEAKER) (test code=670)    13.50 K/ L      1.80-8.00        

 

                LYMPHOCYTES ABSOLUTE COUNT (BEAKER) (test code=414)    1.07 K/ L       1.48-4.50        

 

                MONOCYTES ABSOLUTE COUNT (BEAKER) (test code=415)    0.78 K/ L       0.00-1.30        

 

                EOSINOPHILS ABSOLUTE COUNT (BEAKER) (test code=416)    0.01 K/ L       0.00-0.50        

 

                BASOPHILS ABSOLUTE COUNT (BEAKER) (test code=417)    0.01 K/ L       0.00-0.20        





0.00 No

## 2021-05-06 NOTE — ED PROVIDER NOTE - ATTENDING CONTRIBUTION TO CARE
Attending MD Tavera.  Agree with above.  Pt is a 76 yo female with pmhx of presenting to ED with inc swelling b/l LE's x 1 wk.  Noted bilateral erythema was worsening.  Started augmentin this last week.  Erythema has improved since starting augmentin.  Pt appears mildly tachypneic on exam.  Pt has 3+ bilateral pitting edema.  LE's are erythematous and mildly warm to touch but equal bilaterally.  Pulses equal bilaterally.  Exam c/w peripheral edema.  Pt believes last echo was a few mos ago and she believes it was 'negative'.  Pt states that she is not more SOB than usual.  Pt dosed Tylenol because she has chronic back pain. At this time pt well appearing, do not strongly suspect infection as etiology of erythema.

## 2021-05-06 NOTE — CONSULT NOTE ADULT - SUBJECTIVE AND OBJECTIVE BOX
CHIEF COMPLAINT:Patient is a 75y old  Female who presents with a chief complaint of cellulitis (06 May 2021 16:29)      HPI:  75F w/ PMHx of HTN, HLD, Dm2, afib on xarelto, nephrolithiasis, chronic LE edema p/w worsening erythema and tenderness of R>L lower extremity. Notes she first had increased redness starting Monday/Tuesday. No fevers, chills. Some pain in the area. No nausea/vomiting/diarrhea. No trauma or recent wounds. She saw her PMD who prescribed augmentin. When erythema did not improve, patient was referred to ER for IV abx. Degree of edema has not changed recently. She has baseline TODD, can walk 0.5 block or 1 flight of stairs slowly. Ambulates w/ cane.   In the ER, afebrile and well appearing. Noted to have bilateral LE edema w/ associated erythema and warmth. Given CTX.      PAST MEDICAL & SURGICAL HISTORY:  Hypertension  stable    Diabetes type 2, controlled    Hyperlipidemia    Nephrolithiasis    AF (atrial fibrillation)    HTN (hypertension)    HLD (hyperlipidemia)    Diabetes    Atrial fibrillation  onset 5/2018    Hypothyroid    Nephrolithiasis    Status post total knee replacement, unspecified laterality    H/O total knee replacement  bilateral  , 1998, 2000    S/P cystoscopy  with left ureteral stent 4/2018        MEDICATIONS  (STANDING):  atorvastatin 20 milliGRAM(s) Oral at bedtime  cholecalciferol 2000 Unit(s) Oral daily  dextrose 40% Gel 15 Gram(s) Oral once  dextrose 5%. 1000 milliLiter(s) (50 mL/Hr) IV Continuous <Continuous>  dextrose 5%. 1000 milliLiter(s) (100 mL/Hr) IV Continuous <Continuous>  dextrose 50% Injectable 25 Gram(s) IV Push once  dextrose 50% Injectable 12.5 Gram(s) IV Push once  dextrose 50% Injectable 25 Gram(s) IV Push once  gabapentin 100 milliGRAM(s) Oral three times a day  glucagon  Injectable 1 milliGRAM(s) IntraMuscular once  insulin lispro (ADMELOG) corrective regimen sliding scale   SubCutaneous three times a day before meals  insulin lispro (ADMELOG) corrective regimen sliding scale   SubCutaneous at bedtime  levothyroxine 25 MICROGram(s) Oral daily  metoprolol succinate  milliGRAM(s) Oral daily    MEDICATIONS  (PRN):      FAMILY HISTORY:  No pertinent family history in first degree relatives        SOCIAL HISTORY:    [ ] Non-smoker  [ ] Smoker  [ ] Alcohol    Allergies    No Known Allergies    Intolerances    	    REVIEW OF SYSTEMS:  CONSTITUTIONAL: No fever, weight loss, or fatigue  EYES: No eye pain, visual disturbances, or discharge  ENT:  No difficulty hearing, tinnitus, vertigo; No sinus or throat pain  NECK: No pain or stiffness  RESPIRATORY: No cough, wheezing, chills or hemoptysis; + Shortness of Breath  CARDIOVASCULAR: No chest pain, palpitations, passing out, dizziness, or leg swelling  GASTROINTESTINAL: No abdominal or epigastric pain. No nausea, vomiting, or hematemesis; No diarrhea or constipation. No melena or hematochezia.  GENITOURINARY: No dysuria, frequency, hematuria, or incontinence  NEUROLOGICAL: No headaches, memory loss, loss of strength, numbness, or tremors  SKIN: No itching, burning, rashes, or lesions   LYMPH Nodes: No enlarged glands  ENDOCRINE: No heat or cold intolerance; No hair loss  MUSCULOSKELETAL: No joint pain or swelling; No muscle, back, or extremity pain  PSYCHIATRIC: No depression, anxiety, mood swings, or difficulty sleeping  HEME/LYMPH: No easy bruising, or bleeding gums  ALLERGY AND IMMUNOLOGIC: No hives or eczema	    [ ] All others negative	  [ ] Unable to obtain    PHYSICAL EXAM:  T(C): 36.6 (05-06-21 @ 17:20), Max: 36.7 (05-06-21 @ 09:53)  HR: 65 (05-06-21 @ 17:20) (65 - 81)  BP: 135/65 (05-06-21 @ 17:20) (135/65 - 183/79)  RR: 18 (05-06-21 @ 17:20) (18 - 22)  SpO2: 100% (05-06-21 @ 17:20) (96% - 100%)  Wt(kg): --  I&O's Summary      Appearance: Normal	  HEENT:   Normal oral mucosa, PERRL, EOMI	  Lymphatic: No lymphadenopathy  Cardiovascular: Normal S1 S2, No JVD, + murmurs, + edema  Respiratory: Lungs clear to auscultation	  Psychiatry: A & O x 3, Mood & affect appropriate  Gastrointestinal:  Soft, Non-tender, + BS	  Skin: No rashes, No ecchymoses, No cyanosis	  Neurologic: Non-focal  Extremities: Normal range of motion, No clubbing, cyanosis , + cellulitis/ edema  Vascular: Peripheral pulses palpable 2+ bilaterally    TELEMETRY: 	    ECG:  	  RADIOLOGY:  OTHER: 	  	  LABS:	 	    CARDIAC MARKERS:                              11.6   7.97  )-----------( 198      ( 06 May 2021 11:12 )             37.8     05-06    140  |  106  |  21  ----------------------------<  182<H>  4.0   |  23  |  0.75    Ca    9.3      06 May 2021 11:12    TPro  7.2  /  Alb  3.8  /  TBili  0.4  /  DBili  x   /  AST  22  /  ALT  14  /  AlkPhos  120  05-06    proBNP: Serum Pro-Brain Natriuretic Peptide: 432 pg/mL (05-06 @ 11:12)    Lipid Profile:   HgA1c:   TSH:       PREVIOUS DIAGNOSTIC TESTING:     < from: 12 Lead ECG (05.04.18 @ 13:52) >  Diagnosis Line ATRIAL FIBRILLATION WITH PREMATURE VENTRICULAR OR ABERRANTLY CONDUCTED COMPLEXES  NONSPECIFICT WAVE ABNORMALITY , PROBABLY DIGITALIS EFFECT  ABNORMAL ECG    < from: Transthoracic Echocardiogram (05.09.18 @ 06:07) >  1. Concentric left ventricular hypertrophy. Increased  relative wall thickness with normal left ventricular mass  index, consistent with concentric left ventricular  remodeling.  2. Hyperdynamic left ventricular systolic function.  3. The right ventricle is not well visualized; grossly  normal right ventricular systolic function.  4. Estimated pulmonary artery systolic pressure equals 33  mm Hg, assuming right atrial pressure equals 8  mm Hg,  consistent with normal pulmonary pressures.    < from: Xray Chest 1 View AP/PA (05.06.21 @ 11:07) >  Heart size and the mediastinum cannot be accurately evaluated on this projection.  Clear lungs.  No pleural effusion or pneumothorax.  No acute bony abnormality.      IMPRESSION:  Clear lungs.    No pleural effusion.

## 2021-05-06 NOTE — ED ADULT TRIAGE NOTE - CHIEF COMPLAINT QUOTE
worsening SOB/TODD, bilateral lower extremity edema and redness. PMD rx abx for possible cellulitis Monday without improvement.

## 2021-05-06 NOTE — CONSULT NOTE ADULT - ASSESSMENT
75F w/ PMHx of HTN, HLD, Dm2, afib on xarelto, nephrolithiasis, chronic LE edema p/w worsening erythema and tenderness of R>L lower extremity. Notes she first had increased redness starting Monday/Tuesday. No fevers, chills. Some pain in the area. No nausea/vomiting/diarrhea. No trauma or recent wounds. She saw her PMD who prescribed augmentin. When erythema did not improve, patient was referred to ER for IV abx. Degree of edema has not changed recently. She has baseline TODD, can walk 0.5 block or 1 flight of stairs slowly. Ambulates w/ cane.   In the ER, afebrile and well appearing. Noted to have bilateral LE edema w/ associated erythema and warmth. Given CTX.  pt with hx of hfpef, chronic a.fib with le edema and cellulitis  continue all cardiiac meds  continue beta blocker, fu hr  continue ac  doubt dvt she is on chronic ac  abx  echo  lipid panel

## 2021-05-06 NOTE — H&P ADULT - ASSESSMENT
75F w/ PMHx of HTN, HLD, Dm2, afib on xarelto, nephrolithiasis, chronic LE edema p/w worsening erythema and tenderness of R>L lower extremity. She is well appearing and without systemic symptoms, however given the degree of erythema and warmth, particularly on the RLE will treat for cellulitis. Will also obtain BNP, dopplers to r/o DVT. Per pt had recent outpt echo, would try to obtain.

## 2021-05-06 NOTE — H&P ADULT - NSHPREVIEWOFSYSTEMS_GEN_ALL_CORE
Review of Systems:   CONSTITUTIONAL: No fever, weight loss  EYES: No eye pain, visual disturbances, or discharge  ENMT:  No difficulty hearing, tinnitus, vertigo; No sinus or throat pain  RESPIRATORY: No SOB. No cough, wheezing, chills or hemoptysis  CARDIOVASCULAR: No chest pain, palpitations, dizziness, +leg swelling  GASTROINTESTINAL: No abdominal or epigastric pain. No nausea, vomiting, or hematemesis; No diarrhea or constipation. No melena or hematochezia.  GENITOURINARY: No dysuria, frequency, hematuria, or incontinence  NEUROLOGICAL: No headaches, memory loss, loss of strength, numbness, or tremors  SKIN: No itching, burning, +bright red erythema of R>L lower extremities mid shin.  LYMPH NODES: No enlarged glands  ENDOCRINE: No heat or cold intolerance; No hair loss  MUSCULOSKELETAL: No joint pain or swelling; No muscle, back pain  PSYCHIATRIC: No depression, anxiety, mood swings, or difficulty sleeping  HEME/LYMPH: No easy bruising, or bleeding gums

## 2021-05-06 NOTE — ED ADULT NURSE NOTE - OBJECTIVE STATEMENT
75y f pt c/o bilat le redness & swelling; pt states pmd started antibiotic on monday; redness same but now right le "leaking"; pt states not painful; pt states her pain stems from sciatica which comes and goes; pt states takes tylenol for pain; pt uses a cane to ambulate; denies any recent falls; aox3; no resp distress; pt denies sob/swift; denies chest pain; denies abd pain; no n/v/d; denies fever/chills; no cough/congestion; no new numbness/tingling; bilat le red/swollen from foot 1/2 way up shin; right leg  has redness and open weeping wound; 75y f pt c/o bilat le redness & swelling; pt states pmd started antibiotic on monday; redness same but now right le "leaking"; pt states not painful; pt states her pain stems from sciatica which comes and goes; pt states takes tylenol for pain; pt uses a cane to ambulate; denies any recent falls; aox3; no resp distress; pt denies sob/swift; denies chest pain; denies abd pain; no n/v/d; denies fever/chills; no cough/congestion; no new numbness/tingling; bilat le red/swollen from foot 1/2 way up shin; right leg  has redness and open weeping wound; pt states redness a little better but pmd wants iv antibiotics; iv placed; labs drawn; pt medicated; call bell in reach; safety/comfort maintained

## 2021-05-06 NOTE — H&P ADULT - NSICDXPASTSURGICALHX_GEN_ALL_CORE_FT
PAST SURGICAL HISTORY:  H/O total knee replacement bilateral  , 1998, 2000    Nephrolithiasis     S/P cystoscopy with left ureteral stent 4/2018    Status post total knee replacement, unspecified laterality

## 2021-05-06 NOTE — ED ADULT NURSE NOTE - CHIEF COMPLAINT QUOTE
Concussion likely exacerbating uderlying chronic headaches. Will plan for return to play until her headaches are back to baseline.   
worsening SOB/TODD, bilateral lower extremity edema and redness. PMD rx abx for possible cellulitis Monday without improvement.

## 2021-05-06 NOTE — H&P ADULT - HISTORY OF PRESENT ILLNESS
75F w/ PMHx of HTN, HLD, Dm2, afib on xarelto, nephrolithiasis, chronic LE edema p/w worsening erythema and tenderness of R>L lower extremity. Notes she first had increased redness starting Monday/Tuesday. No fevers, chills. Some pain in the area. No nausea/vomiting/diarrhea. No trauma or recent wounds. She saw her PMD who prescribed augmentin. When erythema did not improve, patient was referred to ER for IV abx. Degree of edema has not changed recently. She has baseline TODD, can walk 0.5 block or 1 flight of stairs slowly. Ambulates w/ cane.     In the ER, afebrile and well appearing. Noted to have bilateral LE edema w/ associated erythema and warmth. Given CTX.

## 2021-05-06 NOTE — ED PROVIDER NOTE - NS ED ROS FT
Gen: Denies fever, chills  CV: Denies chest pain  Skin: +increased redness of lower extremities b/l   Resp: Denies SOB, cough  ENT: Denies nasal congestion  GI: Denies nausea, vomiting, diarrhea  Msk: +LE swelling  : Denies dysuria

## 2021-05-06 NOTE — ED PROVIDER NOTE - CLINICAL SUMMARY MEDICAL DECISION MAKING FREE TEXT BOX
Presents with lower extremity edema and erythema for past week. Started on abx three days. No systemic sxs. Pt was mildly short of breath during exam - CTAB, sating well. Will obtain CXR, labs.

## 2021-05-07 LAB
A1C WITH ESTIMATED AVERAGE GLUCOSE RESULT: 7.4 % — HIGH (ref 4–5.6)
CHOLEST SERPL-MCNC: 124 MG/DL — SIGNIFICANT CHANGE UP
COVID-19 SPIKE DOMAIN AB INTERP: POSITIVE
COVID-19 SPIKE DOMAIN ANTIBODY RESULT: >250 U/ML — HIGH
ESTIMATED AVERAGE GLUCOSE: 166 MG/DL — HIGH (ref 68–114)
GLUCOSE BLDC GLUCOMTR-MCNC: 129 MG/DL — HIGH (ref 70–99)
GLUCOSE BLDC GLUCOMTR-MCNC: 139 MG/DL — HIGH (ref 70–99)
GLUCOSE BLDC GLUCOMTR-MCNC: 145 MG/DL — HIGH (ref 70–99)
GLUCOSE BLDC GLUCOMTR-MCNC: 159 MG/DL — HIGH (ref 70–99)
HDLC SERPL-MCNC: 30 MG/DL — LOW
LIPID PNL WITH DIRECT LDL SERPL: 54 MG/DL — SIGNIFICANT CHANGE UP
NON HDL CHOLESTEROL: 93 MG/DL — SIGNIFICANT CHANGE UP
SARS-COV-2 IGG+IGM SERPL QL IA: >250 U/ML — HIGH
SARS-COV-2 IGG+IGM SERPL QL IA: POSITIVE
SARS-COV-2 RNA SPEC QL NAA+PROBE: SIGNIFICANT CHANGE UP
TRIGL SERPL-MCNC: 196 MG/DL — HIGH

## 2021-05-07 PROCEDURE — 93970 EXTREMITY STUDY: CPT | Mod: 26

## 2021-05-07 RX ORDER — BUMETANIDE 0.25 MG/ML
1 INJECTION INTRAMUSCULAR; INTRAVENOUS DAILY
Refills: 0 | Status: DISCONTINUED | OUTPATIENT
Start: 2021-05-07 | End: 2021-05-09

## 2021-05-07 RX ORDER — ACETAMINOPHEN 500 MG
1000 TABLET ORAL ONCE
Refills: 0 | Status: COMPLETED | OUTPATIENT
Start: 2021-05-07 | End: 2021-05-07

## 2021-05-07 RX ORDER — TRAMADOL HYDROCHLORIDE 50 MG/1
25 TABLET ORAL EVERY 6 HOURS
Refills: 0 | Status: DISCONTINUED | OUTPATIENT
Start: 2021-05-07 | End: 2021-05-09

## 2021-05-07 RX ORDER — PETROLATUM,WHITE
1 JELLY (GRAM) TOPICAL DAILY
Refills: 0 | Status: DISCONTINUED | OUTPATIENT
Start: 2021-05-07 | End: 2021-05-09

## 2021-05-07 RX ORDER — LOSARTAN POTASSIUM 100 MG/1
100 TABLET, FILM COATED ORAL DAILY
Refills: 0 | Status: DISCONTINUED | OUTPATIENT
Start: 2021-05-07 | End: 2021-05-09

## 2021-05-07 RX ORDER — LOSARTAN POTASSIUM 100 MG/1
25 TABLET, FILM COATED ORAL DAILY
Refills: 0 | Status: DISCONTINUED | OUTPATIENT
Start: 2021-05-07 | End: 2021-05-07

## 2021-05-07 RX ADMIN — Medication 25 MICROGRAM(S): at 05:03

## 2021-05-07 RX ADMIN — GABAPENTIN 100 MILLIGRAM(S): 400 CAPSULE ORAL at 13:04

## 2021-05-07 RX ADMIN — TRAMADOL HYDROCHLORIDE 25 MILLIGRAM(S): 50 TABLET ORAL at 12:42

## 2021-05-07 RX ADMIN — Medication 400 MILLIGRAM(S): at 01:18

## 2021-05-07 RX ADMIN — LOSARTAN POTASSIUM 100 MILLIGRAM(S): 100 TABLET, FILM COATED ORAL at 17:27

## 2021-05-07 RX ADMIN — Medication 100 MILLIGRAM(S): at 05:02

## 2021-05-07 RX ADMIN — Medication 200 MILLIGRAM(S): at 05:03

## 2021-05-07 RX ADMIN — RIVAROXABAN 20 MILLIGRAM(S): KIT at 17:27

## 2021-05-07 RX ADMIN — Medication 100 MILLIGRAM(S): at 13:04

## 2021-05-07 RX ADMIN — Medication 1000 MILLIGRAM(S): at 01:48

## 2021-05-07 RX ADMIN — ATORVASTATIN CALCIUM 20 MILLIGRAM(S): 80 TABLET, FILM COATED ORAL at 21:46

## 2021-05-07 RX ADMIN — GABAPENTIN 100 MILLIGRAM(S): 400 CAPSULE ORAL at 21:45

## 2021-05-07 RX ADMIN — TRAMADOL HYDROCHLORIDE 25 MILLIGRAM(S): 50 TABLET ORAL at 23:07

## 2021-05-07 RX ADMIN — Medication 100 MILLIGRAM(S): at 21:45

## 2021-05-07 RX ADMIN — BUMETANIDE 1 MILLIGRAM(S): 0.25 INJECTION INTRAMUSCULAR; INTRAVENOUS at 12:43

## 2021-05-07 RX ADMIN — TRAMADOL HYDROCHLORIDE 25 MILLIGRAM(S): 50 TABLET ORAL at 13:22

## 2021-05-07 RX ADMIN — Medication 2000 UNIT(S): at 12:43

## 2021-05-07 RX ADMIN — BUMETANIDE 1 MILLIGRAM(S): 0.25 INJECTION INTRAMUSCULAR; INTRAVENOUS at 05:03

## 2021-05-07 RX ADMIN — GABAPENTIN 100 MILLIGRAM(S): 400 CAPSULE ORAL at 05:04

## 2021-05-07 RX ADMIN — Medication 1 APPLICATION(S): at 18:33

## 2021-05-07 NOTE — PROGRESS NOTE ADULT - SUBJECTIVE AND OBJECTIVE BOX
CARDIOLOGY     PROGRESS  NOTE   ________________________________________________    CHIEF COMPLAINT:Patient is a 75y old  Female who presents with a chief complaint of cellulitis (06 May 2021 21:04)  no complain.  	  REVIEW OF SYSTEMS:  CONSTITUTIONAL: No fever, weight loss, or fatigue  EYES: No eye pain, visual disturbances, or discharge  ENT:  No difficulty hearing, tinnitus, vertigo; No sinus or throat pain  NECK: No pain or stiffness  RESPIRATORY: No cough, wheezing, chills or hemoptysis; No Shortness of Breath  CARDIOVASCULAR: No chest pain, palpitations, passing out, dizziness, or leg swelling  GASTROINTESTINAL: No abdominal or epigastric pain. No nausea, vomiting, or hematemesis; No diarrhea or constipation. No melena or hematochezia.  GENITOURINARY: No dysuria, frequency, hematuria, or incontinence  NEUROLOGICAL: No headaches, memory loss, loss of strength, numbness, or tremors  SKIN: No itching, burning, rashes, or lesions   LYMPH Nodes: No enlarged glands  ENDOCRINE: No heat or cold intolerance; No hair loss  MUSCULOSKELETAL: No joint pain or swelling; No muscle, back, or extremity pain  PSYCHIATRIC: No depression, anxiety, mood swings, or difficulty sleeping  HEME/LYMPH: No easy bruising, or bleeding gums  ALLERGY AND IMMUNOLOGIC: No hives or eczema	    [ ] All others negative	  [ ] Unable to obtain    PHYSICAL EXAM:  T(C): 36.8 (05-07-21 @ 04:59), Max: 37.1 (05-07-21 @ 02:50)  HR: 72 (05-07-21 @ 04:59) (63 - 81)  BP: 161/72 (05-07-21 @ 04:59) (135/65 - 183/79)  RR: 18 (05-07-21 @ 04:59) (18 - 22)  SpO2: 98% (05-07-21 @ 04:59) (96% - 100%)  Wt(kg): --  I&O's Summary    06 May 2021 07:01  -  07 May 2021 07:00  --------------------------------------------------------  IN: 240 mL / OUT: 500 mL / NET: -260 mL        Appearance: Normal	  HEENT:   Normal oral mucosa, PERRL, EOMI	  Lymphatic: No lymphadenopathy  Cardiovascular: Normal S1 S2, No JVD, +murmurs, No edema  Respiratory: Lungs clear to auscultation	  Psychiatry: A & O x 3, Mood & affect appropriate  Gastrointestinal:  Soft, Non-tender, + BS	  Skin: No rashes, No ecchymoses, No cyanosis	  Neurologic: Non-focal  Extremities: Normal range of motion, + cellulitis  Vascular: Peripheral pulses palpable 2+ bilaterally    MEDICATIONS  (STANDING):  atorvastatin 20 milliGRAM(s) Oral at bedtime  buMETAnide 1 milliGRAM(s) Oral daily  ceFAZolin   IVPB 1000 milliGRAM(s) IV Intermittent every 8 hours  cholecalciferol 2000 Unit(s) Oral daily  dextrose 40% Gel 15 Gram(s) Oral once  dextrose 5%. 1000 milliLiter(s) (50 mL/Hr) IV Continuous <Continuous>  dextrose 5%. 1000 milliLiter(s) (100 mL/Hr) IV Continuous <Continuous>  dextrose 50% Injectable 25 Gram(s) IV Push once  dextrose 50% Injectable 12.5 Gram(s) IV Push once  dextrose 50% Injectable 25 Gram(s) IV Push once  gabapentin 100 milliGRAM(s) Oral three times a day  glucagon  Injectable 1 milliGRAM(s) IntraMuscular once  insulin lispro (ADMELOG) corrective regimen sliding scale   SubCutaneous three times a day before meals  insulin lispro (ADMELOG) corrective regimen sliding scale   SubCutaneous at bedtime  levothyroxine 25 MICROGram(s) Oral daily  metoprolol succinate  milliGRAM(s) Oral daily  rivaroxaban 20 milliGRAM(s) Oral with dinner      TELEMETRY: 	    ECG:  	  RADIOLOGY:  OTHER: 	  	  LABS:	 	    CARDIAC MARKERS:                                11.6   7.97  )-----------( 198      ( 06 May 2021 11:12 )             37.8     05-06    140  |  106  |  21  ----------------------------<  182<H>  4.0   |  23  |  0.75    Ca    9.3      06 May 2021 11:12    TPro  7.2  /  Alb  3.8  /  TBili  0.4  /  DBili  x   /  AST  22  /  ALT  14  /  AlkPhos  120  05-06    proBNP: Serum Pro-Brain Natriuretic Peptide: 432 pg/mL (05-06 @ 11:12)    Lipid Profile:   HgA1c:   TSH:   < from: 12 Lead ECG (05.04.18 @ 13:52) >  Diagnosis Line ATRIAL FIBRILLATION WITH PREMATURE VENTRICULAR OR ABERRANTLY CONDUCTED COMPLEXES  NONSPECIFICT WAVE ABNORMALITY , PROBABLY DIGITALIS EFFECT  ABNORMAL ECG    < from: Transthoracic Echocardiogram (05.09.18 @ 06:07) >  1. Concentric left ventricular hypertrophy. Increased  relative wall thickness with normal left ventricular mass  index, consistent with concentric left ventricular  remodeling.  2. Hyperdynamic left ventricular systolic function.  3. The right ventricle is not well visualized; grossly  normal right ventricular systolic function.  4. Estimated pulmonary artery systolic pressure equals 33  mm Hg, assuming right atrial pressure equals 8  mm Hg,  consistent with normal pulmonary pressures.    < end of copied text >          Assessment and plan  ---------------------------  75F w/ PMHx of HTN, HLD, Dm2, afib on xarelto, nephrolithiasis, chronic LE edema p/w worsening erythema and tenderness of R>L lower extremity. Notes she first had increased redness starting Monday/Tuesday. No fevers, chills. Some pain in the area. No nausea/vomiting/diarrhea. No trauma or recent wounds. She saw her PMD who prescribed augmentin. When erythema did not improve, patient was referred to ER for IV abx. Degree of edema has not changed recently. She has baseline TODD, can walk 0.5 block or 1 flight of stairs slowly. Ambulates w/ cane.   In the ER, afebrile and well appearing. Noted to have bilateral LE edema w/ associated erythema and warmth. Given CTX.  pt with hx of hfpef, chronic a.fib with le edema and cellulitis  continue all cardiac meds  continue beta blocker, fu hr  continue ac  doubt dvt she is on chronic ac  abx  echo  lipid panel  will add ace or ARB in view of ADM if increase alfredo  awaiting echo    	                    CARDIOLOGY     PROGRESS  NOTE   ________________________________________________    CHIEF COMPLAINT:Patient is a 75y old  Female who presents with a chief complaint of cellulitis (06 May 2021 21:04)  no complain.  	  REVIEW OF SYSTEMS:  CONSTITUTIONAL: No fever, weight loss, or fatigue  EYES: No eye pain, visual disturbances, or discharge  ENT:  No difficulty hearing, tinnitus, vertigo; No sinus or throat pain  NECK: No pain or stiffness  RESPIRATORY: No cough, wheezing, chills or hemoptysis; No Shortness of Breath  CARDIOVASCULAR: No chest pain, palpitations, passing out, dizziness, or leg swelling  GASTROINTESTINAL: No abdominal or epigastric pain. No nausea, vomiting, or hematemesis; No diarrhea or constipation. No melena or hematochezia.  GENITOURINARY: No dysuria, frequency, hematuria, or incontinence  NEUROLOGICAL: No headaches, memory loss, loss of strength, numbness, or tremors  SKIN: No itching, burning, rashes, or lesions   LYMPH Nodes: No enlarged glands  ENDOCRINE: No heat or cold intolerance; No hair loss  MUSCULOSKELETAL: No joint pain or swelling; No muscle, back, or extremity pain  PSYCHIATRIC: No depression, anxiety, mood swings, or difficulty sleeping  HEME/LYMPH: No easy bruising, or bleeding gums  ALLERGY AND IMMUNOLOGIC: No hives or eczema	    [ ] All others negative	  [ ] Unable to obtain    PHYSICAL EXAM:  T(C): 36.8 (05-07-21 @ 04:59), Max: 37.1 (05-07-21 @ 02:50)  HR: 72 (05-07-21 @ 04:59) (63 - 81)  BP: 161/72 (05-07-21 @ 04:59) (135/65 - 183/79)  RR: 18 (05-07-21 @ 04:59) (18 - 22)  SpO2: 98% (05-07-21 @ 04:59) (96% - 100%)  Wt(kg): --  I&O's Summary    06 May 2021 07:01  -  07 May 2021 07:00  --------------------------------------------------------  IN: 240 mL / OUT: 500 mL / NET: -260 mL        Appearance: Normal	  HEENT:   Normal oral mucosa, PERRL, EOMI	  Lymphatic: No lymphadenopathy  Cardiovascular: Normal S1 S2, No JVD, +murmurs, No edema  Respiratory: Lungs clear to auscultation	  Psychiatry: A & O x 3, Mood & affect appropriate  Gastrointestinal:  Soft, Non-tender, + BS	  Skin: No rashes, No ecchymoses, No cyanosis	  Neurologic: Non-focal  Extremities: Normal range of motion, + cellulitis  Vascular: Peripheral pulses palpable 2+ bilaterally    MEDICATIONS  (STANDING):  atorvastatin 20 milliGRAM(s) Oral at bedtime  buMETAnide 1 milliGRAM(s) Oral daily  ceFAZolin   IVPB 1000 milliGRAM(s) IV Intermittent every 8 hours  cholecalciferol 2000 Unit(s) Oral daily  dextrose 40% Gel 15 Gram(s) Oral once  dextrose 5%. 1000 milliLiter(s) (50 mL/Hr) IV Continuous <Continuous>  dextrose 5%. 1000 milliLiter(s) (100 mL/Hr) IV Continuous <Continuous>  dextrose 50% Injectable 25 Gram(s) IV Push once  dextrose 50% Injectable 12.5 Gram(s) IV Push once  dextrose 50% Injectable 25 Gram(s) IV Push once  gabapentin 100 milliGRAM(s) Oral three times a day  glucagon  Injectable 1 milliGRAM(s) IntraMuscular once  insulin lispro (ADMELOG) corrective regimen sliding scale   SubCutaneous three times a day before meals  insulin lispro (ADMELOG) corrective regimen sliding scale   SubCutaneous at bedtime  levothyroxine 25 MICROGram(s) Oral daily  metoprolol succinate  milliGRAM(s) Oral daily  rivaroxaban 20 milliGRAM(s) Oral with dinner      TELEMETRY: 	    ECG:  	  RADIOLOGY:  OTHER: 	  	  LABS:	 	    CARDIAC MARKERS:                                11.6   7.97  )-----------( 198      ( 06 May 2021 11:12 )             37.8     05-06    140  |  106  |  21  ----------------------------<  182<H>  4.0   |  23  |  0.75    Ca    9.3      06 May 2021 11:12    TPro  7.2  /  Alb  3.8  /  TBili  0.4  /  DBili  x   /  AST  22  /  ALT  14  /  AlkPhos  120  05-06    proBNP: Serum Pro-Brain Natriuretic Peptide: 432 pg/mL (05-06 @ 11:12)    Lipid Profile:   HgA1c:   TSH:   < from: 12 Lead ECG (05.04.18 @ 13:52) >  Diagnosis Line ATRIAL FIBRILLATION WITH PREMATURE VENTRICULAR OR ABERRANTLY CONDUCTED COMPLEXES  NONSPECIFICT WAVE ABNORMALITY , PROBABLY DIGITALIS EFFECT  ABNORMAL ECG    < from: Transthoracic Echocardiogram (05.09.18 @ 06:07) >  1. Concentric left ventricular hypertrophy. Increased  relative wall thickness with normal left ventricular mass  index, consistent with concentric left ventricular  remodeling.  2. Hyperdynamic left ventricular systolic function.  3. The right ventricle is not well visualized; grossly  normal right ventricular systolic function.  4. Estimated pulmonary artery systolic pressure equals 33  mm Hg, assuming right atrial pressure equals 8  mm Hg,  consistent with normal pulmonary pressures.    < end of copied text >          Assessment and plan  ---------------------------  75F w/ PMHx of HTN, HLD, Dm2, afib on xarelto, nephrolithiasis, chronic LE edema p/w worsening erythema and tenderness of R>L lower extremity. Notes she first had increased redness starting Monday/Tuesday. No fevers, chills. Some pain in the area. No nausea/vomiting/diarrhea. No trauma or recent wounds. She saw her PMD who prescribed augmentin. When erythema did not improve, patient was referred to ER for IV abx. Degree of edema has not changed recently. She has baseline TODD, can walk 0.5 block or 1 flight of stairs slowly. Ambulates w/ cane.   In the ER, afebrile and well appearing. Noted to have bilateral LE edema w/ associated erythema and warmth. Given CTX.  pt with hx of hfpef, chronic a.fib with le edema and cellulitis  continue all cardiac meds  continue beta blocker, fu hr  continue ac  doubt dvt she is on chronic ac  abx  echo  lipid panel  will add ace or ARB in view of DM if increase bp  awaiting echo

## 2021-05-07 NOTE — PROGRESS NOTE ADULT - PROBLEM SELECTOR PLAN 1
-received CTX in ER  -start ancef in AM 1g tid  -check LE dopplers to r.o dvt (low suspicion as on ac)  -check BNP, attempt to obtain records from outpt echo to r/o CHF as contributor for edema  -c/w home bumex dose  -monitor for fevers, improvement CXR clear. Start Ancef in AM 1g tid, responding today. Dopplers negative. TTE pending, change Bumex to   1 mg IVP daily.    SMA-7 in AM.

## 2021-05-07 NOTE — PROGRESS NOTE ADULT - SUBJECTIVE AND OBJECTIVE BOX
INTERVAL HPI/OVERNIGHT EVENTS:  Pt seen and examined at bedside.     Allergies/Intolerance: No Known Allergies      MEDICATIONS  (STANDING):  atorvastatin 20 milliGRAM(s) Oral at bedtime  buMETAnide 1 milliGRAM(s) Oral daily  ceFAZolin   IVPB 1000 milliGRAM(s) IV Intermittent every 8 hours  cholecalciferol 2000 Unit(s) Oral daily  dextrose 40% Gel 15 Gram(s) Oral once  dextrose 5%. 1000 milliLiter(s) (50 mL/Hr) IV Continuous <Continuous>  dextrose 5%. 1000 milliLiter(s) (100 mL/Hr) IV Continuous <Continuous>  dextrose 50% Injectable 25 Gram(s) IV Push once  dextrose 50% Injectable 12.5 Gram(s) IV Push once  dextrose 50% Injectable 25 Gram(s) IV Push once  gabapentin 100 milliGRAM(s) Oral three times a day  glucagon  Injectable 1 milliGRAM(s) IntraMuscular once  insulin lispro (ADMELOG) corrective regimen sliding scale   SubCutaneous three times a day before meals  insulin lispro (ADMELOG) corrective regimen sliding scale   SubCutaneous at bedtime  levothyroxine 25 MICROGram(s) Oral daily  losartan 25 milliGRAM(s) Oral daily  metoprolol succinate  milliGRAM(s) Oral daily  rivaroxaban 20 milliGRAM(s) Oral with dinner    MEDICATIONS  (PRN):        ROS: all systems reviewed and wnl      PHYSICAL EXAMINATION:  Vital Signs Last 24 Hrs  T(C): 36.8 (07 May 2021 04:59), Max: 37.1 (07 May 2021 02:50)  T(F): 98.3 (07 May 2021 04:59), Max: 98.7 (07 May 2021 02:50)  HR: 72 (07 May 2021 04:59) (63 - 74)  BP: 161/72 (07 May 2021 04:59) (135/65 - 161/72)  BP(mean): --  RR: 18 (07 May 2021 04:59) (18 - 20)  SpO2: 98% (07 May 2021 04:59) (97% - 100%)  CAPILLARY BLOOD GLUCOSE      POCT Blood Glucose.: 139 mg/dL (07 May 2021 08:40)  POCT Blood Glucose.: 119 mg/dL (06 May 2021 22:27)  POCT Blood Glucose.: 115 mg/dL (06 May 2021 20:54)      05-06 @ 07:01  -  05-07 @ 07:00  --------------------------------------------------------  IN: 240 mL / OUT: 500 mL / NET: -260 mL        GENERAL:   NECK: supple, No JVD  CHEST/LUNG: clear to auscultation bilaterally; no rales, rhonchi, or wheezing b/l  HEART: normal S1, S2  ABDOMEN: BS+, soft, ND, NT   EXTREMITIES:  pulses palpable; no clubbing, cyanosis, or edema b/l LEs  SKIN: no rashes or lesions      LABS:                        11.6   7.97  )-----------( 198      ( 06 May 2021 11:12 )             37.8     05-06    140  |  106  |  21  ----------------------------<  182<H>  4.0   |  23  |  0.75    Ca    9.3      06 May 2021 11:12    TPro  7.2  /  Alb  3.8  /  TBili  0.4  /  DBili  x   /  AST  22  /  ALT  14  /  AlkPhos  120  05-06               INTERVAL HPI/OVERNIGHT EVENTS:  Pt seen and examined at bedside.     Allergies/Intolerance: No Known Allergies      MEDICATIONS  (STANDING):  atorvastatin 20 milliGRAM(s) Oral at bedtime  buMETAnide 1 milliGRAM(s) Oral daily  ceFAZolin   IVPB 1000 milliGRAM(s) IV Intermittent every 8 hours  cholecalciferol 2000 Unit(s) Oral daily  dextrose 40% Gel 15 Gram(s) Oral once  dextrose 5%. 1000 milliLiter(s) (50 mL/Hr) IV Continuous <Continuous>  dextrose 5%. 1000 milliLiter(s) (100 mL/Hr) IV Continuous <Continuous>  dextrose 50% Injectable 25 Gram(s) IV Push once  dextrose 50% Injectable 12.5 Gram(s) IV Push once  dextrose 50% Injectable 25 Gram(s) IV Push once  gabapentin 100 milliGRAM(s) Oral three times a day  glucagon  Injectable 1 milliGRAM(s) IntraMuscular once  insulin lispro (ADMELOG) corrective regimen sliding scale   SubCutaneous three times a day before meals  insulin lispro (ADMELOG) corrective regimen sliding scale   SubCutaneous at bedtime  levothyroxine 25 MICROGram(s) Oral daily  losartan 25 milliGRAM(s) Oral daily  metoprolol succinate  milliGRAM(s) Oral daily  rivaroxaban 20 milliGRAM(s) Oral with dinner    MEDICATIONS  (PRN):        ROS: all systems reviewed and wnl      PHYSICAL EXAMINATION:  Vital Signs Last 24 Hrs  T(C): 36.8 (07 May 2021 04:59), Max: 37.1 (07 May 2021 02:50)  T(F): 98.3 (07 May 2021 04:59), Max: 98.7 (07 May 2021 02:50)  HR: 72 (07 May 2021 04:59) (63 - 74)  BP: 161/72 (07 May 2021 04:59) (135/65 - 161/72)  BP(mean): --  RR: 18 (07 May 2021 04:59) (18 - 20)  SpO2: 98% (07 May 2021 04:59) (97% - 100%)  CAPILLARY BLOOD GLUCOSE      POCT Blood Glucose.: 139 mg/dL (07 May 2021 08:40)  POCT Blood Glucose.: 119 mg/dL (06 May 2021 22:27)  POCT Blood Glucose.: 115 mg/dL (06 May 2021 20:54)      05-06 @ 07:01  -  05-07 @ 07:00  --------------------------------------------------------  IN: 240 mL / OUT: 500 mL / NET: -260 mL        GENERAL: stable, in chair, no SOB or CP  NECK: supple, No JVD  CHEST/LUNG: clear to auscultation bilaterally; no rales, rhonchi, or wheezing b/l  HEART: normal S1, S2  ABDOMEN: BS+, soft, ND, NT   EXTREMITIES:  pulses palpable; no clubbing, cyanosis, mild edema b/l LEs and cellulitis  SKIN: no rashes or lesions      LABS:                        11.6   7.97  )-----------( 198      ( 06 May 2021 11:12 )             37.8     05-06    140  |  106  |  21  ----------------------------<  182<H>  4.0   |  23  |  0.75    Ca    9.3      06 May 2021 11:12    TPro  7.2  /  Alb  3.8  /  TBili  0.4  /  DBili  x   /  AST  22  /  ALT  14  /  AlkPhos  120  05-06

## 2021-05-07 NOTE — PROGRESS NOTE ADULT - PROBLEM/PLAN-1
CC: Abdominal pain    HPI:  86F.  c/o abdominal pain.  left flank which wraps arround to right side beneath diaphragm.   onset 1 day ago.  no provocative or palliative factors.  no a/w diet.    ED given MS + Zosyn + NS.  US (+) gallstone and sludge.  CT AB/pelvis (+) gallstone and sludge in gallbladder neck.  (+) inflammation.  lactate 2.6.      5/27 - Events noted. remains on pressors for BP support.    PMHx:  type 2DM;  HTN;  hyperlipidemia.    PSHx:  hip sx.    ALL:  PCN-swelling of the lips.    Rx:  reviewed.    SocHx:  lives w/ son in the community.  able to ambulate w/ cane.  no tobacco.  no EtOH.    FHx:  NC. (22 May 2017 10:30)      PAST MEDICAL & SURGICAL HISTORY:  Hyperlipidemia  Diabetes: DM II  Hypertension      FAMILY HISTORY:      Social Hx:    Allergies    penicillins (Unknown)    Intolerances        86y        ICU Vital Signs Last 24 Hrs  T(C): 37.3, Max: 37.6 (05-26 @ 17:00)  T(F): 99.1, Max: 99.6 (05-26 @ 17:00)  HR: 88 (77 - 112)  BP: 83/62 (76/40 - 146/92)  BP(mean): 67 (43 - 101)  ABP: --  ABP(mean): --  RR: 27 (13 - 28)  SpO2: 100% (96% - 100%)          I&O's Summary  I & Os for 24h ending 26 May 2017 07:00  =============================================  IN: 3972 ml / OUT: 195 ml / NET: 3777 ml    I & Os for current day (as of 27 May 2017 06:52)  =============================================  IN: 4607.5 ml / OUT: 865 ml / NET: 3742.5 ml                            11.1   18.8  )-----------( 212      ( 26 May 2017 04:48 )             32.6       05-26    134<L>  |  109<H>  |  23  ----------------------------<  174<H>  4.3   |  18<L>  |  2.25<H>    Ca    6.4<LL>      26 May 2017 04:48    TPro  6.3  /  Alb  2.3<L>  /  TBili  1.1  /  DBili  x   /  AST  67<H>  /  ALT  26  /  AlkPhos  79  05-26      CAPILLARY BLOOD GLUCOSE  103 (26 May 2017 21:54)  127 (26 May 2017 16:41)  140 (26 May 2017 11:50)  131 (26 May 2017 08:54)      LIVER FUNCTIONS - ( 26 May 2017 04:48 )  Alb: 2.3 g/dL / Pro: 6.3 gm/dL / ALK PHOS: 79 U/L / ALT: 26 U/L / AST: 67 U/L / GGT: x             CARDIAC MARKERS ( 25 May 2017 21:16 )  <0.015 ng/mL / x     / 150 U/L / x     / x            PT/INR - ( 25 May 2017 13:44 )   PT: 19.8 sec;   INR: 1.81 ratio         PTT - ( 25 May 2017 13:44 )  PTT:39.8 sec            MEDICATIONS  (STANDING):  docusate sodium 100milliGRAM(s) Oral three times a day  insulin lispro (HumaLOG) corrective regimen sliding scale  SubCutaneous three times a day before meals  insulin lispro (HumaLOG) corrective regimen sliding scale  SubCutaneous at bedtime  dextrose 5%. 1000milliLiter(s) IV Continuous <Continuous>  dextrose 50% Injectable 12.5Gram(s) IV Push once  dextrose 50% Injectable 25Gram(s) IV Push once  dextrose 50% Injectable 25Gram(s) IV Push once  losartan 25milliGRAM(s) Oral daily  atorvastatin 10milliGRAM(s) Oral at bedtime  metoprolol succinate ER 50milliGRAM(s) Oral daily  polyethylene glycol 3350 17Gram(s) Oral daily  pantoprazole    Tablet 40milliGRAM(s) Oral before breakfast  multivitamin 1Tablet(s) Oral daily  heparin  Injectable 5000Unit(s) SubCutaneous every 8 hours  phenylephrine    Infusion 0.5MICROgram(s)/kG/Min IV Continuous <Continuous>  sodium chloride 0.9%. 1000milliLiter(s) IV Continuous <Continuous>  meropenem IVPB  IV Intermittent   meropenem IVPB 500milliGRAM(s) IV Intermittent every 8 hours    MEDICATIONS  (PRN):  acetaminophen   Tablet 650milliGRAM(s) Oral every 6 hours PRN For Temp greater than 38.5 C (101.3 F)  acetaminophen   Tablet. 650milliGRAM(s) Oral every 6 hours PRN Mild Pain (1 - 3)  dextrose Gel 1Dose(s) Oral once PRN Blood Glucose LESS THAN 70 milliGRAM(s)/deciliter  glucagon  Injectable 1milliGRAM(s) IntraMuscular once PRN Glucose LESS THAN 70 milligrams/deciliter  HYDROmorphone  Injectable 1milliGRAM(s) IV Push every 4 hours PRN Severe Pain  oxyCODONE IR 5milliGRAM(s) Oral every 6 hours PRN Moderate Pain          DVT Prophylaxis: Hep SQ    Advanced Directives:  Discussed with:    Visit Information:    30-min CC time is exclusive of billed procedures and/or teaching and/or routine family updates. DISPLAY PLAN FREE TEXT

## 2021-05-07 NOTE — PROGRESS NOTE ADULT - PROBLEM SELECTOR PLAN 2
-c/w xarelto and metoprolol  -would hold ASA-per pt no hx of MI or PCI. Would clarify indication w/ PMD and favor dc on discharge as pt will be at risk for bleeding on AC + ASA -Continue Xarelto 20 mg/day and metoprolol 200 mg/day. Agree hold ASA.

## 2021-05-07 NOTE — PATIENT PROFILE ADULT - NSPROHMDIABETMGMTSTRAT_GEN_A_NUR
activity/adequate rest/diet modification/exercise/medication therapy/routine screenings/weight management

## 2021-05-08 LAB
ANION GAP SERPL CALC-SCNC: 17 MMOL/L — SIGNIFICANT CHANGE UP (ref 5–17)
BUN SERPL-MCNC: 19 MG/DL — SIGNIFICANT CHANGE UP (ref 7–23)
CALCIUM SERPL-MCNC: 9.4 MG/DL — SIGNIFICANT CHANGE UP (ref 8.4–10.5)
CHLORIDE SERPL-SCNC: 103 MMOL/L — SIGNIFICANT CHANGE UP (ref 96–108)
CO2 SERPL-SCNC: 22 MMOL/L — SIGNIFICANT CHANGE UP (ref 22–31)
CREAT SERPL-MCNC: 0.75 MG/DL — SIGNIFICANT CHANGE UP (ref 0.5–1.3)
GLUCOSE BLDC GLUCOMTR-MCNC: 125 MG/DL — HIGH (ref 70–99)
GLUCOSE BLDC GLUCOMTR-MCNC: 143 MG/DL — HIGH (ref 70–99)
GLUCOSE BLDC GLUCOMTR-MCNC: 149 MG/DL — HIGH (ref 70–99)
GLUCOSE BLDC GLUCOMTR-MCNC: 150 MG/DL — HIGH (ref 70–99)
GLUCOSE SERPL-MCNC: 134 MG/DL — HIGH (ref 70–99)
POTASSIUM SERPL-MCNC: 3.6 MMOL/L — SIGNIFICANT CHANGE UP (ref 3.5–5.3)
POTASSIUM SERPL-SCNC: 3.6 MMOL/L — SIGNIFICANT CHANGE UP (ref 3.5–5.3)
SODIUM SERPL-SCNC: 142 MMOL/L — SIGNIFICANT CHANGE UP (ref 135–145)

## 2021-05-08 RX ORDER — ACETAMINOPHEN 500 MG
325 TABLET ORAL ONCE
Refills: 0 | Status: COMPLETED | OUTPATIENT
Start: 2021-05-08 | End: 2021-05-09

## 2021-05-08 RX ORDER — OXYCODONE AND ACETAMINOPHEN 5; 325 MG/1; MG/1
1 TABLET ORAL ONCE
Refills: 0 | Status: DISCONTINUED | OUTPATIENT
Start: 2021-05-08 | End: 2021-05-08

## 2021-05-08 RX ADMIN — Medication 200 MILLIGRAM(S): at 05:12

## 2021-05-08 RX ADMIN — LOSARTAN POTASSIUM 100 MILLIGRAM(S): 100 TABLET, FILM COATED ORAL at 05:14

## 2021-05-08 RX ADMIN — Medication 25 MICROGRAM(S): at 05:11

## 2021-05-08 RX ADMIN — TRAMADOL HYDROCHLORIDE 25 MILLIGRAM(S): 50 TABLET ORAL at 05:12

## 2021-05-08 RX ADMIN — Medication 100 MILLIGRAM(S): at 05:11

## 2021-05-08 RX ADMIN — TRAMADOL HYDROCHLORIDE 25 MILLIGRAM(S): 50 TABLET ORAL at 06:12

## 2021-05-08 RX ADMIN — GABAPENTIN 100 MILLIGRAM(S): 400 CAPSULE ORAL at 13:02

## 2021-05-08 RX ADMIN — RIVAROXABAN 20 MILLIGRAM(S): KIT at 16:50

## 2021-05-08 RX ADMIN — Medication 2000 UNIT(S): at 13:02

## 2021-05-08 RX ADMIN — BUMETANIDE 1 MILLIGRAM(S): 0.25 INJECTION INTRAMUSCULAR; INTRAVENOUS at 05:12

## 2021-05-08 RX ADMIN — GABAPENTIN 100 MILLIGRAM(S): 400 CAPSULE ORAL at 21:27

## 2021-05-08 RX ADMIN — Medication 100 MILLIGRAM(S): at 21:27

## 2021-05-08 RX ADMIN — GABAPENTIN 100 MILLIGRAM(S): 400 CAPSULE ORAL at 05:12

## 2021-05-08 RX ADMIN — Medication 1 APPLICATION(S): at 13:00

## 2021-05-08 RX ADMIN — TRAMADOL HYDROCHLORIDE 25 MILLIGRAM(S): 50 TABLET ORAL at 17:38

## 2021-05-08 RX ADMIN — OXYCODONE AND ACETAMINOPHEN 1 TABLET(S): 5; 325 TABLET ORAL at 21:27

## 2021-05-08 RX ADMIN — TRAMADOL HYDROCHLORIDE 25 MILLIGRAM(S): 50 TABLET ORAL at 00:07

## 2021-05-08 RX ADMIN — TRAMADOL HYDROCHLORIDE 25 MILLIGRAM(S): 50 TABLET ORAL at 16:49

## 2021-05-08 RX ADMIN — OXYCODONE AND ACETAMINOPHEN 1 TABLET(S): 5; 325 TABLET ORAL at 22:08

## 2021-05-08 RX ADMIN — ATORVASTATIN CALCIUM 20 MILLIGRAM(S): 80 TABLET, FILM COATED ORAL at 21:27

## 2021-05-08 RX ADMIN — Medication 100 MILLIGRAM(S): at 13:01

## 2021-05-08 NOTE — PROGRESS NOTE ADULT - ASSESSMENT
75F w/ PMHx of HTN, HLD, Dm2, afib on xarelto, nephrolithiasis, chronic LE edema p/w worsening erythema and tenderness of R>L lower extremity. She is well appearing and without systemic symptoms, however given the degree of erythema and warmth, particularly on the RLE will treat for cellulitis. Will also obtain BNP, dopplers to r/o DVT. Per pt had recent outpt echo, would try to obtain.
75F w/ PMHx of HTN, HLD, Dm2, afib on xarelto, nephrolithiasis, chronic LE edema p/w worsening erythema and tenderness of R>L lower extremity. She is well appearing and without systemic symptoms, however given the degree of erythema and warmth, particularly on the RLE will treat for cellulitis. Will also obtain BNP, dopplers to r/o DVT. Per pt had recent outpt echo, would try to obtain.

## 2021-05-08 NOTE — PROGRESS NOTE ADULT - SUBJECTIVE AND OBJECTIVE BOX
CARDIOLOGY     PROGRESS  NOTE   ________________________________________________    CHIEF COMPLAINT:Patient is a 75y old  Female who presents with a chief complaint of cellulitis (08 May 2021 11:38)  doing better  can not lay flat sec to sob.  	  REVIEW OF SYSTEMS:  CONSTITUTIONAL: No fever, weight loss, or fatigue  EYES: No eye pain, visual disturbances, or discharge  ENT:  No difficulty hearing, tinnitus, vertigo; No sinus or throat pain  NECK: No pain or stiffness  RESPIRATORY: No cough, wheezing, chills or hemoptysis; + Shortness of Breath  CARDIOVASCULAR: No chest pain, palpitations, passing out, dizziness, or leg swelling  GASTROINTESTINAL: No abdominal or epigastric pain. No nausea, vomiting, or hematemesis; No diarrhea or constipation. No melena or hematochezia.  GENITOURINARY: No dysuria, frequency, hematuria, or incontinence  NEUROLOGICAL: No headaches, memory loss, loss of strength, numbness, or tremors  SKIN: No itching, burning, rashes, or lesions   LYMPH Nodes: No enlarged glands  ENDOCRINE: No heat or cold intolerance; No hair loss  MUSCULOSKELETAL: No joint pain or swelling; No muscle, back, or extremity pain  PSYCHIATRIC: No depression, anxiety, mood swings, or difficulty sleeping  HEME/LYMPH: No easy bruising, or bleeding gums  ALLERGY AND IMMUNOLOGIC: No hives or eczema	    [ ] All others negative	  [ ] Unable to obtain    PHYSICAL EXAM:  T(C): 36.5 (05-08-21 @ 05:14), Max: 37 (05-07-21 @ 21:08)  HR: 64 (05-08-21 @ 05:14) (64 - 71)  BP: 146/77 (05-08-21 @ 05:14) (139/73 - 150/83)  RR: 18 (05-08-21 @ 05:14) (18 - 18)  SpO2: 98% (05-08-21 @ 05:14) (94% - 98%)  Wt(kg): --  I&O's Summary    07 May 2021 07:01  -  08 May 2021 07:00  --------------------------------------------------------  IN: 720 mL / OUT: 1150 mL / NET: -430 mL        Appearance: Normal	  HEENT:   Normal oral mucosa, PERRL, EOMI	  Lymphatic: No lymphadenopathy  Cardiovascular: Normal S1 S2, No JVD, + murmurs, No edema  Respiratory: Lungs clear to auscultation	  Psychiatry: A & O x 3, Mood & affect appropriate  Gastrointestinal:  Soft, Non-tender, + BS	  Skin: No rashes, No ecchymoses, No cyanosis	  Neurologic: Non-focal  Extremities: Normal range of motion,+ cellulitis  Vascular: Peripheral pulses palpable 2+ bilaterally    MEDICATIONS  (STANDING):  AQUAPHOR (petrolatum Ointment) 1 Application(s) Topical daily  atorvastatin 20 milliGRAM(s) Oral at bedtime  buMETAnide Injectable 1 milliGRAM(s) IV Push daily  ceFAZolin   IVPB 1000 milliGRAM(s) IV Intermittent every 8 hours  cholecalciferol 2000 Unit(s) Oral daily  dextrose 40% Gel 15 Gram(s) Oral once  dextrose 5%. 1000 milliLiter(s) (50 mL/Hr) IV Continuous <Continuous>  dextrose 5%. 1000 milliLiter(s) (100 mL/Hr) IV Continuous <Continuous>  dextrose 50% Injectable 25 Gram(s) IV Push once  dextrose 50% Injectable 12.5 Gram(s) IV Push once  dextrose 50% Injectable 25 Gram(s) IV Push once  gabapentin 100 milliGRAM(s) Oral three times a day  glucagon  Injectable 1 milliGRAM(s) IntraMuscular once  insulin lispro (ADMELOG) corrective regimen sliding scale   SubCutaneous three times a day before meals  insulin lispro (ADMELOG) corrective regimen sliding scale   SubCutaneous at bedtime  levothyroxine 25 MICROGram(s) Oral daily  losartan 100 milliGRAM(s) Oral daily  metoprolol succinate  milliGRAM(s) Oral daily  rivaroxaban 20 milliGRAM(s) Oral with dinner      TELEMETRY: 	    ECG:  	  RADIOLOGY:  OTHER: 	  	  LABS:	 	    CARDIAC MARKERS:            05-08    142  |  103  |  19  ----------------------------<  134<H>  3.6   |  22  |  0.75    Ca    9.4      08 May 2021 06:52      proBNP: Serum Pro-Brain Natriuretic Peptide: 432 pg/mL (05-06 @ 11:12)    Lipid Profile: Cholesterol 124  LDL --  HDL 30      HgA1c:   TSH:     < from: Transthoracic Echocardiogram (05.09.18 @ 06:07) >  1. Concentric left ventricular hypertrophy. Increased  relative wall thickness with normal left ventricular mass  index, consistent with concentric left ventricular  remodeling.  2. Hyperdynamic left ventricular systolic function.  3. The right ventricle is not well visualized; grossly  normal right ventricular systolic function.  4. Estimated pulmonary artery systolic pressure equals 33  mm Hg, assuming right atrial pressure equals 8  mm Hg,  consistent with normal pulmonary pressures.    < from: 12 Lead ECG (05.04.18 @ 13:52) >  Diagnosis Line ATRIAL FIBRILLATION WITH PREMATURE VENTRICULAR OR ABERRANTLY CONDUCTED COMPLEXES  NONSPECIFICT WAVE ABNORMALITY , PROBABLY DIGITALIS EFFECT  ABNORMAL ECG      < from: Xray Chest 1 View AP/PA (05.06.21 @ 11:07) >  Heart size and the mediastinum cannot be accurately evaluated on this projection.    Clear lungs.  No pleural effusion or pneumothorax.  No acute bony abnormality.      IMPRESSION:  Clear lungs.    No pleural effusion.      Assessment and plan  ---------------------------  75F w/ PMHx of HTN, HLD, Dm2, afib on xarelto, nephrolithiasis, chronic LE edema p/w worsening erythema and tenderness of R>L lower extremity. Notes she first had increased redness starting Monday/Tuesday. No fevers, chills. Some pain in the area. No nausea/vomiting/diarrhea. No trauma or recent wounds. She saw her PMD who prescribed augmentin. When erythema did not improve, patient was referred to ER for IV abx. Degree of edema has not changed recently. She has baseline TODD, can walk 0.5 block or 1 flight of stairs slowly. Ambulates w/ cane.   In the ER, afebrile and well appearing. Noted to have bilateral LE edema w/ associated erythema and warmth. Given CTX.  pt with hx of hfpef, chronic a.fib with le edema and cellulitis  continue all cardiac meds  continue beta blocker, fu hr  continue ac  doubt dvt she is on chronic ac  abx  lipid panel  will add ace or ARB in view of DM if increase bp  awaiting echo  continue ac  pro bnp noted  sob >?sec to sleep apnea/ underlaying lung disease

## 2021-05-08 NOTE — PROGRESS NOTE ADULT - SUBJECTIVE AND OBJECTIVE BOX
INTERVAL HPI/OVERNIGHT EVENTS:  Pt seen and examined at bedside.     Allergies/Intolerance: No Known Allergies      MEDICATIONS  (STANDING):  AQUAPHOR (petrolatum Ointment) 1 Application(s) Topical daily  atorvastatin 20 milliGRAM(s) Oral at bedtime  buMETAnide Injectable 1 milliGRAM(s) IV Push daily  ceFAZolin   IVPB 1000 milliGRAM(s) IV Intermittent every 8 hours  cholecalciferol 2000 Unit(s) Oral daily  dextrose 40% Gel 15 Gram(s) Oral once  dextrose 5%. 1000 milliLiter(s) (50 mL/Hr) IV Continuous <Continuous>  dextrose 5%. 1000 milliLiter(s) (100 mL/Hr) IV Continuous <Continuous>  dextrose 50% Injectable 25 Gram(s) IV Push once  dextrose 50% Injectable 12.5 Gram(s) IV Push once  dextrose 50% Injectable 25 Gram(s) IV Push once  gabapentin 100 milliGRAM(s) Oral three times a day  glucagon  Injectable 1 milliGRAM(s) IntraMuscular once  insulin lispro (ADMELOG) corrective regimen sliding scale   SubCutaneous three times a day before meals  insulin lispro (ADMELOG) corrective regimen sliding scale   SubCutaneous at bedtime  levothyroxine 25 MICROGram(s) Oral daily  losartan 100 milliGRAM(s) Oral daily  metoprolol succinate  milliGRAM(s) Oral daily  rivaroxaban 20 milliGRAM(s) Oral with dinner    MEDICATIONS  (PRN):  traMADol 25 milliGRAM(s) Oral every 6 hours PRN Moderate Pain (4 - 6)        ROS: all systems reviewed and wnl      PHYSICAL EXAMINATION:  Vital Signs Last 24 Hrs  T(C): 36.5 (08 May 2021 05:14), Max: 37 (07 May 2021 21:08)  T(F): 97.7 (08 May 2021 05:14), Max: 98.6 (07 May 2021 21:08)  HR: 64 (08 May 2021 05:14) (64 - 71)  BP: 146/77 (08 May 2021 05:14) (139/73 - 150/83)  BP(mean): --  RR: 18 (08 May 2021 05:14) (18 - 18)  SpO2: 98% (08 May 2021 05:14) (94% - 98%)  CAPILLARY BLOOD GLUCOSE      POCT Blood Glucose.: 143 mg/dL (08 May 2021 08:57)  POCT Blood Glucose.: 159 mg/dL (07 May 2021 21:18)  POCT Blood Glucose.: 129 mg/dL (07 May 2021 17:53)  POCT Blood Glucose.: 145 mg/dL (07 May 2021 13:05)      05-07 @ 07:01  -  05-08 @ 07:00  --------------------------------------------------------  IN: 720 mL / OUT: 1150 mL / NET: -430 mL        GENERAL:   NECK: supple, No JVD  CHEST/LUNG: clear to auscultation bilaterally; no rales, rhonchi, or wheezing b/l  HEART: normal S1, S2  ABDOMEN: BS+, soft, ND, NT   EXTREMITIES:  pulses palpable; no clubbing, cyanosis, or edema b/l LEs  SKIN: no rashes or lesions      LABS:    05-08    142  |  103  |  19  ----------------------------<  134<H>  3.6   |  22  |  0.75    Ca    9.4      08 May 2021 06:52                 INTERVAL HPI/OVERNIGHT EVENTS:  Pt seen and examined at bedside.     Allergies/Intolerance: No Known Allergies      MEDICATIONS  (STANDING):  AQUAPHOR (petrolatum Ointment) 1 Application(s) Topical daily  atorvastatin 20 milliGRAM(s) Oral at bedtime  buMETAnide Injectable 1 milliGRAM(s) IV Push daily  ceFAZolin   IVPB 1000 milliGRAM(s) IV Intermittent every 8 hours  cholecalciferol 2000 Unit(s) Oral daily  dextrose 40% Gel 15 Gram(s) Oral once  dextrose 5%. 1000 milliLiter(s) (50 mL/Hr) IV Continuous <Continuous>  dextrose 5%. 1000 milliLiter(s) (100 mL/Hr) IV Continuous <Continuous>  dextrose 50% Injectable 25 Gram(s) IV Push once  dextrose 50% Injectable 12.5 Gram(s) IV Push once  dextrose 50% Injectable 25 Gram(s) IV Push once  gabapentin 100 milliGRAM(s) Oral three times a day  glucagon  Injectable 1 milliGRAM(s) IntraMuscular once  insulin lispro (ADMELOG) corrective regimen sliding scale   SubCutaneous three times a day before meals  insulin lispro (ADMELOG) corrective regimen sliding scale   SubCutaneous at bedtime  levothyroxine 25 MICROGram(s) Oral daily  losartan 100 milliGRAM(s) Oral daily  metoprolol succinate  milliGRAM(s) Oral daily  rivaroxaban 20 milliGRAM(s) Oral with dinner    MEDICATIONS  (PRN):  traMADol 25 milliGRAM(s) Oral every 6 hours PRN Moderate Pain (4 - 6)        ROS: all systems reviewed and wnl      PHYSICAL EXAMINATION:  Vital Signs Last 24 Hrs  T(C): 36.5 (08 May 2021 05:14), Max: 37 (07 May 2021 21:08)  T(F): 97.7 (08 May 2021 05:14), Max: 98.6 (07 May 2021 21:08)  HR: 64 (08 May 2021 05:14) (64 - 71)  BP: 146/77 (08 May 2021 05:14) (139/73 - 150/83)  BP(mean): --  RR: 18 (08 May 2021 05:14) (18 - 18)  SpO2: 98% (08 May 2021 05:14) (94% - 98%)  CAPILLARY BLOOD GLUCOSE      POCT Blood Glucose.: 143 mg/dL (08 May 2021 08:57)  POCT Blood Glucose.: 159 mg/dL (07 May 2021 21:18)  POCT Blood Glucose.: 129 mg/dL (07 May 2021 17:53)  POCT Blood Glucose.: 145 mg/dL (07 May 2021 13:05)      05-07 @ 07:01  -  05-08 @ 07:00  --------------------------------------------------------  IN: 720 mL / OUT: 1150 mL / NET: -430 mL        GENERAL: stable, less leg edema today.   NECK: supple, No JVD  CHEST/LUNG: clear to auscultation bilaterally; no rales, rhonchi, or wheezing b/l  HEART: normal S1, S2  ABDOMEN: BS+, soft, ND, NT   EXTREMITIES:  pulses palpable; no clubbing, cyanosis, less edema b/l LEs, less edema   SKIN: no rashes or lesions      LABS:    05-08    142  |  103  |  19  ----------------------------<  134<H>  3.6   |  22  |  0.75    Ca    9.4      08 May 2021 06:52

## 2021-05-08 NOTE — PROGRESS NOTE ADULT - PROBLEM SELECTOR PLAN 1
CXR clear. Start Ancef in AM 1g tid, responding today. Dopplers negative. TTE pending, change Bumex to   1 mg IVP daily.    SMA-7 in AM. CXR clear. Start Ancef in AM 1g tid, responding today. Dopplers negative. TTE done recently. Bumex to   1 mg po daily in AM, then discharge home AM Sunday.

## 2021-05-09 ENCOUNTER — TRANSCRIPTION ENCOUNTER (OUTPATIENT)
Age: 76
End: 2021-05-09

## 2021-05-09 VITALS
SYSTOLIC BLOOD PRESSURE: 152 MMHG | HEART RATE: 69 BPM | OXYGEN SATURATION: 96 % | TEMPERATURE: 99 F | DIASTOLIC BLOOD PRESSURE: 66 MMHG | RESPIRATION RATE: 18 BRPM

## 2021-05-09 LAB
GLUCOSE BLDC GLUCOMTR-MCNC: 141 MG/DL — HIGH (ref 70–99)
GLUCOSE BLDC GLUCOMTR-MCNC: 142 MG/DL — HIGH (ref 70–99)
HCT VFR BLD CALC: 40.8 % — SIGNIFICANT CHANGE UP (ref 34.5–45)
HGB BLD-MCNC: 12.9 G/DL — SIGNIFICANT CHANGE UP (ref 11.5–15.5)
MCHC RBC-ENTMCNC: 28.7 PG — SIGNIFICANT CHANGE UP (ref 27–34)
MCHC RBC-ENTMCNC: 31.6 GM/DL — LOW (ref 32–36)
MCV RBC AUTO: 90.7 FL — SIGNIFICANT CHANGE UP (ref 80–100)
NRBC # BLD: 0 /100 WBCS — SIGNIFICANT CHANGE UP (ref 0–0)
PLATELET # BLD AUTO: 248 K/UL — SIGNIFICANT CHANGE UP (ref 150–400)
RBC # BLD: 4.5 M/UL — SIGNIFICANT CHANGE UP (ref 3.8–5.2)
RBC # FLD: 15.2 % — HIGH (ref 10.3–14.5)
WBC # BLD: 7.92 K/UL — SIGNIFICANT CHANGE UP (ref 3.8–10.5)
WBC # FLD AUTO: 7.92 K/UL — SIGNIFICANT CHANGE UP (ref 3.8–10.5)

## 2021-05-09 PROCEDURE — 86769 SARS-COV-2 COVID-19 ANTIBODY: CPT

## 2021-05-09 PROCEDURE — 87635 SARS-COV-2 COVID-19 AMP PRB: CPT

## 2021-05-09 PROCEDURE — 80061 LIPID PANEL: CPT

## 2021-05-09 PROCEDURE — 80048 BASIC METABOLIC PNL TOTAL CA: CPT

## 2021-05-09 PROCEDURE — 85027 COMPLETE CBC AUTOMATED: CPT

## 2021-05-09 PROCEDURE — 83036 HEMOGLOBIN GLYCOSYLATED A1C: CPT

## 2021-05-09 PROCEDURE — 80053 COMPREHEN METABOLIC PANEL: CPT

## 2021-05-09 PROCEDURE — 99285 EMERGENCY DEPT VISIT HI MDM: CPT | Mod: 25

## 2021-05-09 PROCEDURE — 82962 GLUCOSE BLOOD TEST: CPT

## 2021-05-09 PROCEDURE — 96365 THER/PROPH/DIAG IV INF INIT: CPT

## 2021-05-09 PROCEDURE — 85025 COMPLETE CBC W/AUTO DIFF WBC: CPT

## 2021-05-09 PROCEDURE — 93970 EXTREMITY STUDY: CPT

## 2021-05-09 PROCEDURE — 71045 X-RAY EXAM CHEST 1 VIEW: CPT

## 2021-05-09 PROCEDURE — 83880 ASSAY OF NATRIURETIC PEPTIDE: CPT

## 2021-05-09 RX ORDER — LOSARTAN POTASSIUM 100 MG/1
1 TABLET, FILM COATED ORAL
Qty: 30 | Refills: 0
Start: 2021-05-09 | End: 2021-06-07

## 2021-05-09 RX ORDER — CEFUROXIME AXETIL 250 MG
1 TABLET ORAL
Qty: 10 | Refills: 0
Start: 2021-05-09 | End: 2021-05-13

## 2021-05-09 RX ORDER — ASPIRIN/CALCIUM CARB/MAGNESIUM 324 MG
1 TABLET ORAL
Qty: 0 | Refills: 0 | DISCHARGE

## 2021-05-09 RX ADMIN — Medication 1 APPLICATION(S): at 13:18

## 2021-05-09 RX ADMIN — Medication 200 MILLIGRAM(S): at 05:08

## 2021-05-09 RX ADMIN — BUMETANIDE 1 MILLIGRAM(S): 0.25 INJECTION INTRAMUSCULAR; INTRAVENOUS at 13:20

## 2021-05-09 RX ADMIN — Medication 100 MILLIGRAM(S): at 05:09

## 2021-05-09 RX ADMIN — LOSARTAN POTASSIUM 100 MILLIGRAM(S): 100 TABLET, FILM COATED ORAL at 08:32

## 2021-05-09 RX ADMIN — Medication 325 MILLIGRAM(S): at 05:09

## 2021-05-09 RX ADMIN — GABAPENTIN 100 MILLIGRAM(S): 400 CAPSULE ORAL at 13:19

## 2021-05-09 RX ADMIN — Medication 2000 UNIT(S): at 13:18

## 2021-05-09 RX ADMIN — Medication 25 MICROGRAM(S): at 05:09

## 2021-05-09 RX ADMIN — Medication 100 MILLIGRAM(S): at 13:25

## 2021-05-09 RX ADMIN — GABAPENTIN 100 MILLIGRAM(S): 400 CAPSULE ORAL at 05:08

## 2021-05-09 NOTE — DISCHARGE NOTE PROVIDER - CARE PROVIDER_API CALL
Minesh Davis  CARDIOVASCULAR DISEASE  [address], [address]  [place] [address]  Phone: [phone]  Fax: [phone]  Follow Up Time: 1 week

## 2021-05-09 NOTE — PROGRESS NOTE ADULT - SUBJECTIVE AND OBJECTIVE BOX
CARDIOLOGY     PROGRESS  NOTE   ________________________________________________    CHIEF COMPLAINT:Patient is a 75y old  Female who presents with a chief complaint of cellulitis (08 May 2021 12:57)  doing better, no complain.  	  REVIEW OF SYSTEMS:  CONSTITUTIONAL: No fever, weight loss, or fatigue  EYES: No eye pain, visual disturbances, or discharge  ENT:  No difficulty hearing, tinnitus, vertigo; No sinus or throat pain  NECK: No pain or stiffness  RESPIRATORY: No cough, wheezing, chills or hemoptysis; decrease  Shortness of Breath  CARDIOVASCULAR: No chest pain, palpitations, passing out, dizziness, or leg swelling  GASTROINTESTINAL: No abdominal or epigastric pain. No nausea, vomiting, or hematemesis; No diarrhea or constipation. No melena or hematochezia.  GENITOURINARY: No dysuria, frequency, hematuria, or incontinence  NEUROLOGICAL: No headaches, memory loss, loss of strength, numbness, or tremors  SKIN: No itching, burning, rashes, or lesions   LYMPH Nodes: No enlarged glands  ENDOCRINE: No heat or cold intolerance; No hair loss  MUSCULOSKELETAL: No joint pain or swelling; No muscle, back, or extremity pain  PSYCHIATRIC: No depression, anxiety, mood swings, or difficulty sleeping  HEME/LYMPH: No easy bruising, or bleeding gums  ALLERGY AND IMMUNOLOGIC: No hives or eczema	    [ ] All others negative	  [ ] Unable to obtain    PHYSICAL EXAM:  T(C): 36.9 (05-09-21 @ 08:30), Max: 36.9 (05-09-21 @ 08:30)  HR: 64 (05-09-21 @ 08:30) (64 - 67)  BP: 137/82 (05-09-21 @ 08:30) (137/82 - 153/76)  RR: 18 (05-09-21 @ 08:30) (17 - 18)  SpO2: 98% (05-09-21 @ 08:30) (97% - 98%)  Wt(kg): --  I&O's Summary    08 May 2021 07:01  -  09 May 2021 07:00  --------------------------------------------------------  IN: 50 mL / OUT: 0 mL / NET: 50 mL        Appearance: Normal	  HEENT:   Normal oral mucosa, PERRL, EOMI	  Lymphatic: No lymphadenopathy  Cardiovascular: Normal S1 S2, No JVD, + murmurs, decrease  edema  Respiratory: Lungs clear to auscultation	  Psychiatry: A & O x 3, Mood & affect appropriate  Gastrointestinal:  Soft, Non-tender, + BS	  Skin: No rashes, No ecchymoses, No cyanosis	  Neurologic: Non-focal  Extremities: Normal range of motion, No clubbing, cyanosis , decrease erythema and edema  Vascular: Peripheral pulses palpable 2+ bilaterally    MEDICATIONS  (STANDING):  AQUAPHOR (petrolatum Ointment) 1 Application(s) Topical daily  atorvastatin 20 milliGRAM(s) Oral at bedtime  buMETAnide Injectable 1 milliGRAM(s) IV Push daily  ceFAZolin   IVPB 1000 milliGRAM(s) IV Intermittent every 8 hours  cholecalciferol 2000 Unit(s) Oral daily  dextrose 40% Gel 15 Gram(s) Oral once  dextrose 5%. 1000 milliLiter(s) (50 mL/Hr) IV Continuous <Continuous>  dextrose 5%. 1000 milliLiter(s) (100 mL/Hr) IV Continuous <Continuous>  dextrose 50% Injectable 25 Gram(s) IV Push once  dextrose 50% Injectable 12.5 Gram(s) IV Push once  dextrose 50% Injectable 25 Gram(s) IV Push once  gabapentin 100 milliGRAM(s) Oral three times a day  glucagon  Injectable 1 milliGRAM(s) IntraMuscular once  insulin lispro (ADMELOG) corrective regimen sliding scale   SubCutaneous three times a day before meals  insulin lispro (ADMELOG) corrective regimen sliding scale   SubCutaneous at bedtime  levothyroxine 25 MICROGram(s) Oral daily  losartan 100 milliGRAM(s) Oral daily  metoprolol succinate  milliGRAM(s) Oral daily  rivaroxaban 20 milliGRAM(s) Oral with dinner      TELEMETRY: 	    ECG:  	  RADIOLOGY:  OTHER: 	  	  LABS:	 	    CARDIAC MARKERS:            05-08    142  |  103  |  19  ----------------------------<  134<H>  3.6   |  22  |  0.75    Ca    9.4      08 May 2021 06:52      proBNP: Serum Pro-Brain Natriuretic Peptide: 432 pg/mL (05-06 @ 11:12)    Lipid Profile: Cholesterol 124  LDL --  HDL 30      HgA1c:   TSH:   < from: Transthoracic Echocardiogram (05.09.18 @ 06:07) >  Mitral Valve: Mitral annular calcification, otherwise  normal mitral valve. Mild mitral regurgitation.  Mean  transmitral valve gradient equals 3 mm Hg, consistent with  mild mitral stenosis.  Aortic Valve/Aorta: Calcifiedtrileaflet aortic valve with  normal opening. Peak transaortic valve gradient equals 12  mm Hg, mean transaortic valve gradient equals 6 mm Hg,  aortic valve velocity time integral equals 25 cm, estimated  aortic valve area equals 2.4 sqcm. No aorticvalve  regurgitation seen. Peak left ventricular outflow tract  gradient equals 4 mm Hg, mean gradient is equal to 2 mm Hg,  LVOT velocity time integral equals 19 cm.  Normal aortic root (Ao: 2.9 cm at the sinuses of Valsalva).  Left Atrium: Severelydilated left atrium.  LA volume index  = 67 cc/m2.  Left Ventricle: Hyperdynamic left ventricular systolic  function. Concentric left ventricular hypertrophy.  Increased relative wall thickness with normal left  ventricular mass index, consistent with concentric left  ventricular remodeling. Normal diastolic function  Right Heart: Moderate right atrial enlargement. The right  ventricle is not well visualized; grossly normal right  ventricular systolic function. Normal tricuspid valve.  Minimal tricuspid regurgitation. Pulmonic valve not well  visualized.  Pericardium/Pleura: Normal pericardium with no pericardial  effusion.  Hemodynamic: Estimated right atrial pressure is 8 mm Hg.  Estimated right ventricular systolic pressure equals 33 mm  Hg, assuming right atrial pressure equals 8 mm Hg,  consistent with normal pulmonary pressures.  ------------------------------------------------------------------------  Conclusions:  1. Concentric left ventricular hypertrophy. Increased  relative wall thickness with normal left ventricular mass  index, consistent with concentric left ventricular  remodeling.  2. Hyperdynamic left ventricular systolic function.  3. The right ventricle is not well visualized; grossly  normal right ventricular systolic function.  4. Estimated pulmonary artery systolic pressure equals 33  mm Hg, assuming right atrial pressure equals 8  mm Hg,  consistent with normal pulmonary pressures.    < end of copied text >        Assessment and plan  ---------------------------  75F w/ PMHx of HTN, HLD, Dm2, afib on xarelto, nephrolithiasis, chronic LE edema p/w worsening erythema and tenderness of R>L lower extremity. Notes she first had increased redness starting Monday/Tuesday. No fevers, chills. Some pain in the area. No nausea/vomiting/diarrhea. No trauma or recent wounds. She saw her PMD who prescribed augmentin. When erythema did not improve, patient was referred to ER for IV abx. Degree of edema has not changed recently. She has baseline TODD, can walk 0.5 block or 1 flight of stairs slowly. Ambulates w/ cane.   In the ER, afebrile and well appearing. Noted to have bilateral LE edema w/ associated erythema and warmth. Given CTX.  pt with hx of hfpef, chronic a.fib with le edema and cellulitis  continue all cardiac meds  continue beta blocker, fu hr  continue ac  doubt dvt she is on chronic ac  abx  lipid panel  will add ace or ARB in view of DM if increase bp  continue ac  pro bnp noted  sob ?sec to sleep apnea/ underlaying lung disease  needs sleep study/ le edema ?venous insufficiency, doppler with reflux as out pt

## 2021-05-09 NOTE — DISCHARGE NOTE NURSING/CASE MANAGEMENT/SOCIAL WORK - PATIENT PORTAL LINK FT
You can access the FollowMyHealth Patient Portal offered by [facility] by registering at the following website: [email or web address]. By joining [employer]’s FollowMyHealth portal, you will also be able to view your health information using other applications (apps) compatible with our system.

## 2021-05-09 NOTE — DISCHARGE NOTE NURSING/CASE MANAGEMENT/SOCIAL WORK - NSDCFUADDAPPT_GEN_ALL_CORE_FT
Please follow up with outpatient PMD in 1 week  Dr. Machuca    Please follow up with Cardiology outpatient in 1-2 weeks call and make appointment

## 2021-05-09 NOTE — DISCHARGE NOTE PROVIDER - HOSPITAL COURSE
75F w/ PMHx of HTN, HLD, Dm2, afib on xarelto, nephrolithiasis, chronic LE edema p/w worsening erythema and tenderness of R>L lower extremity. She is well appearing and without systemic symptoms, however given the degree of erythema and warmth, particularly on the RLE will treat for cellulitis. Will also obtain BNP, dopplers to r/o DVT. Per pt had recent outpt echo, would try to obtain.     Problem/Plan - 1:  ·  Problem: Cellulitis of lower extremity, unspecified laterality.  Plan: CXR clear. Start Ancef in AM 1g tid, responding today. Dopplers negative. TTE done recently. Bumex to   1 mg po daily in AM, then discharge home AM Sunday.     Problem/Plan - 2:  ·  Problem: Paroxysmal atrial fibrillation.  Plan: -Continue Xarelto 20 mg/day and metoprolol 200 mg/day. Agree hold ASA.      Problem/Plan - 3:  ·  Problem: Essential hypertension.  Plan: -c/w metoprolol as above, increase Cozaar to 100 mg/day.      Problem/Plan - 4:  ·  Problem: Hyperlipidemia, unspecified hyperlipidemia type.  Plan: -c/w atorvastatin 20 mg/day.      Problem/Plan - 5:  ·  Problem: Controlled type 2 diabetes mellitus without complication, without long-term current use of insulin.  Plan: -hold metformin, , stable. Continue correctional scale.      Problem/Plan - 6:  Problem: Hypothyroidism, unspecified type. Plan: -c/w synthroid 25 mcg/day.  Patient medically stable for discharge home cleared by Dr. Day   Reviewed medications    75 year old female PMH HTN, HLD, DM(II), chronic afib on xarelto, chronic LE edema p/w worsening erythema and tenderness of R>L lower extremity. She is well appearing and without systemic symptoms, however given the degree of erythema and warmth, particularly on the RLE will treat for bilateral LE cellulitis. Venous  dopplers negative for DVT. Per pt had recent outpt echo, would try to obtain.     Problem/Plan - 1:  ·  Problem: Cellulitis of both lower extremity R>L. CXR clear. Start Ancef in AM 1g tid, responding daily. TTE done recently, no need to repeat. Bumex to   1 mg po daily in AM, then discharge home AM Sunday. Was placed briefly on IV Bumex to clear leg edema. Was never SOB.      Problem/Plan - 2:  ·  Problem: Paroxysmal atrial fibrillation.  Plan: -Continue Xarelto 20 mg/day and metoprolol 200 mg/day. Agree hold ASA.      Problem/Plan - 3:  ·  Problem: Essential hypertension.  Plan: -c/w metoprolol as above, increase Cozaar to 100 mg/day.      Problem/Plan - 4:  ·  Problem: Hyperlipidemia, unspecified hyperlipidemia type.  Plan: -c/w atorvastatin 20 mg/day.      Problem/Plan - 5:  ·  Problem: Controlled type 2 diabetes mellitus without complication, without long-term current use of insulin.  Plan: -hold metformin, , stable. Continue correctional scale. Resume DM meds at home upon discharge. See attached med list.       Problem/Plan - 6:  Problem: Hypothyroidism, unspecified type. Plan: -c/w synthroid 25 mcg/day.  Patient medically stable for discharge home cleared by Dr. Day   Reviewed medications    75 year old female PMH HTN, HLD, DM(II), chronic afib on xarelto, chronic LE edema p/w worsening erythema and tenderness of R>L lower extremity. She is well appearing and without systemic symptoms, however given the degree of erythema and warmth, particularly on the RLE will treat for bilateral LE cellulitis. Venous  dopplers negative for DVT. Per pt had recent outpt echo. Renal function is normal.      Problem/Plan - 1:  ·  Problem: Cellulitis of both lower extremity R>L. CXR clear. Start Ancef in AM 1g tid, responding daily. TTE done recently, no need to repeat. Bumex to   1 mg po daily in AM, then discharge home AM Sunday. Was placed briefly on IV Bumex to clear leg edema. Was never SOB.      Problem/Plan - 2:  ·  Problem: Paroxysmal atrial fibrillation.  Plan: -Continue Xarelto 20 mg/day and metoprolol 200 mg/day. Agree hold ASA.      Problem/Plan - 3:  ·  Problem: Essential hypertension.  Plan: -c/w metoprolol as above, increase Cozaar to 100 mg/day.      Problem/Plan - 4:  ·  Problem: Hyperlipidemia, unspecified hyperlipidemia type.  Plan: -c/w atorvastatin 20 mg/day.      Problem/Plan - 5:  ·  Problem: Controlled type 2 diabetes mellitus without complication, without long-term current use of insulin.  Plan: -hold metformin, , stable. Continue correctional scale. Resume DM meds at home upon discharge. See attached med list.       Problem/Plan - 6:  Problem: Hypothyroidism, unspecified type. Plan: -c/w synthroid 25 mcg/day.  Patient medically stable for discharge home cleared by Dr. Day   Reviewed medications

## 2021-05-09 NOTE — DISCHARGE NOTE PROVIDER - NSDCFUADDAPPT_GEN_ALL_CORE_FT
Please follow up with outpatient PMD in 1 week  Dr. Machuca  Please follow up with outpatient PMD in 1 week  Dr. Machuca    Please follow up with Cardiology outpatient in 1-2 weeks call and make appointment

## 2021-05-09 NOTE — DISCHARGE NOTE PROVIDER - NSDCMRMEDTOKEN_GEN_ALL_CORE_FT
atorvastatin 20 mg oral tablet: 1 tab(s) orally once a day (at bedtime)  bumetanide 1 mg oral tablet: 1 tab(s) orally once a day  cefuroxime 500 mg oral tablet: 1 tab(s) orally every 12 hours  for 5 more days   gabapentin 100 mg oral tablet: 1 tab(s) orally 3 times a day  levothyroxine 25 mcg (0.025 mg) oral tablet: 1 tab(s) orally once a day  losartan 100 mg oral tablet: 1 tab(s) orally once a day  metFORMIN 500 mg oral tablet, extended release: 1 tab(s) orally once a day  metoprolol succinate 200 mg oral tablet, extended release: 1 tab(s) orally once a day  Vitamin D3 2000 intl units oral tablet: 1 tab(s) orally once a day  Xarelto 20 mg oral tablet: 1 tab(s) orally once a day (in the evening), last dose 7/3/2018

## 2021-05-09 NOTE — DISCHARGE NOTE PROVIDER - NSDCCPCAREPLAN_GEN_ALL_CORE_FT
PRINCIPAL DISCHARGE DIAGNOSIS  Diagnosis: Cellulitis  Assessment and Plan of Treatment: Treat with Ancef Discharge on Keflex 500mg twice daily for 5 more days      SECONDARY DISCHARGE DIAGNOSES  Diagnosis: Paroxysmal atrial fibrillation  Assessment and Plan of Treatment: continue with Xarelto    Diagnosis: Essential hypertension  Assessment and Plan of Treatment: Low salt diet  Activity as tolerated.  Take all medication as prescribed.  Follow up with your medical doctor for routine blood pressure monitoring at your next visit.  Notify your doctor if you have any of the following symptoms:   Dizziness, Lightheadedness, Blurry vision, Headache, Chest pain, Shortness of breath      Diagnosis: Controlled type 2 diabetes mellitus without complication, without long-term current use of insulin  Assessment and Plan of Treatment: HgA1C this admission.  Make sure you get your HgA1c checked every three months.  If you take oral diabetes medications, check your blood glucose two times a day.  If you take insulin, check your blood glucose before meals and at bedtime.  It's important not to skip any meals.  Keep a log of your blood glucose results and always take it with you to your doctor appointments.  Keep a list of your current medications including injectables and over the counter medications and bring this medication list with you to all your doctor appointments.  If you have not seen your opthalmologist this year call for appointment.  Check your feet daily for redness, sores, or openings. Do not self treat. If no improvement in two days call your primary care physician for an appointment.  Low blood sugar (hypoglycemia) is a blood sugar below 70mg/dl. Check your blood sugar if you feel signs/symptoms of hypoglycemia. If your blood sugar is below 70 take 15 grams of carbohydrates (ex 4 oz of apple juice, 3-4 glucosr tablets, or 4-6 oz of regular soda) wait 15 minutes and repeat blood sugar to make sure it comes up above 70.  If your blood sugar is above 70 and you are due for a meal, have a meal.  If you are not due for a meal have a snack.  This snack helps keeps your blood sugar at a safe range.      Diagnosis: Hypothyroidism, unspecified type  Assessment and Plan of Treatment: you do not make enough thyroid hormone  signs & symptoms of low levels of thyroid hormone - tired, getting cold easily, coarse or thin hair, constipation, shortness of breath, swelling, irregular periods  your doctor will do thyroid hormone blood tests at least once a year to monitor if medication dose is adequate  take your thyroid medicine as directed by your doctor      Diagnosis: Hyperlipidemia, unspecified hyperlipidemia type  Assessment and Plan of Treatment: continue with Lipitor    Diagnosis: Acute on chronic systolic congestive heart failure  Assessment and Plan of Treatment: Continue with home Bumex   Weigh yourself daily.  If you gain 3lbs in 3 days, or 5lbs in a week call your Health Care Provider.  Do not eat or drink foods containing more than 2000mg of salt (sodium) in your diet every day.  Call your Health Care Provider if you have any swelling or increased swelling in your feet, ankles, and/or stomach.  Take all of your medication as directed.  If you become dizzy call your Health Care Provider.       PRINCIPAL DISCHARGE DIAGNOSIS  Diagnosis: Cellulitis  Assessment and Plan of Treatment: Treat with Ancef Discharge on Keflex 500mg twice daily for 5 more days      SECONDARY DISCHARGE DIAGNOSES  Diagnosis: Paroxysmal atrial fibrillation  Assessment and Plan of Treatment: continue with Xarelto    Diagnosis: Essential hypertension  Assessment and Plan of Treatment: Low salt diet  Activity as tolerated.  Take all medication as prescribed.  Follow up with your medical doctor for routine blood pressure monitoring at your next visit.  Notify your doctor if you have any of the following symptoms:   Dizziness, Lightheadedness, Blurry vision, Headache, Chest pain, Shortness of breath      Diagnosis: Controlled type 2 diabetes mellitus without complication, without long-term current use of insulin  Assessment and Plan of Treatment: HgA1C this admission. HBA1C 5/7 -7.4%   Make sure you get your HgA1c checked every three months.  If you take oral diabetes medications, check your blood glucose two times a day.  If you take insulin, check your blood glucose before meals and at bedtime.  It's important not to skip any meals.  Keep a log of your blood glucose results and always take it with you to your doctor appointments.  Keep a list of your current medications including injectables and over the counter medications and bring this medication list with you to all your doctor appointments.  If you have not seen your opthalmologist this year call for appointment.  Check your feet daily for redness, sores, or openings. Do not self treat. If no improvement in two days call your primary care physician for an appointment.  Low blood sugar (hypoglycemia) is a blood sugar below 70mg/dl. Check your blood sugar if you feel signs/symptoms of hypoglycemia. If your blood sugar is below 70 take 15 grams of carbohydrates (ex 4 oz of apple juice, 3-4 glucosr tablets, or 4-6 oz of regular soda) wait 15 minutes and repeat blood sugar to make sure it comes up above 70.  If your blood sugar is above 70 and you are due for a meal, have a meal.  If you are not due for a meal have a snack.  This snack helps keeps your blood sugar at a safe range.      Diagnosis: Hypothyroidism, unspecified type  Assessment and Plan of Treatment: you do not make enough thyroid hormone  signs & symptoms of low levels of thyroid hormone - tired, getting cold easily, coarse or thin hair, constipation, shortness of breath, swelling, irregular periods  your doctor will do thyroid hormone blood tests at least once a year to monitor if medication dose is adequate  take your thyroid medicine as directed by your doctor      Diagnosis: Hyperlipidemia, unspecified hyperlipidemia type  Assessment and Plan of Treatment: continue with Lipitor    Diagnosis: Acute on chronic systolic congestive heart failure  Assessment and Plan of Treatment: Continue with home Bumex   Weigh yourself daily.  If you gain 3lbs in 3 days, or 5lbs in a week call your Health Care Provider.  Do not eat or drink foods containing more than 2000mg of salt (sodium) in your diet every day.  Call your Health Care Provider if you have any swelling or increased swelling in your feet, ankles, and/or stomach.  Take all of your medication as directed.  If you become dizzy call your Health Care Provider.

## 2021-07-07 NOTE — BRIEF OPERATIVE NOTE - PROCEDURE POST
<<-----Click on this checkbox to enter Post-Op Dx
Patient with one or more new problems requiring additional work-up/treatment.

## 2022-04-06 NOTE — ED ADULT TRIAGE NOTE - MODE OF ARRIVAL
[employer] 86M hx ESRD on HD (M/F), anemia, CHFpEF, arrythmia, HTN, HLD, CAD, a-fib and LLE DVT (used to be on Flecainide and no longer on AC due to GI bleed), AS s/p TAVR, and PAD (RLE fem-pop bypass, revised LLE fem-pop bypass) presents with red, swollen painful left leg worsening for 1 week, sent in by vascular team who are planning for revision.  Otherwise denies new symptoms.

## 2022-07-25 ENCOUNTER — INPATIENT (INPATIENT)
Facility: HOSPITAL | Age: 77
LOS: 1 days | Discharge: HOME | DRG: 603 | End: 2022-07-27
Attending: HOSPITALIST | Admitting: INTERNAL MEDICINE
Payer: MEDICARE

## 2022-07-25 VITALS
HEART RATE: 71 BPM | RESPIRATION RATE: 18 BRPM | OXYGEN SATURATION: 96 % | WEIGHT: 233.91 LBS | SYSTOLIC BLOOD PRESSURE: 129 MMHG | HEIGHT: 62 IN | TEMPERATURE: 98 F | DIASTOLIC BLOOD PRESSURE: 78 MMHG

## 2022-07-25 DIAGNOSIS — N17.9 ACUTE KIDNEY FAILURE, UNSPECIFIED: ICD-10-CM

## 2022-07-25 DIAGNOSIS — I10 ESSENTIAL (PRIMARY) HYPERTENSION: ICD-10-CM

## 2022-07-25 DIAGNOSIS — Z98.890 OTHER SPECIFIED POSTPROCEDURAL STATES: Chronic | ICD-10-CM

## 2022-07-25 DIAGNOSIS — E11.9 TYPE 2 DIABETES MELLITUS WITHOUT COMPLICATIONS: ICD-10-CM

## 2022-07-25 DIAGNOSIS — N20.0 CALCULUS OF KIDNEY: Chronic | ICD-10-CM

## 2022-07-25 DIAGNOSIS — Z96.659 PRESENCE OF UNSPECIFIED ARTIFICIAL KNEE JOINT: Chronic | ICD-10-CM

## 2022-07-25 DIAGNOSIS — Z29.9 ENCOUNTER FOR PROPHYLACTIC MEASURES, UNSPECIFIED: ICD-10-CM

## 2022-07-25 DIAGNOSIS — R60.0 LOCALIZED EDEMA: ICD-10-CM

## 2022-07-25 DIAGNOSIS — L03.90 CELLULITIS, UNSPECIFIED: ICD-10-CM

## 2022-07-25 DIAGNOSIS — I48.20 CHRONIC ATRIAL FIBRILLATION, UNSPECIFIED: ICD-10-CM

## 2022-07-25 LAB
ALBUMIN SERPL ELPH-MCNC: 4.5 G/DL — SIGNIFICANT CHANGE UP (ref 3.3–5)
ALP SERPL-CCNC: 92 U/L — SIGNIFICANT CHANGE UP (ref 40–120)
ALT FLD-CCNC: 17 U/L — SIGNIFICANT CHANGE UP (ref 10–45)
ANION GAP SERPL CALC-SCNC: 14 MMOL/L — SIGNIFICANT CHANGE UP (ref 5–17)
ANION GAP SERPL CALC-SCNC: 15 MMOL/L — SIGNIFICANT CHANGE UP (ref 5–17)
APTT BLD: 34.3 SEC — SIGNIFICANT CHANGE UP (ref 27.5–35.5)
AST SERPL-CCNC: 24 U/L — SIGNIFICANT CHANGE UP (ref 10–40)
BASOPHILS # BLD AUTO: 0.02 K/UL — SIGNIFICANT CHANGE UP (ref 0–0.2)
BASOPHILS NFR BLD AUTO: 0.2 % — SIGNIFICANT CHANGE UP (ref 0–2)
BILIRUB SERPL-MCNC: 0.4 MG/DL — SIGNIFICANT CHANGE UP (ref 0.2–1.2)
BUN SERPL-MCNC: 52 MG/DL — HIGH (ref 7–23)
BUN SERPL-MCNC: 71 MG/DL — HIGH (ref 7–23)
CALCIUM SERPL-MCNC: 9.7 MG/DL — SIGNIFICANT CHANGE UP (ref 8.4–10.5)
CALCIUM SERPL-MCNC: 9.9 MG/DL — SIGNIFICANT CHANGE UP (ref 8.4–10.5)
CHLORIDE SERPL-SCNC: 101 MMOL/L — SIGNIFICANT CHANGE UP (ref 96–108)
CHLORIDE SERPL-SCNC: 105 MMOL/L — SIGNIFICANT CHANGE UP (ref 96–108)
CO2 SERPL-SCNC: 21 MMOL/L — LOW (ref 22–31)
CO2 SERPL-SCNC: 23 MMOL/L — SIGNIFICANT CHANGE UP (ref 22–31)
CREAT SERPL-MCNC: 1.29 MG/DL — SIGNIFICANT CHANGE UP (ref 0.5–1.3)
CREAT SERPL-MCNC: 1.91 MG/DL — HIGH (ref 0.5–1.3)
EGFR: 27 ML/MIN/1.73M2 — LOW
EGFR: 42 ML/MIN/1.73M2 — LOW
EOSINOPHIL # BLD AUTO: 0.21 K/UL — SIGNIFICANT CHANGE UP (ref 0–0.5)
EOSINOPHIL NFR BLD AUTO: 2.5 % — SIGNIFICANT CHANGE UP (ref 0–6)
FLUAV AG NPH QL: SIGNIFICANT CHANGE UP
FLUBV AG NPH QL: SIGNIFICANT CHANGE UP
GLUCOSE SERPL-MCNC: 118 MG/DL — HIGH (ref 70–99)
GLUCOSE SERPL-MCNC: 149 MG/DL — HIGH (ref 70–99)
HCT VFR BLD CALC: 34.9 % — SIGNIFICANT CHANGE UP (ref 34.5–45)
HGB BLD-MCNC: 11.1 G/DL — LOW (ref 11.5–15.5)
IMM GRANULOCYTES NFR BLD AUTO: 0.5 % — SIGNIFICANT CHANGE UP (ref 0–1.5)
INR BLD: 1.88 RATIO — HIGH (ref 0.88–1.16)
LYMPHOCYTES # BLD AUTO: 1.14 K/UL — SIGNIFICANT CHANGE UP (ref 1–3.3)
LYMPHOCYTES # BLD AUTO: 13.5 % — SIGNIFICANT CHANGE UP (ref 13–44)
MCHC RBC-ENTMCNC: 29.2 PG — SIGNIFICANT CHANGE UP (ref 27–34)
MCHC RBC-ENTMCNC: 31.8 GM/DL — LOW (ref 32–36)
MCV RBC AUTO: 91.8 FL — SIGNIFICANT CHANGE UP (ref 80–100)
MONOCYTES # BLD AUTO: 0.69 K/UL — SIGNIFICANT CHANGE UP (ref 0–0.9)
MONOCYTES NFR BLD AUTO: 8.2 % — SIGNIFICANT CHANGE UP (ref 2–14)
NEUTROPHILS # BLD AUTO: 6.35 K/UL — SIGNIFICANT CHANGE UP (ref 1.8–7.4)
NEUTROPHILS NFR BLD AUTO: 75.1 % — SIGNIFICANT CHANGE UP (ref 43–77)
NRBC # BLD: 0 /100 WBCS — SIGNIFICANT CHANGE UP (ref 0–0)
PLATELET # BLD AUTO: 171 K/UL — SIGNIFICANT CHANGE UP (ref 150–400)
POTASSIUM SERPL-MCNC: 4.4 MMOL/L — SIGNIFICANT CHANGE UP (ref 3.5–5.3)
POTASSIUM SERPL-MCNC: 4.6 MMOL/L — SIGNIFICANT CHANGE UP (ref 3.5–5.3)
POTASSIUM SERPL-SCNC: 4.4 MMOL/L — SIGNIFICANT CHANGE UP (ref 3.5–5.3)
POTASSIUM SERPL-SCNC: 4.6 MMOL/L — SIGNIFICANT CHANGE UP (ref 3.5–5.3)
PROT SERPL-MCNC: 8 G/DL — SIGNIFICANT CHANGE UP (ref 6–8.3)
PROTHROM AB SERPL-ACNC: 21.9 SEC — HIGH (ref 10.5–13.4)
RBC # BLD: 3.8 M/UL — SIGNIFICANT CHANGE UP (ref 3.8–5.2)
RBC # FLD: 15.2 % — HIGH (ref 10.3–14.5)
RSV RNA NPH QL NAA+NON-PROBE: SIGNIFICANT CHANGE UP
SARS-COV-2 RNA SPEC QL NAA+PROBE: SIGNIFICANT CHANGE UP
SODIUM SERPL-SCNC: 137 MMOL/L — SIGNIFICANT CHANGE UP (ref 135–145)
SODIUM SERPL-SCNC: 142 MMOL/L — SIGNIFICANT CHANGE UP (ref 135–145)
WBC # BLD: 8.45 K/UL — SIGNIFICANT CHANGE UP (ref 3.8–10.5)
WBC # FLD AUTO: 8.45 K/UL — SIGNIFICANT CHANGE UP (ref 3.8–10.5)

## 2022-07-25 PROCEDURE — 93010 ELECTROCARDIOGRAM REPORT: CPT

## 2022-07-25 PROCEDURE — 99223 1ST HOSP IP/OBS HIGH 75: CPT

## 2022-07-25 PROCEDURE — 93971 EXTREMITY STUDY: CPT | Mod: 26,LT

## 2022-07-25 PROCEDURE — 99285 EMERGENCY DEPT VISIT HI MDM: CPT

## 2022-07-25 PROCEDURE — 73610 X-RAY EXAM OF ANKLE: CPT | Mod: 26,LT

## 2022-07-25 PROCEDURE — 73590 X-RAY EXAM OF LOWER LEG: CPT | Mod: 26,LT

## 2022-07-25 RX ORDER — SODIUM CHLORIDE 9 MG/ML
1000 INJECTION, SOLUTION INTRAVENOUS
Refills: 0 | Status: DISCONTINUED | OUTPATIENT
Start: 2022-07-25 | End: 2022-07-27

## 2022-07-25 RX ORDER — LOSARTAN POTASSIUM 100 MG/1
100 TABLET, FILM COATED ORAL DAILY
Refills: 0 | Status: DISCONTINUED | OUTPATIENT
Start: 2022-07-25 | End: 2022-07-25

## 2022-07-25 RX ORDER — RIVAROXABAN 15 MG-20MG
15 KIT ORAL DAILY
Refills: 0 | Status: DISCONTINUED | OUTPATIENT
Start: 2022-07-25 | End: 2022-07-27

## 2022-07-25 RX ORDER — CHOLECALCIFEROL (VITAMIN D3) 125 MCG
1 CAPSULE ORAL
Qty: 0 | Refills: 0 | DISCHARGE

## 2022-07-25 RX ORDER — PIPERACILLIN AND TAZOBACTAM 4; .5 G/20ML; G/20ML
3.38 INJECTION, POWDER, LYOPHILIZED, FOR SOLUTION INTRAVENOUS ONCE
Refills: 0 | Status: COMPLETED | OUTPATIENT
Start: 2022-07-25 | End: 2022-07-25

## 2022-07-25 RX ORDER — METOPROLOL TARTRATE 50 MG
200 TABLET ORAL DAILY
Refills: 0 | Status: DISCONTINUED | OUTPATIENT
Start: 2022-07-25 | End: 2022-07-27

## 2022-07-25 RX ORDER — DEXTROSE 50 % IN WATER 50 %
25 SYRINGE (ML) INTRAVENOUS ONCE
Refills: 0 | Status: DISCONTINUED | OUTPATIENT
Start: 2022-07-25 | End: 2022-07-27

## 2022-07-25 RX ORDER — ACETAMINOPHEN 500 MG
650 TABLET ORAL EVERY 6 HOURS
Refills: 0 | Status: DISCONTINUED | OUTPATIENT
Start: 2022-07-25 | End: 2022-07-27

## 2022-07-25 RX ORDER — ATORVASTATIN CALCIUM 80 MG/1
20 TABLET, FILM COATED ORAL AT BEDTIME
Refills: 0 | Status: DISCONTINUED | OUTPATIENT
Start: 2022-07-25 | End: 2022-07-27

## 2022-07-25 RX ORDER — ACETAMINOPHEN 500 MG
650 TABLET ORAL ONCE
Refills: 0 | Status: COMPLETED | OUTPATIENT
Start: 2022-07-25 | End: 2022-07-25

## 2022-07-25 RX ORDER — VANCOMYCIN HCL 1 G
1000 VIAL (EA) INTRAVENOUS ONCE
Refills: 0 | Status: COMPLETED | OUTPATIENT
Start: 2022-07-25 | End: 2022-07-25

## 2022-07-25 RX ORDER — SODIUM CHLORIDE 9 MG/ML
1000 INJECTION INTRAMUSCULAR; INTRAVENOUS; SUBCUTANEOUS ONCE
Refills: 0 | Status: COMPLETED | OUTPATIENT
Start: 2022-07-25 | End: 2022-07-25

## 2022-07-25 RX ORDER — DEXTROSE 50 % IN WATER 50 %
12.5 SYRINGE (ML) INTRAVENOUS ONCE
Refills: 0 | Status: DISCONTINUED | OUTPATIENT
Start: 2022-07-25 | End: 2022-07-27

## 2022-07-25 RX ORDER — ATORVASTATIN CALCIUM 80 MG/1
1 TABLET, FILM COATED ORAL
Qty: 0 | Refills: 0 | DISCHARGE

## 2022-07-25 RX ORDER — INSULIN LISPRO 100/ML
VIAL (ML) SUBCUTANEOUS
Refills: 0 | Status: DISCONTINUED | OUTPATIENT
Start: 2022-07-25 | End: 2022-07-27

## 2022-07-25 RX ORDER — HEPARIN SODIUM 5000 [USP'U]/ML
5000 INJECTION INTRAVENOUS; SUBCUTANEOUS EVERY 12 HOURS
Refills: 0 | Status: DISCONTINUED | OUTPATIENT
Start: 2022-07-25 | End: 2022-07-25

## 2022-07-25 RX ORDER — GABAPENTIN 400 MG/1
1 CAPSULE ORAL
Qty: 0 | Refills: 0 | DISCHARGE

## 2022-07-25 RX ORDER — LANOLIN ALCOHOL/MO/W.PET/CERES
3 CREAM (GRAM) TOPICAL AT BEDTIME
Refills: 0 | Status: DISCONTINUED | OUTPATIENT
Start: 2022-07-25 | End: 2022-07-27

## 2022-07-25 RX ORDER — LEVOTHYROXINE SODIUM 125 MCG
25 TABLET ORAL DAILY
Refills: 0 | Status: DISCONTINUED | OUTPATIENT
Start: 2022-07-25 | End: 2022-07-27

## 2022-07-25 RX ORDER — LEVOTHYROXINE SODIUM 125 MCG
1 TABLET ORAL
Qty: 0 | Refills: 0 | DISCHARGE

## 2022-07-25 RX ORDER — CEFAZOLIN SODIUM 1 G
1000 VIAL (EA) INJECTION ONCE
Refills: 0 | Status: COMPLETED | OUTPATIENT
Start: 2022-07-25 | End: 2022-07-25

## 2022-07-25 RX ORDER — CEFAZOLIN SODIUM 1 G
1000 VIAL (EA) INJECTION EVERY 12 HOURS
Refills: 0 | Status: DISCONTINUED | OUTPATIENT
Start: 2022-07-26 | End: 2022-07-27

## 2022-07-25 RX ORDER — METFORMIN HYDROCHLORIDE 850 MG/1
1 TABLET ORAL
Qty: 0 | Refills: 0 | DISCHARGE

## 2022-07-25 RX ORDER — CEFAZOLIN SODIUM 1 G
VIAL (EA) INJECTION
Refills: 0 | Status: DISCONTINUED | OUTPATIENT
Start: 2022-07-25 | End: 2022-07-27

## 2022-07-25 RX ORDER — DEXTROSE 50 % IN WATER 50 %
15 SYRINGE (ML) INTRAVENOUS ONCE
Refills: 0 | Status: DISCONTINUED | OUTPATIENT
Start: 2022-07-25 | End: 2022-07-27

## 2022-07-25 RX ORDER — INSULIN LISPRO 100/ML
VIAL (ML) SUBCUTANEOUS AT BEDTIME
Refills: 0 | Status: DISCONTINUED | OUTPATIENT
Start: 2022-07-25 | End: 2022-07-27

## 2022-07-25 RX ORDER — GLUCAGON INJECTION, SOLUTION 0.5 MG/.1ML
1 INJECTION, SOLUTION SUBCUTANEOUS ONCE
Refills: 0 | Status: DISCONTINUED | OUTPATIENT
Start: 2022-07-25 | End: 2022-07-27

## 2022-07-25 RX ADMIN — Medication 250 MILLIGRAM(S): at 11:06

## 2022-07-25 RX ADMIN — PIPERACILLIN AND TAZOBACTAM 3.38 GRAM(S): 4; .5 INJECTION, POWDER, LYOPHILIZED, FOR SOLUTION INTRAVENOUS at 11:03

## 2022-07-25 RX ADMIN — ATORVASTATIN CALCIUM 20 MILLIGRAM(S): 80 TABLET, FILM COATED ORAL at 23:02

## 2022-07-25 RX ADMIN — SODIUM CHLORIDE 1000 MILLILITER(S): 9 INJECTION INTRAMUSCULAR; INTRAVENOUS; SUBCUTANEOUS at 15:16

## 2022-07-25 RX ADMIN — Medication 100 MILLIGRAM(S): at 15:16

## 2022-07-25 RX ADMIN — PIPERACILLIN AND TAZOBACTAM 200 GRAM(S): 4; .5 INJECTION, POWDER, LYOPHILIZED, FOR SOLUTION INTRAVENOUS at 10:15

## 2022-07-25 NOTE — H&P ADULT - NSICDXFAMILYHX_GEN_ALL_CORE_FT
FAMILY HISTORY:  Father  Still living? Unknown  FH: heart failure, Age at diagnosis: Age Unknown

## 2022-07-25 NOTE — ED PROVIDER NOTE - PROGRESS NOTE DETAILS
Nicki Hector- pt doing well. spoke about admission, pt agrees. spoke to hospitalist about admission.

## 2022-07-25 NOTE — ED PROVIDER NOTE - OBJECTIVE STATEMENT
78yo F w/ hx HTN, HLD, DM(II), chronic afib on xarelto, chronic LE edema presenting with leg pain. Pt recently dx'd with BLE cellulitis which she gets often. Was given abx (thinks it is augmentin), took it for 2w. There was improvement of the RLE erythema and cellulitis however the L side has not improved. The L leg has erythema, swelling circumferentially in the lower leg, with blisters that have popped and oozed serosanguinous fluid, now scabbed over. R leg has mild erythema to anterior lower shin. She has no fever, cp, sob, n/v, abd pain. No hx blood clot, pt on xarelto.

## 2022-07-25 NOTE — H&P ADULT - ASSESSMENT
77F w/ PMHx of HTN, HLD, Dm2, afib on xarelto, nephrolithiasis, chronic LE edema p/w worsening erythema and swelling L>R after completing 14 days agumentin for cellulitis. Admitted for empiric tx of cellultitis and r/o DVT, complicated with KELSEY

## 2022-07-25 NOTE — H&P ADULT - NSHPPHYSICALEXAM_GEN_ALL_CORE
Vital Signs Last 24 Hrs  T(C): 36.7 (25 Jul 2022 08:30), Max: 36.7 (25 Jul 2022 08:30)  T(F): 98 (25 Jul 2022 08:30), Max: 98 (25 Jul 2022 08:30)  HR: 72 (25 Jul 2022 09:40) (71 - 72)  BP: 128/69 (25 Jul 2022 09:40) (128/69 - 129/78)  BP(mean): --  RR: 16 (25 Jul 2022 09:40) (16 - 18)  SpO2: 96% (25 Jul 2022 09:40) (96% - 96%)    Parameters below as of 25 Jul 2022 09:40  Patient On (Oxygen Delivery Method): room air    CONSTITUTIONAL: Well-groomed, in no apparent distress  EYES: No conjunctival or scleral injection, non-icteric; PERRLA and symmetric  ENMT: no pharyngeal injection or exudates, oral mucosa with dry membranes  NECK: Trachea midline without palpable neck mass; thyroid not enlarged and non-tender  RESPIRATORY: Breathing comfortably; lungs CTA without wheeze/rhonchi/rales  CARDIOVASCULAR: +S1S2, RRR, no M/G/R; pedal pulses full and symmetric; 3+ lower extremity edema, nonpitting  GASTROINTESTINAL: No palpable masses or tenderness, +BS throughout, no rebound/guarding;  LYMPHATIC: No cervical LAD or tenderness; no inguinal LAD or tenderness  MUSCULOSKELETAL: Normal gait and station; no digital clubbing or cyanosis; normal strength and tone of extremities  SKIN: Two 2 cm scabs in LLE. 3+ nonpitting b/l LE edema with L>R. B/l erthyema up mid shin L>R. No warmth or tenderness.   NEUROLOGIC: CN II-XII intact; sensation intact in LEs b/l to light touch  PSYCHIATRIC: A+O x 3; mood and affect appropriate; appropriate insight and judgment

## 2022-07-25 NOTE — H&P ADULT - NSHPLABSRESULTS_GEN_ALL_CORE
Personally reviewed labs, imaging, ekg                           11.1   8.45  )-----------( 171      ( 25 Jul 2022 10:13 )             34.9       07-25    137  |  101  |  71<H>  ----------------------------<  149<H>  4.6   |  21<L>  |  1.91<H>    Ca    9.7      25 Jul 2022 10:13    TPro  8.0  /  Alb  4.5  /  TBili  0.4  /  DBili  x   /  AST  24  /  ALT  17  /  AlkPhos  92  07-25          PT/INR - ( 25 Jul 2022 10:13 )   PT: 21.9 sec;   INR: 1.88 ratio         PTT - ( 25 Jul 2022 10:13 )  PTT:34.3 sec  < from: Xray Ankle Complete 3 Views, Left (07.25.22 @ 10:32) >      IMPRESSION:  No tracking subcutaneous emphysema or radiographic evidence of advanced   osteomyelitis.    < end of copied text >    < from: Xray Tibia + Fibula 2 Views, Left (07.25.22 @ 10:32) >      IMPRESSION:  No tracking subcutaneous emphysema or radiographic evidence of advanced   osteomyelitis.    < end of copied text >    PROCEDURE:   · Procedure Name: Point of Care Ultrasound Other  · Procedure Name: pocus - LLE DVT STUDY  · Informed Consent: Benefits, risks, and possible complications of procedure explained to patient/caregiver who verbalized understanding and gave verbal consent.  · Procedure Date/Time: 25-Jul-2022 12:24  · Procedure Performed By: Dario  · Procedure was Supervised and/or Assisted?: The procedure was performed independently  · Procedure Additional Details: POCUS: Emergency Department Focused Ultrasound performed at patient's bedside.  The complete report will be available in PACS.      Personal interpretation EKG: NSR with no ST changes

## 2022-07-25 NOTE — ED ADULT NURSE REASSESSMENT NOTE - NS ED NURSE REASSESS COMMENT FT1
received report from CARLOS Delgado. pt is comfortable resting in chair next to stretcher with family member at bedside. pt is A&Ox3, VSS, NAD noted at this time. Denies any pain. Pt admitted to Medicine, pending bed assignment. Pt transported to US

## 2022-07-25 NOTE — ED ADULT NURSE REASSESSMENT NOTE - NS ED NURSE REASSESS COMMENT FT1
On return from US, pt states she did not want to have the test done. [facility] Duplex not completed. Admitting ACP Hali [id number] contacted and made aware.

## 2022-07-25 NOTE — H&P ADULT - NSHPADDITIONALINFOADULT_GEN_ALL_CORE
Marie Gilbert MD  Division of Hospital Medicine  Pager [phone]  Available on [employer] Teams Discussed with ACP Trang Gilbert MD  Division of Hospital Medicine  Pager [phone]  Available on [employer] Teams

## 2022-07-25 NOTE — H&P ADULT - HISTORY OF PRESENT ILLNESS
77F with hx of HTN, DMII, Afib (Xarelto), chronic LE edema presents with left lower extremity swelling. She states she has had recurrent b/l cellulitis and completed 14 day course of Augmentin in April as well as another 14 day Augmentin course early June. B/l LE redness and swelling improved. However, b/l LLE swelling and redness worsened over the weekend, left LE >R LE which is usual. Reports that she had a minor cut in LLE 2 weeks ago. No fevers, chills, SOB, or CP. Sleeps reclined or upright position but denies hx of heart failure. Patient was prescribed Bumex 1mg PO daily for LE swelling with no improvement. She went to the dermatologist in the past and was told she had lymphedema. No abdominal surgeries.

## 2022-07-25 NOTE — ED PROVIDER NOTE - ATTENDING CONTRIBUTION TO CARE
RGUJRAL 78yo f hx listed presents with B/L L>R RLE cellulitis. Pt denies any trauma or recent travel, completed 2 weeks of augmentin w intermittent improvement now w recurrent swelling and erythema. No f/c. No chest pain, palp, sob.   On exam, Patient is awake, alert and oriented x 3.  Patient is well appearing and in no acute distress.  NCAT  Neck is supple  Lungs are CTA B/L,+S1S2  Abdomen:Soft nd/nt+bs no rebound or guarding.  R posterior leg with erythema, LLE with erythema and swelling. 2+ DP , nml sensation.   Neuro CN3-12 intact. Strength 5/5 in upper and lower extremities. Nml Sensation.  Check labs, cultures, xray and US to ro DVT. Patient failed outpatient oral antibiotics, IV antibiotics and admit.

## 2022-07-25 NOTE — ED PROVIDER NOTE - CLINICAL SUMMARY MEDICAL DECISION MAKING FREE TEXT BOX
76yo F w/ hx HTN, HLD, DM(II), chronic afib on xarelto, chronic LE edema presenting with LLE>RLE swelling, erythema, ttp. Consistent with cellulitis. No crepitus or tenderness out of proportion. low suspicion for nec fasc. likely cellulitis that failed outpt abx. Will get DVT study of LLE given unilateral swelling, low suspicion given pt on AC. Pt not septic. Will get labs, abx.

## 2022-07-25 NOTE — ED ADULT NURSE NOTE - OBJECTIVE STATEMENT
77 year old female pt presented to the Ed co redness to b/l ankles, pt states the redness has been chronic for her for a few years, this episode started approx 2 weeks ago, taking oral antibiotics without relief of symptoms, +redness to b/l ankles pt states she ambulates without assistance at home but outside as precaution she uses a cane, +PP skin warm red with blisters noted, denies any fever no chills pt is A&OX3 pt denies any sob, pt states she hit her left leg when getting into a car 1 week ago, +scab to left ankle

## 2022-07-25 NOTE — H&P ADULT - PROBLEM SELECTOR PLAN 2
Initial Cr 1.9 with baseline Cr 0.7 likely in the setting diuretic use  -hold bumex  -send UA, urine Cr, urine urea, Urine osm, urine Na  -1L NS bolus and recheck BMP  -renally dose meds and avoid nephrotoxic agents  -hold home losartan

## 2022-07-25 NOTE — ED PROVIDER NOTE - PHYSICAL EXAMINATION
General appearance: NAD, conversant, afebrile    Eyes: anicteric sclerae, YEISON, EOMI   HENT: Atraumatic; oropharynx clear, MMM and no ulcerations, no pharyngeal erythema or exudate   Neck: Trachea midline; Full range of motion, supple   Pulm: CTA bl, normal respiratory effort and no intercostal retractions, normal work of breathing   CV: RRR, No murmurs, rubs, or gallops. 2+ peripheral pulses.   Abdomen: Soft, non-tender, non-distended; no guarding or rebound   Extremities: Erythema, swelling, ttp circumferentially L lower leg with couple scabs, no pus drainage. RLE has mild erythema R lower anterior shin   Skin: erythema, swelling as described above.   Psych: Appropriate affect, cooperative; alert and oriented to person, place and time

## 2022-07-25 NOTE — H&P ADULT - NSHPREVIEWOFSYSTEMS_GEN_ALL_CORE
Review of Systems:   CONSTITUTIONAL: No fever, +intentional weight loss  EYES: No eye pain, visual disturbances, or discharge  ENMT:  No difficulty hearing, tinnitus, vertigo; No sinus or throat pain  RESPIRATORY: No SOB. No cough, wheezing, chills or hemoptysis  CARDIOVASCULAR: No chest pain, palpitations, dizziness, or leg swelling  GASTROINTESTINAL: No abdominal or epigastric pain. No nausea, vomiting, or hematemesis; No diarrhea or constipation. No melena or hematochezia.  GENITOURINARY: No dysuria, frequency, hematuria, or incontinence  NEUROLOGICAL: No headaches, memory loss, loss of strength, numbness, or tremors  SKIN: No itching, burning, rashes, or lesions   ENDOCRINE: No heat or cold intolerance; No hair loss  MUSCULOSKELETAL: +b/l LE swelling; No muscle, back pain  PSYCHIATRIC: No depression, anxiety, mood swings, or difficulty sleeping  HEME/LYMPH: No easy bruising, or bleeding gums

## 2022-07-25 NOTE — H&P ADULT - PROBLEM SELECTOR PLAN 1
worsening b/l LE swelling and erythema since 07/24 s/p outpatient 14 days Augmentin for cellulitis in June. Exam notable for eythema to mid shins, 3+ nonpitting edema with L>R. On exam, more consistent with lymphedema but will empirically treat for cellulitis. Rule out DVT and HF  -Ancef 1g q12hr, renally dosed  -hold home bumex  -LE Dopplers  -b/l leg elevation

## 2022-07-26 LAB
A1C WITH ESTIMATED AVERAGE GLUCOSE RESULT: 7.3 % — HIGH (ref 4–5.6)
ANION GAP SERPL CALC-SCNC: 11 MMOL/L — SIGNIFICANT CHANGE UP (ref 5–17)
APPEARANCE UR: CLEAR — SIGNIFICANT CHANGE UP
BACTERIA # UR AUTO: NEGATIVE — SIGNIFICANT CHANGE UP
BILIRUB UR-MCNC: NEGATIVE — SIGNIFICANT CHANGE UP
BUN SERPL-MCNC: 35 MG/DL — HIGH (ref 7–23)
CALCIUM SERPL-MCNC: 9.7 MG/DL — SIGNIFICANT CHANGE UP (ref 8.4–10.5)
CHLORIDE SERPL-SCNC: 107 MMOL/L — SIGNIFICANT CHANGE UP (ref 96–108)
CO2 SERPL-SCNC: 24 MMOL/L — SIGNIFICANT CHANGE UP (ref 22–31)
COLOR SPEC: YELLOW — SIGNIFICANT CHANGE UP
CREAT ?TM UR-MCNC: 69 MG/DL — SIGNIFICANT CHANGE UP
CREAT SERPL-MCNC: 0.93 MG/DL — SIGNIFICANT CHANGE UP (ref 0.5–1.3)
DIFF PNL FLD: ABNORMAL
EGFR: 63 ML/MIN/1.73M2 — SIGNIFICANT CHANGE UP
ESTIMATED AVERAGE GLUCOSE: 163 MG/DL — HIGH (ref 68–114)
GLUCOSE SERPL-MCNC: 140 MG/DL — HIGH (ref 70–99)
GLUCOSE UR QL: NEGATIVE — SIGNIFICANT CHANGE UP
HCT VFR BLD CALC: 35.2 % — SIGNIFICANT CHANGE UP (ref 34.5–45)
HGB BLD-MCNC: 11.1 G/DL — LOW (ref 11.5–15.5)
KETONES UR-MCNC: NEGATIVE — SIGNIFICANT CHANGE UP
LEUKOCYTE ESTERASE UR-ACNC: ABNORMAL
MAGNESIUM SERPL-MCNC: 2.2 MG/DL — SIGNIFICANT CHANGE UP (ref 1.6–2.6)
MCHC RBC-ENTMCNC: 29.3 PG — SIGNIFICANT CHANGE UP (ref 27–34)
MCHC RBC-ENTMCNC: 31.5 GM/DL — LOW (ref 32–36)
MCV RBC AUTO: 92.9 FL — SIGNIFICANT CHANGE UP (ref 80–100)
MRSA PCR RESULT.: SIGNIFICANT CHANGE UP
NITRITE UR-MCNC: NEGATIVE — SIGNIFICANT CHANGE UP
NRBC # BLD: 0 /100 WBCS — SIGNIFICANT CHANGE UP (ref 0–0)
OSMOLALITY UR: 448 MOS/KG — SIGNIFICANT CHANGE UP (ref 300–900)
PH UR: 6.5 — SIGNIFICANT CHANGE UP (ref 5–8)
PHOSPHATE SERPL-MCNC: 2.6 MG/DL — SIGNIFICANT CHANGE UP (ref 2.5–4.5)
PLATELET # BLD AUTO: 161 K/UL — SIGNIFICANT CHANGE UP (ref 150–400)
POTASSIUM SERPL-MCNC: 4.6 MMOL/L — SIGNIFICANT CHANGE UP (ref 3.5–5.3)
POTASSIUM SERPL-SCNC: 4.6 MMOL/L — SIGNIFICANT CHANGE UP (ref 3.5–5.3)
PROT UR-MCNC: ABNORMAL
RBC # BLD: 3.79 M/UL — LOW (ref 3.8–5.2)
RBC # FLD: 15.1 % — HIGH (ref 10.3–14.5)
RBC CASTS # UR COMP ASSIST: 2 /HPF — SIGNIFICANT CHANGE UP (ref 0–4)
S AUREUS DNA NOSE QL NAA+PROBE: SIGNIFICANT CHANGE UP
SODIUM SERPL-SCNC: 142 MMOL/L — SIGNIFICANT CHANGE UP (ref 135–145)
SODIUM UR-SCNC: 32 MMOL/L — SIGNIFICANT CHANGE UP
SP GR SPEC: 1.02 — SIGNIFICANT CHANGE UP (ref 1.01–1.02)
UROBILINOGEN FLD QL: NEGATIVE — SIGNIFICANT CHANGE UP
UUN UR-MCNC: 909 MG/DL — SIGNIFICANT CHANGE UP
WBC # BLD: 5.98 K/UL — SIGNIFICANT CHANGE UP (ref 3.8–10.5)
WBC # FLD AUTO: 5.98 K/UL — SIGNIFICANT CHANGE UP (ref 3.8–10.5)
WBC UR QL: 206 /HPF — HIGH (ref 0–5)

## 2022-07-26 PROCEDURE — 99232 SBSQ HOSP IP/OBS MODERATE 35: CPT

## 2022-07-26 PROCEDURE — 93306 TTE W/DOPPLER COMPLETE: CPT | Mod: 26

## 2022-07-26 RX ORDER — CHLORHEXIDINE GLUCONATE 213 G/1000ML
1 SOLUTION TOPICAL DAILY
Refills: 0 | Status: DISCONTINUED | OUTPATIENT
Start: 2022-07-26 | End: 2022-07-27

## 2022-07-26 RX ORDER — NYSTATIN CREAM 100000 [USP'U]/G
1 CREAM TOPICAL
Refills: 0 | Status: DISCONTINUED | OUTPATIENT
Start: 2022-07-26 | End: 2022-07-27

## 2022-07-26 RX ADMIN — NYSTATIN CREAM 1 APPLICATION(S): 100000 CREAM TOPICAL at 17:17

## 2022-07-26 RX ADMIN — NYSTATIN CREAM 1 APPLICATION(S): 100000 CREAM TOPICAL at 06:07

## 2022-07-26 RX ADMIN — Medication 100 MILLIGRAM(S): at 17:15

## 2022-07-26 RX ADMIN — Medication 100 MILLIGRAM(S): at 06:08

## 2022-07-26 RX ADMIN — Medication 1: at 12:15

## 2022-07-26 RX ADMIN — Medication 200 MILLIGRAM(S): at 06:07

## 2022-07-26 RX ADMIN — ATORVASTATIN CALCIUM 20 MILLIGRAM(S): 80 TABLET, FILM COATED ORAL at 21:16

## 2022-07-26 RX ADMIN — Medication 25 MICROGRAM(S): at 06:07

## 2022-07-26 RX ADMIN — RIVAROXABAN 15 MILLIGRAM(S): KIT at 17:14

## 2022-07-26 NOTE — PATIENT PROFILE ADULT - VISION (WITH CORRECTIVE LENSES IF THE PATIENT USUALLY WEARS THEM):
pt wears eyeglasses/Partially impaired: cannot see medication labels or newsprint, but can see obstacles in path, and the surrounding layout; can count fingers at arm's length

## 2022-07-26 NOTE — PATIENT PROFILE ADULT - FALL HARM RISK - HARM RISK INTERVENTIONS
Assistance with ambulation/Assistance OOB with selected safe patient handling equipment/Communicate Risk of Fall with Harm to all staff/Discuss with provider need for PT consult/Monitor for mental status changes/Monitor gait and stability/Move patient closer to nurses' station/Orthostatic vital signs/Provide patient with walking aids - walker, cane, crutches/Reinforce activity limits and safety measures with patient and family/Reorient to person, place and time as needed/Review medications for side effects contributing to fall risk/Tailored Fall Risk Interventions/Toileting schedule using arm’s reach rule for commode and bathroom/Use of alarms - bed, chair and/or voice tab/Visual Cue: Yellow wristband and red socks/Bed in lowest position, wheels locked, appropriate side rails in place/Call bell, personal items and telephone in reach/Instruct patient to call for assistance before getting out of bed or chair/Non-slip footwear when patient is out of bed/[place] to call system/Physically safe environment - no spills, clutter or unnecessary equipment/Purposeful Proactive Rounding/Room/bathroom lighting operational, light cord in reach

## 2022-07-27 ENCOUNTER — TRANSCRIPTION ENCOUNTER (OUTPATIENT)
Age: 77
End: 2022-07-27

## 2022-07-27 VITALS
SYSTOLIC BLOOD PRESSURE: 130 MMHG | RESPIRATION RATE: 18 BRPM | HEART RATE: 68 BPM | WEIGHT: 223.77 LBS | TEMPERATURE: 98 F | DIASTOLIC BLOOD PRESSURE: 70 MMHG | OXYGEN SATURATION: 100 %

## 2022-07-27 DIAGNOSIS — E11.9 TYPE 2 DIABETES MELLITUS WITHOUT COMPLICATIONS: ICD-10-CM

## 2022-07-27 LAB
ANION GAP SERPL CALC-SCNC: 11 MMOL/L — SIGNIFICANT CHANGE UP (ref 5–17)
BUN SERPL-MCNC: 25 MG/DL — HIGH (ref 7–23)
CALCIUM SERPL-MCNC: 9.8 MG/DL — SIGNIFICANT CHANGE UP (ref 8.4–10.5)
CHLORIDE SERPL-SCNC: 107 MMOL/L — SIGNIFICANT CHANGE UP (ref 96–108)
CO2 SERPL-SCNC: 24 MMOL/L — SIGNIFICANT CHANGE UP (ref 22–31)
CREAT SERPL-MCNC: 0.96 MG/DL — SIGNIFICANT CHANGE UP (ref 0.5–1.3)
EGFR: 61 ML/MIN/1.73M2 — SIGNIFICANT CHANGE UP
GLUCOSE SERPL-MCNC: 210 MG/DL — HIGH (ref 70–99)
HCT VFR BLD CALC: 35.8 % — SIGNIFICANT CHANGE UP (ref 34.5–45)
HGB BLD-MCNC: 11.3 G/DL — LOW (ref 11.5–15.5)
MAGNESIUM SERPL-MCNC: 2.1 MG/DL — SIGNIFICANT CHANGE UP (ref 1.6–2.6)
MCHC RBC-ENTMCNC: 29.2 PG — SIGNIFICANT CHANGE UP (ref 27–34)
MCHC RBC-ENTMCNC: 31.6 GM/DL — LOW (ref 32–36)
MCV RBC AUTO: 92.5 FL — SIGNIFICANT CHANGE UP (ref 80–100)
NRBC # BLD: 0 /100 WBCS — SIGNIFICANT CHANGE UP (ref 0–0)
PLATELET # BLD AUTO: 168 K/UL — SIGNIFICANT CHANGE UP (ref 150–400)
POTASSIUM SERPL-MCNC: 4.2 MMOL/L — SIGNIFICANT CHANGE UP (ref 3.5–5.3)
POTASSIUM SERPL-SCNC: 4.2 MMOL/L — SIGNIFICANT CHANGE UP (ref 3.5–5.3)
RBC # BLD: 3.87 M/UL — SIGNIFICANT CHANGE UP (ref 3.8–5.2)
RBC # FLD: 15.2 % — HIGH (ref 10.3–14.5)
SODIUM SERPL-SCNC: 142 MMOL/L — SIGNIFICANT CHANGE UP (ref 135–145)
WBC # BLD: 5.88 K/UL — SIGNIFICANT CHANGE UP (ref 3.8–10.5)
WBC # FLD AUTO: 5.88 K/UL — SIGNIFICANT CHANGE UP (ref 3.8–10.5)

## 2022-07-27 PROCEDURE — 87637 SARSCOV2&INF A&B&RSV AMP PRB: CPT

## 2022-07-27 PROCEDURE — 99239 HOSP IP/OBS DSCHRG MGMT >30: CPT

## 2022-07-27 PROCEDURE — 73610 X-RAY EXAM OF ANKLE: CPT

## 2022-07-27 PROCEDURE — 80053 COMPREHEN METABOLIC PANEL: CPT

## 2022-07-27 PROCEDURE — 85027 COMPLETE CBC AUTOMATED: CPT

## 2022-07-27 PROCEDURE — 93971 EXTREMITY STUDY: CPT

## 2022-07-27 PROCEDURE — 85730 THROMBOPLASTIN TIME PARTIAL: CPT

## 2022-07-27 PROCEDURE — 85610 PROTHROMBIN TIME: CPT

## 2022-07-27 PROCEDURE — 73590 X-RAY EXAM OF LOWER LEG: CPT

## 2022-07-27 PROCEDURE — 99285 EMERGENCY DEPT VISIT HI MDM: CPT

## 2022-07-27 PROCEDURE — 84300 ASSAY OF URINE SODIUM: CPT

## 2022-07-27 PROCEDURE — 96374 THER/PROPH/DIAG INJ IV PUSH: CPT

## 2022-07-27 PROCEDURE — 83735 ASSAY OF MAGNESIUM: CPT

## 2022-07-27 PROCEDURE — 87641 MR-STAPH DNA AMP PROBE: CPT

## 2022-07-27 PROCEDURE — 93306 TTE W/DOPPLER COMPLETE: CPT

## 2022-07-27 PROCEDURE — 96375 TX/PRO/DX INJ NEW DRUG ADDON: CPT

## 2022-07-27 PROCEDURE — 36415 COLL VENOUS BLD VENIPUNCTURE: CPT

## 2022-07-27 PROCEDURE — 87040 BLOOD CULTURE FOR BACTERIA: CPT

## 2022-07-27 PROCEDURE — 85025 COMPLETE CBC W/AUTO DIFF WBC: CPT

## 2022-07-27 PROCEDURE — 87640 STAPH A DNA AMP PROBE: CPT

## 2022-07-27 PROCEDURE — 84540 ASSAY OF URINE/UREA-N: CPT

## 2022-07-27 PROCEDURE — 81001 URINALYSIS AUTO W/SCOPE: CPT

## 2022-07-27 PROCEDURE — 83935 ASSAY OF URINE OSMOLALITY: CPT

## 2022-07-27 PROCEDURE — 80048 BASIC METABOLIC PNL TOTAL CA: CPT

## 2022-07-27 PROCEDURE — 82570 ASSAY OF URINE CREATININE: CPT

## 2022-07-27 PROCEDURE — 83036 HEMOGLOBIN GLYCOSYLATED A1C: CPT

## 2022-07-27 PROCEDURE — 82962 GLUCOSE BLOOD TEST: CPT

## 2022-07-27 PROCEDURE — 84100 ASSAY OF PHOSPHORUS: CPT

## 2022-07-27 RX ORDER — CEPHALEXIN 500 MG
1 CAPSULE ORAL
Qty: 10 | Refills: 0
Start: 2022-07-27 | End: 2022-07-31

## 2022-07-27 RX ORDER — RIVAROXABAN 15 MG-20MG
1 KIT ORAL
Qty: 0 | Refills: 0 | DISCHARGE

## 2022-07-27 RX ORDER — BUMETANIDE 0.25 MG/ML
1 INJECTION INTRAMUSCULAR; INTRAVENOUS
Qty: 0 | Refills: 0 | DISCHARGE

## 2022-07-27 RX ORDER — RIVAROXABAN 15 MG-20MG
1 KIT ORAL
Qty: 30 | Refills: 0
Start: 2022-07-27 | End: 2022-08-25

## 2022-07-27 RX ADMIN — Medication 25 MICROGRAM(S): at 05:47

## 2022-07-27 RX ADMIN — Medication 1: at 12:58

## 2022-07-27 RX ADMIN — Medication 100 MILLIGRAM(S): at 05:47

## 2022-07-27 RX ADMIN — NYSTATIN CREAM 1 APPLICATION(S): 100000 CREAM TOPICAL at 05:48

## 2022-07-27 RX ADMIN — CHLORHEXIDINE GLUCONATE 1 APPLICATION(S): 213 SOLUTION TOPICAL at 12:59

## 2022-07-27 RX ADMIN — RIVAROXABAN 15 MILLIGRAM(S): KIT at 12:58

## 2022-07-27 NOTE — PROGRESS NOTE ADULT - PROBLEM SELECTOR PLAN 1
worsening b/l LE swelling and erythema since 07/24 s/p outpatient 14 days Augmentin for cellulitis in June. Exam notable for erythema to mid shins, 3+ nonpitting edema with L>R.   On exam, more consistent with lymphedema but will empirically treat for cellulitis for now.   POCUS LE doppler negative for DVT   TTE with EF 80% ; Hyperdynamic left ventricle.  -Ancef 1g q12hr, renally dosed  -c/t hold home bumex  -b/l leg elevation  - will refer to [facility] ; [address] [facility] [phone]
worsening b/l LE swelling and erythema since 07/24 s/p outpatient 14 days Augmentin for cellulitis in June. Exam notable for erythema to mid shins, 3+ nonpitting edema with L>R.   On exam, more consistent with lymphedema but will empirically treat for cellulitis for now.   POCUS LE doppler negative for DVT   TTE with EF 80% ; Hyperdynamic left ventricle.  -Ancef 1g q12hr, renally dosed  -c/t hold home bumex  -b/l leg elevation  - referred  to [facility] [address] [place] [address] [phone]. Appointment scheduled for 8/22/22

## 2022-07-27 NOTE — DISCHARGE NOTE NURSING/CASE MANAGEMENT/SOCIAL WORK - NSDCPEFALRISK_GEN_ALL_CORE
For information on Fall & Injury Prevention, visit: [email or web address] OR  [email or web address] OR  https://www.cdc.gov/steadi/patient.html

## 2022-07-27 NOTE — PROGRESS NOTE ADULT - ASSESSMENT
77F w/ PMHx of HTN, HLD, Dm2, afib on xarelto, nephrolithiasis, chronic LE edema p/w worsening erythema and swelling L>R after completing 14 days agumentin for cellulitis. Admitted for empiric tx of cellultitis and r/o DVT, complicated with KELSEY     
77F w/ PMHx of HTN, HLD, Dm2, afib on xarelto, nephrolithiasis, chronic LE edema p/w worsening erythema and swelling L>R after completing 14 days agumentin for cellulitis. Admitted for empiric tx of cellultitis and r/o DVT, complicated with KELSEY

## 2022-07-27 NOTE — DISCHARGE NOTE NURSING/CASE MANAGEMENT/SOCIAL WORK - PATIENT PORTAL LINK FT
You can access the FollowMyHealth Patient Portal offered by [facility] by registering at the following website: [email or web address]. By joining [facility]’s FollowMyHealth portal, you will also be able to view your health information using other applications (apps) compatible with our system.

## 2022-07-27 NOTE — PROGRESS NOTE ADULT - TIME BILLING
case complexity and discharge planning. Pt is clinically stable for discharge. Pt is to follow up with PCP Dr Machuca in 1-2 weeks.    d/w MYLES Cook

## 2022-07-27 NOTE — DISCHARGE NOTE PROVIDER - CARE PROVIDER_API CALL
Dong Machuca  CARDIOVASCULAR DISEASE  [address] [place] [phone]  Phone: [phone]  Fax: [phone]  Follow Up Time: Routine    [facility])  Urology  [address] [place] [address]  Phone: [phone]  Fax: [phone]  Established Patient  Follow Up Time: Routine

## 2022-07-27 NOTE — PROGRESS NOTE ADULT - PROBLEM SELECTOR PLAN 2
Initial Cr 1.9 with baseline Cr 0.7 likely in the setting diuretic use  -c/t hold bumex  - s/p 1L NS bolus   - Cr much improved  -renally dose meds and avoid nephrotoxic agents  -c/t hold home losartan
Initial Cr 1.9 with baseline Cr 0.7 likely in the setting diuretic use  -c/t hold bumex  - s/p 1L NS bolus   - Cr much improved  -renally dose meds and avoid nephrotoxic agents  -c/t hold home losartan

## 2022-07-27 NOTE — DISCHARGE NOTE PROVIDER - NSDCMRMEDTOKEN_GEN_ALL_CORE_FT
atorvastatin 20 mg oral tablet: 1 tab(s) orally once a day (at bedtime)  levothyroxine 25 mcg (0.025 mg) oral tablet: 1 tab(s) orally once a day  metFORMIN 500 mg oral tablet, extended release: 1 tab(s) orally once a day  metoprolol succinate 200 mg oral tablet, extended release: 1 tab(s) orally once a day  Vitamin D3 2000 intl units oral tablet: 1 tab(s) orally once a day  Xarelto 20 mg oral tablet: 1 tab(s) orally once a day (in the evening), last dose 7/3/2018   atorvastatin 20 mg oral tablet: 1 tab(s) orally once a day (at bedtime)  cephalexin 500 mg oral capsule: 1 cap(s) orally every 12 hours   levothyroxine 25 mcg (0.025 mg) oral tablet: 1 tab(s) orally once a day  metFORMIN 500 mg oral tablet, extended release: 1 tab(s) orally once a day  metoprolol succinate 200 mg oral tablet, extended release: 1 tab(s) orally once a day  rivaroxaban 15 mg oral tablet: 1 tab(s) orally once a day  Vitamin D3 2000 intl units oral tablet: 1 tab(s) orally once a day

## 2022-07-27 NOTE — DISCHARGE NOTE PROVIDER - NSDCCPCAREPLAN_GEN_ALL_CORE_FT
PRINCIPAL DISCHARGE DIAGNOSIS  Diagnosis: Cellulitis  Assessment and Plan of Treatment: You have swelling to your lets which was diagnosed as cellulitis and treated with Augmentin in June. On examination this appears to look more like lymphedema however will continue a course of antibiotics for cellulitis. You will then go to a [facility]. You have an appointment for August 22. Please keep that appointment and bring your prescription. You had a doppler which was negative for blood clots. You should also elevate your legs as much as possible. Stop taking your home bumex for now until follow up with your provider.    [facility] ; [address] [place]  [phone]. Appointment August 22 at 12 noon      SECONDARY DISCHARGE DIAGNOSES  Diagnosis: Hypertension  Assessment and Plan of Treatment: Low sodium and fat diet, continue anti-hypertensive medications, and follow up with primary care physician. STOP LOSARTAN and STOP BUMEX. Continue to take metorprolol    Diagnosis: Atrial fibrillation, chronic  Assessment and Plan of Treatment: Your dose of xarelto is decreased to 15mg daily. This was sent to your pharmacy.  Also continue metoprolol   Atrial Fibrillation  Atrial fibrillation is a type of irregular heartbeat (arrhythmia) where the heart quivers continuously in a chaotic pattern that makes the heart unable to pump blood normally. This can increase the risk for stroke, heart failure, and other heart-related conditions. Atrial fibrillation can be caused by a variety of conditions and may be temporary, intermittent, or permanent. Symptoms include feeling that your heart is beating rapidly or irregularly, chest discomfort, shortness of breath, or dizziness/lightheadedness that may be worse with exertion. Treatment is varied but may involve medication or electrical shock (cardioversion).  SEEK IMMEDIATE MEDICAL CARE IF YOU HAVE ANY OF THE FOLLOWING SYMPTOMS: chest pain, shortness of breath, abdominal pain, sweating, vomiting, blood in vomit/bowel movements/urine, dizziness/lightheadedness, weakness or numbness to face/arm/leg, trouble speaking or understanding, facial droop.    Diagnosis: Diabetes  Assessment and Plan of Treatment: Continue a low salt, fat and carbohydrate diet, minimize glucose intake.  Exercise daily for at least 30 minutes and weight loss.  Follow up with primary care physician and endocrinologist for routine Hemoglobin A1C checks and management.  Follow up with your ophthalmologist for routine yearly vision exams.    Diagnosis: KELSEY (acute kidney injury)  Assessment and Plan of Treatment: Your kidney function is decreased right now so please stop taking your bumex for now until you follow up with your primary care provider. Please make an appointment for next week. You recieved fluids in to help your kidneys recover. Also stop taking Losartan for now and other nephrotoxic agents such as nonsteroidal anti-inflammatory agents (NSAIDs).   Please follow up with your nephrologist to monitor your kidney function, continue with low protein and potassium diet.

## 2022-07-27 NOTE — DISCHARGE NOTE PROVIDER - PROVIDER TOKENS
PROVIDER:[TOKEN:[[id number]],FOLLOWUP:[Routine]],PROVIDER:[TOKEN:[[id number]],FOLLOWUP:[Routine],ESTABLISHEDPATIENT:[T]]

## 2022-07-27 NOTE — PROGRESS NOTE ADULT - PROBLEM SELECTOR PLAN 5
holding metformin while inpatient  -c/w CHRIS  - FS QAC QHS  -A1c 7.3
hold metformin  -SSI and FS  -A1c 7.3

## 2022-07-27 NOTE — PROGRESS NOTE ADULT - SUBJECTIVE AND OBJECTIVE BOX
Patient is a 77y old  Female who presents with a chief complaint of LLE swelling (2022 13:24)      SUBJECTIVE / OVERNIGHT EVENTS:  Pt seen and examined. No acute events overnight. She denies CP, SOB. Reports improvement in b/l LE redness and swelling. Denies fever/chills.    MEDICATIONS  (STANDING):  atorvastatin 20 milliGRAM(s) Oral at bedtime  ceFAZolin   IVPB 1000 milliGRAM(s) IV Intermittent every 12 hours  ceFAZolin   IVPB      chlorhexidine 4% Liquid 1 Application(s) Topical daily  dextrose 5%. 1000 milliLiter(s) (50 mL/Hr) IV Continuous <Continuous>  dextrose 5%. 1000 milliLiter(s) (100 mL/Hr) IV Continuous <Continuous>  dextrose 50% Injectable 25 Gram(s) IV Push once  dextrose 50% Injectable 12.5 Gram(s) IV Push once  dextrose 50% Injectable 25 Gram(s) IV Push once  glucagon  Injectable 1 milliGRAM(s) IntraMuscular once  insulin lispro (ADMELOG) corrective regimen sliding scale   SubCutaneous three times a day before meals  insulin lispro (ADMELOG) corrective regimen sliding scale   SubCutaneous at bedtime  levothyroxine 25 MICROGram(s) Oral daily  metoprolol succinate  milliGRAM(s) Oral daily  nystatin Powder 1 Application(s) Topical two times a day  rivaroxaban 15 milliGRAM(s) Oral daily    MEDICATIONS  (PRN):  acetaminophen     Tablet .. 650 milliGRAM(s) Oral every 6 hours PRN Temp greater or equal to 38C (100.4F), Mild Pain (1 - 3)  dextrose Oral Gel 15 Gram(s) Oral once PRN Blood Glucose LESS THAN 70 milliGRAM(s)/deciliter  melatonin 3 milliGRAM(s) Oral at bedtime PRN Insomnia      Vital Signs Last 24 Hrs  T(C): 36.5 (2022 12:01), Max: 36.7 (2022 21:03)  T(F): 97.7 (2022 12:01), Max: 98 (2022 21:03)  HR: 68 (2022 12:01) (56 - 68)  BP: 130/70 (2022 12:01) (110/71 - 130/70)  BP(mean): --  RR: 18 (2022 12:01) (18 - 18)  SpO2: 100% (2022 12:01) (97% - 100%)    Parameters below as of 2022 12:01  Patient On (Oxygen Delivery Method): room air      CAPILLARY BLOOD GLUCOSE      POCT Blood Glucose.: 188 mg/dL (2022 12:31)  POCT Blood Glucose.: 134 mg/dL (2022 08:40)  POCT Blood Glucose.: 185 mg/dL (2022 21:38)  POCT Blood Glucose.: 138 mg/dL (2022 17:06)    I&O's Summary    2022 07:01  -  2022 07:00  --------------------------------------------------------  IN: 1010 mL / OUT: 0 mL / NET: 1010 mL    2022 07:01  -  2022 15:55  --------------------------------------------------------  IN: 480 mL / OUT: 0 mL / NET: 480 mL        PHYSICAL EXAM:  GENERAL: NAD, obese, well groomed  HEAD:  Atraumatic, Normocephalic  EYES: conjunctiva and sclera clear  NECK: Supple, No JVD  CHEST/LUNG: Clear to auscultation bilaterally; No wheeze  HEART: Regular rate and rhythm; No murmurs, rubs, or gallops  ABDOMEN: Soft, Nontender, Nondistended; Bowel sounds present  EXTREMITIES:  2+ brawny nonpitting b/l LE edema ; b/l shin redness ; chronic skin changes; ruptured blisters on LLE with scabs  PSYCH: AAOx3  NEUROLOGY: non-focal  SKIN: No rashes or lesions      LABS:                        11.3   5.88  )-----------( 168      ( 2022 11:14 )             35.8         142  |  107  |  25<H>  ----------------------------<  210<H>  4.2   |  24  |  0.96    Ca    9.8      2022 11:14  Phos  2.6       Mg     2.1                 Urinalysis Basic - ( 2022 02:46 )    Color: Yellow / Appearance: Clear / S.017 / pH: x  Gluc: x / Ketone: Negative  / Bili: Negative / Urobili: Negative   Blood: x / Protein: 30 mg/dL / Nitrite: Negative   Leuk Esterase: Small / RBC: 2 /hpf /  /HPF   Sq Epi: x / Non Sq Epi: x / Bacteria: Negative        
Patient is a 77y old  Female who presents with a chief complaint of LLE swelling (2022 13:02)      SUBJECTIVE / OVERNIGHT EVENTS:  Pt seen and examined. No acute events overnight. She denies fever/chills. Reports improvement in b/l LE redness ; denies LE pain.    MEDICATIONS  (STANDING):  atorvastatin 20 milliGRAM(s) Oral at bedtime  ceFAZolin   IVPB 1000 milliGRAM(s) IV Intermittent every 12 hours  ceFAZolin   IVPB      chlorhexidine 4% Liquid 1 Application(s) Topical daily  dextrose 5%. 1000 milliLiter(s) (50 mL/Hr) IV Continuous <Continuous>  dextrose 5%. 1000 milliLiter(s) (100 mL/Hr) IV Continuous <Continuous>  dextrose 50% Injectable 25 Gram(s) IV Push once  dextrose 50% Injectable 12.5 Gram(s) IV Push once  dextrose 50% Injectable 25 Gram(s) IV Push once  glucagon  Injectable 1 milliGRAM(s) IntraMuscular once  insulin lispro (ADMELOG) corrective regimen sliding scale   SubCutaneous three times a day before meals  insulin lispro (ADMELOG) corrective regimen sliding scale   SubCutaneous at bedtime  levothyroxine 25 MICROGram(s) Oral daily  metoprolol succinate  milliGRAM(s) Oral daily  nystatin Powder 1 Application(s) Topical two times a day  rivaroxaban 15 milliGRAM(s) Oral daily    MEDICATIONS  (PRN):  acetaminophen     Tablet .. 650 milliGRAM(s) Oral every 6 hours PRN Temp greater or equal to 38C (100.4F), Mild Pain (1 - 3)  dextrose Oral Gel 15 Gram(s) Oral once PRN Blood Glucose LESS THAN 70 milliGRAM(s)/deciliter  melatonin 3 milliGRAM(s) Oral at bedtime PRN Insomnia      Vital Signs Last 24 Hrs  T(C): 36.4 (2022 11:54), Max: 36.6 (2022 23:05)  T(F): 97.6 (2022 11:54), Max: 97.8 (2022 23:05)  HR: 60 (2022 11:54) (58 - 68)  BP: 115/65 (2022 11:54) (102/61 - 150/81)  BP(mean): 97 (2022 23:05) (97 - 104)  RR: 18 (2022 11:54) (16 - 18)  SpO2: 98% (2022 11:54) (98% - 100%)    Parameters below as of 2022 11:54  Patient On (Oxygen Delivery Method): room air      CAPILLARY BLOOD GLUCOSE      POCT Blood Glucose.: 138 mg/dL (2022 17:06)  POCT Blood Glucose.: 180 mg/dL (2022 12:13)  POCT Blood Glucose.: 131 mg/dL (2022 08:17)  POCT Blood Glucose.: 130 mg/dL (2022 21:07)    I&O's Summary    2022 07:01  -  2022 07:00  --------------------------------------------------------  IN: 250 mL / OUT: 0 mL / NET: 250 mL    2022 07:01  -  2022 18:18  --------------------------------------------------------  IN: 480 mL / OUT: 0 mL / NET: 480 mL        PHYSICAL EXAM:  GENERAL: NAD, obese, well groomed  HEAD:  Atraumatic, Normocephalic  EYES: conjunctiva and sclera clear  NECK: Supple, No JVD  CHEST/LUNG: Clear to auscultation bilaterally; No wheeze  HEART: Regular rate and rhythm; No murmurs, rubs, or gallops  ABDOMEN: Soft, Nontender, Nondistended; Bowel sounds present  EXTREMITIES:  2+ brawny nonpitting b/l LE edema ; b/l shin redness ; chronic skin changes; ruptured blisters on LLE with scabs  PSYCH: AAOx3  NEUROLOGY: non-focal  SKIN: No rashes or lesions    LABS:                        11.1   5.98  )-----------( 161      ( 2022 08:55 )             35.2     07-26    142  |  107  |  35<H>  ----------------------------<  140<H>  4.6   |  24  |  0.93    Ca    9.7      2022 08:55  Phos  2.6       Mg     2.2         TPro  8.0  /  Alb  4.5  /  TBili  0.4  /  DBili  x   /  AST  24  /  ALT  17  /  AlkPhos  92      PT/INR - ( 2022 10:13 )   PT: 21.9 sec;   INR: 1.88 ratio         PTT - ( 2022 10:13 )  PTT:34.3 sec      Urinalysis Basic - ( 2022 02:46 )    Color: Yellow / Appearance: Clear / S.017 / pH: x  Gluc: x / Ketone: Negative  / Bili: Negative / Urobili: Negative   Blood: x / Protein: 30 mg/dL / Nitrite: Negative   Leuk Esterase: Small / RBC: 2 /hpf /  /HPF   Sq Epi: x / Non Sq Epi: x / Bacteria: Negative

## 2022-07-27 NOTE — DISCHARGE NOTE PROVIDER - HOSPITAL COURSE
77F w/ PMHx of HTN, HLD, Dm2, afib on xarelto, nephrolithiasis, chronic LE edema p/w worsening erythema and swelling L>R after completing 14 days agumentin for cellulitis. Admitted for empiric tx of cellultitis and r/o DVT, complicated with Prerenal/post renal/renal azotemia due to     . Check urine lytes. Monitor I & O’s, Daily weight.    Bilateral edema of lower extremity.   ·  Plan: worsening b/l LE swelling and erythema since 07/24 s/p outpatient 14 days Augmentin for cellulitis in June. Exam notable for erythema to mid shins, 3+ nonpitting edema with L>R.   On exam, more consistent with lymphedema but will empirically treat for cellulitis for now.   POCUS LE doppler negative for DVT   TTE with EF 80% ; Hyperdynamic left ventricle.  -Ancef 1g q12hr, renally dosed  -c/t hold home bumex  -b/l leg elevation  - will refer to [facility] ; [address] [place] [address] [phone].    KELSEY (acute kidney injury).   ·  Plan: Initial Cr 1.9 with baseline Cr 0.7 likely in the setting diuretic use  -c/t hold bumex  - s/p 1L NS bolus   - Cr much improved  -renally dose meds and avoid nephrotoxic agents  -c/t hold home losartan.    Hypertension.   ·  Plan: -c/w home metoprolol   -hold home losartan.    Atrial fibrillation, chronic.   ·  Plan: -decreased xarelto to 15mg, renally dose   -metoprolol 200mg daily.    Diabetes.   ·  Plan: hold metformin  -A1c 7.3.     Problem/Plan - 6:  ·  Problem: Preventive measure.   ·  Plan: Diet: Consistent carb and DASH  DVT: Xarelto  Dispo: likely home.   77F w/ PMHx of HTN, HLD, Dm2, afib on xarelto, nephrolithiasis, chronic LE edema p/w worsening erythema and swelling L>R after completing 14 days agumentin for cellulitis. Admitted for empiric tx of cellultitis and r/o DVT, complicated with Prerenal/post renal/renal azotemia due to     . Check urine lytes. Monitor I & O’s, Daily weight.    Bilateral edema of lower extremity.   ·  Plan: worsening b/l LE swelling and erythema since 07/24 s/p outpatient 14 days Augmentin for cellulitis in June. Exam notable for erythema to mid shins, 3+ nonpitting edema with L>R.   On exam, more consistent with lymphedema but will empirically treat for cellulitis for now.   POCUS LE doppler negative for DVT   TTE with EF 80% ; Hyperdynamic left ventricle.  -Ancef 1g q12hr, renally dosed  -c/t hold home bumex  -b/l leg elevation  - will refer to [facility] ; [address] [place] [address]  [phone]. Script given to pt and call made. Pt has appointment August 22 at 12 noon     KELSEY (acute kidney injury).   ·  Plan: Initial Cr 1.9 with baseline Cr 0.7 likely in the setting diuretic use  -c/t hold bumex  - s/p 1L NS bolus   - Cr much improved  -renally dose meds and avoid nephrotoxic agents  -c/t hold home losartan.    Hypertension.   ·  Plan: -c/w home metoprolol   -hold home losartan.    Atrial fibrillation, chronic.   ·  Plan: -decreased xarelto to 15mg, renally dose   -metoprolol 200mg daily.    Diabetes.   ·  Plan: held metformin while in hospital. Now can continue a/p attending   -A1c 7.3.     Problem/Plan - 6:  ·  Problem: Preventive measure.   ·  Plan: Diet: Consistent carb and DASH  DVT: Xarelto  Dispo: home     Case discussed with attending. Pt is cleared for discharge to home. Opportunity given to ask questions and all answered. Meds sent to pharmacy

## 2022-07-27 NOTE — PROGRESS NOTE ADULT - PROBLEM SELECTOR PLAN 4
-decreased xarelto to 15mg, renally dosed  -metoprolol 200mg daily
-decreased xarelto to 15mg, renally dose   -metoprolol 200mg daily

## 2022-07-27 NOTE — PROGRESS NOTE ADULT - PROBLEM SELECTOR PLAN 6
Diet: Consistent carb and DASH  DVT: Xarelto
Diet: Consistent carb and DASH  DVT: Xarelto  Dispo: likely home

## 2023-05-02 NOTE — DISCHARGE NOTE NURSING/CASE MANAGEMENT/SOCIAL WORK - NSTRANSFERBELONGINGSDISPO_GEN_A_NUR
with patient Bactrim Counseling:  I discussed with the patient the risks of sulfa antibiotics including but not limited to GI upset, allergic reaction, drug rash, diarrhea, dizziness, photosensitivity, and yeast infections.  Rarely, more serious reactions can occur including but not limited to aplastic anemia, agranulocytosis, methemoglobinemia, blood dyscrasias, liver or kidney failure, lung infiltrates or desquamative/blistering drug rashes.

## 2023-06-22 NOTE — ED PROVIDER NOTE - NS ED MD TWO NIGHTS YN
Refill Routing Note   Medication(s) are not appropriate for processing by [facility] for the following reason(s):      Required vitals abnormal:  BP (!) 163/65    [facility] action(s):  Defer Care Due:  None identified          Appointments  past 12m or future 3m with PCP    Date Provider   Last Visit   4/6/2023 Keith Valentino MD   Next Visit   Visit date not found Keith Valentino MD   ED visits in past 90 days: 0        Note composed:1:19 PM 06/22/2023            Yes

## 2023-10-12 NOTE — ED PROVIDER NOTE - NS ED MD TWO NIGHTS YN
October 12, 2023       Fifi Jurado MD  [address] [facility] [facility] [address]  [facility] [address]  Via Fax: [phone]      Patient: Hayden Grimes   YOB: 2020   Date of Visit: 10/12/2023       Dear Dr. Jurado:    I saw your patient, Hayden Grimes, for an evaluation. Below are my notes for this visit with her.    If you have questions, please do not hesitate to call me.      Sincerely,        Jeff Kumari MD        CC: No Recipients    Jeff Kumari MD  10/12/2023 10:35 AM  Signed  10/12/2023    Chief Complaint   Patient presents with   • Office Visit     Ultrasound review       HPI:    Hayden is a 3 yr-old female who was seen initially secondary to fever, URI symptoms, increased irritability, and foul-smelling urine.  Evaluation performed at that time noted blood pressure 103/55 and a pulse of 179.  Was febrile to 38.1°C.  UA performed and concerning for infection.  Later urine culture consistent with a urinary infection.  Found to have pelviectasis.  Last seen in nephrology on 8/31/21.  Doing well per mother.  Sometimes complains of stinging with urination.  Mom not sure if empties bladder.  Can have accidents: many because does not want to stop playing.  Can be up to daily; in the last month.  Previously at 2x/week.    Can be worse at school; mom thinks due to distraction.    Does overall hold her urine for longer times.      Recently changed to sheryl ; does time wise link to change.    In August; cousin moved into the house  No UTIs recently    Review of Systems   Constitutional: Negative.  Negative for activity change, appetite change and fever.   HENT: Positive for rhinorrhea. Negative for congestion, nosebleeds and sore throat.    Eyes: Negative.  Negative for visual disturbance.   Respiratory: Negative.    Cardiovascular: Negative.    Gastrointestinal: Positive for constipation (daily but can be hard.  Does get miralax daily). Negative for abdominal distention, abdominal pain,  blood in stool, diarrhea, nausea and vomiting.   Endocrine: Negative.  Negative for polydipsia and polyuria.   Genitourinary: Negative.  Negative for decreased urine volume, difficulty urinating, dysuria, enuresis, flank pain, frequency, hematuria and urgency.   Musculoskeletal: Negative.  Negative for back pain and joint swelling.   Skin: Negative.  Negative for rash.   Neurological: Negative.  Negative for seizures and headaches.   Hematological: Negative.  Does not bruise/bleed easily.       Current Medications:   Current Outpatient Medications   Medication Sig Dispense Refill   • ibuprofen (CHILDRENS ADVIL) 100 MG/5ML suspension Take 10 mg/kg by mouth every 6 hours as needed.       No current facility-administered medications for this visit.         ALLERGIES:  No Known Allergies      Birth History:  Full term: YES  Delivery method: Vaginal  Amniotic fluid level: Normal  Prenatal Ultrasound Normal: YES  Normal  nursery after delivery: YES    Relevant Past Medical History:    History reviewed. No pertinent past medical history.    History reviewed. No pertinent surgical history.     Family History   Problem Relation Age of Onset   • Patient is unaware of any medical problems Mother    • Patient is unaware of any medical problems Father    • Kidney disease Neg Hx        Social History     Social History Narrative    Lives at home with parents, older brother (1/2 the time), cousin; in [place]    Dad: works for a [employer]    Mom: vet tech    Hemal ; dentist?       Examination:   Blood pressure 95/51, pulse 96, height 3' 5.54\" (1.055 m), weight 17.3 kg (38 lb 0.5 oz).  Weight    10/12/23 0959   Weight: 17.3 kg (38 lb 0.5 oz)       Physical Exam  Constitutional:       General: She is active. She is not in acute distress.     Appearance: She is well-developed.   Eyes:      Conjunctiva/sclera: Conjunctivae normal.   Cardiovascular:      Heart sounds: No murmur heard.  Pulmonary:       Effort: Pulmonary effort is normal. No respiratory distress, nasal flaring or retractions.      Breath sounds: Normal breath sounds.   Abdominal:      General: Bowel sounds are normal. There is no distension.      Palpations: Abdomen is soft.      Tenderness: There is no abdominal tenderness. There is no guarding.   Neurological:      Mental Status: She is alert.         3/9/2021:  UA: 1.023, pH 5.0, 1+ protein, nitrate positive, 2+ leuk esterase, 5-10 RBC, white blood cells too numerous to count, bacteria 1+  Urine culture: 10,000-50,000 and E. coli; resistant to ampicillin, intermediate to ampicillin/sulbactam, resistant to trimethoprim sulfamethoxazole; otherwise pan susceptible    3/23/2021: Urine culture: Straight cath: 1000 Proteus mirabilis, 200 gram-negative bacilli; pan susceptible    3/17/2021: Renal ultrasound: Right 5.64 cm, left 6.67 cm; very mild bilateral pelvicalyceal dilation .  Debris within the right collecting system.  Debris within the bladder.  Personally the images and discussed with family.    9/20/2021: Renal ultrasound: Right 6.5 cm, left 6.8 cm.  Per official read very mild right pelviectasis that is similar to previous  10/11/2023: Renal ultrasound: Right 6.7 cm, left 6.6 cm.  Mild right pelvic calyceal dilation increased from previous.  Mild prominence of the central left collecting system also increased.  SFU grade 2 on the right and SFU grade 1-2 on the left.  No distal ureteral dilation.    Assessment/Plan:  Hayden is a 3 year old female with with a history of an E. coli urinary infection and mild bilateral hydronephrosis.  Discussed the general role, function and anatomy of the renal system with the family.  Discussed risk factors for recurrent infections as well as indications/information gained from [facility] in regards to potential reflux.    Problem List Items Addressed This Visit        Genitourinary and Reproductive    Pelviectasis - Primary     - Urine dip to be performed  - No  serum testing today  - Continue to check blood pressures at all clinical visits; notify nephrology persistently elevated  - Notify nephrology if develops UTI, gross hematuria, dysuria, edema  - Reviewed urinary hygiene  -- timed voiding  -- avoid constipation  - Repeat ultrasound approxi-9 to 12 months time  - Follow-up in approximate 3-6 months time         Relevant Orders    Urinalysis With Microscopy Exam W/O C/S    Calcium/Creatinine Ratio, Urine     Return in about 6 months (around 4/12/2024).        Jeff Kumari MD                 Yes

## 2023-10-27 NOTE — ED PROVIDER NOTE - RATE
Sharad Ryder  10/27/2023     PREOPERATIVE DIAGNOSIS: PAD (peripheral artery disease) [I73.9]  Aortoiliac occlusive disease [I74.09]  Preop testing [Z01.818]     POSTOPERATIVE DIAGNOSIS: Post-Op Diagnosis Codes:     * PAD (peripheral artery disease) [I73.9]     * Aortoiliac occlusive disease [I74.09]     * Preop testing [Z01.818]     PROCEDURE PERFORMED:   1.  Bilateral common femoral artery cannulations  2.  Introduction of catheter/sheath into the aorta  3.  Aortoiliac angiogram with bilateral lower extremity runoffs  4.  Kissing balloon angioplasty of bilateral common iliac arteries using a 7 x 40 mm and a 7 x 60 mm EverCross balloons  5.  Placement of kissing stents in bilateral common iliac arteries at the bifurcation using an 8 x 39 mm [place] VBX stent graft (right) and an 8 x 59 mm [place] VBX stent graft (left)  6.  Completion aortoiliac angiogram with radiographic supervision and interpretation  7.  Mynx closure of bilateral common femoral arteries     SURGEON: Jl Salgado DO      ANESTHESIA: MAC    PREPARATION: Routine.    STAFF: Circulator: Gloria Rosado RN  Scrub Person: Amilcar Guthrie  Assistant: Markel Douglas  Vascular Radiology Technician: Nadira Bauer    Estimated Blood Loss: minimal    SPECIMENS: None    COMPLICATIONS: None    INDICATIONS: Sharad Ryder is a 62 y.o. male who we are currently following for carotid stenosis and a small abdominal aortic aneurysm.    He had a previous right carotid endarterectomy on 2/1/2018.  He denies any abdominal pain.  He does have complaints of left leg pain mostly to the left hip and buttocks area.  He is a current daily smoker.  He is maintained on aspirin, Plavix, and Crestor.  He did have noninvasive testing which I did review.  The indications, risks, and possible complications of the procedure were explained to the patient, who voiced understanding and wished to proceed with surgery.     PROCEDURE IN DETAIL: The patient was taken to the  operating room and placed on the operating table in a supine position. After MAC anesthesia was obtained, the bilateral groins was prepped and draped in a sterile manner.  5 cc of 0.5% Marcaine plain was used infiltrate the right groin for local anesthesia.  Using a micropuncture technique the right common femoral artery was cannulated and a microsheath was placed.  Advantage Glidewire was advanced into the aorta and a short 6 Italian sheath was placed per the patient was given 1000 units of intravenous heparin.  The Omni Flush catheter was advanced into the aorta and an aortoiliac angiogram with bilateral lower extremity runoffs were performed.  Findings are as follows:  1.  Patent renal arteries bilaterally with evidence of severe calcific disease surrounding each ostia  2.  Patent aorta with severe calcific disease present in both the wall and intraluminal with a 4 cm aneurysm present  3.  Patent right iliac system with heavy plaque burden noted  4.  Near complete occlusion of the left common iliac artery with patent external iliac artery  5.  Patent common femoral, profunda femoris, and SFA's bilaterally without stenosis  6.  Patent popliteal artery bilaterally without stenosis  7.  Patent three-vessel runoff to the foot bilaterally with both distal anterior tibial arteries atretic near the ankle.  The posterior tibial arteries bilaterally were dominant    At this point, the decision was made to try and revascularize the aortoiliac inflow segments.  Using a micropuncture technique, the left common femoral artery was cannulated and a microsheath was placed.  Advantage Glidewire was advanced into the left iliac system and a short 6 Italian sheath was placed.  Patient was given an additional 4000 units of intravenous heparin.  With the help of the angled glide catheter, the left common iliac artery  was traversed.  Kissing balloon angioplasty was then performed of bilateral common iliac arteries using a 7 x 40 mm  and a 7 x 60 mm EverCross balloons.  7 Macedonian sheaths were then exchanged.  Next, kissing stents were placed in bilateral common iliac arteries extending up into the aorta 2 to 3 mm.  An 8 x 39 mm [place] VBX covered stent graft was placed in the right common iliac artery and an 8 x 59 mm [place] VBX covered stent graft was placed in the left common iliac artery.  Completion aortoiliac angiogram was performed which showed rapid flow down through both iliac segments without any residual stenosis, dissection, or occlusion.  Completion angiogram was performed of the lower leg and the runoff vessels were maintained adequately.  At this point, felt the result was adequate no further intervention was warranted.  Minx devices were used to seal off both common femoral arteries.  Direct pressure was held for an additional 10 to 15 minutes to help ensure hemostasis. Sterile dressings were applied. The patient tolerated the procedure well. Sponge and needle counts were correct. The patient was then awakened in the operating room and taken to the recovery room in good condition.    Jl Salgado,   Date: 10/27/2023 Time: 12:13 CDT    CC:Nila Alexander APRN   77

## 2024-08-20 NOTE — ED PROVIDER NOTE - MEDICAL DECISION MAKING DETAILS
20-Aug-2024 adult female pw progressive worsening pedal edema to the point she is needs help lifting legs into bed, NO cp/sob bnp 3K, edema marked lower extr most cw chf. but will perform doppler to ro dvt given recent hospitalization. TBA for diuresis

## 2024-09-13 ENCOUNTER — INPATIENT (INPATIENT)
Facility: HOSPITAL | Age: 79
LOS: 3 days | Discharge: HOME | DRG: 556 | End: 2024-09-17
Attending: INTERNAL MEDICINE | Admitting: INTERNAL MEDICINE
Payer: MEDICARE

## 2024-09-13 VITALS
OXYGEN SATURATION: 97 % | HEIGHT: 61 IN | WEIGHT: 225.09 LBS | HEART RATE: 100 BPM | TEMPERATURE: 98 F | RESPIRATION RATE: 18 BRPM | DIASTOLIC BLOOD PRESSURE: 91 MMHG | SYSTOLIC BLOOD PRESSURE: 162 MMHG

## 2024-09-13 DIAGNOSIS — Z96.659 PRESENCE OF UNSPECIFIED ARTIFICIAL KNEE JOINT: Chronic | ICD-10-CM

## 2024-09-13 DIAGNOSIS — M79.89 OTHER SPECIFIED SOFT TISSUE DISORDERS: ICD-10-CM

## 2024-09-13 DIAGNOSIS — Z98.890 OTHER SPECIFIED POSTPROCEDURAL STATES: Chronic | ICD-10-CM

## 2024-09-13 DIAGNOSIS — N20.0 CALCULUS OF KIDNEY: Chronic | ICD-10-CM

## 2024-09-13 LAB
ALBUMIN SERPL ELPH-MCNC: 4 G/DL — SIGNIFICANT CHANGE UP (ref 3.3–5)
ALP SERPL-CCNC: 112 U/L — SIGNIFICANT CHANGE UP (ref 40–120)
ALT FLD-CCNC: 16 U/L — SIGNIFICANT CHANGE UP (ref 10–45)
ANION GAP SERPL CALC-SCNC: 13 MMOL/L — SIGNIFICANT CHANGE UP (ref 5–17)
AST SERPL-CCNC: 20 U/L — SIGNIFICANT CHANGE UP (ref 10–40)
BASOPHILS # BLD AUTO: 0.02 K/UL — SIGNIFICANT CHANGE UP (ref 0–0.2)
BASOPHILS NFR BLD AUTO: 0.2 % — SIGNIFICANT CHANGE UP (ref 0–2)
BILIRUB SERPL-MCNC: 0.4 MG/DL — SIGNIFICANT CHANGE UP (ref 0.2–1.2)
BUN SERPL-MCNC: 12 MG/DL — SIGNIFICANT CHANGE UP (ref 7–23)
CALCIUM SERPL-MCNC: 9.2 MG/DL — SIGNIFICANT CHANGE UP (ref 8.4–10.5)
CHLORIDE SERPL-SCNC: 107 MMOL/L — SIGNIFICANT CHANGE UP (ref 96–108)
CO2 SERPL-SCNC: 24 MMOL/L — SIGNIFICANT CHANGE UP (ref 22–31)
CREAT SERPL-MCNC: 0.57 MG/DL — SIGNIFICANT CHANGE UP (ref 0.5–1.3)
CRP SERPL-MCNC: 8 MG/L — HIGH (ref 0–4)
EGFR: 92 ML/MIN/1.73M2 — SIGNIFICANT CHANGE UP
EOSINOPHIL # BLD AUTO: 0.13 K/UL — SIGNIFICANT CHANGE UP (ref 0–0.5)
EOSINOPHIL NFR BLD AUTO: 1.5 % — SIGNIFICANT CHANGE UP (ref 0–6)
GLUCOSE BLDC GLUCOMTR-MCNC: 100 MG/DL — HIGH (ref 70–99)
GLUCOSE BLDC GLUCOMTR-MCNC: 124 MG/DL — HIGH (ref 70–99)
GLUCOSE SERPL-MCNC: 137 MG/DL — HIGH (ref 70–99)
HCT VFR BLD CALC: 38 % — SIGNIFICANT CHANGE UP (ref 34.5–45)
HGB BLD-MCNC: 11.8 G/DL — SIGNIFICANT CHANGE UP (ref 11.5–15.5)
IMM GRANULOCYTES NFR BLD AUTO: 0.6 % — SIGNIFICANT CHANGE UP (ref 0–0.9)
LYMPHOCYTES # BLD AUTO: 1.19 K/UL — SIGNIFICANT CHANGE UP (ref 1–3.3)
LYMPHOCYTES # BLD AUTO: 13.9 % — SIGNIFICANT CHANGE UP (ref 13–44)
MCHC RBC-ENTMCNC: 28.9 PG — SIGNIFICANT CHANGE UP (ref 27–34)
MCHC RBC-ENTMCNC: 31.1 GM/DL — LOW (ref 32–36)
MCV RBC AUTO: 92.9 FL — SIGNIFICANT CHANGE UP (ref 80–100)
MONOCYTES # BLD AUTO: 0.45 K/UL — SIGNIFICANT CHANGE UP (ref 0–0.9)
MONOCYTES NFR BLD AUTO: 5.2 % — SIGNIFICANT CHANGE UP (ref 2–14)
NEUTROPHILS # BLD AUTO: 6.75 K/UL — SIGNIFICANT CHANGE UP (ref 1.8–7.4)
NEUTROPHILS NFR BLD AUTO: 78.6 % — HIGH (ref 43–77)
NRBC # BLD: 0 /100 WBCS — SIGNIFICANT CHANGE UP (ref 0–0)
NT-PROBNP SERPL-SCNC: 307 PG/ML — HIGH (ref 0–300)
PLATELET # BLD AUTO: 233 K/UL — SIGNIFICANT CHANGE UP (ref 150–400)
POTASSIUM SERPL-MCNC: 3.9 MMOL/L — SIGNIFICANT CHANGE UP (ref 3.5–5.3)
POTASSIUM SERPL-SCNC: 3.9 MMOL/L — SIGNIFICANT CHANGE UP (ref 3.5–5.3)
PROT SERPL-MCNC: 7.5 G/DL — SIGNIFICANT CHANGE UP (ref 6–8.3)
RBC # BLD: 4.09 M/UL — SIGNIFICANT CHANGE UP (ref 3.8–5.2)
RBC # FLD: 15 % — HIGH (ref 10.3–14.5)
SODIUM SERPL-SCNC: 144 MMOL/L — SIGNIFICANT CHANGE UP (ref 135–145)
TROPONIN T, HIGH SENSITIVITY RESULT: 16 NG/L — SIGNIFICANT CHANGE UP (ref 0–51)
TROPONIN T, HIGH SENSITIVITY RESULT: 17 NG/L — SIGNIFICANT CHANGE UP (ref 0–51)
WBC # BLD: 8.59 K/UL — SIGNIFICANT CHANGE UP (ref 3.8–10.5)
WBC # FLD AUTO: 8.59 K/UL — SIGNIFICANT CHANGE UP (ref 3.8–10.5)

## 2024-09-13 PROCEDURE — 71045 X-RAY EXAM CHEST 1 VIEW: CPT | Mod: 26

## 2024-09-13 PROCEDURE — 99285 EMERGENCY DEPT VISIT HI MDM: CPT

## 2024-09-13 RX ORDER — RIVAROXABAN 10 MG/1
20 TABLET, FILM COATED ORAL
Refills: 0 | Status: DISCONTINUED | OUTPATIENT
Start: 2024-09-13 | End: 2024-09-17

## 2024-09-13 RX ORDER — DEXTROSE 15 G/33 G
15 GEL IN PACKET (GRAM) ORAL ONCE
Refills: 0 | Status: DISCONTINUED | OUTPATIENT
Start: 2024-09-13 | End: 2024-09-17

## 2024-09-13 RX ORDER — LEVOTHYROXINE SODIUM 100 MCG
25 TABLET ORAL DAILY
Refills: 0 | Status: DISCONTINUED | OUTPATIENT
Start: 2024-09-13 | End: 2024-09-17

## 2024-09-13 RX ORDER — DEXTROSE 15 G/33 G
25 GEL IN PACKET (GRAM) ORAL ONCE
Refills: 0 | Status: DISCONTINUED | OUTPATIENT
Start: 2024-09-13 | End: 2024-09-17

## 2024-09-13 RX ORDER — METOPROLOL TARTRATE 100 MG/1
200 TABLET ORAL DAILY
Refills: 0 | Status: DISCONTINUED | OUTPATIENT
Start: 2024-09-13 | End: 2024-09-17

## 2024-09-13 RX ORDER — ACETAMINOPHEN 325 MG/1
1000 TABLET ORAL ONCE
Refills: 0 | Status: COMPLETED | OUTPATIENT
Start: 2024-09-13 | End: 2024-09-13

## 2024-09-13 RX ORDER — GLUCAGON INJECTION, SOLUTION 1 MG/.2ML
1 INJECTION, SOLUTION SUBCUTANEOUS ONCE
Refills: 0 | Status: DISCONTINUED | OUTPATIENT
Start: 2024-09-13 | End: 2024-09-17

## 2024-09-13 RX ORDER — DEXTROSE 15 G/33 G
12.5 GEL IN PACKET (GRAM) ORAL ONCE
Refills: 0 | Status: DISCONTINUED | OUTPATIENT
Start: 2024-09-13 | End: 2024-09-17

## 2024-09-13 RX ORDER — CEFAZOLIN SODIUM 2 G/100ML
1000 INJECTION, SOLUTION INTRAVENOUS EVERY 8 HOURS
Refills: 0 | Status: DISCONTINUED | OUTPATIENT
Start: 2024-09-13 | End: 2024-09-17

## 2024-09-13 RX ORDER — CEFAZOLIN SODIUM 2 G/100ML
1000 INJECTION, SOLUTION INTRAVENOUS ONCE
Refills: 0 | Status: COMPLETED | OUTPATIENT
Start: 2024-09-13 | End: 2024-09-13

## 2024-09-13 RX ADMIN — CEFAZOLIN SODIUM 100 MILLIGRAM(S): 2 INJECTION, SOLUTION INTRAVENOUS at 13:13

## 2024-09-13 RX ADMIN — RIVAROXABAN 20 MILLIGRAM(S): 10 TABLET, FILM COATED ORAL at 16:44

## 2024-09-13 RX ADMIN — ACETAMINOPHEN 1000 MILLIGRAM(S): 325 TABLET ORAL at 16:40

## 2024-09-13 RX ADMIN — Medication 20 MILLIGRAM(S): at 23:41

## 2024-09-13 RX ADMIN — CEFAZOLIN SODIUM 100 MILLIGRAM(S): 2 INJECTION, SOLUTION INTRAVENOUS at 23:41

## 2024-09-13 RX ADMIN — CEFAZOLIN SODIUM 1000 MILLIGRAM(S): 2 INJECTION, SOLUTION INTRAVENOUS at 16:40

## 2024-09-13 RX ADMIN — ACETAMINOPHEN 400 MILLIGRAM(S): 325 TABLET ORAL at 12:30

## 2024-09-13 NOTE — ED PROVIDER NOTE - CLINICAL SUMMARY MEDICAL DECISION MAKING FREE TEXT BOX
Afebrile hemodynamically stable female presents to ED for bilateral lower extremity edema with erythema and clear discharge.  Physical exam notable for chronic lymphedema with clear discharge in the posterior aspect of the leg but is foul-smelling.  Erythematous and warm to touch bilaterally.  Distal pulses palpable, no calf tenderness.  Patient denies SOB or chest pain.  No bowel movement.  Desatted to mid 80s.  TTE 2022 revealed EF 80%.  Will order basic labs, troponin, proBNP, CRP, sed rate, EKG, CXR to rule out HF versus ACS versus cellulitis versus infectious etiology.  Given pain meds, reassess. Afebrile hemodynamically stable female presents to ED for bilateral lower extremity edema with erythema and clear discharge.  Physical exam notable for chronic lymphedema with clear discharge in the posterior aspect of the leg but is foul-smelling.  Erythematous and warm to touch bilaterally.  Distal pulses palpable, no calf tenderness.  Patient denies SOB or chest pain.  No bowel movement.  Desatted to mid 80s.  TTE 2022 revealed EF 80%.  Will order basic labs, troponin, proBNP, CRP, sed rate, EKG, CXR to rule out HF versus ACS versus cellulitis versus infectious etiology.  Given pain meds, reassess.    Maryjo Adams, Attending Physician: Agree with above.  Differential diagnosis includes but not limited to: Chronic venous stasis with poor healing, lymphedema, cellulitis which clinically seems likely given it is warm and tender and edematous,, less likely DVT given clinical presentation, no clinical features of nec fasc at this time. Will likely warrant admission for IV abx and monitoring given extent of findings.

## 2024-09-13 NOTE — ED PROVIDER NOTE - PHYSICAL EXAMINATION
Gen: NAD, non-toxic appearing  Head: normal appearing  HEENT: normal conjunctiva, oral mucosa dry   Lung: no respiratory distress, speaking in full sentences, CTA b/l     CV: regular rate and rhythm, no murmurs  Abd: soft, non distended, non tender   MSK: chronic lymphedema, erythematous, warm to touch w/ clear foul smelling discharge posteriorly b/l   Neuro: No focal deficits, AAOx3  Skin: Warm  Psych: normal affect Gen: NAD, non-toxic appearing  Head: normal appearing  HEENT: normal conjunctiva, oral mucosa dry   Lung: no respiratory distress, speaking in full sentences, CTA b/l     CV: regular rate and rhythm, no murmurs  Abd: soft, non distended, non tender   MSK: chronic lymphedema, erythematous, warm to touch w/ clear foul smelling discharge posteriorly b/l. No crepitus. No pain out of proportion.  Neuro: No focal deficits, AAOx3  Skin: Warm  Psych: normal affect

## 2024-09-13 NOTE — H&P ADULT - HISTORY OF PRESENT ILLNESS
79-year-old female past medical history HTN, HLD, DMT2, A-fib on Xarelto, lymphedema chronic presents to ED for erythema swelling of the lower extremity concerning for cellulitis.  Advised by PMD to come to ED for further evaluation.  Previously admitted 7/27/2022 for cellulitis.  Patient states she has had chronic lymphedema however erythema has been progressively worse over the last 2 weeks with clear discharge that is foul-smelling and warm to the touch.  Denies fever, chills, nausea, vomiting, chest pain, SOB, calf tenderness.  No prior history of PE/DVT.

## 2024-09-13 NOTE — H&P ADULT - ASSESSMENT
79-year-old female past medical history HTN, HLD, DMT2, A-fib on Xarelto, lymphedema chronic presents to ED for erythema swelling of the lower extremity concerning for cellulitis.  Advised by PMD to come to ED for further evaluation.  Previously admitted 7/27/2022 for cellulitis.  Patient states she has had chronic lymphedema however erythema has been progressively worse over the last 2 weeks with clear discharge that is foul-smelling and warm to the touch.  Denies fever, chills, nausea, vomiting, chest pain, SOB, calf tenderness.  No prior history of PE/DVT.    LE swelling, cellulitis  - cw cefazolin  - check LE doppler to ro DVT   - fu cultures  - will monitor     Afib  - cw xarelto  - rate controlled    HTN  - cw home meds  - DASH diet     DM  - monitor FS  - ISS

## 2024-09-13 NOTE — ED PROVIDER NOTE - ATTENDING CONTRIBUTION TO CARE
see mdm    edited by Maryjo Adams DO - attending physician.   Please see progress notes for status/labs/consult updates and ED course after initial presentation.

## 2024-09-13 NOTE — H&P ADULT - NSHPLABSRESULTS_GEN_ALL_CORE
Lab Results:  CBC  CBC Full  -  ( 13 Sep 2024 12:13 )  WBC Count : 8.59 K/uL  RBC Count : 4.09 M/uL  Hemoglobin : 11.8 g/dL  Hematocrit : 38.0 %  Platelet Count - Automated : 233 K/uL  Mean Cell Volume : 92.9 fl  Mean Cell Hemoglobin : 28.9 pg  Mean Cell Hemoglobin Concentration : 31.1 gm/dL  Auto Neutrophil # : 6.75 K/uL  Auto Lymphocyte # : 1.19 K/uL  Auto Monocyte # : 0.45 K/uL  Auto Eosinophil # : 0.13 K/uL  Auto Basophil # : 0.02 K/uL  Auto Neutrophil % : 78.6 %  Auto Lymphocyte % : 13.9 %  Auto Monocyte % : 5.2 %  Auto Eosinophil % : 1.5 %  Auto Basophil % : 0.2 %    .		Differential:	[] Automated		[] Manual  Chemistry                        11.8   8.59  )-----------( 233      ( 13 Sep 2024 12:13 )             38.0     09-13    144  |  107  |  12  ----------------------------<  137<H>  3.9   |  24  |  0.57    Ca    9.2      13 Sep 2024 12:13    TPro  7.5  /  Alb  4.0  /  TBili  0.4  /  DBili  x   /  AST  20  /  ALT  16  /  AlkPhos  112  09-13    LIVER FUNCTIONS - ( 13 Sep 2024 12:13 )  Alb: 4.0 g/dL / Pro: 7.5 g/dL / ALK PHOS: 112 U/L / ALT: 16 U/L / AST: 20 U/L / GGT: x             Urinalysis Basic - ( 13 Sep 2024 12:13 )    Color: x / Appearance: x / SG: x / pH: x  Gluc: 137 mg/dL / Ketone: x  / Bili: x / Urobili: x   Blood: x / Protein: x / Nitrite: x   Leuk Esterase: x / RBC: x / WBC x   Sq Epi: x / Non Sq Epi: x / Bacteria: x            MICROBIOLOGY/CULTURES:      RADIOLOGY RESULTS: reviewed

## 2024-09-13 NOTE — ED ADULT NURSE NOTE - OBJECTIVE STATEMENT
THe pt is an 79 Y F with a PMH of HTN, DM, HLD, Hypothyroidism, and Afib on ac. PT is Aox4 breathing evenly on room air and able to speka in full sentences. PT came in today sent from her out pt MD for increased swelling and redness in BLLE. PT states that for the last few days her MD has followed her legs and they have been leaking fluids on the back. Pt legs were noted to be red with dry skin and yellow fluids leaking out the calf muscles. PT denies head ache, fevers, chills, N,V,D  chest pain sob vision changes and pt has full motor and sensory function intact. pt was placed in a gown in a stretcher with comfort and safety measures provided

## 2024-09-13 NOTE — H&P ADULT - NSHPPHYSICALEXAM_GEN_ALL_CORE
General: WN/WD NAD  PERRLA  Neurology: A&Ox3, nonfocal, THOMPSON x 4  Respiratory: CTA B/L  CV: S1S2+  Abdominal: Soft, NT, ND +BS, Last BM  Extremities: edema+

## 2024-09-14 LAB
A1C WITH ESTIMATED AVERAGE GLUCOSE RESULT: 7.4 % — HIGH (ref 4–5.6)
ERYTHROCYTE [SEDIMENTATION RATE] IN BLOOD: 63 MM/HR — HIGH (ref 0–20)
ESTIMATED AVERAGE GLUCOSE: 166 MG/DL — HIGH (ref 68–114)
GLUCOSE BLDC GLUCOMTR-MCNC: 114 MG/DL — HIGH (ref 70–99)
GLUCOSE BLDC GLUCOMTR-MCNC: 122 MG/DL — HIGH (ref 70–99)
GLUCOSE BLDC GLUCOMTR-MCNC: 124 MG/DL — HIGH (ref 70–99)
GLUCOSE BLDC GLUCOMTR-MCNC: 130 MG/DL — HIGH (ref 70–99)

## 2024-09-14 PROCEDURE — 93970 EXTREMITY STUDY: CPT | Mod: 26

## 2024-09-14 PROCEDURE — 99222 1ST HOSP IP/OBS MODERATE 55: CPT

## 2024-09-14 RX ORDER — LISINOPRIL 10 MG/1
5 TABLET ORAL DAILY
Refills: 0 | Status: DISCONTINUED | OUTPATIENT
Start: 2024-09-14 | End: 2024-09-17

## 2024-09-14 RX ORDER — FUROSEMIDE 40 MG
20 TABLET ORAL DAILY
Refills: 0 | Status: DISCONTINUED | OUTPATIENT
Start: 2024-09-14 | End: 2024-09-16

## 2024-09-14 RX ADMIN — Medication 25 MICROGRAM(S): at 05:30

## 2024-09-14 RX ADMIN — Medication 20 MILLIGRAM(S): at 14:01

## 2024-09-14 RX ADMIN — METOPROLOL TARTRATE 200 MILLIGRAM(S): 100 TABLET ORAL at 05:30

## 2024-09-14 RX ADMIN — RIVAROXABAN 20 MILLIGRAM(S): 10 TABLET, FILM COATED ORAL at 18:14

## 2024-09-14 RX ADMIN — LISINOPRIL 5 MILLIGRAM(S): 10 TABLET ORAL at 14:02

## 2024-09-14 RX ADMIN — CEFAZOLIN SODIUM 100 MILLIGRAM(S): 2 INJECTION, SOLUTION INTRAVENOUS at 22:56

## 2024-09-14 RX ADMIN — Medication 20 MILLIGRAM(S): at 22:56

## 2024-09-14 RX ADMIN — CEFAZOLIN SODIUM 100 MILLIGRAM(S): 2 INJECTION, SOLUTION INTRAVENOUS at 14:01

## 2024-09-14 RX ADMIN — CEFAZOLIN SODIUM 100 MILLIGRAM(S): 2 INJECTION, SOLUTION INTRAVENOUS at 05:30

## 2024-09-14 NOTE — PROGRESS NOTE ADULT - SUBJECTIVE AND OBJECTIVE BOX
date of service: 09-14-24 @ 09:09  afberile  REVIEW OF SYSTEMS:  CONSTITUTIONAL: No fever,  no  weight loss  ENT:  No  tinnitus,   no   vertigo  NECK: No pain or stiffness  RESPIRATORY: No cough, wheezing, chills or hemoptysis;    No Shortness of Breath  CARDIOVASCULAR: No chest pain, palpitations, dizziness  GASTROINTESTINAL: No abdominal or epigastric pain. No nausea, vomiting, or hematemesis; No diarrhea  No melena or hematochezia.  GENITOURINARY: No dysuria, frequency, hematuria, or incontinence  NEUROLOGICAL: No headaches  SKIN: No itching,  no   rash  LYMPH Nodes: No enlarged glands  ENDOCRINE: No heat or cold intolerance  MUSCULOSKELETAL: No joint pain or swelling  PSYCHIATRIC: No depression, anxiety  HEME/LYMPH: No easy bruising, or bleeding gums  ALLERGY AND IMMUNOLOGIC: No hives or eczema	    MEDICATIONS  (STANDING):  atorvastatin 20 milliGRAM(s) Oral at bedtime  ceFAZolin   IVPB 1000 milliGRAM(s) IV Intermittent every 8 hours  dextrose 5%. 1000 milliLiter(s) (50 mL/Hr) IV Continuous <Continuous>  dextrose 5%. 1000 milliLiter(s) (100 mL/Hr) IV Continuous <Continuous>  dextrose 50% Injectable 25 Gram(s) IV Push once  dextrose 50% Injectable 25 Gram(s) IV Push once  dextrose 50% Injectable 12.5 Gram(s) IV Push once  glucagon  Injectable 1 milliGRAM(s) IntraMuscular once  insulin lispro (ADMELOG) corrective regimen sliding scale   SubCutaneous three times a day before meals  levothyroxine 25 MICROGram(s) Oral daily  metoprolol succinate  milliGRAM(s) Oral daily  rivaroxaban 20 milliGRAM(s) Oral with dinner    MEDICATIONS  (PRN):  dextrose Oral Gel 15 Gram(s) Oral once PRN Blood Glucose LESS THAN 70 milliGRAM(s)/deciliter      Vital Signs Last 24 Hrs  T(C): 36.6 (14 Sep 2024 04:18), Max: 37 (13 Sep 2024 11:19)  T(F): 97.9 (14 Sep 2024 04:18), Max: 98.6 (13 Sep 2024 11:19)  HR: 81 (14 Sep 2024 04:18) (73 - 100)  BP: 154/81 (14 Sep 2024 04:18) (147/78 - 171/92)  BP(mean): --  RR: 18 (14 Sep 2024 04:18) (18 - 18)  SpO2: 92% (14 Sep 2024 04:18) (92% - 99%)    Parameters below as of 14 Sep 2024 04:18  Patient On (Oxygen Delivery Method): room air      CAPILLARY BLOOD GLUCOSE      POCT Blood Glucose.: 124 mg/dL (14 Sep 2024 08:41)  POCT Blood Glucose.: 124 mg/dL (13 Sep 2024 21:42)  POCT Blood Glucose.: 100 mg/dL (13 Sep 2024 16:39)    I&O's Summary        Appearance: Normal	  HEENT:   Normal oral mucosa, PERRL, EOMI	  Lymphatic: No lymphadenopathy  Cardiovascular: Normal S1 S2, No JVD  Respiratory: Lungs clear to auscultation	  Gastrointestinal:  Soft, Non-tender, + BS	  Skin: No rash, No ecchymoses	  Extremities:   +  edema    LABS:                        11.8   8.59  )-----------( 233      ( 13 Sep 2024 12:13 )             38.0     09-13    144  |  107  |  12  ----------------------------<  137<H>  3.9   |  24  |  0.57    Ca    9.2      13 Sep 2024 12:13    TPro  7.5  /  Alb  4.0  /  TBili  0.4  /  DBili  x   /  AST  20  /  ALT  16  /  AlkPhos  112  09-13          Urinalysis Basic - ( 13 Sep 2024 12:13 )    Color: x / Appearance: x / SG: x / pH: x  Gluc: 137 mg/dL / Ketone: x  / Bili: x / Urobili: x   Blood: x / Protein: x / Nitrite: x   Leuk Esterase: x / RBC: x / WBC x   Sq Epi: x / Non Sq Epi: x / Bacteria: x                      Consultant(s) Notes Reviewed:      Care Discussed with Consultants/Other Providers:

## 2024-09-14 NOTE — PROGRESS NOTE ADULT - SUBJECTIVE AND OBJECTIVE BOX
Date of Service: 09-14-24 @ 10:26           CARDIOLOGY     PROGRESS  NOTE   ________________________________________________    CHIEF COMPLAINT:Patient is a 79y old  Female who presents with a chief complaint of LE swelling (14 Sep 2024 09:09)  no complain  	  REVIEW OF SYSTEMS:  CONSTITUTIONAL: No fever, weight loss, or fatigue  EYES: No eye pain, visual disturbances, or discharge  ENT:  No difficulty hearing, tinnitus, vertigo; No sinus or throat pain  NECK: No pain or stiffness  RESPIRATORY: No cough, wheezing, chills or hemoptysis; No Shortness of Breath  CARDIOVASCULAR: No chest pain, palpitations, passing out, dizziness, + leg swelling  GASTROINTESTINAL: No abdominal or epigastric pain. No nausea, vomiting, or hematemesis; No diarrhea or constipation. No melena or hematochezia.  GENITOURINARY: No dysuria, frequency, hematuria, or incontinence  NEUROLOGICAL: No headaches, memory loss, loss of strength, numbness, or tremors  SKIN: No itching, burning, rashes, or lesions   LYMPH Nodes: No enlarged glands  ENDOCRINE: No heat or cold intolerance; No hair loss  MUSCULOSKELETAL: No joint pain or swelling; No muscle, back, or extremity pain  PSYCHIATRIC: No depression, anxiety, mood swings, or difficulty sleeping  HEME/LYMPH: No easy bruising, or bleeding gums  ALLERGY AND IMMUNOLOGIC: No hives or eczema	    [x ] All others negative	  [ ] Unable to obtain    PHYSICAL EXAM:  T(C): 36.6 (09-14-24 @ 04:18), Max: 37 (09-13-24 @ 11:19)  HR: 81 (09-14-24 @ 04:18) (73 - 100)  BP: 154/81 (09-14-24 @ 04:18) (147/78 - 171/92)  RR: 18 (09-14-24 @ 04:18) (18 - 18)  SpO2: 92% (09-14-24 @ 04:18) (92% - 99%)  Wt(kg): --  I&O's Summary    14 Sep 2024 07:01  -  14 Sep 2024 10:26  --------------------------------------------------------  IN: 240 mL / OUT: 0 mL / NET: 240 mL        Appearance: Normal	  HEENT:   Normal oral mucosa, PERRL, EOMI	  Lymphatic: No lymphadenopathy  Cardiovascular: Normal S1 S2, No JVD, + murmurs, + edema  Respiratory: rhonchi  Psychiatry: A & O x 3, Mood & affect appropriate  Gastrointestinal:  Soft, Non-tender, + BS	  Skin: No rashes, No ecchymoses, No cyanosis	  Neurologic: Non-focal  Extremities: Normal range of motion, No clubbing, cyanosis , + edema  Vascular: Peripheral pulses palpable 2+ bilaterally    MEDICATIONS  (STANDING):  atorvastatin 20 milliGRAM(s) Oral at bedtime  ceFAZolin   IVPB 1000 milliGRAM(s) IV Intermittent every 8 hours  dextrose 5%. 1000 milliLiter(s) (100 mL/Hr) IV Continuous <Continuous>  dextrose 5%. 1000 milliLiter(s) (50 mL/Hr) IV Continuous <Continuous>  dextrose 50% Injectable 25 Gram(s) IV Push once  dextrose 50% Injectable 12.5 Gram(s) IV Push once  dextrose 50% Injectable 25 Gram(s) IV Push once  glucagon  Injectable 1 milliGRAM(s) IntraMuscular once  insulin lispro (ADMELOG) corrective regimen sliding scale   SubCutaneous three times a day before meals  levothyroxine 25 MICROGram(s) Oral daily  metoprolol succinate  milliGRAM(s) Oral daily  rivaroxaban 20 milliGRAM(s) Oral with dinner      TELEMETRY: 	    ECG:  	  RADIOLOGY:  OTHER: 	  	  LABS:	 	    CARDIAC MARKERS:                                11.8   8.59  )-----------( 233      ( 13 Sep 2024 12:13 )             38.0     09-13    144  |  107  |  12  ----------------------------<  137<H>  3.9   |  24  |  0.57    Ca    9.2      13 Sep 2024 12:13    TPro  7.5  /  Alb  4.0  /  TBili  0.4  /  DBili  x   /  AST  20  /  ALT  16  /  AlkPhos  112  09-13    proBNP:   Lipid Profile:   HgA1c:   TSH:         Assessment and plan  ---------------------------   79-year-old female past medical history HTN, HLD, DMT2, A-fib on Xarelto, lymphedema chronic presents to ED for erythema swelling of the lower extremity concerning for cellulitis.  Advised by PMD to come to ED for further evaluation.  Previously admitted 7/27/2022 for cellulitis.  Patient states she has had chronic lymphedema however erythema has been progressively worse over the last 2 weeks with clear discharge that is foul-smelling and warm to the touch.  Denies fever, chills, nausea, vomiting, chest pain, SOB, calf tenderness.  No prior history of PE/DVT.   pt with hx of hfpef, chronic a.fib with le edema and cellulitis  continue all cardiac meds  continue beta blocker, fu hr  continue ac  doubt dvt she is on chronic ac  abx  lipid panel  will add ace or ARB in view of DM if increase bp  continue ac  pro bnp  sob ?sec to sleep apnea/ underlaying lung disease  needs sleep study/ le edema ?venous insufficiency, doppler with reflux as out pt  will call Dr Machuca to see if sleep studies are done  add lasix 20 mg iv bid  Lisinopril 5 mg daily

## 2024-09-14 NOTE — PATIENT PROFILE ADULT - NSPROPTRIGHTSUPPORTPERSON_GEN_A_NUR
Hospitalist Admission History and Physical    NAME:  Norma Galan   :   1979   MRN:   [id number]     PCP:  Catrachita Chaparro MD  Date/Time:  2024 6:33 AM  Subjective:   CHIEF COMPLAINT:      HISTORY OF PRESENT ILLNESS:     Norma is a 44 y.o.   male with history of sickle cell disease, past sickle cell pain crisis, past acute chest syndrome who presents in day 4 of a pain crisis which is no longer amenable to home p.o. Dilaudid.  Pain present in hips, back.  Takes hydroxyurea and folic acid.  Notes that he had some mild discomfort in his chest this morning although currently without any chest discomfort, no dyspnea, no fevers, no cough.        Past Medical History:   Diagnosis Date    B12 deficiency 03/15/2010    210    Cholelithiasis 2012    U/S Result: Cholelithiasis    Elevated alkaline phosphatase level 2010    158    Elevated ALT measurement 2010    71    Elevated AST (SGOT) 03/10/2012    38    Elevated platelet count 10/25/2007    494    Elevated total protein 2011    8.7    History of blood transfusion     Hypocalcemia 2011    4.1    Hypokalemia     Hyponatremia 2009    Leukocytosis 2009    Microcytic anemia 10/25/2007    Pleural effusion on right 07/15/2015    [facility] CXR Result    Reticulocytosis 10/25/2007    7.8    Serum total bilirubin elevated 2011    T.B. 3.8, D.B. 0.4.     Sickle cell anemia with crisis (HCC)     Dr. Catrachita Chaparro    Vitamin D deficiency 2016    5.4        Past Surgical History:   Procedure Laterality Date    CHOLECYSTECTOMY         Social History     Tobacco Use    Smoking status: Never    Smokeless tobacco: Never   Substance Use Topics    Alcohol use: Yes        Family History   Problem Relation Age of Onset    Hypertension Neg Hx     Stroke Neg Hx     Heart Attack Neg Hx     Heart Disease Neg Hx     Diabetes Neg Hx     Cancer Neg Hx         Allergies   Allergen Reactions    Milk (Cow) 
same name as above

## 2024-09-14 NOTE — CONSULT NOTE ADULT - ASSESSMENT
79-year-old female past medical history HTN, HLD, DM II, A-fib on Xarelto, chronic lymphedema presents for erythema and swelling of the lower extremity worsening over last 2 weeks. She also notes bullae to the area which ruptured spontaneously. Associated with clear malodorous drainage. No known trauma. No fever at home. Recently took ciprofloxacin for 7 days for a UTI, completed about 10 days ago.    Afebrile, no leukocytosis, ESR 63, CRP 8  Erythema, swelling, and warmth with clear malodorous drainage from the back of both lower legs, L >R  No pending cultures  Started on cefazolin by primary team on 9/13    #Cellulitis  #Chronic lymphedema    - continue cefazolin 1g q8h  - check mrsa pcr  - consider vascular evaluation for lymphedema management  - monitor for clinical improvement    d/w attending.    Sumeet Coleman, PGY5  ID Fellow  [facility] [employer] Preferred  After 5pm/weekends call [phone]
 79-year-old female past medical history HTN, HLD, DMT2, A-fib on Xarelto, lymphedema chronic presents to ED for erythema swelling of the lower extremity concerning for cellulitis.  Advised by PMD to come to ED for further evaluation.  Previously admitted 7/27/2022 for cellulitis.  Patient states she has had chronic lymphedema however erythema has been progressively worse over the last 2 weeks with clear discharge that is foul-smelling and warm to the touch.  Denies fever, chills, nausea, vomiting, chest pain, SOB, calf tenderness.  No prior history of PE/DVT.   pt with hx of hfpef, chronic a.fib with le edema and cellulitis  continue all cardiac meds  continue beta blocker, fu hr  continue ac  doubt dvt she is on chronic ac  abx  lipid panel  will add ace or ARB in view of DM if increase bp  continue ac  pro bnp  sob ?sec to sleep apnea/ underlaying lung disease  needs sleep study/ le edema ?venous insufficiency, doppler with reflux as out pt  add diuretics  will call Dr Machuca to see if sleep studies are done

## 2024-09-14 NOTE — CONSULT NOTE ADULT - SUBJECTIVE AND OBJECTIVE BOX
Date of Service, 09-13-24 @ 18:12  CHIEF COMPLAINT:Patient is a 79y old  Female who presents with a chief complaint of LE swelling (13 Sep 2024 16:04)      HPI:   79-year-old female past medical history HTN, HLD, DMT2, A-fib on Xarelto, lymphedema chronic presents to ED for erythema swelling of the lower extremity concerning for cellulitis.  Advised by PMD to come to ED for further evaluation.  Previously admitted 7/27/2022 for cellulitis.  Patient states she has had chronic lymphedema however erythema has been progressively worse over the last 2 weeks with clear discharge that is foul-smelling and warm to the touch.  Denies fever, chills, nausea, vomiting, chest pain, SOB, calf tenderness.  No prior history of PE/DVT.       PAST MEDICAL & SURGICAL HISTORY:  Hypertension  stable  Diabetes type 2, controlled  Hyperlipidemia  Nephrolithiasis  AF (atrial fibrillation)  HTN (hypertension)  HLD (hyperlipidemia)  Diabetes  Atrial fibrillation  onset 5/2018  Hypothyroid  Nephrolithiasis  Status post total knee replacement, unspecified laterality  H/O total knee replacement  bilateral  , 1998, 2000  S/P cystoscopy  with left ureteral stent 4/2018    MEDICATIONS  (STANDING):  atorvastatin 20 milliGRAM(s) Oral at bedtime  ceFAZolin   IVPB 1000 milliGRAM(s) IV Intermittent every 8 hours  dextrose 5%. 1000 milliLiter(s) (100 mL/Hr) IV Continuous <Continuous>  dextrose 5%. 1000 milliLiter(s) (50 mL/Hr) IV Continuous <Continuous>  dextrose 50% Injectable 25 Gram(s) IV Push once  dextrose 50% Injectable 12.5 Gram(s) IV Push once  dextrose 50% Injectable 25 Gram(s) IV Push once  glucagon  Injectable 1 milliGRAM(s) IntraMuscular once  insulin lispro (ADMELOG) corrective regimen sliding scale   SubCutaneous three times a day before meals  levothyroxine 25 MICROGram(s) Oral daily  metoprolol succinate  milliGRAM(s) Oral daily  rivaroxaban 20 milliGRAM(s) Oral with dinner    MEDICATIONS  (PRN):  dextrose Oral Gel 15 Gram(s) Oral once PRN Blood Glucose LESS THAN 70 milliGRAM(s)/deciliter      FAMILY HISTORY:  FH: heart failure (Father)        SOCIAL HISTORY:    [x ] Non-smoker  [ ] Smoker  [ ] Alcohol    Allergies    No Known Allergies    Intolerances    	    REVIEW OF SYSTEMS:  CONSTITUTIONAL: No fever, weight loss, or fatigue  EYES: No eye pain, visual disturbances, or discharge  ENT:  No difficulty hearing, tinnitus, vertigo; No sinus or throat pain  NECK: No pain or stiffness  RESPIRATORY: No cough, wheezing, chills or hemoptysis; + Shortness of Breath  CARDIOVASCULAR: No chest pain, palpitations, passing out, dizziness, + leg swelling  GASTROINTESTINAL: No abdominal or epigastric pain. No nausea, vomiting, or hematemesis; No diarrhea or constipation. No melena or hematochezia.  GENITOURINARY: No dysuria, frequency, hematuria, or incontinence  NEUROLOGICAL: No headaches, memory loss, loss of strength, numbness, or tremors  SKIN: No itching, burning, rashes, or lesions   LYMPH Nodes: No enlarged glands  ENDOCRINE: No heat or cold intolerance; No hair loss  MUSCULOSKELETAL: No joint pain or swelling; No muscle, back, or extremity pain  PSYCHIATRIC: No depression, anxiety, mood swings, or difficulty sleeping  HEME/LYMPH: No easy bruising, or bleeding gums  ALLERGY AND IMMUNOLOGIC: No hives or eczema	    [x ] All others negative	  [ ] Unable to obtain    PHYSICAL EXAM:  T(C): 36.6 (09-13-24 @ 17:14), Max: 37 (09-13-24 @ 11:19)  HR: 73 (09-13-24 @ 17:14) (73 - 100)  BP: 147/78 (09-13-24 @ 17:14) (147/78 - 171/92)  RR: 18 (09-13-24 @ 17:14) (18 - 18)  SpO2: 97% (09-13-24 @ 17:14) (97% - 99%)  Wt(kg): --  I&O's Summary      Appearance: Normal	  HEENT:   Normal oral mucosa, PERRL, EOMI	  Lymphatic: No lymphadenopathy  Cardiovascular: Normal S1 S2, No JVD, + murmurs, + edema  Respiratory: Lungs clear to auscultation	  Psychiatry: A & O x 3, Mood & affect appropriate  Gastrointestinal:  Soft, Non-tender, + BS	  Skin: No rashes, No ecchymoses, No cyanosis, + cellulitis	  Neurologic: Non-focal  Extremities: Normal range of motion, No clubbing, cyanosis + edema  Vascular: Peripheral pulses palpable 2+ bilaterally    TELEMETRY: 	    ECG:  	  RADIOLOGY:  OTHER: 	  	  LABS:	 	    CARDIAC MARKERS:                          11.8   8.59  )-----------( 233      ( 13 Sep 2024 12:13 )             38.0     09-13    144  |  107  |  12  ----------------------------<  137<H>  3.9   |  24  |  0.57    Ca    9.2      13 Sep 2024 12:13    TPro  7.5  /  Alb  4.0  /  TBili  0.4  /  DBili  x   /  AST  20  /  ALT  16  /  AlkPhos  112  09-13    proBNP:   Lipid Profile:   HgA1c:   TSH:       PREVIOUS DIAGNOSTIC TESTING:     < from: 12 Lead ECG (07.25.22 @ 10:00) >  Ventricular Rate 65 BPM    Atrial Rate 65 BPM    P-R Interval 168 ms    QRS Duration 76 ms    Q-T Interval 412 ms    QTC Calculation(Bazett) 428 ms    P Axis 105 degrees    R Axis -4 degrees    T Axis 20 degrees    Diagnosis Line NORMAL SINUS RHYTHM  NORMAL ECG  WHEN COMPARED WITH ECG OF 16-APR-2018 10:07,  NO SIGNIFICANT CHANGE WAS FOUND    < from: CT Abdomen and Pelvis w/ Oral Cont and w/ IV Cont (04.16.18 @ 11:48) >  IMPRESSION: Obstructing calculus in the left distal ureter, measuring 0.9   cm with moderate left hydroureteronephrosis and perinephric stranding.    < from: Transthoracic Echocardiogram (07.26.22 @ 09:55) >  Mitral Valve: Mild mitral stenosis due to annular  calcification. Mean gradient 4.3 mmHg at  109/min.  Aortic Valve/Aorta: Calcified aortic valve with normal  opening.  Normal aortic root size.  Left Atrium: Normal left atrium.  Left Ventricle: Normal left ventricular internal dimensions  and wall thicknesses.  Hyperdynamic left ventricle.  Right Heart: Normal right atrium. Normal right ventricular  size and function.  Normal tricuspid valve. Mild tricuspid regurgitation.  Normal pulmonic valve.  Pericardium/Pleura: Normal pericardium with no pericardial  effusion.  Hemodynamic: Estimated right atrial pressure is normal.  Mild-moderate pulmonary hypertension. Estimated PASP 52  mmHg.  ------------------------------------------------------------------------  Conclusions:  Hyperdynamic left ventricle.  Mild mitral stenosis due to annular calcification.  Mild-moderate pulmonary hypertension.                
Patient is a 79y old  Female who presents with a chief complaint of LE swelling (14 Sep 2024 10:26)    HPI:   79-year-old female past medical history HTN, HLD, DMT2, A-fib on Xarelto, lymphedema chronic presents to ED for erythema swelling of the lower extremity concerning for cellulitis.  Advised by PMD to come to ED for further evaluation.  Previously admitted 7/27/2022 for cellulitis.  Patient states she has had chronic lymphedema however erythema has been progressively worse over the last 2 weeks with clear discharge that is foul-smelling and warm to the touch.  Denies fever, chills, nausea, vomiting, chest pain, SOB, calf tenderness.  No prior history of PE/DVT. (13 Sep 2024 16:04)       REVIEW OF SYSTEMS  Constitutional: No fevers, chills, weight loss or fatigue   Skin: +LE swelling, erythema, discharge  Eyes: No discharge	  ENMT: No sore throat, oral thrush, ulcers or exudate  Respiratory: No cough, no SOB  Cardiovascular:  No chest pain, palpitations or edema   Gastrointestinal: No pain, nausea, vomiting, diarrhea or constipation	  Genitourinary: No dysuria, discharge or flank pain  MSK: No arthralgias or back pain   Neurological: No HA, no weakness, no seizures, no AMS       prior hospital charts reviewed [V]  primary team notes reviewed [V]  other consultant notes reviewed [V]    PAST MEDICAL & SURGICAL HISTORY:  Hypertension  stable      Diabetes type 2, controlled      Hyperlipidemia      Nephrolithiasis      AF (atrial fibrillation)      HTN (hypertension)      HLD (hyperlipidemia)      Diabetes      Atrial fibrillation  onset 5/2018      Hypothyroid      Nephrolithiasis      Status post total knee replacement, unspecified laterality      H/O total knee replacement  bilateral  , 1998, 2000      S/P cystoscopy  with left ureteral stent 4/2018          SOCIAL HISTORY:    FAMILY HISTORY:  FH: heart failure (Father)    Allergies  No Known Allergies    ANTIMICROBIALS:  ceFAZolin   IVPB 1000 every 8 hours      ANTIMICROBIALS (past 90 days):  MEDICATIONS  (STANDING):  ceFAZolin   IVPB   100 mL/Hr IV Intermittent (09-13-24 @ 13:13)    ceFAZolin   IVPB   100 mL/Hr IV Intermittent (09-14-24 @ 14:01)   100 mL/Hr IV Intermittent (09-14-24 @ 05:30)   100 mL/Hr IV Intermittent (09-13-24 @ 23:41)      OTHER MEDS:   MEDICATIONS  (STANDING):  atorvastatin 20 at bedtime  dextrose 50% Injectable 25 once  dextrose 50% Injectable 12.5 once  dextrose 50% Injectable 25 once  dextrose Oral Gel 15 once PRN  furosemide   Injectable 20 daily  glucagon  Injectable 1 once  insulin lispro (ADMELOG) corrective regimen sliding scale  three times a day before meals  levothyroxine 25 daily  lisinopril 5 daily  metoprolol succinate  daily  rivaroxaban 20 with dinner      VITALS:  Vital Signs Last 24 Hrs  T(F): 98.3 (09-14-24 @ 11:52), Max: 98.6 (09-13-24 @ 11:19)    Vital Signs Last 24 Hrs  HR: 73 (09-14-24 @ 11:52) (73 - 81)  BP: 139/78 (09-14-24 @ 11:52) (139/78 - 161/75)  RR: 18 (09-14-24 @ 11:52)  SpO2: 95% (09-14-24 @ 11:52) (92% - 97%)  Wt(kg): --    EXAM:  General: Patient in no acute distress   HEENT: NCAT, no oral lesions  CV: S1+S2, no m/r/g appreciated  Lungs: No respiratory distress, CTAB  Abd: Soft, nontender  Ext: 3+ edema b/l LE  Neuro: Alert and oriented, no focal deficits, CN II-XII grossly intact   Skin: Erythema, warmth, mild tenderness involving back of both shins R > L. Clear malodorous discharge present from both areasa   IV: No phlebitis      Labs:                        11.8   8.59  )-----------( 233      ( 13 Sep 2024 12:13 )             38.0     09-13    144  |  107  |  12  ----------------------------<  137<H>  3.9   |  24  |  0.57    Ca    9.2      13 Sep 2024 12:13    TPro  7.5  /  Alb  4.0  /  TBili  0.4  /  DBili  x   /  AST  20  /  ALT  16  /  AlkPhos  112  09-13      WBC Trend:  WBC Count: 8.59 (09-13-24 @ 12:13)      Auto Neutrophil #: 6.75 K/uL (09-13-24 @ 12:13)      Creatine Trend:  Creatinine: 0.57 (09-13)      Liver Biochemical Testing Trend:  Alanine Aminotransferase (ALT/SGPT): 16 (09-13)  Aspartate Aminotransferase (AST/SGOT): 20 (09-13-24 @ 12:13)  Bilirubin Total: 0.4 (09-13)    Auto Eosinophil %: 1.5 % (09-13-24 @ 12:13)      Urinalysis Basic - ( 13 Sep 2024 12:13 )    Color: x / Appearance: x / SG: x / pH: x  Gluc: 137 mg/dL / Ketone: x  / Bili: x / Urobili: x   Blood: x / Protein: x / Nitrite: x   Leuk Esterase: x / RBC: x / WBC x   Sq Epi: x / Non Sq Epi: x / Bacteria: x    MICROBIOLOGY:    C-Reactive Protein: 8 (09-13)    Troponin T, High Sensitivity Result: 16 (09-13)  Troponin T, High Sensitivity Result: 17 (09-13)    A1C with Estimated Average Glucose Result: 7.4 % (09-14-24 @ 07:10)    RADIOLOGY:  imaging below personally reviewed    < from: [facility] Duplex Lower Ext Vein Scan, Bilat (09.14.24 @ 13:44) >  IMPRESSION:  No evidence of deep venous thrombosis in either lower extremity.    < end of copied text >  < from: Xray Chest 1 View- PORTABLE-Urgent (Xray Chest 1 View- PORTABLE-Urgent .) (09.13.24 @ 12:30) >  IMPRESSION:  Clear lungs.    < end of copied text >

## 2024-09-14 NOTE — PROGRESS NOTE ADULT - ASSESSMENT
79-year-old female     h/o    HTN, HLD, DMT2,         A-fib on Xarelto, lymphedema chronic .  renal  stone  left  ureter          presents to ED for erythema swelling of the lower extremity concerning for cellulitis. as  advised  by  pmd     chronic lymphedema however erythema ,  been progressively worse over the last 2 weeks with clear discharge    malodor       on iv   ancef       house  ID  eval     c/c  Afib,           on Xarelto   HTN /  HLD    Hypothyroid     h/o  c/c  diastolic  chf    venous  insufficiency.  legs    lipitor, toprol, synthroid, xarelo     rad             79-year-old female     h/o    HTN, HLD, DMT2,         A-fib on Xarelto, lymphedema chronic .  renal  stone  left  ureter          presents to ED for erythema swelling of the lower extremity concerning for cellulitis. as  advised  by  pmd     chronic lymphedema however erythema ,  been progressively worse over the last 2 weeks with clear discharge    malodor       on iv   ancef       house  ID  eval     c/c  Afib,           on Xarelto   HTN /  HLD    Hypothyroid     h/o  c/c  diastolic  chf    venous  insufficiency.  legs    lipitor, toprol, synthroid, xarelo                  79-year-old female     h/o    HTN, HLD, DMT2,         A-fib on Xarelto, lymphedema chronic .  renal  stone  left  ureter          presents to ED for erythema swelling of the lower extremity concerning for cellulitis. as  advised  by  pmd     chronic lymphedema however erythema ,  been progressively worse over the last 2 weeks with clear discharge    malodor       on iv   ancef       house  ID  eval     c/c  Afib,           on Xarelto   HTN /  HLD    Hypothyroid     h/o  c/c  diastolic  chf    venous  insufficiency.   b/l  legs    lipitor, toprol, synthroid, xarelo

## 2024-09-14 NOTE — PATIENT PROFILE ADULT - FALL HARM RISK - HARM RISK INTERVENTIONS

## 2024-09-14 NOTE — CHART NOTE - NSCHARTNOTEFT_GEN_A_CORE
Advanced Care Planning:  I have had goals of care discussion, advanced directive and code status with patient/family    and  in  coordinating   patient care.     all questions answered and expressed understanding of the plan.   pt  is  full  code

## 2024-09-14 NOTE — CONSULT NOTE ADULT - ATTENDING COMMENTS
79-year-old female past medical history HTN, HLD, DM II, A-fib on Xarelto, chronic lymphedema presents for erythema and swelling of the lower extremity worsening over last 2 weeks. She also notes bullae to the area which ruptured spontaneously. Associated with clear malodorous drainage. No known trauma. No fever at home. Recently took ciprofloxacin for 7 days for a UTI, completed about 10 days ago.    Afebrile, no leukocytosis, ESR 63, CRP 8  Erythema, swelling, and warmth with clear malodorous drainage from the back of both lower legs, L >R  No pending cultures  Started on cefazolin by primary team on 9/13.    Infectious diseases consultation requested today for further management. She reports mild pain in bilateral LE. NO fevers or chills at home. Reports being treated for cellulitis year ago. 2 weeks ago took ciprofloxacin for UTI but no other antibiotic use. She has no drug allergies. Reports no other symptoms. B/L edema and erythema noted with serous discharge.     # Bilateral Lower extremity Cellulitis  # Chronic lymphedema  # Elevated inflammatory markers     Antibiotics:   Cefazolin 09/13---current     RECOMMENDATIONS:   - continue cefazolin 1g q8h  - Send MRSA nares PCR.   - consider vascular evaluation for lymphedema management  - monitor for clinical improvement,  Trend WBC, monitor fever curve and hemodynamic status.   If febrile, please send blood cultures.   Rest of the care as per the primary team.      Discussed with the primary team    Tiffany Girard MD  Attending Physician, Division of Infectious Diseases  Department of Medicine   [facility]    Contact on TEAMS messaging from 9am - 5pm  Office: [phone] (after 5 PM or weekend)

## 2024-09-14 NOTE — PATIENT PROFILE ADULT - NSPROHMSYMPCOND_GEN_A_NUR
Pt states doctor told her she now has to watch her sugar due to high number (A1c)., pt states she never managed it at home before./none

## 2024-09-15 LAB
GLUCOSE BLDC GLUCOMTR-MCNC: 116 MG/DL — HIGH (ref 70–99)
GLUCOSE BLDC GLUCOMTR-MCNC: 120 MG/DL — HIGH (ref 70–99)
GLUCOSE BLDC GLUCOMTR-MCNC: 125 MG/DL — HIGH (ref 70–99)
GLUCOSE BLDC GLUCOMTR-MCNC: 136 MG/DL — HIGH (ref 70–99)
HCT VFR BLD CALC: 36.1 % — SIGNIFICANT CHANGE UP (ref 34.5–45)
HGB BLD-MCNC: 11.4 G/DL — LOW (ref 11.5–15.5)
MCHC RBC-ENTMCNC: 28.9 PG — SIGNIFICANT CHANGE UP (ref 27–34)
MCHC RBC-ENTMCNC: 31.6 GM/DL — LOW (ref 32–36)
MCV RBC AUTO: 91.4 FL — SIGNIFICANT CHANGE UP (ref 80–100)
MRSA PCR RESULT.: SIGNIFICANT CHANGE UP
NRBC # BLD: 0 /100 WBCS — SIGNIFICANT CHANGE UP (ref 0–0)
PLATELET # BLD AUTO: 221 K/UL — SIGNIFICANT CHANGE UP (ref 150–400)
RBC # BLD: 3.95 M/UL — SIGNIFICANT CHANGE UP (ref 3.8–5.2)
RBC # FLD: 15.1 % — HIGH (ref 10.3–14.5)
S AUREUS DNA NOSE QL NAA+PROBE: SIGNIFICANT CHANGE UP
WBC # BLD: 8.69 K/UL — SIGNIFICANT CHANGE UP (ref 3.8–10.5)
WBC # FLD AUTO: 8.69 K/UL — SIGNIFICANT CHANGE UP (ref 3.8–10.5)

## 2024-09-15 RX ADMIN — CEFAZOLIN SODIUM 100 MILLIGRAM(S): 2 INJECTION, SOLUTION INTRAVENOUS at 06:00

## 2024-09-15 RX ADMIN — METOPROLOL TARTRATE 200 MILLIGRAM(S): 100 TABLET ORAL at 05:58

## 2024-09-15 RX ADMIN — Medication 25 MICROGRAM(S): at 05:59

## 2024-09-15 RX ADMIN — CEFAZOLIN SODIUM 100 MILLIGRAM(S): 2 INJECTION, SOLUTION INTRAVENOUS at 22:14

## 2024-09-15 RX ADMIN — Medication 20 MILLIGRAM(S): at 05:55

## 2024-09-15 RX ADMIN — RIVAROXABAN 20 MILLIGRAM(S): 10 TABLET, FILM COATED ORAL at 17:42

## 2024-09-15 RX ADMIN — Medication 20 MILLIGRAM(S): at 22:13

## 2024-09-15 RX ADMIN — CEFAZOLIN SODIUM 100 MILLIGRAM(S): 2 INJECTION, SOLUTION INTRAVENOUS at 13:15

## 2024-09-15 RX ADMIN — LISINOPRIL 5 MILLIGRAM(S): 10 TABLET ORAL at 05:59

## 2024-09-15 NOTE — PHYSICAL THERAPY INITIAL EVALUATION ADULT - PERTINENT HX OF CURRENT PROBLEM, REHAB EVAL
79-year-old female past medical history HTN, HLD, DMT2, A-fib on Xarelto, lymphedema chronic presents to ED for erythema swelling of the lower extremity concerning for cellulitis.  Advised by PMD to come to ED for further evaluation.  Previously admitted 7/27/2022 for cellulitis.  Patient states she has had chronic lymphedema however erythema has been progressively worse over the last 2 weeks with clear discharge that is foul-smelling and warm to the touch.  Denies fever, chills, nausea, vomiting, chest pain, SOB, calf tenderness.  No prior history of PE/DVT.    CXR: clear lungs (9/13)  Bilateral [facility] duplex: No evidence of deep venous thrombosis in either lower extremity. (9/13)

## 2024-09-15 NOTE — PROGRESS NOTE ADULT - ASSESSMENT
79-year-old female     h/o    HTN, HLD, DMT2,         A-fib on Xarelto, lymphedema chronic .  renal  stone  left  ureter          presents to ED for erythema swelling of the lower extremity concerning for cellulitis. as  advised  by  pmd     chronic lymphedema however erythema ,  been progressively worse over the last 2 weeks with clear discharge    malodor       on iv   ancef       house  ID  eval     c/c  Afib,           on Xarelto   HTN /  HLD    Hypothyroid     h/o  c/c  diastolic  chf    venous  insufficiency.   b/l  legs /  known to  outsode   vascular  dr/  and  pt  feels  it  was   not  helping   lipitor, toprol, synthroid, xarelo    on iv ancef, per  ID

## 2024-09-15 NOTE — PHYSICAL THERAPY INITIAL EVALUATION ADULT - ADDITIONAL COMMENTS
Pt lives in a private home with 2 MIRANDA, bedroom and bathroom on first floor. there is aflight of stairs to go up and down to a basement that she reports she is able to negotiate independently but does not need to if unable. Pt lives with her . Uses cane/RW for community mobility but often does not need the device when she is in her home. - driving. Pt reports she sleeps in a recliner for the last 2 years. Pt has grab bars installed in all bathrooms and owns RW and cane

## 2024-09-15 NOTE — PROGRESS NOTE ADULT - SUBJECTIVE AND OBJECTIVE BOX
date of service: 09-15-24 @ 08:49  afberile  REVIEW OF SYSTEMS:  CONSTITUTIONAL: No fever,  no  weight loss  ENT:  No  tinnitus,   no   vertigo  NECK: No pain or stiffness  RESPIRATORY: No cough, wheezing, chills or hemoptysis;    No Shortness of Breath  CARDIOVASCULAR: No chest pain, palpitations, dizziness  GASTROINTESTINAL: No abdominal or epigastric pain. No nausea, vomiting, or hematemesis; No diarrhea  No melena or hematochezia.  GENITOURINARY: No dysuria, frequency, hematuria, or incontinence  NEUROLOGICAL: No headaches  SKIN: No itching,  no   rash  LYMPH Nodes: No enlarged glands  ENDOCRINE: No heat or cold intolerance  MUSCULOSKELETAL: No joint pain or swelling  PSYCHIATRIC: No depression, anxiety  HEME/LYMPH: No easy bruising, or bleeding gums  ALLERGY AND IMMUNOLOGIC: No hives or eczema	    MEDICATIONS  (STANDING):  atorvastatin 20 milliGRAM(s) Oral at bedtime  ceFAZolin   IVPB 1000 milliGRAM(s) IV Intermittent every 8 hours  dextrose 5%. 1000 milliLiter(s) (100 mL/Hr) IV Continuous <Continuous>  dextrose 5%. 1000 milliLiter(s) (50 mL/Hr) IV Continuous <Continuous>  dextrose 50% Injectable 25 Gram(s) IV Push once  dextrose 50% Injectable 12.5 Gram(s) IV Push once  dextrose 50% Injectable 25 Gram(s) IV Push once  furosemide   Injectable 20 milliGRAM(s) IV Push daily  glucagon  Injectable 1 milliGRAM(s) IntraMuscular once  insulin lispro (ADMELOG) corrective regimen sliding scale   SubCutaneous three times a day before meals  levothyroxine 25 MICROGram(s) Oral daily  lisinopril 5 milliGRAM(s) Oral daily  metoprolol succinate  milliGRAM(s) Oral daily  rivaroxaban 20 milliGRAM(s) Oral with dinner    MEDICATIONS  (PRN):  dextrose Oral Gel 15 Gram(s) Oral once PRN Blood Glucose LESS THAN 70 milliGRAM(s)/deciliter      Vital Signs Last 24 Hrs  T(C): 37 (15 Sep 2024 04:15), Max: 37 (15 Sep 2024 04:15)  T(F): 98.6 (15 Sep 2024 04:15), Max: 98.6 (15 Sep 2024 04:15)  HR: 75 (15 Sep 2024 04:15) (73 - 80)  BP: 133/75 (15 Sep 2024 04:15) (133/75 - 143/77)  BP(mean): --  RR: 18 (15 Sep 2024 04:15) (18 - 18)  SpO2: 99% (15 Sep 2024 04:15) (95% - 99%)    Parameters below as of 15 Sep 2024 04:15  Patient On (Oxygen Delivery Method): room air      CAPILLARY BLOOD GLUCOSE      POCT Blood Glucose.: 136 mg/dL (15 Sep 2024 08:35)  POCT Blood Glucose.: 130 mg/dL (14 Sep 2024 21:24)  POCT Blood Glucose.: 114 mg/dL (14 Sep 2024 17:54)  POCT Blood Glucose.: 122 mg/dL (14 Sep 2024 12:14)    I&O's Summary    14 Sep 2024 07:01  -  15 Sep 2024 07:00  --------------------------------------------------------  IN: 855 mL / OUT: 2800 mL / NET: -1945 mL          Appearance: Normal	  HEENT:   Normal oral mucosa, PERRL, EOMI	  Lymphatic: No lymphadenopathy  Cardiovascular: Normal S1 S2, No JVD  Respiratory: Lungs clear to auscultation	  Gastrointestinal:  Soft, Non-tender, + BS	  Skin: No rash, No ecchymoses	  Extremities:     LABS:                        11.4   8.69  )-----------( 221      ( 15 Sep 2024 07:43 )             36.1     09-13    144  |  107  |  12  ----------------------------<  137<H>  3.9   |  24  |  0.57    Ca    9.2      13 Sep 2024 12:13    TPro  7.5  /  Alb  4.0  /  TBili  0.4  /  DBili  x   /  AST  20  /  ALT  16  /  AlkPhos  112  09-13          Urinalysis Basic - ( 13 Sep 2024 12:13 )    Color: x / Appearance: x / SG: x / pH: x  Gluc: 137 mg/dL / Ketone: x  / Bili: x / Urobili: x   Blood: x / Protein: x / Nitrite: x   Leuk Esterase: x / RBC: x / WBC x   Sq Epi: x / Non Sq Epi: x / Bacteria: x                      Consultant(s) Notes Reviewed:      Care Discussed with Consultants/Other Providers:

## 2024-09-15 NOTE — PROGRESS NOTE ADULT - SUBJECTIVE AND OBJECTIVE BOX
Date of Service: 09-15-24 @ 10:08           CARDIOLOGY     PROGRESS  NOTE   ________________________________________________    CHIEF COMPLAINT:Patient is a 79y old  Female who presents with a chief complaint of LE swelling (15 Sep 2024 08:48)  doing better  	  REVIEW OF SYSTEMS:  CONSTITUTIONAL: No fever, weight loss, or fatigue  EYES: No eye pain, visual disturbances, or discharge  ENT:  No difficulty hearing, tinnitus, vertigo; No sinus or throat pain  NECK: No pain or stiffness  RESPIRATORY: No cough, wheezing, chills or hemoptysis; No Shortness of Breath  CARDIOVASCULAR: No chest pain, palpitations, passing out, dizziness, or leg swelling  GASTROINTESTINAL: No abdominal or epigastric pain. No nausea, vomiting, or hematemesis; No diarrhea or constipation. No melena or hematochezia.  GENITOURINARY: No dysuria, frequency, hematuria, or incontinence  NEUROLOGICAL: No headaches, memory loss, loss of strength, numbness, or tremors  SKIN: No itching, burning, rashes, or lesions   LYMPH Nodes: No enlarged glands  ENDOCRINE: No heat or cold intolerance; No hair loss  MUSCULOSKELETAL: No joint pain or swelling; No muscle, back pain  PSYCHIATRIC: No depression, anxiety, mood swings, or difficulty sleeping  HEME/LYMPH: No easy bruising, or bleeding gums  ALLERGY AND IMMUNOLOGIC: No hives or eczema	    [x ] All others negative	  [ ] Unable to obtain    PHYSICAL EXAM:  T(C): 37 (09-15-24 @ 04:15), Max: 37 (09-15-24 @ 04:15)  HR: 71 (09-15-24 @ 09:52) (71 - 80)  BP: 152/77 (09-15-24 @ 09:52) (133/75 - 152/77)  RR: 18 (09-15-24 @ 04:15) (18 - 18)  SpO2: 95% (09-15-24 @ 09:52) (95% - 99%)  Wt(kg): --  I&O's Summary    14 Sep 2024 07:01  -  15 Sep 2024 07:00  --------------------------------------------------------  IN: 855 mL / OUT: 2800 mL / NET: -1945 mL        Appearance: Normal	  HEENT:   Normal oral mucosa, PERRL, EOMI	  Lymphatic: No lymphadenopathy  Cardiovascular: Normal S1 S2, No JVD, + murmurs, + edema  Respiratory: rhonchi  Psychiatry: A & O x 3, Mood & affect appropriate  Gastrointestinal:  Soft, Non-tender, + BS	  Skin: No rashes, No ecchymoses, No cyanosis	  Neurologic: Non-focal  Extremities: Normal range of motion, No clubbing, cyanosis + edema R>L  Vascular: Peripheral pulses palpable 2+ bilaterally    MEDICATIONS  (STANDING):  atorvastatin 20 milliGRAM(s) Oral at bedtime  ceFAZolin   IVPB 1000 milliGRAM(s) IV Intermittent every 8 hours  dextrose 5%. 1000 milliLiter(s) (100 mL/Hr) IV Continuous <Continuous>  dextrose 5%. 1000 milliLiter(s) (50 mL/Hr) IV Continuous <Continuous>  dextrose 50% Injectable 25 Gram(s) IV Push once  dextrose 50% Injectable 12.5 Gram(s) IV Push once  dextrose 50% Injectable 25 Gram(s) IV Push once  furosemide   Injectable 20 milliGRAM(s) IV Push daily  glucagon  Injectable 1 milliGRAM(s) IntraMuscular once  insulin lispro (ADMELOG) corrective regimen sliding scale   SubCutaneous three times a day before meals  levothyroxine 25 MICROGram(s) Oral daily  lisinopril 5 milliGRAM(s) Oral daily  metoprolol succinate  milliGRAM(s) Oral daily  rivaroxaban 20 milliGRAM(s) Oral with dinner      TELEMETRY: 	    ECG:  	  RADIOLOGY:  OTHER: 	  	  LABS:	 	    CARDIAC MARKERS:                                11.4   8.69  )-----------( 221      ( 15 Sep 2024 07:43 )             36.1     09-13    144  |  107  |  12  ----------------------------<  137<H>  3.9   |  24  |  0.57    Ca    9.2      13 Sep 2024 12:13    TPro  7.5  /  Alb  4.0  /  TBili  0.4  /  DBili  x   /  AST  20  /  ALT  16  /  AlkPhos  112  09-13    proBNP:   Lipid Profile:   HgA1c:   TSH:         Assessment and plan  ---------------------------   79-year-old female past medical history HTN, HLD, DMT2, A-fib on Xarelto, lymphedema chronic presents to ED for erythema swelling of the lower extremity concerning for cellulitis.  Advised by PMD to come to ED for further evaluation.  Previously admitted 7/27/2022 for cellulitis.  Patient states she has had chronic lymphedema however erythema has been progressively worse over the last 2 weeks with clear discharge that is foul-smelling and warm to the touch.  Denies fever, chills, nausea, vomiting, chest pain, SOB, calf tenderness.  No prior history of PE/DVT.   pt with hx of hfpef, chronic a.fib with le edema and cellulitis  continue all cardiac meds  continue beta blocker, fu hr  continue ac  doubt dvt she is on chronic ac  abx  lipid panel  will add ace or ARB in view of DM if increase bp  continue ac  pro bnp  sob ?sec to sleep apnea/ underlaying lung disease  needs sleep study/ le edema ?venous insufficiency, doppler with reflux as out pt  will call Dr Machuca to see if sleep studies are done  add lasix 20 mg iv bid  Lisinopril 5 mg daily, will increase lisinopril dose as tolerated  oob to the chair/ physical therapy  ECHo

## 2024-09-16 ENCOUNTER — TRANSCRIPTION ENCOUNTER (OUTPATIENT)
Age: 79
End: 2024-09-16

## 2024-09-16 LAB
ANION GAP SERPL CALC-SCNC: 10 MMOL/L — SIGNIFICANT CHANGE UP (ref 5–17)
BUN SERPL-MCNC: 19 MG/DL — SIGNIFICANT CHANGE UP (ref 7–23)
CALCIUM SERPL-MCNC: 9.2 MG/DL — SIGNIFICANT CHANGE UP (ref 8.4–10.5)
CHLORIDE SERPL-SCNC: 103 MMOL/L — SIGNIFICANT CHANGE UP (ref 96–108)
CO2 SERPL-SCNC: 28 MMOL/L — SIGNIFICANT CHANGE UP (ref 22–31)
CREAT SERPL-MCNC: 0.62 MG/DL — SIGNIFICANT CHANGE UP (ref 0.5–1.3)
EGFR: 90 ML/MIN/1.73M2 — SIGNIFICANT CHANGE UP
GLUCOSE BLDC GLUCOMTR-MCNC: 111 MG/DL — HIGH (ref 70–99)
GLUCOSE BLDC GLUCOMTR-MCNC: 123 MG/DL — HIGH (ref 70–99)
GLUCOSE BLDC GLUCOMTR-MCNC: 129 MG/DL — HIGH (ref 70–99)
GLUCOSE BLDC GLUCOMTR-MCNC: 158 MG/DL — HIGH (ref 70–99)
GLUCOSE SERPL-MCNC: 122 MG/DL — HIGH (ref 70–99)
HCT VFR BLD CALC: 37.4 % — SIGNIFICANT CHANGE UP (ref 34.5–45)
HGB BLD-MCNC: 11.5 G/DL — SIGNIFICANT CHANGE UP (ref 11.5–15.5)
MCHC RBC-ENTMCNC: 28 PG — SIGNIFICANT CHANGE UP (ref 27–34)
MCHC RBC-ENTMCNC: 30.7 GM/DL — LOW (ref 32–36)
MCV RBC AUTO: 91.2 FL — SIGNIFICANT CHANGE UP (ref 80–100)
NRBC # BLD: 0 /100 WBCS — SIGNIFICANT CHANGE UP (ref 0–0)
PLATELET # BLD AUTO: 224 K/UL — SIGNIFICANT CHANGE UP (ref 150–400)
POTASSIUM SERPL-MCNC: 3.7 MMOL/L — SIGNIFICANT CHANGE UP (ref 3.5–5.3)
POTASSIUM SERPL-SCNC: 3.7 MMOL/L — SIGNIFICANT CHANGE UP (ref 3.5–5.3)
RBC # BLD: 4.1 M/UL — SIGNIFICANT CHANGE UP (ref 3.8–5.2)
RBC # FLD: 14.9 % — HIGH (ref 10.3–14.5)
SODIUM SERPL-SCNC: 141 MMOL/L — SIGNIFICANT CHANGE UP (ref 135–145)
WBC # BLD: 9.44 K/UL — SIGNIFICANT CHANGE UP (ref 3.8–10.5)
WBC # FLD AUTO: 9.44 K/UL — SIGNIFICANT CHANGE UP (ref 3.8–10.5)

## 2024-09-16 PROCEDURE — 99232 SBSQ HOSP IP/OBS MODERATE 35: CPT

## 2024-09-16 RX ORDER — FUROSEMIDE 40 MG
40 TABLET ORAL DAILY
Refills: 0 | Status: DISCONTINUED | OUTPATIENT
Start: 2024-09-17 | End: 2024-09-17

## 2024-09-16 RX ORDER — FUROSEMIDE 40 MG
20 TABLET ORAL ONCE
Refills: 0 | Status: COMPLETED | OUTPATIENT
Start: 2024-09-16 | End: 2024-09-16

## 2024-09-16 RX ORDER — FUROSEMIDE 40 MG
40 TABLET ORAL DAILY
Refills: 0 | Status: DISCONTINUED | OUTPATIENT
Start: 2024-09-16 | End: 2024-09-16

## 2024-09-16 RX ADMIN — CEFAZOLIN SODIUM 100 MILLIGRAM(S): 2 INJECTION, SOLUTION INTRAVENOUS at 21:36

## 2024-09-16 RX ADMIN — RIVAROXABAN 20 MILLIGRAM(S): 10 TABLET, FILM COATED ORAL at 17:23

## 2024-09-16 RX ADMIN — METOPROLOL TARTRATE 200 MILLIGRAM(S): 100 TABLET ORAL at 05:50

## 2024-09-16 RX ADMIN — CEFAZOLIN SODIUM 100 MILLIGRAM(S): 2 INJECTION, SOLUTION INTRAVENOUS at 05:50

## 2024-09-16 RX ADMIN — Medication 20 MILLIGRAM(S): at 21:36

## 2024-09-16 RX ADMIN — CEFAZOLIN SODIUM 100 MILLIGRAM(S): 2 INJECTION, SOLUTION INTRAVENOUS at 13:12

## 2024-09-16 RX ADMIN — Medication 25 MICROGRAM(S): at 05:50

## 2024-09-16 RX ADMIN — Medication 20 MILLIGRAM(S): at 06:55

## 2024-09-16 RX ADMIN — Medication 20 MILLIGRAM(S): at 05:50

## 2024-09-16 RX ADMIN — LISINOPRIL 5 MILLIGRAM(S): 10 TABLET ORAL at 05:49

## 2024-09-16 NOTE — DISCHARGE NOTE PROVIDER - PROVIDER TOKENS
PROVIDER:[TOKEN:[[id number]]] PROVIDER:[TOKEN:[[id number]]] PROVIDER:[TOKEN:[[id number]]],PROVIDER:[TOKEN:[[id number]],SCHEDULEDAPPT:[10/09/2024],ESTABLISHEDPATIENT:[T]]

## 2024-09-16 NOTE — DISCHARGE NOTE PROVIDER - HOSPITAL COURSE
79-year-old female     h/o    HTN, HLD, DM     A-fib on Xarelto, lymphedema chronic ,  renal  stone  left  ureter          presents to ED for erythema swelling of the lower extremity concerning for cellulitis. as  advised  by  pmd     chronic lymphedema however erythema ,  been progressively worse over the last 2 weeks with clear discharge    malodor       on iv   ancef       house  ID  eval     c/c  Afib,           on Xarelto   HTN /  HLD    Hypothyroid     h/o  c/c  diastolic  chf    venous  insufficiency.   b/l  legs /  known to  outsode   vascular  dr/  and  pt  feels  it  was   not  helping   lipitor, toprol, synthroid, xarelo    on iv ancef, per  ID.    .  dc  on po keflex    legs  still  with  redness/  lasix  given  for  c/c  edema    f/p  dr ryan sanchez          79-year-old female     h/o    HTN, HLD, DM     A-fib on Xarelto, lymphedema chronic ,  renal  stone  left  ureter          presents to ED for erythema swelling of the lower extremity concerning for cellulitis. as  advised  by  pmd     chronic lymphedema however erythema ,  been progressively worse over the last 2 weeks with clear discharge    malodor       on iv   ancef       house  ID  eval     c/c  Afib,           on Xarelto   HTN /  HLD    Hypothyroid     h/o  c/c  diastolic  chf    venous  insufficiency.   b/l  legs /  known to  outsode   vascular  dr/  and  pt  feels  it  was   not  helping   lipitor, toprol, synthroid, xarelo    on iv ancef, per  ID.    .  dc  on po keflex    legs  still  with  redness/  lasix  given  for  c/c  edema    f/p  dr mary burdick

## 2024-09-16 NOTE — DISCHARGE NOTE PROVIDER - CARE PROVIDERS DIRECT ADDRESSES
[email or web address] ,DirectAddress_Unknown ,DirectAddress_Unknown,DirectAddress_Unknown

## 2024-09-16 NOTE — PROGRESS NOTE ADULT - SUBJECTIVE AND OBJECTIVE BOX
date of service: 09-16-24 @ 06:22  [facility]  REVIEW OF SYSTEMS:  CONSTITUTIONAL: No fever,  no  weight loss  ENT:  No  tinnitus,   no   vertigo  NECK: No pain or stiffness  RESPIRATORY: No cough, wheezing, chills or hemoptysis;    No Shortness of Breath  CARDIOVASCULAR: No chest pain, palpitations, dizziness  GASTROINTESTINAL: No abdominal or epigastric pain. No nausea, vomiting, or hematemesis; No diarrhea  No melena or hematochezia.  GENITOURINARY: No dysuria, frequency, hematuria, or incontinence  NEUROLOGICAL: No headaches  SKIN: No itching,  no   rash  LYMPH Nodes: No enlarged glands  ENDOCRINE: No heat or cold intolerance  MUSCULOSKELETAL: No joint pain or swelling  PSYCHIATRIC: No depression, anxiety  HEME/LYMPH: No easy bruising, or bleeding gums  ALLERGY AND IMMUNOLOGIC: No hives or eczema	    MEDICATIONS  (STANDING):  atorvastatin 20 milliGRAM(s) Oral at bedtime  ceFAZolin   IVPB 1000 milliGRAM(s) IV Intermittent every 8 hours  dextrose 5%. 1000 milliLiter(s) (100 mL/Hr) IV Continuous <Continuous>  dextrose 5%. 1000 milliLiter(s) (50 mL/Hr) IV Continuous <Continuous>  dextrose 50% Injectable 25 Gram(s) IV Push once  dextrose 50% Injectable 12.5 Gram(s) IV Push once  dextrose 50% Injectable 25 Gram(s) IV Push once  furosemide   Injectable 20 milliGRAM(s) IV Push daily  glucagon  Injectable 1 milliGRAM(s) IntraMuscular once  insulin lispro (ADMELOG) corrective regimen sliding scale   SubCutaneous three times a day before meals  levothyroxine 25 MICROGram(s) Oral daily  lisinopril 5 milliGRAM(s) Oral daily  metoprolol succinate  milliGRAM(s) Oral daily  rivaroxaban 20 milliGRAM(s) Oral with dinner    MEDICATIONS  (PRN):  dextrose Oral Gel 15 Gram(s) Oral once PRN Blood Glucose LESS THAN 70 milliGRAM(s)/deciliter      Vital Signs Last 24 Hrs  T(C): 37.2 (16 Sep 2024 05:14), Max: 37.3 (15 Sep 2024 21:19)  T(F): 99 (16 Sep 2024 05:14), Max: 99.1 (15 Sep 2024 21:19)  HR: 71 (16 Sep 2024 05:14) (71 - 74)  BP: 129/74 (16 Sep 2024 05:14) (126/80 - 152/77)  BP(mean): --  RR: 18 (16 Sep 2024 05:14) (18 - 18)  SpO2: 92% (16 Sep 2024 05:14) (92% - 95%)    Parameters below as of 16 Sep 2024 05:14  Patient On (Oxygen Delivery Method): room air      CAPILLARY BLOOD GLUCOSE      POCT Blood Glucose.: 116 mg/dL (15 Sep 2024 21:56)  POCT Blood Glucose.: 125 mg/dL (15 Sep 2024 17:27)  POCT Blood Glucose.: 120 mg/dL (15 Sep 2024 12:45)  POCT Blood Glucose.: 136 mg/dL (15 Sep 2024 08:35)    I&O's Summary    14 Sep 2024 07:01  -  15 Sep 2024 07:00  --------------------------------------------------------  IN: 855 mL / OUT: 2800 mL / NET: -1945 mL    15 Sep 2024 07:01  -  16 Sep 2024 06:22  --------------------------------------------------------  IN: 490 mL / OUT: 1450 mL / NET: -960 mL          Appearance: Normal	  HEENT:   Normal oral mucosa, PERRL, EOMI	  Lymphatic: No lymphadenopathy  Cardiovascular: Normal S1 S2, No JVD  Respiratory: Lungs clear to auscultation	  Gastrointestinal:  Soft, Non-tender, + BS	  Skin: No rash, No ecchymoses	  Extremities:     LABS:                        11.4   8.69  )-----------( 221      ( 15 Sep 2024 07:43 )             36.1                                   Consultant(s) Notes Reviewed:      Care Discussed with Consultants/Other Providers:

## 2024-09-16 NOTE — PROGRESS NOTE ADULT - SUBJECTIVE AND OBJECTIVE BOX
Date of Service: 09-16-24 @ 08:16           CARDIOLOGY     PROGRESS  NOTE   ________________________________________________    CHIEF COMPLAINT:Patient is a 79y old  Female who presents with a chief complaint of LE swelling (16 Sep 2024 06:22)  no complain  	  REVIEW OF SYSTEMS:  CONSTITUTIONAL: No fever, weight loss, or fatigue  EYES: No eye pain, visual disturbances, or discharge  ENT:  No difficulty hearing, tinnitus, vertigo; No sinus or throat pain  NECK: No pain or stiffness  RESPIRATORY: No cough, wheezing, chills or hemoptysis; No Shortness of Breath  CARDIOVASCULAR: No chest pain, palpitations, passing out, dizziness, or leg swelling  GASTROINTESTINAL: No abdominal or epigastric pain. No nausea, vomiting, or hematemesis; No diarrhea or constipation. No melena or hematochezia.  GENITOURINARY: No dysuria, frequency, hematuria, or incontinence  NEUROLOGICAL: No headaches, memory loss, loss of strength, numbness, or tremors  SKIN: No itching, burning, rashes, or lesions   LYMPH Nodes: No enlarged glands  ENDOCRINE: No heat or cold intolerance; No hair loss  MUSCULOSKELETAL: No joint pain or swelling; No muscle, back, or extremity pain  PSYCHIATRIC: No depression, anxiety, mood swings, or difficulty sleeping  HEME/LYMPH: No easy bruising, or bleeding gums  ALLERGY AND IMMUNOLOGIC: No hives or eczema	    [x ] All others negative	  [ ] Unable to obtain    PHYSICAL EXAM:  T(C): 37.2 (09-16-24 @ 05:14), Max: 37.3 (09-15-24 @ 21:19)  HR: 71 (09-16-24 @ 05:14) (71 - 74)  BP: 129/74 (09-16-24 @ 05:14) (126/80 - 152/77)  RR: 18 (09-16-24 @ 05:14) (18 - 18)  SpO2: 92% (09-16-24 @ 05:14) (92% - 95%)  Wt(kg): --  I&O's Summary    15 Sep 2024 07:01  -  16 Sep 2024 07:00  --------------------------------------------------------  IN: 690 mL / OUT: 1700 mL / NET: -1010 mL        Appearance: Normal	  HEENT:   Normal oral mucosa, PERRL, EOMI	  Lymphatic: No lymphadenopathy  Cardiovascular: Normal S1 S2, No JVD, + murmurs, No edema  Respiratory: rhonchi  Psychiatry: A & O x 3, Mood & affect appropriate  Gastrointestinal:  Soft, Non-tender, + BS	  Skin: No rashes, No ecchymoses, No cyanosis	  Extremities: Normal range of motion, No clubbing, cyanosis or edema  Vascular: Peripheral pulses palpable 2+ bilaterally    MEDICATIONS  (STANDING):  atorvastatin 20 milliGRAM(s) Oral at bedtime  ceFAZolin   IVPB 1000 milliGRAM(s) IV Intermittent every 8 hours  dextrose 5%. 1000 milliLiter(s) (100 mL/Hr) IV Continuous <Continuous>  dextrose 5%. 1000 milliLiter(s) (50 mL/Hr) IV Continuous <Continuous>  dextrose 50% Injectable 25 Gram(s) IV Push once  dextrose 50% Injectable 12.5 Gram(s) IV Push once  dextrose 50% Injectable 25 Gram(s) IV Push once  glucagon  Injectable 1 milliGRAM(s) IntraMuscular once  insulin lispro (ADMELOG) corrective regimen sliding scale   SubCutaneous three times a day before meals  levothyroxine 25 MICROGram(s) Oral daily  lisinopril 5 milliGRAM(s) Oral daily  metoprolol succinate  milliGRAM(s) Oral daily  rivaroxaban 20 milliGRAM(s) Oral with dinner      TELEMETRY: 	    ECG:  	  RADIOLOGY:  OTHER: 	  	  LABS:	 	    CARDIAC MARKERS:                        11.5   9.44  )-----------( 224      ( 16 Sep 2024 06:51 )             37.4     09-16    141  |  103  |  19  ----------------------------<  122<H>  3.7   |  28  |  0.62    Ca    9.2      16 Sep 2024 06:46      proBNP:   Lipid Profile:   HgA1c:   TSH:     < from: Transthoracic Echocardiogram (07.26.22 @ 09:55) >  Mitral Valve: Mild mitral stenosis due to annular  calcification. Mean gradient 4.3 mmHg at  109/min.  Aortic Valve/Aorta: Calcified aortic valve with normal  opening.  Normal aortic root size.  Left Atrium: Normal left atrium.  Left Ventricle: Normal left ventricular internal dimensions  and wall thicknesses.  Hyperdynamic left ventricle.  Right Heart: Normal right atrium. Normal right ventricular  size and function.  Normal tricuspid valve. Mild tricuspid regurgitation.  Normal pulmonic valve.  Pericardium/Pleura: Normal pericardium with no pericardial  effusion.  Hemodynamic: Estimated right atrial pressure is normal.  Mild-moderate pulmonary hypertension. Estimated PASP 52  mmHg.  ------------------------------------------------------------------------  Conclusions:  Hyperdynamic left ventricle.  Mild mitral stenosis due to annular calcification.  Mild-moderate pulmonary hypertension.    RECOMMENDATIONS:   - continue cefazolin 1g q8h  - Send MRSA nares PCR.   - consider vascular evaluation for lymphedema management  - monitor for clinical improvement,  Trend WBC, monitor fever curve and hemodynamic status.   If febrile, please send blood cultures.   Rest of the care as per the primary team.    Assessment and plan  ---------------------------   79-year-old female past medical history HTN, HLD, DMT2, A-fib on Xarelto, lymphedema chronic presents to ED for erythema swelling of the lower extremity concerning for cellulitis.  Advised by PMD to come to ED for further evaluation.  Previously admitted 7/27/2022 for cellulitis.  Patient states she has had chronic lymphedema however erythema has been progressively worse over the last 2 weeks with clear discharge that is foul-smelling and warm to the touch.  Denies fever, chills, nausea, vomiting, chest pain, SOB, calf tenderness.  No prior history of PE/DVT.   pt with hx of hfpef, chronic a.fib with le edema and cellulitis  continue all cardiac meds  continue beta blocker, fu hr  continue ac  doubt dvt she is on chronic ac  abx  lipid panel  will add ace or ARB in view of DM if increase bp  continue ac  pro bnp  sob ?sec to sleep apnea/ underlaying lung disease  needs sleep study/ le edema ?venous insufficiency, doppler with reflux as out pt  will call Dr Machuca to see if sleep studies are done  add lasix 20 mg iv bid  Lisinopril 5 mg daily, will increase lisinopril dose as tolerated  oob to the chair/ physical therapy  Echo repeat with hx of Mitral stenosis  cellulitis still  with warm and red LE on abx, ID is following up

## 2024-09-16 NOTE — DISCHARGE NOTE PROVIDER - NSDCFUADDAPPT_GEN_ALL_CORE_FT
APPTS ARE READY TO BE MADE: [x ] YES    Best Family or Patient Contact (if needed):    Additional Information about above appointments (if needed):    1:   2:   3:     Other comments or requests:    APPTS ARE READY TO BE MADE: [x ] YES    Best Family or Patient Contact (if needed):    Additional Information about above appointments (if needed):    1:   2:   3:     Other comments or requests:     Prior to outreaching the patient, it was visible that the patient has secured a follow up appointment for Cardiovascular Disease with Dr. Hinkle for 10/9/2024, [address] [place] [address] [address] [place] [address], which was not scheduled by our team.

## 2024-09-16 NOTE — PROGRESS NOTE ADULT - ASSESSMENT
79-year-old female past medical history HTN, HLD, DM II, A-fib on Xarelto, chronic lymphedema presents for erythema and swelling of the lower extremity worsening over last 2 weeks. She also notes bullae to the area which ruptured spontaneously. Associated with clear malodorous drainage. No known trauma. No fever at home. Recently took ciprofloxacin for 7 days for a UTI, completed about 10 days ago.    Afebrile, no leukocytosis, ESR 63, CRP 8  Erythema, swelling, and warmth with clear malodorous drainage from the back of both lower legs, L >R  No pending cultures  Started on cefazolin by primary team on 9/13.    Infectious diseases consultation requested today for further management. She reports mild pain in bilateral LE. NO fevers or chills at home. Reports being treated for cellulitis year ago. 2 weeks ago took ciprofloxacin for UTI but no other antibiotic use. She has no drug allergies. Reports no other symptoms. B/L edema and erythema noted with serous discharge.     # Bilateral Lower extremity Cellulitis  # Chronic lymphedema  # Elevated inflammatory markers     Antibiotics:   Cefazolin 09/13---current     RECOMMENDATIONS:   - continue cefazolin 1g q8h while inpatient   - consider vascular evaluation for lymphedema management  - monitor for clinical improvement,  Trend WBC, monitor fever curve and hemodynamic status.   If febrile, please send blood cultures.   Rest of the care as per the primary team.      Discussed with the primary team    Tiffany Girard MD  Attending Physician, Division of Infectious Diseases  Department of Medicine   [facility]    Contact on TEAMS messaging from 9am - 5pm  Office: [phone] (after 5 PM or weekend) .   79-year-old female past medical history HTN, HLD, DM II, A-fib on Xarelto, chronic lymphedema presents for erythema and swelling of the lower extremity worsening over last 2 weeks. She also notes bullae to the area which ruptured spontaneously. Associated with clear malodorous drainage. No known trauma. No fever at home. Recently took ciprofloxacin for 7 days for a UTI, completed about 10 days ago.    Afebrile, no leukocytosis, ESR 63, CRP 8  Erythema, swelling, and warmth with clear malodorous drainage from the back of both lower legs, L >R  No pending cultures  Started on cefazolin by primary team on 9/13.    Infectious diseases consultation requested for further management.     # Bilateral Lower extremity Cellulitis  # Chronic lymphedema  # Elevated inflammatory markers     Antibiotics:   Cefazolin 09/13---current     RECOMMENDATIONS:   - continue cefazolin 1g q8h while inpatient. Monitor for clinical improvement.   - Encourage leg elevation   - Upon discharge would transition to PO Keflex 500 mg Q6H. Plan for a total of 10 days until 09/22  - consider vascular evaluation for lymphedema management  Trend WBC, monitor fever curve and hemodynamic status.   If febrile, please send blood cultures.   Rest of the care as per the primary team.      Discussed with the primary team.  ID will sign off today. Thank you for the consultation. Please feel free to reach out with any questions or concerns.     Tiffany Girard MD  Attending Physician, Division of Infectious Diseases  Department of Medicine   [facility]    Contact on TEAMS messaging from 9am - 5pm  Office: [phone] (after 5 PM or weekend) .

## 2024-09-16 NOTE — DISCHARGE NOTE PROVIDER - CARE PROVIDER_API CALL
Slick Miles  Cardiovascular Disease  [address] [place] [address], [address]  [place] [address]  Phone: [phone]  Fax: [phone]  Follow Up Time:    Dong Machuca  Cardiovascular Disease  [address] [place] [id number]  Phone: [phone]  Fax: [phone]  Follow Up Time:    Dong Machuca  Cardiovascular Disease  [address] [place] [id number]  Phone: [phone]  Fax: [phone]  Follow Up Time:     Minesh Davis  Cardiovascular Disease  [address] [place] [id number]  Phone: [phone]  Fax: [phone]  Established Patient  Scheduled Appointment: 10/09/2024

## 2024-09-16 NOTE — PROGRESS NOTE ADULT - SUBJECTIVE AND OBJECTIVE BOX
Patient is a 79y old  Female who presents with a chief complaint of LE swelling (16 Sep 2024 08:16)    Being followed by ID for cellulitis     Interval history:  No other acute events  Afebrile   WBC stable   MRSA nares negative     ROS:  No cough,SOB,CP  No N/V/D  No abd pain  No urinary complaints  No HA  No other complaints    Antimicrobials:  ceFAZolin   IVPB 1000 milliGRAM(s) IV Intermittent every 8 hours      Vital Signs Last 24 Hrs  T(C): 37.2 (09-16-24 @ 05:14), Max: 37.3 (09-15-24 @ 21:19)  T(F): 99 (09-16-24 @ 05:14), Max: 99.1 (09-15-24 @ 21:19)  HR: 71 (09-16-24 @ 05:14) (71 - 74)  BP: 129/74 (09-16-24 @ 05:14) (126/80 - 129/77)  BP(mean): --  RR: 18 (09-16-24 @ 05:14) (18 - 18)  SpO2: 92% (09-16-24 @ 05:14) (92% - 95%)    Physical Exam:  Constitutional:  well preserved, comfortable  Head/Eyes: no icterus, PERRL, EOMI  ENT:  supple; no thrush  LUNGS:  CTA  CVS:  normal S1, S2, no murmur  Abd:  soft, non-tender; non-distended  Ext:  no edema  Vascular:  IV site no erythema tenderness or discharge  MSK:  joints without swelling  Neuro: AAO X 3, non- focal    Lab Data:                        11.5   9.44  )-----------( 224      ( 16 Sep 2024 06:51 )             37.4     09-16    141  |  103  |  19  ----------------------------<  122<H>  3.7   |  28  |  0.62    Ca    9.2      16 Sep 2024 06:46                    RADIOLOGY:  below reviewed       Patient is a 79y old  Female who presents with a chief complaint of LE swelling (16 Sep 2024 08:16)    Being followed by ID for cellulitis     Interval history:  No other acute events  Afebrile   WBC stable   MRSA nares negative   Redness and swelling have improved   Patient reports less pain now   No drainage noted on examination today     ROS:  No cough,SOB,CP  No N/V/D  No abd pain  No urinary complaints  No HA  No other complaints    Antimicrobials:  ceFAZolin   IVPB 1000 milliGRAM(s) IV Intermittent every 8 hours      Vital Signs Last 24 Hrs  T(C): 37.2 (09-16-24 @ 05:14), Max: 37.3 (09-15-24 @ 21:19)  T(F): 99 (09-16-24 @ 05:14), Max: 99.1 (09-15-24 @ 21:19)  HR: 71 (09-16-24 @ 05:14) (71 - 74)  BP: 129/74 (09-16-24 @ 05:14) (126/80 - 129/77)  BP(mean): --  RR: 18 (09-16-24 @ 05:14) (18 - 18)  SpO2: 92% (09-16-24 @ 05:14) (92% - 95%)    Physical Exam:  General: Patient in no acute distress   HEENT: NCAT, no oral lesions  CV: S1+S2, no m/r/g appreciated  Lungs: No respiratory distress, CTAB  Abd: Soft, nontender  Ext: 3+ edema b/l LE  Neuro: Alert and oriented, no focal deficits, CN II-XII grossly intact   Skin: Erythema, warmth, mild tenderness involving back of both shins R > L.   IV: No phlebitis    Lab Data:                        11.5   9.44  )-----------( 224      ( 16 Sep 2024 06:51 )             37.4     09-16    141  |  103  |  19  ----------------------------<  122<H>  3.7   |  28  |  0.62    Ca    9.2      16 Sep 2024 06:46                    RADIOLOGY:  imaging below personally reviewed    < from: [facility] Duplex Lower Ext Vein Scan, Bilat (09.14.24 @ 13:44) >  IMPRESSION:  No evidence of deep venous thrombosis in either lower extremity.    < end of copied text >  < from: Xray Chest 1 View- PORTABLE-Urgent (Xray Chest 1 View- PORTABLE-Urgent .) (09.13.24 @ 12:30) >  IMPRESSION:  Clear lungs.    < end of copied text >    ACC: [id number] EXAM:  DUPLEX SCAN EXT VEINS LOWER BI   ORDERED BY:   RAO RESTREPO DATE:  09/14/2024          INTERPRETATION:  CLINICAL INFORMATION: Lower extremity swelling/erythema    COMPARISON: None available.    TECHNIQUE: Duplex sonography of the BILATERAL LOWER extremity veins with   color and spectral Doppler, with and without compression.    FINDINGS:    RIGHT:  Normal compressibility of the RIGHT common femoral, femoral and popliteal   veins.  Doppler examination shows normal spontaneous and phasic flow.  No RIGHT calf vein thrombosis is detected.    LEFT:  Normal compressibility of the LEFT common femoral, femoral and popliteal   veins.  Doppler examination shows normal spontaneous and phasic flow.  No LEFT calf vein thrombosis is detected.    IMPRESSION:  No evidence of deep venous thrombosis in either lower extremity.

## 2024-09-16 NOTE — ADVANCED PRACTICE NURSE CONSULT - RECOMMEDATIONS
Will recommend the followin. BLE: gentle daily cleansing and apply Sween 24 cream to moisturize dry skin. If drainage occurs may resume Aquacel dressings.  2. continue to encourage leg elevation  3. continue to encourage mobility, T&P  4. off-load heels with boots  5. seat cushion when OOB to chair  6. nutrition support as pt condition allows  7. Podiatry consult for toenail hygiene  Tx plan discussed with pt/RN

## 2024-09-16 NOTE — ADVANCED PRACTICE NURSE CONSULT - REASON FOR CONSULT
Requested by staff to assess skin status: BLE. PMH is noted:  Reason for Admission: LE swelling  History of Present Illness:    79-year-old female past medical history HTN, HLD, DMT2, A-fib on Xarelto, lymphedema chronic presents to ED for erythema swelling of the lower extremity concerning for cellulitis.  Advised by PMD to come to ED for further evaluation.  Previously admitted 7/27/2022 for cellulitis.  Patient states she has had chronic lymphedema however erythema has been progressively worse over the last 2 weeks with clear discharge that is foul-smelling and warm to the touch.  Denies fever, chills, nausea, vomiting, chest pain, SOB, calf tenderness.  No prior history of PE/DVT.

## 2024-09-16 NOTE — DISCHARGE NOTE PROVIDER - NSDCQMPCI_CARD_ALL_CORE
This is a 69-year-old female, she has biopsy-proven condyloma acuminata of the perianal area  There are 3 areas of concern today  One at the 7 o'clock position 1 at the 9 o'clock position and 1 at the 5 o'clock position looking at the introitus  These areas were treated with trichloracetic acid for total of 3 minutes  Care was taken not to expose the skin more than necessary  After this the lesions of turn white, the areas were treated variously with cold water  The greatest diameter either 1 was approximately 3 mm  She tolerated procedure well  Return to office in 2 weeks for a follow-up  She will watch for any kind a skin irritation or burning  She will keep me informed her progress  No

## 2024-09-16 NOTE — DISCHARGE NOTE PROVIDER - NSDCMRMEDTOKEN_GEN_ALL_CORE_FT
atorvastatin 20 mg oral tablet: 1 tab(s) orally once a day (at bedtime)  levothyroxine 25 mcg (0.025 mg) oral tablet: 1 tab(s) orally once a day  metFORMIN 500 mg oral tablet, extended release: 1 tab(s) orally 2 times a day  metoprolol succinate 200 mg oral tablet, extended release: 1 tab(s) orally once a day  rivaroxaban 15 mg oral tablet: 1 tab(s) orally once a day   atorvastatin 20 mg oral tablet: 1 tab(s) orally once a day (at bedtime)  cephalexin 500 mg oral capsule: 1 cap(s) orally 4 times a day  furosemide 40 mg oral tablet: 1 tab(s) orally once a day  levothyroxine 25 mcg (0.025 mg) oral tablet: 1 tab(s) orally once a day  lisinopril 5 mg oral tablet: 1 tab(s) orally once a day  metFORMIN 500 mg oral tablet, extended release: 1 tab(s) orally 2 times a day  metoprolol succinate 200 mg oral tablet, extended release: 1 tab(s) orally once a day  rivaroxaban 20 mg oral tablet: 1 tab(s) orally once a day (before a meal)   atorvastatin 20 mg oral tablet: 1 tab(s) orally once a day (at bedtime)  cephalexin 500 mg oral capsule: 1 cap(s) orally 4 times a day  furosemide 40 mg oral tablet: 1 tab(s) orally once a day  levothyroxine 25 mcg (0.025 mg) oral tablet: 1 tab(s) orally once a day  lisinopril 5 mg oral tablet: 1 tab(s) orally once a day  metFORMIN 500 mg oral tablet, extended release: 1 tab(s) orally 2 times a day  metoprolol succinate 200 mg oral tablet, extended release: 1 tab(s) orally once a day  Physical therapy: evaluate and treat  rivaroxaban 15 mg oral tablet: 1 tab(s) orally once a day

## 2024-09-16 NOTE — DISCHARGE NOTE PROVIDER - NSDCCPCAREPLAN_GEN_ALL_CORE_FT
PRINCIPAL DISCHARGE DIAGNOSIS  Diagnosis: Swelling of lower extremity  Assessment and Plan of Treatment: Take all of your antibiotics as ordered.  Call your Health Care Provider within two days of arriving home to make a follow up appointment within one week.  If the affected cellulitic area increases in redness, warmth, pain or swelling call your Health Care Provider.  If you develop fever, chills, and/or malaise, call your Health Care Provider.

## 2024-09-16 NOTE — PROGRESS NOTE ADULT - ASSESSMENT
79-year-old female     h/o    HTN, HLD, DM     A-fib on Xarelto, lymphedema chronic ,  renal  stone  left  ureter          presents to ED for erythema swelling of the lower extremity concerning for cellulitis. as  advised  by  pmd     chronic lymphedema however erythema ,  been progressively worse over the last 2 weeks with clear discharge    malodor       on iv   ancef       house  ID  eval     c/c  Afib,           on Xarelto   HTN /  HLD    Hypothyroid     h/o  c/c  diastolic  chf    venous  insufficiency.   b/l  legs /  known to  outsode   vascular  dr/  and  pt  feels  it  was   not  helping   lipitor, toprol, synthroid, xarelo    on iv ancef, per  ID.  awiat duration.  legs  still  with  redness                  79-year-old female     h/o    HTN, HLD, DM     A-fib on Xarelto, lymphedema chronic ,  renal  stone  left  ureter          presents to ED for erythema swelling of the lower extremity concerning for cellulitis. as  advised  by  pmd     chronic lymphedema however erythema ,  been progressively worse over the last 2 weeks with clear discharge    malodor       on iv   ancef       house  ID  eval     c/c  Afib,           on Xarelto   HTN /  HLD    Hypothyroid     h/o  c/c  diastolic  chf    venous  insufficiency.   b/l  legs /  known to  outsode   vascular  dr/  and  pt  feels  it  was   not  helping   lipitor, toprol, synthroid, xarelo    on iv ancef, per  ID.  awiat duration.      legs  still  with  redness/  lasix  given  for  c/c  edema

## 2024-09-16 NOTE — ADVANCED PRACTICE NURSE CONSULT - ASSESSMENT
The pt was encountered on [facility]- Mrs Rivas was awake and alert, engaging in conversation. She is in a Versa[employer] P 500 support surface and is able to move about in the bed.   She reports a hx of chronic lymphedema for which she was attending an outpatient [facility] for treatment of same. she had to stop as her  became ill and her legs started to swell again. today her BLE present with swelling, erythema and are warm to touch. no open wounds were noted and the Aquacel dressings that had been applied to the legs were dry with no drainage. there was dry flaking skin. As per review of the medical record, ID is following the pt for cellulitis.  We spoke about leg swelling and the changes that can occur in the skin with this- we reviewed the tx plan.  she had + pedal pulses, her toenails were elongated - will request a Podiatry consult for toenail hygiene.

## 2024-09-17 ENCOUNTER — TRANSCRIPTION ENCOUNTER (OUTPATIENT)
Age: 79
End: 2024-09-17

## 2024-09-17 VITALS
DIASTOLIC BLOOD PRESSURE: 76 MMHG | SYSTOLIC BLOOD PRESSURE: 128 MMHG | RESPIRATION RATE: 18 BRPM | TEMPERATURE: 98 F | OXYGEN SATURATION: 92 % | HEART RATE: 67 BPM

## 2024-09-17 LAB
GLUCOSE BLDC GLUCOMTR-MCNC: 110 MG/DL — HIGH (ref 70–99)
GLUCOSE BLDC GLUCOMTR-MCNC: 138 MG/DL — HIGH (ref 70–99)

## 2024-09-17 RX ORDER — FUROSEMIDE 40 MG
40 TABLET ORAL DAILY
Refills: 0 | Status: DISCONTINUED | OUTPATIENT
Start: 2024-09-17 | End: 2024-09-17

## 2024-09-17 RX ORDER — FUROSEMIDE 40 MG
1 TABLET ORAL
Qty: 30 | Refills: 0
Start: 2024-09-17 | End: 2024-10-16

## 2024-09-17 RX ORDER — RIVAROXABAN 10 MG/1
1 TABLET, FILM COATED ORAL
Qty: 0 | Refills: 0 | DISCHARGE
Start: 2024-09-17

## 2024-09-17 RX ORDER — CEPHALEXIN 500 MG
500 CAPSULE ORAL
Refills: 0 | Status: DISCONTINUED | OUTPATIENT
Start: 2024-09-17 | End: 2024-09-17

## 2024-09-17 RX ORDER — LISINOPRIL 10 MG/1
1 TABLET ORAL
Qty: 0 | Refills: 0 | DISCHARGE
Start: 2024-09-17

## 2024-09-17 RX ORDER — CEPHALEXIN 500 MG
1 CAPSULE ORAL
Qty: 24 | Refills: 0
Start: 2024-09-17 | End: 2024-09-22

## 2024-09-17 RX ORDER — FUROSEMIDE 40 MG
1 TABLET ORAL
Qty: 0 | Refills: 0 | DISCHARGE
Start: 2024-09-17

## 2024-09-17 RX ORDER — CEPHALEXIN 500 MG
1 CAPSULE ORAL
Qty: 0 | Refills: 0 | DISCHARGE
Start: 2024-09-17

## 2024-09-17 RX ORDER — LISINOPRIL 10 MG/1
1 TABLET ORAL
Qty: 30 | Refills: 0
Start: 2024-09-17 | End: 2024-10-16

## 2024-09-17 RX ADMIN — METOPROLOL TARTRATE 200 MILLIGRAM(S): 100 TABLET ORAL at 09:09

## 2024-09-17 RX ADMIN — LISINOPRIL 5 MILLIGRAM(S): 10 TABLET ORAL at 06:48

## 2024-09-17 RX ADMIN — CEFAZOLIN SODIUM 100 MILLIGRAM(S): 2 INJECTION, SOLUTION INTRAVENOUS at 06:48

## 2024-09-17 RX ADMIN — Medication 500 MILLIGRAM(S): at 12:27

## 2024-09-17 RX ADMIN — Medication 25 MICROGRAM(S): at 06:48

## 2024-09-17 NOTE — DISCHARGE NOTE NURSING/CASE MANAGEMENT/SOCIAL WORK - PATIENT PORTAL LINK FT
You can access the FollowMyHealth Patient Portal offered by [facility] by registering at the following website: [email or web address]. By joining [employer]’s FollowMyHealth portal, you will also be able to view your health information using other applications (apps) compatible with our system.

## 2024-09-17 NOTE — PROGRESS NOTE ADULT - SUBJECTIVE AND OBJECTIVE BOX
Date of Service: 09-17-24 @ 08:56           CARDIOLOGY     PROGRESS  NOTE   ________________________________________________    CHIEF COMPLAINT:Patient is a 79y old  Female who presents with a chief complaint of LE swelling (17 Sep 2024 07:06)  no complain  	  REVIEW OF SYSTEMS:  CONSTITUTIONAL: No fever, weight loss, or fatigue  EYES: No eye pain, visual disturbances, or discharge  ENT:  No difficulty hearing, tinnitus, vertigo; No sinus or throat pain  NECK: No pain or stiffness  RESPIRATORY: No cough, wheezing, chills or hemoptysis; No Shortness of Breath  CARDIOVASCULAR: No chest pain, palpitations, passing out, dizziness, or leg swelling  GASTROINTESTINAL: No abdominal or epigastric pain. No nausea, vomiting, or hematemesis; No diarrhea or constipation. No melena or hematochezia.  GENITOURINARY: No dysuria, frequency, hematuria, or incontinence  NEUROLOGICAL: No headaches, memory loss, loss of strength, numbness, or tremors  SKIN: No itching, burning, rashes, or lesions   LYMPH Nodes: No enlarged glands  ENDOCRINE: No heat or cold intolerance; No hair loss  MUSCULOSKELETAL: No joint pain or swelling; No muscle, back, or extremity pain  PSYCHIATRIC: No depression, anxiety, mood swings, or difficulty sleeping  HEME/LYMPH: No easy bruising, or bleeding gums  ALLERGY AND IMMUNOLOGIC: No hives or eczema	    [ ]x All others negative	  [ ] Unable to obtain    PHYSICAL EXAM:  T(C): 36.9 (09-17-24 @ 05:06), Max: 36.9 (09-17-24 @ 05:06)  HR: 68 (09-17-24 @ 05:06) (68 - 84)  BP: 103/69 (09-17-24 @ 05:06) (103/69 - 147/56)  RR: 18 (09-17-24 @ 05:06) (18 - 18)  SpO2: 93% (09-17-24 @ 05:06) (92% - 94%)  Wt(kg): --  I&O's Summary    16 Sep 2024 07:01  -  17 Sep 2024 07:00  --------------------------------------------------------  IN: 840 mL / OUT: 1600 mL / NET: -760 mL        Appearance: Normal	  HEENT:   Normal oral mucosa, PERRL, EOMI	  Lymphatic: No lymphadenopathy  Cardiovascular: Normal S1 S2, No JVD, + murmurs, decrease  edema  Respiratory: Lungs clear to auscultation	  Psychiatry: A & O x 3, Mood & affect appropriate  Gastrointestinal:  Soft, Non-tender, + BS	  Skin: No rashes, No ecchymoses, No cyanosis	  Neurologic: Non-focal  Extremities: Normal range of motion, No clubbing, cyanosis , decrease  edema  Vascular: Peripheral pulses palpable 2+ bilaterally    MEDICATIONS  (STANDING):  atorvastatin 20 milliGRAM(s) Oral at bedtime  cephalexin 500 milliGRAM(s) Oral four times a day  dextrose 5%. 1000 milliLiter(s) (50 mL/Hr) IV Continuous <Continuous>  dextrose 5%. 1000 milliLiter(s) (100 mL/Hr) IV Continuous <Continuous>  dextrose 50% Injectable 25 Gram(s) IV Push once  dextrose 50% Injectable 12.5 Gram(s) IV Push once  dextrose 50% Injectable 25 Gram(s) IV Push once  furosemide    Tablet 40 milliGRAM(s) Oral daily  glucagon  Injectable 1 milliGRAM(s) IntraMuscular once  insulin lispro (ADMELOG) corrective regimen sliding scale   SubCutaneous three times a day before meals  levothyroxine 25 MICROGram(s) Oral daily  lisinopril 5 milliGRAM(s) Oral daily  metoprolol succinate  milliGRAM(s) Oral daily  rivaroxaban 20 milliGRAM(s) Oral with dinner      TELEMETRY: 	    ECG:  	  RADIOLOGY:  OTHER: 	  	  LABS:	 	    CARDIAC MARKERS:                                11.5   9.44  )-----------( 224      ( 16 Sep 2024 06:51 )             37.4     09-16    141  |  103  |  19  ----------------------------<  122<H>  3.7   |  28  |  0.62    Ca    9.2      16 Sep 2024 06:46      proBNP:   Lipid Profile:   HgA1c:   TSH:     RECOMMENDATIONS:   - continue cefazolin 1g q8h while inpatient. Monitor for clinical improvement.   - Encourage leg elevation   - Upon discharge would transition to PO Keflex 500 mg Q6H. Plan for a total of 10 days until 09/22  - consider vascular evaluation for lymphedema management  Trend WBC, monitor fever curve and hemodynamic status.   If febrile, please send blood cultures.   Rest of the care as per the primary team.      Assessment and plan  ---------------------------   79-year-old female past medical history HTN, HLD, DMT2, A-fib on Xarelto, lymphedema chronic presents to ED for erythema swelling of the lower extremity concerning for cellulitis.  Advised by PMD to come to ED for further evaluation.  Previously admitted 7/27/2022 for cellulitis.  Patient states she has had chronic lymphedema however erythema has been progressively worse over the last 2 weeks with clear discharge that is foul-smelling and warm to the touch.  Denies fever, chills, nausea, vomiting, chest pain, SOB, calf tenderness.  No prior history of PE/DVT.   pt with hx of hfpef, chronic a.fib with le edema and cellulitis  continue all cardiac meds  continue beta blocker, fu hr  continue ac  doubt dvt she is on chronic ac  abx  lipid panel  will add ace or ARB in view of DM if increase bp  continue ac  pro bnp  sob ?sec to sleep apnea/ underlaying lung disease  needs sleep study/ le edema ?venous insufficiency, doppler with reflux as out pt  will call Dr Machuca to see if sleep studies are done  change lasix to 40 mg daily  Lisinopril 5 mg daily, will increase lisinopril dose as tolerated  oob to the chair/ physical therapy  Echo repeat with hx of Mitral stenosis  cellulitis still  with warm and red LE on abx, ID is following up  may consider lymphedema physical therapy  echo as out pt

## 2024-09-17 NOTE — PROGRESS NOTE ADULT - PROVIDER SPECIALTY LIST ADULT
Cardiology
Infectious Disease
Internal Medicine

## 2024-09-17 NOTE — PROGRESS NOTE ADULT - ASSESSMENT
79-year-old female     h/o    HTN, HLD, DM     A-fib on Xarelto, lymphedema chronic ,  renal  stone  left  ureter          presents to ED for erythema swelling of the lower extremity concerning for cellulitis. as  advised  by  pmd     chronic lymphedema however erythema ,  been progressively worse over the last 2 weeks with clear discharge    malodor       on iv   ancef       house  ID  eval     c/c  Afib,           on Xarelto   HTN /  HLD    Hypothyroid     h/o  c/c  diastolic  chf    venous  insufficiency.   b/l  legs /  known to  outsode   vascular  dr/  and  pt  feels  it  was   not  helping   lipitor, toprol, synthroid, xarelo    on  po  keflex  till  9/22,  per  ID.     d/c  pkans  today

## 2024-09-17 NOTE — PROGRESS NOTE ADULT - SUBJECTIVE AND OBJECTIVE BOX
date of service: 09-17-24 @ 07:06  [facility]  REVIEW OF SYSTEMS:  CONSTITUTIONAL: No fever,  no  weight loss  ENT:  No  tinnitus,   no   vertigo  NECK: No pain or stiffness  RESPIRATORY: No cough, wheezing, chills or hemoptysis;    No Shortness of Breath  CARDIOVASCULAR: No chest pain, palpitations, dizziness  GASTROINTESTINAL: No abdominal or epigastric pain. No nausea, vomiting, or hematemesis; No diarrhea  No melena or hematochezia.  GENITOURINARY: No dysuria, frequency, hematuria, or incontinence  NEUROLOGICAL: No headaches  SKIN: No itching,  no   rash  LYMPH Nodes: No enlarged glands  ENDOCRINE: No heat or cold intolerance  MUSCULOSKELETAL: No joint pain or swelling  PSYCHIATRIC: No depression, anxiety  HEME/LYMPH: No easy bruising, or bleeding gums  ALLERGY AND IMMUNOLOGIC: No hives or eczema	    MEDICATIONS  (STANDING):  atorvastatin 20 milliGRAM(s) Oral at bedtime  ceFAZolin   IVPB 1000 milliGRAM(s) IV Intermittent every 8 hours  dextrose 5%. 1000 milliLiter(s) (100 mL/Hr) IV Continuous <Continuous>  dextrose 5%. 1000 milliLiter(s) (50 mL/Hr) IV Continuous <Continuous>  dextrose 50% Injectable 25 Gram(s) IV Push once  dextrose 50% Injectable 12.5 Gram(s) IV Push once  dextrose 50% Injectable 25 Gram(s) IV Push once  furosemide   Injectable 40 milliGRAM(s) IV Push daily  glucagon  Injectable 1 milliGRAM(s) IntraMuscular once  insulin lispro (ADMELOG) corrective regimen sliding scale   SubCutaneous three times a day before meals  levothyroxine 25 MICROGram(s) Oral daily  lisinopril 5 milliGRAM(s) Oral daily  metoprolol succinate  milliGRAM(s) Oral daily  rivaroxaban 20 milliGRAM(s) Oral with dinner    MEDICATIONS  (PRN):  dextrose Oral Gel 15 Gram(s) Oral once PRN Blood Glucose LESS THAN 70 milliGRAM(s)/deciliter      Vital Signs Last 24 Hrs  T(C): 36.9 (17 Sep 2024 05:06), Max: 36.9 (17 Sep 2024 05:06)  T(F): 98.5 (17 Sep 2024 05:06), Max: 98.5 (17 Sep 2024 05:06)  HR: 68 (17 Sep 2024 05:06) (68 - 84)  BP: 103/69 (17 Sep 2024 05:06) (103/69 - 147/56)  BP(mean): --  RR: 18 (17 Sep 2024 05:06) (18 - 18)  SpO2: 93% (17 Sep 2024 05:06) (92% - 94%)    Parameters below as of 17 Sep 2024 05:06  Patient On (Oxygen Delivery Method): room air      CAPILLARY BLOOD GLUCOSE      POCT Blood Glucose.: 158 mg/dL (16 Sep 2024 21:45)  POCT Blood Glucose.: 129 mg/dL (16 Sep 2024 17:10)  POCT Blood Glucose.: 111 mg/dL (16 Sep 2024 12:04)  POCT Blood Glucose.: 123 mg/dL (16 Sep 2024 08:36)    I&O's Summary    16 Sep 2024 07:01  -  17 Sep 2024 07:00  --------------------------------------------------------  IN: 840 mL / OUT: 1600 mL / NET: -760 mL          Appearance: Normal	  HEENT:   Normal oral mucosa, PERRL, EOMI	  Lymphatic: No lymphadenopathy  Cardiovascular: Normal S1 S2, No JVD  Respiratory: Lungs clear to auscultation	  Gastrointestinal:  Soft, Non-tender, + BS	  Skin: No rash, No ecchymoses	  Extremities:     LABS:                        11.5   9.44  )-----------( 224      ( 16 Sep 2024 06:51 )             37.4     09-16    141  |  103  |  19  ----------------------------<  122<H>  3.7   |  28  |  0.62    Ca    9.2      16 Sep 2024 06:46            Urinalysis Basic - ( 16 Sep 2024 06:46 )    Color: x / Appearance: x / SG: x / pH: x  Gluc: 122 mg/dL / Ketone: x  / Bili: x / Urobili: x   Blood: x / Protein: x / Nitrite: x   Leuk Esterase: x / RBC: x / WBC x   Sq Epi: x / Non Sq Epi: x / Bacteria: x                      Consultant(s) Notes Reviewed:      Care Discussed with Consultants/Other Providers:

## 2024-09-17 NOTE — PROGRESS NOTE ADULT - REASON FOR ADMISSION
LE swelling

## 2024-09-18 NOTE — PATIENT PROFILE ADULT - MONEY FOR FOOD
Called and informed pt.   
Lissy:    Please call patient to discuss the following lab results. Blood counts and kidney counts are improved and she does not have hepatitis C. No further follow up indicated right now.     Once finished please close the encounter.    Thank you,  Grecia Jarvis MD  
no

## 2024-10-15 PROCEDURE — 83036 HEMOGLOBIN GLYCOSYLATED A1C: CPT

## 2024-10-15 PROCEDURE — 97161 PT EVAL LOW COMPLEX 20 MIN: CPT

## 2024-10-15 PROCEDURE — 84484 ASSAY OF TROPONIN QUANT: CPT

## 2024-10-15 PROCEDURE — 85652 RBC SED RATE AUTOMATED: CPT

## 2024-10-15 PROCEDURE — 71045 X-RAY EXAM CHEST 1 VIEW: CPT

## 2024-10-15 PROCEDURE — 80048 BASIC METABOLIC PNL TOTAL CA: CPT

## 2024-10-15 PROCEDURE — 99285 EMERGENCY DEPT VISIT HI MDM: CPT | Mod: 25

## 2024-10-15 PROCEDURE — 80053 COMPREHEN METABOLIC PANEL: CPT

## 2024-10-15 PROCEDURE — 93970 EXTREMITY STUDY: CPT

## 2024-10-15 PROCEDURE — 85025 COMPLETE CBC W/AUTO DIFF WBC: CPT

## 2024-10-15 PROCEDURE — 83880 ASSAY OF NATRIURETIC PEPTIDE: CPT

## 2024-10-15 PROCEDURE — 82962 GLUCOSE BLOOD TEST: CPT

## 2024-10-15 PROCEDURE — 87640 STAPH A DNA AMP PROBE: CPT

## 2024-10-15 PROCEDURE — 96374 THER/PROPH/DIAG INJ IV PUSH: CPT

## 2024-10-15 PROCEDURE — 86140 C-REACTIVE PROTEIN: CPT

## 2024-10-15 PROCEDURE — 87641 MR-STAPH DNA AMP PROBE: CPT

## 2024-10-15 PROCEDURE — 36415 COLL VENOUS BLD VENIPUNCTURE: CPT

## 2024-10-15 PROCEDURE — 85027 COMPLETE CBC AUTOMATED: CPT

## 2024-10-15 PROCEDURE — 96375 TX/PRO/DX INJ NEW DRUG ADDON: CPT

## 2024-12-26 NOTE — PATIENT PROFILE ADULT - HOME ACCESSIBILITY CONCERNS
Independent MANSI Visit.        [facility]  ED  Encounter Note  Admit Date/RoomTime: 2024  1:00 PM  ED Room:   NAME: Soraya Hammond  : 1934  MRN: [id number]  PCP: Jamel Handy Jr.,     CHIEF COMPLAINT     Hematuria and Fall (Last night noted blood in urine, fell 5 days ago landing on right buttock still pain, hit head --okay, still lump)    HISTORY OF PRESENT ILLNESS        Soraya Hammond is a 89 y.o. female who presents to the ED with complaints of hematuria that was noticed yesterday and a fall 5 days ago landing on her right hip and striking her head.  Patient states she struck her head to the occipital region of the scalp.  Patient denies loss of consciousness.  Patient states she is currently on Eliquis.  Patient states she had a recent fall and recently had a brain bleed and was evaluated at [facility] [place].  Patient denies history of kidney stones.  Patient denies dysuria.  Patient denies nausea or vomiting.    REVIEW OF SYSTEMS     Pertinent positives and negatives are stated within HPI, all other systems reviewed and are negative.    Past Medical History:  has a past medical history of Carpal tunnel syndrome, Cataract, Cobalamin deficiency, Contact dermatitis, Contusion, Hematoma, Hyperlipidemia, Hypomagnesemia, Perforation of intestine (HCC), Short bowel syndrome, Shoulder pain, Upper respiratory infection, Urinary tract infectious disease, Vertigo, and Vitamin D deficiency.  Surgical History:  has a past surgical history that includes Hysterectomy; Cholecystectomy; and colectomy (N/A, 2021).  Social History:  reports that she has never smoked. She has never used smokeless tobacco. She reports that she does not drink alcohol and does not use drugs.  Family History: family history includes Heart Disease in her father; Stroke in her mother.   Allergies: Nut [peanut-containing drug products] and Statins  CURRENT MEDICATIONS       Discharge Medication List as  stairs to enter home/stairs within home

## 2025-01-14 NOTE — ED ADULT TRIAGE NOTE - TEMPERATURE IN FAHRENHEIT (DEGREES F)
Is the patient currently in the state of [place]? YES    Current patient location[place] [address] [place] [place] [address] [address]    Visit mode:Video    If the visit is dropped, the patient can be reconnected by: VIDEO VISIT: Text to cell phone:   Telephone Information:   Mobile [phone]       Will anyone else be joining the visit? No  (If patient encounters technical issues they should call [phone])    Are changes needed to the allergy or medication list? Yes Now taking risperidone at night 1.25mg    Are refills needed on medications prescribed by this physician? No    Rooming Documentation: Questionnaire(s) completed.    Reason for visit: Consult     KARTHIK Lau        
98.5

## 2025-02-14 NOTE — PATIENT PROFILE ADULT - HISTORY OF COVID-19 VACCINATION
CHIEF COMPLAINT:  Chief Complaint   Patient presents with    Office Visit    Transitional Care Management     [facility]  Admitted: 01/26/2025  Discharged: 02/06/2025  Cc: [facility] [place], ulcer on foot  Dx: Rt. Foot wound s/p 2nd toe amputation    Has been relatively painless, drove here.  Had the flu while in the hospital, has resolved just a minor cold.      Went to [place] then drove out to [place] in the RV.  It started building on his drive to [place].  Came down with the flu for 10 days then the foot was getting worse then he drove to [facility] ED then transferred to [facility].      Referral     Did not collect referrals yet, wondering if he needs Podiatry?      Needs to schedule w/ wound care & ID, although his sister has been his expert.        Wound     Wound is open,  Now on PO Augmentin BID.  Sister has been changing his bandage.  Drainage is more serosanguinous now, very little.    -Will be finishing the Augmentin since Friday night 02/07/2025 was given a #14 day course... Not sure if he needs to continue?      Medication Issue     As of yesterday restarted his regular regimen of medications.  While in the hospital did get some swelling as most meds weren't given.  Started furosemide and was urinating all night.     The patient is unaccompanied.    SUBJECTIVE:  Palbo Mcduffie is a 62 year old man who presents with PHFU    While in [place]:    Wound right great toe. Got worse, then fevers, chills.   Admitted. Had MRI - 3.8x3.0x2.3-cm abscess plantar forefoot communicates with a skin ulceration. Abscess extends deep to the plantar fascia and second flexor digitorum lognus tendon.  Had right 2nd toe amputation at the level of metatarsophalangeal joint, and right foot excisional debridement to level of deep fasica. Had wound VAC. Discharged on amoxicillin-clavulanate.   Hasn't yet scheduled with specialists.    Prior to that, had influenza A.      He has longstanding back pain with sciatica. He deals with  it.      Has alcoholic cirrhosis. Had been seeing GI but it's been awhile. They're not in his current insurance network.     Had varices (mild). On nadolol.     Has gastritis. On pantoprazole.     Has presumed diabetes mellitus, type 2, well controlled. On metformin.     Has hyperlipidemia. On atorvastatin.     Has hypothyroidism. On levothyroxine.     On furosemide for ascites.     Currently sober.     Current smoker, 2 PPD.     He will be in [place] from October through April or May.    REVIEW OF SYSTEMS:  Review of Systems   Constitutional:  Negative for chills and fever.   Respiratory:  Negative for cough, shortness of breath and wheezing.    Cardiovascular:  Negative for chest pain and palpitations.   Gastrointestinal:  Negative for abdominal pain, constipation, diarrhea, nausea and vomiting.       MEDICATIONS:  Current Outpatient Medications   Medication Sig Dispense Refill    amoxicillin-clavulanate (AUGMENTIN) 875-125 MG per tablet Take 1 tablet by mouth in the morning and 1 tablet in the evening.      levothyroxine 50 MCG tablet TAKE 1 TABLET BY MOUTH DAILY 90 tablet 0    folic acid (FOLATE) 1 MG tablet TAKE 1 TABLET BY MOUTH DAILY 90 tablet 0    metformin (GLUCOPHAGE) 1000 MG tablet TAKE 1 TABLET BY MOUTH IN THE MORNING AND IN THE EVENING 180 tablet 1    furosemide (LASIX) 40 MG tablet TAKE 1 TABLET BY MOUTH TWICE DAILY 180 tablet 1    atorvastatin (LIPITOR) 10 MG tablet TAKE 1 TABLET BY MOUTH DAILY 90 tablet 2    pantoprazole (PROTONIX) 40 MG tablet Take 1 tablet by mouth daily. 90 tablet 3    nadolol (CORGARD) 40 MG tablet Take 1 tablet by mouth daily. 90 tablet 0    Multiple Vitamins-Minerals (CENTRUM SILVER 50+MEN PO) Take by mouth 2 (two) times a day.       No current facility-administered medications for this visit.       ALLERGIES:  ALLERGIES:  Seasonal    PROBLEM LIST:    There is no problem list on file for this patient.           OBJECTIVE:    PHYSICAL EXAM:  Visit Vitals  /64 (BP Location:  LUE - Left upper extremity, Patient Position: Sitting, Cuff Size: Regular)   Pulse 72   Temp 97.2 °F (36.2 °C) (Temporal)   Ht 5' 7.5\" (1.715 m)   Wt 88.5 kg (195 lb)   SpO2 99% Comment: RA   BMI 30.09 kg/m²       Physical Exam  Vitals reviewed.   Constitutional:       General: He is not in acute distress.  Cardiovascular:      Rate and Rhythm: Normal rate and regular rhythm.      Heart sounds: Normal heart sounds. No murmur heard.     No friction rub. No gallop.   Pulmonary:      Effort: Pulmonary effort is normal. No respiratory distress.      Breath sounds: Normal breath sounds. No wheezing.   Abdominal:      General: Bowel sounds are normal. There is no distension.      Palpations: Abdomen is soft. There is no mass.      Tenderness: There is no abdominal tenderness.   Skin:     General: Skin is warm and dry.      Findings: No rash.   Neurological:      Mental Status: He is alert and oriented to person, place, and time.      Cranial Nerves: No cranial nerve deficit.      Sensory: No sensory deficit.      Motor: No abnormal muscle tone.         LABORATORY TESTS:  External Lab on 02/05/2025   Component Date Value    WBC 02/05/2025 6.4     RBC 02/05/2025 3.54 (L)     HGB 02/05/2025 10.4 (L)     HCT 02/05/2025 32.5 (L)     MCV 02/05/2025 92     MCH 02/05/2025 29.4     MCHC 02/05/2025 32.0     RDW-CV 02/05/2025 14.7 (H)     PLT 02/05/2025 182     MPV 02/05/2025 9.9     Immature Granulocytes 02/05/2025 1.1 (H)     NRBC (AUTO) 02/05/2025 0     Neutrophil 02/05/2025 61.2     LYMPH 02/05/2025 20.4     MONO 02/05/2025 13.2 (H)     EOSIN 02/05/2025 3.3     BASO 02/05/2025 0.8     Absolute NRBC (AUTO) 02/05/2025 0.00     Absolute Neutrophil 02/05/2025 3.9     Absolute Immature Granul* 02/05/2025 0.07 (H)     Absolute Lymph 02/05/2025 1.3     Absolute Mono 02/05/2025 0.9     Absolute Eos 02/05/2025 0.2     Absolute Baso 02/05/2025 0.1    External Lab on 02/03/2025   Component Date Value    Sodium 02/03/2025 141     Potassium  02/03/2025 4.4     Chloride 02/03/2025 107     Carbon Dioxide 02/03/2025 26     Anion Gap 02/03/2025 8     Glucose 02/03/2025 114     BUN 02/03/2025 25 (H)     Creatinine 02/03/2025 1.30     GFR,ESTIMATE 02/03/2025 61     Albumin 02/03/2025 3.3     CALCIUM 02/03/2025 9.1     PHOSPHORUS 02/03/2025 3.1     Fasting Status 02/03/2025 Fasting status not provided        ASSESSMENT/PLAN:  Cellulitis and abscess of foot  I'm surprised that he had such a severe infection. Assuming he has diabetes mellitus, type 2, it's been very well controlled in the time I've known him, with no A1c above 6.5%.  He does have cirrhosis  Check repeat MRI.  Schedule with specialists  - MRI FOREFOOT RIGHT W WO CONTRAST; Future        Orders Placed This Encounter    MRI FOREFOOT RIGHT W WO CONTRAST       Return if symptoms worsen or fail to improve, for previously scheduled appointment.    Smith Blackwell MD  2/14/2025         Yes

## 2025-03-31 ENCOUNTER — INPATIENT (INPATIENT)
Facility: HOSPITAL | Age: 80
LOS: 2 days | Discharge: HOME | DRG: 603 | End: 2025-04-03
Attending: INTERNAL MEDICINE | Admitting: INTERNAL MEDICINE
Payer: MEDICARE

## 2025-03-31 VITALS
SYSTOLIC BLOOD PRESSURE: 153 MMHG | DIASTOLIC BLOOD PRESSURE: 91 MMHG | HEART RATE: 83 BPM | WEIGHT: 240.08 LBS | TEMPERATURE: 97 F | RESPIRATION RATE: 17 BRPM | OXYGEN SATURATION: 99 % | HEIGHT: 63 IN

## 2025-03-31 DIAGNOSIS — Z98.890 OTHER SPECIFIED POSTPROCEDURAL STATES: Chronic | ICD-10-CM

## 2025-03-31 DIAGNOSIS — L03.115 CELLULITIS OF RIGHT LOWER LIMB: ICD-10-CM

## 2025-03-31 DIAGNOSIS — N20.0 CALCULUS OF KIDNEY: Chronic | ICD-10-CM

## 2025-03-31 DIAGNOSIS — Z96.659 PRESENCE OF UNSPECIFIED ARTIFICIAL KNEE JOINT: Chronic | ICD-10-CM

## 2025-03-31 LAB
ALBUMIN SERPL ELPH-MCNC: 3.4 G/DL — SIGNIFICANT CHANGE UP (ref 3.3–5)
ALP SERPL-CCNC: 126 U/L — HIGH (ref 40–120)
ALT FLD-CCNC: 14 U/L — SIGNIFICANT CHANGE UP (ref 10–45)
ANION GAP SERPL CALC-SCNC: 15 MMOL/L — SIGNIFICANT CHANGE UP (ref 5–17)
AST SERPL-CCNC: 19 U/L — SIGNIFICANT CHANGE UP (ref 10–40)
BASOPHILS # BLD AUTO: 0.01 K/UL — SIGNIFICANT CHANGE UP (ref 0–0.2)
BASOPHILS NFR BLD AUTO: 0.1 % — SIGNIFICANT CHANGE UP (ref 0–2)
BILIRUB SERPL-MCNC: 0.4 MG/DL — SIGNIFICANT CHANGE UP (ref 0.2–1.2)
BUN SERPL-MCNC: 15 MG/DL — SIGNIFICANT CHANGE UP (ref 7–23)
CALCIUM SERPL-MCNC: 9.2 MG/DL — SIGNIFICANT CHANGE UP (ref 8.4–10.5)
CHLORIDE SERPL-SCNC: 104 MMOL/L — SIGNIFICANT CHANGE UP (ref 96–108)
CO2 SERPL-SCNC: 19 MMOL/L — LOW (ref 22–31)
CREAT SERPL-MCNC: 0.69 MG/DL — SIGNIFICANT CHANGE UP (ref 0.5–1.3)
EGFR: 88 ML/MIN/1.73M2 — SIGNIFICANT CHANGE UP
EGFR: 88 ML/MIN/1.73M2 — SIGNIFICANT CHANGE UP
EOSINOPHIL # BLD AUTO: 0.37 K/UL — SIGNIFICANT CHANGE UP (ref 0–0.5)
EOSINOPHIL NFR BLD AUTO: 5 % — SIGNIFICANT CHANGE UP (ref 0–6)
GAS PNL BLDV: SIGNIFICANT CHANGE UP
GLUCOSE BLDC GLUCOMTR-MCNC: 108 MG/DL — HIGH (ref 70–99)
GLUCOSE BLDC GLUCOMTR-MCNC: 135 MG/DL — HIGH (ref 70–99)
GLUCOSE SERPL-MCNC: 138 MG/DL — HIGH (ref 70–99)
HCT VFR BLD CALC: 35.1 % — SIGNIFICANT CHANGE UP (ref 34.5–45)
HGB BLD-MCNC: 10.8 G/DL — LOW (ref 11.5–15.5)
IMM GRANULOCYTES NFR BLD AUTO: 0.5 % — SIGNIFICANT CHANGE UP (ref 0–0.9)
LACTATE SERPL-SCNC: 1.4 MMOL/L — SIGNIFICANT CHANGE UP (ref 0.5–2)
LYMPHOCYTES # BLD AUTO: 1.02 K/UL — SIGNIFICANT CHANGE UP (ref 1–3.3)
LYMPHOCYTES # BLD AUTO: 13.7 % — SIGNIFICANT CHANGE UP (ref 13–44)
MCHC RBC-ENTMCNC: 28.1 PG — SIGNIFICANT CHANGE UP (ref 27–34)
MCHC RBC-ENTMCNC: 30.8 G/DL — LOW (ref 32–36)
MCV RBC AUTO: 91.2 FL — SIGNIFICANT CHANGE UP (ref 80–100)
MONOCYTES # BLD AUTO: 0.37 K/UL — SIGNIFICANT CHANGE UP (ref 0–0.9)
MONOCYTES NFR BLD AUTO: 5 % — SIGNIFICANT CHANGE UP (ref 2–14)
NEUTROPHILS # BLD AUTO: 5.65 K/UL — SIGNIFICANT CHANGE UP (ref 1.8–7.4)
NEUTROPHILS NFR BLD AUTO: 75.7 % — SIGNIFICANT CHANGE UP (ref 43–77)
NRBC BLD AUTO-RTO: 0 /100 WBCS — SIGNIFICANT CHANGE UP (ref 0–0)
PLATELET # BLD AUTO: 279 K/UL — SIGNIFICANT CHANGE UP (ref 150–400)
POTASSIUM SERPL-MCNC: 4 MMOL/L — SIGNIFICANT CHANGE UP (ref 3.5–5.3)
POTASSIUM SERPL-SCNC: 4 MMOL/L — SIGNIFICANT CHANGE UP (ref 3.5–5.3)
PROT SERPL-MCNC: 7.3 G/DL — SIGNIFICANT CHANGE UP (ref 6–8.3)
RBC # BLD: 3.85 M/UL — SIGNIFICANT CHANGE UP (ref 3.8–5.2)
RBC # FLD: 14.6 % — HIGH (ref 10.3–14.5)
SODIUM SERPL-SCNC: 138 MMOL/L — SIGNIFICANT CHANGE UP (ref 135–145)
WBC # BLD: 7.46 K/UL — SIGNIFICANT CHANGE UP (ref 3.8–10.5)
WBC # FLD AUTO: 7.46 K/UL — SIGNIFICANT CHANGE UP (ref 3.8–10.5)

## 2025-03-31 PROCEDURE — 99285 EMERGENCY DEPT VISIT HI MDM: CPT

## 2025-03-31 RX ORDER — METOPROLOL SUCCINATE 50 MG/1
200 TABLET, EXTENDED RELEASE ORAL DAILY
Refills: 0 | Status: DISCONTINUED | OUTPATIENT
Start: 2025-03-31 | End: 2025-04-03

## 2025-03-31 RX ORDER — RIVAROXABAN 10 MG/1
20 TABLET, FILM COATED ORAL
Refills: 0 | Status: DISCONTINUED | OUTPATIENT
Start: 2025-03-31 | End: 2025-03-31

## 2025-03-31 RX ORDER — INSULIN LISPRO 100 U/ML
INJECTION, SOLUTION INTRAVENOUS; SUBCUTANEOUS
Refills: 0 | Status: DISCONTINUED | OUTPATIENT
Start: 2025-03-31 | End: 2025-04-03

## 2025-03-31 RX ORDER — CEFAZOLIN SODIUM IN 0.9 % NACL 3 G/100 ML
1000 INTRAVENOUS SOLUTION, PIGGYBACK (ML) INTRAVENOUS ONCE
Refills: 0 | Status: COMPLETED | OUTPATIENT
Start: 2025-03-31 | End: 2025-03-31

## 2025-03-31 RX ORDER — CEFAZOLIN SODIUM IN 0.9 % NACL 3 G/100 ML
1000 INTRAVENOUS SOLUTION, PIGGYBACK (ML) INTRAVENOUS EVERY 8 HOURS
Refills: 0 | Status: DISCONTINUED | OUTPATIENT
Start: 2025-03-31 | End: 2025-04-03

## 2025-03-31 RX ORDER — SODIUM CHLORIDE 9 G/1000ML
1000 INJECTION, SOLUTION INTRAVENOUS
Refills: 0 | Status: DISCONTINUED | OUTPATIENT
Start: 2025-03-31 | End: 2025-04-03

## 2025-03-31 RX ORDER — RIVAROXABAN 10 MG/1
15 TABLET, FILM COATED ORAL
Refills: 0 | Status: DISCONTINUED | OUTPATIENT
Start: 2025-03-31 | End: 2025-04-01

## 2025-03-31 RX ORDER — DEXTROSE 50 % IN WATER 50 %
12.5 SYRINGE (ML) INTRAVENOUS ONCE
Refills: 0 | Status: DISCONTINUED | OUTPATIENT
Start: 2025-03-31 | End: 2025-04-03

## 2025-03-31 RX ORDER — LISINOPRIL 5 MG/1
5 TABLET ORAL DAILY
Refills: 0 | Status: DISCONTINUED | OUTPATIENT
Start: 2025-03-31 | End: 2025-04-03

## 2025-03-31 RX ORDER — DEXTROSE 50 % IN WATER 50 %
25 SYRINGE (ML) INTRAVENOUS ONCE
Refills: 0 | Status: DISCONTINUED | OUTPATIENT
Start: 2025-03-31 | End: 2025-04-03

## 2025-03-31 RX ORDER — NYSTATIN 100000 [USP'U]/G
1 CREAM TOPICAL
Refills: 0 | Status: DISCONTINUED | OUTPATIENT
Start: 2025-03-31 | End: 2025-04-03

## 2025-03-31 RX ORDER — LEVOTHYROXINE SODIUM 300 MCG
25 TABLET ORAL DAILY
Refills: 0 | Status: DISCONTINUED | OUTPATIENT
Start: 2025-03-31 | End: 2025-04-03

## 2025-03-31 RX ORDER — GLUCAGON 3 MG/1
1 POWDER NASAL ONCE
Refills: 0 | Status: DISCONTINUED | OUTPATIENT
Start: 2025-03-31 | End: 2025-04-03

## 2025-03-31 RX ORDER — DEXTROSE 50 % IN WATER 50 %
15 SYRINGE (ML) INTRAVENOUS ONCE
Refills: 0 | Status: DISCONTINUED | OUTPATIENT
Start: 2025-03-31 | End: 2025-04-03

## 2025-03-31 RX ORDER — DOXYCYCLINE HYCLATE 100 MG
100 TABLET ORAL ONCE
Refills: 0 | Status: COMPLETED | OUTPATIENT
Start: 2025-03-31 | End: 2025-03-31

## 2025-03-31 RX ORDER — ATORVASTATIN CALCIUM 80 MG/1
20 TABLET, FILM COATED ORAL AT BEDTIME
Refills: 0 | Status: DISCONTINUED | OUTPATIENT
Start: 2025-03-31 | End: 2025-04-03

## 2025-03-31 RX ORDER — FUROSEMIDE 10 MG/ML
40 INJECTION INTRAMUSCULAR; INTRAVENOUS DAILY
Refills: 0 | Status: DISCONTINUED | OUTPATIENT
Start: 2025-03-31 | End: 2025-04-03

## 2025-03-31 RX ADMIN — RIVAROXABAN 15 MILLIGRAM(S): 10 TABLET, FILM COATED ORAL at 18:17

## 2025-03-31 RX ADMIN — NYSTATIN 1 APPLICATION(S): 100000 CREAM TOPICAL at 22:33

## 2025-03-31 RX ADMIN — ATORVASTATIN CALCIUM 20 MILLIGRAM(S): 80 TABLET, FILM COATED ORAL at 21:13

## 2025-03-31 RX ADMIN — Medication 100 MILLIGRAM(S): at 09:47

## 2025-03-31 RX ADMIN — METOPROLOL SUCCINATE 200 MILLIGRAM(S): 50 TABLET, EXTENDED RELEASE ORAL at 18:18

## 2025-03-31 RX ADMIN — Medication 100 MILLIGRAM(S): at 21:13

## 2025-03-31 RX ADMIN — Medication 100 MILLIGRAM(S): at 09:06

## 2025-03-31 NOTE — ED PROVIDER NOTE - IV ALTEPLASE ADMIN OUTSIDE HIDDEN
show normal... Well appearing, awake, alert, oriented to person, place, time/situation and in no apparent distress.

## 2025-03-31 NOTE — H&P ADULT - HISTORY OF PRESENT ILLNESS
79  yr    h/o   lymphedema of bilateral lower extremities  priro leg   cellulitis    HTN/  DM /  HLD .  c/c    Afib       had  erythema,   warmth   approximately 6 weeks ago in both lower extremities.   and  had  received 2 courses of oral antibiotics /  keflex, with the last finishing about 3 weeks ago.       denies any fevers, chills or sweats.     then had  increasing  redness and pain with weeping which is foul-smelling.    metformin, atorvastatin, metoprolol, levothyroxine and Xarelto.

## 2025-03-31 NOTE — CONSULT NOTE ADULT - SUBJECTIVE AND OBJECTIVE BOX
Date of Service, 03-31-25 @ 16:51  CHIEF COMPLAINT:Patient is a 79y old  Female who presents with a chief complaint of cellulitis (31 Mar 2025 13:46)      HPI:  Patient has history of lymphedema of bilateral lower extremities and prior history is of cellulitis reports of onset of erythema, increased heat approximately 6 weeks ago in both lower extremities.  Patient received 2 courses of oral antibiotics with the last finishing about 3 weeks ago.  She thinks she took cephalexin is 1 course.  She denies any fevers, chills or sweats.  She has history of type 2 diabetes but does not check blood sugar.  She reports increasing redness and pain with weeping which is foul-smelling.  She has no allergies to medication.  She has a history of hypertension, hyperlipidemia as well as atrial fibrillation and congestive heart failure.  Patient takes metformin, atorvastatin, metoprolol, levothyroxine and Xarelto.      PAST MEDICAL & SURGICAL HISTORY:  Hypertension  Diabetes type 2, controlled  Hyperlipidemia  Nephrolithiasis  AF (atrial fibrillation)  HTN (hypertension)  HLD (hyperlipidemia)  Diabetes  Atrial fibrillation  onset 5/2018  Hypothyroid  Nephrolithiasis  Status post total knee replacement, unspecified laterality  H/O total knee replacement  bilateral  , 1998, 2000  S/P cystoscopy  with left ureteral stent 4/2018      MEDICATIONS  (STANDING):  atorvastatin 20 milliGRAM(s) Oral at bedtime  ceFAZolin   IVPB 1000 milliGRAM(s) IV Intermittent every 8 hours  dextrose 5%. 1000 milliLiter(s) (50 mL/Hr) IV Continuous <Continuous>  dextrose 5%. 1000 milliLiter(s) (100 mL/Hr) IV Continuous <Continuous>  dextrose 50% Injectable 25 Gram(s) IV Push once  dextrose 50% Injectable 12.5 Gram(s) IV Push once  dextrose 50% Injectable 25 Gram(s) IV Push once  furosemide   Injectable 40 milliGRAM(s) IV Push daily  glucagon  Injectable 1 milliGRAM(s) IntraMuscular once  insulin lispro (ADMELOG) corrective regimen sliding scale   SubCutaneous three times a day before meals  levothyroxine 25 MICROGram(s) Oral daily  lisinopril 5 milliGRAM(s) Oral daily  metoprolol succinate  milliGRAM(s) Oral daily  nystatin Cream 1 Application(s) Topical two times a day  rivaroxaban 20 milliGRAM(s) Oral with dinner    MEDICATIONS  (PRN):  dextrose Oral Gel 15 Gram(s) Oral once PRN Blood Glucose LESS THAN 70 milliGRAM(s)/deciliter      FAMILY HISTORY:  FH: heart failure (Father)      SOCIAL HISTORY:    [ ] Non-smoker  [ ] Smoker  [ ] Alcohol    Allergies    No Known Allergies    Intolerances    	    REVIEW OF SYSTEMS:  CONSTITUTIONAL: No fever, weight loss, or fatigue  EYES: No eye pain, visual disturbances, or discharge  ENT:  No difficulty hearing, tinnitus, vertigo; No sinus or throat pain  NECK: No pain or stiffness  RESPIRATORY: No cough, wheezing, chills or hemoptysis; + Shortness of Breath  CARDIOVASCULAR: No chest pain, palpitations, passing out, dizziness, or leg swelling  GASTROINTESTINAL: No abdominal or epigastric pain. No nausea, vomiting, or hematemesis; No diarrhea or constipation. No melena or hematochezia.  GENITOURINARY: No dysuria, frequency, hematuria, or incontinence  NEUROLOGICAL: No headaches, memory loss, loss of strength, numbness, or tremors  SKIN: No itching, burning, rashes, or lesions   LYMPH Nodes: No enlarged glands  ENDOCRINE: No heat or cold intolerance; No hair loss  MUSCULOSKELETAL: No joint pain or swelling; No muscle, back, or extremity pain  PSYCHIATRIC: No depression, anxiety, mood swings, or difficulty sleeping  HEME/LYMPH: No easy bruising, or bleeding gums  ALLERGY AND IMMUNOLOGIC: No hives or eczema	    [x ] All others negative	  [ ] Unable to obtain    PHYSICAL EXAM:  T(C): 36.5 (03-31-25 @ 14:56), Max: 36.5 (03-31-25 @ 14:56)  HR: 82 (03-31-25 @ 14:56) (82 - 83)  BP: 152/62 (03-31-25 @ 14:56) (152/62 - 153/91)  RR: 19 (03-31-25 @ 14:56) (17 - 19)  SpO2: 96% (03-31-25 @ 14:56) (96% - 99%)  Wt(kg): --  I&O's Summary      Appearance: Normal	  HEENT:   Normal oral mucosa, PERRL, EOMI	  Lymphatic: No lymphadenopathy  Cardiovascular: Normal S1 S2, No JVD, + murmurs, No edema  Respiratory: Lungs clear to auscultation	  Psychiatry: A & O x 3, Mood & affect appropriate  Gastrointestinal:  Soft, Non-tender, + BS	  Skin: No rashes, No ecchymoses, No cyanosis	  Neurologic: Non-focal  Extremities: Normal range of motion, No clubbing, cyanosis or edema  Vascular: Peripheral pulses palpable 2+ bilaterally    TELEMETRY: 	    ECG:  	  RADIOLOGY:  OTHER: 	  	  LABS:	 	    CARDIAC MARKERS:                              10.8   7.46  )-----------( 279      ( 31 Mar 2025 09:19 )             35.1     03-31    138  |  104  |  15  ----------------------------<  138[H]  4.0   |  19[L]  |  0.69    Ca    9.2      31 Mar 2025 09:19    TPro  7.3  /  Alb  3.4  /  TBili  0.4  /  DBili  x   /  AST  19  /  ALT  14  /  AlkPhos  126[H]  03-31    proBNP:   Lipid Profile:   HgA1c:   TSH:       PREVIOUS DIAGNOSTIC TESTING:       < from: 12 Lead ECG (09.13.24 @ 11:58) >  Diagnosis Line SINUS RHYTHM WITH PREMATURE SUPRAVENTRICULAR COMPLEXES  OTHERWISE NORMAL ECG  WHEN COMPARED WITH ECG OF 25-JUL-2022 10:00,  SIGNIFICANT CHANGES HAVE OCCURRED    < from: Xray Chest 1 View- PORTABLE-Urgent (Xray Chest 1 View- PORTABLE-Urgent .) (09.13.24 @ 12:30) >  The heart size cannot be accurately assessed on this projection.  The lungs are clear.  There is no pneumothorax or pleural effusion.  No acute osseous abnormalities.    IMPRESSION:  Clear lungs.           Date of Service, 03-31-25 @ 16:51  CHIEF COMPLAINT:Patient is a 79y old  Female who presents with a chief complaint of cellulitis (31 Mar 2025 13:46)      HPI:  Patient has history of lymphedema of bilateral lower extremities and prior history is of cellulitis reports of onset of erythema, increased heat approximately 6 weeks ago in both lower extremities.  Patient received 2 courses of oral antibiotics with the last finishing about 3 weeks ago.  She thinks she took cephalexin is 1 course.  She denies any fevers, chills or sweats.  She has history of type 2 diabetes but does not check blood sugar.  She reports increasing redness and pain with weeping which is foul-smelling.  She has no allergies to medication.  She has a history of hypertension, hyperlipidemia as well as atrial fibrillation and congestive heart failure.  Patient takes metformin, atorvastatin, metoprolol, levothyroxine and Xarelto.      PAST MEDICAL & SURGICAL HISTORY:  Hypertension  Diabetes type 2, controlled  Hyperlipidemia  Nephrolithiasis  AF (atrial fibrillation)  HTN (hypertension)  HLD (hyperlipidemia)  Diabetes  Atrial fibrillation  onset 5/2018  Hypothyroid  Nephrolithiasis  Status post total knee replacement, unspecified laterality  H/O total knee replacement  bilateral  , 1998, 2000  S/P cystoscopy  with left ureteral stent 4/2018      MEDICATIONS  (STANDING):  atorvastatin 20 milliGRAM(s) Oral at bedtime  ceFAZolin   IVPB 1000 milliGRAM(s) IV Intermittent every 8 hours  dextrose 5%. 1000 milliLiter(s) (50 mL/Hr) IV Continuous <Continuous>  dextrose 5%. 1000 milliLiter(s) (100 mL/Hr) IV Continuous <Continuous>  dextrose 50% Injectable 25 Gram(s) IV Push once  dextrose 50% Injectable 12.5 Gram(s) IV Push once  dextrose 50% Injectable 25 Gram(s) IV Push once  furosemide   Injectable 40 milliGRAM(s) IV Push daily  glucagon  Injectable 1 milliGRAM(s) IntraMuscular once  insulin lispro (ADMELOG) corrective regimen sliding scale   SubCutaneous three times a day before meals  levothyroxine 25 MICROGram(s) Oral daily  lisinopril 5 milliGRAM(s) Oral daily  metoprolol succinate  milliGRAM(s) Oral daily  nystatin Cream 1 Application(s) Topical two times a day  rivaroxaban 20 milliGRAM(s) Oral with dinner    MEDICATIONS  (PRN):  dextrose Oral Gel 15 Gram(s) Oral once PRN Blood Glucose LESS THAN 70 milliGRAM(s)/deciliter      FAMILY HISTORY:  FH: heart failure (Father)      SOCIAL HISTORY:    [x ] Non-smoker  [ ] Smoker  [ ] Alcohol    Allergies    No Known Allergies    Intolerances    	    REVIEW OF SYSTEMS:  CONSTITUTIONAL: No fever, weight loss, or fatigue  EYES: No eye pain, visual disturbances, or discharge  ENT:  No difficulty hearing, tinnitus, vertigo; No sinus or throat pain  NECK: No pain or stiffness  RESPIRATORY: No cough, wheezing, chills or hemoptysis; + Shortness of Breath  CARDIOVASCULAR: No chest pain, palpitations, passing out, dizziness, or leg swelling  GASTROINTESTINAL: No abdominal or epigastric pain. No nausea, vomiting, or hematemesis; No diarrhea or constipation. No melena or hematochezia.  GENITOURINARY: No dysuria, frequency, hematuria, or incontinence  NEUROLOGICAL: No headaches, memory loss, loss of strength, numbness, or tremors  SKIN: No itching, burning, rashes, or lesions   LYMPH Nodes: No enlarged glands  ENDOCRINE: No heat or cold intolerance; No hair loss  MUSCULOSKELETAL: No joint pain or swelling; No muscle, back, or extremity pain  PSYCHIATRIC: No depression, anxiety, mood swings, or difficulty sleeping  HEME/LYMPH: No easy bruising, or bleeding gums  ALLERGY AND IMMUNOLOGIC: No hives or eczema	    [x ] All others negative	  [ ] Unable to obtain    PHYSICAL EXAM:  T(C): 36.5 (03-31-25 @ 14:56), Max: 36.5 (03-31-25 @ 14:56)  HR: 82 (03-31-25 @ 14:56) (82 - 83)  BP: 152/62 (03-31-25 @ 14:56) (152/62 - 153/91)  RR: 19 (03-31-25 @ 14:56) (17 - 19)  SpO2: 96% (03-31-25 @ 14:56) (96% - 99%)  Wt(kg): --  I&O's Summary      Appearance: Normal	  HEENT:   Normal oral mucosa, PERRL, EOMI	  Lymphatic: No lymphadenopathy  Cardiovascular: Normal S1 S2, No JVD, + murmurs, No edema  Respiratory: Lungs clear to auscultation	  Psychiatry: A & O x 3, Mood & affect appropriate  Gastrointestinal:  Soft, Non-tender, + BS	  Skin: No rashes, No ecchymoses, No cyanosis	  Neurologic: Non-focal  Extremities: , No clubbing, cyanosis or edema  Vascular: Peripheral pulses palpable 2+ bilaterally    TELEMETRY: 	    ECG:  	  RADIOLOGY:  OTHER: 	  	  LABS:	 	    CARDIAC MARKERS:                              10.8   7.46  )-----------( 279      ( 31 Mar 2025 09:19 )             35.1     03-31    138  |  104  |  15  ----------------------------<  138[H]  4.0   |  19[L]  |  0.69    Ca    9.2      31 Mar 2025 09:19    TPro  7.3  /  Alb  3.4  /  TBili  0.4  /  DBili  x   /  AST  19  /  ALT  14  /  AlkPhos  126[H]  03-31    proBNP:   Lipid Profile:   HgA1c:   TSH:       PREVIOUS DIAGNOSTIC TESTING:       < from: 12 Lead ECG (09.13.24 @ 11:58) >  Diagnosis Line SINUS RHYTHM WITH PREMATURE SUPRAVENTRICULAR COMPLEXES  OTHERWISE NORMAL ECG  WHEN COMPARED WITH ECG OF 25-JUL-2022 10:00,  SIGNIFICANT CHANGES HAVE OCCURRED    < from: Xray Chest 1 View- PORTABLE-Urgent (Xray Chest 1 View- PORTABLE-Urgent .) (09.13.24 @ 12:30) >  The heart size cannot be accurately assessed on this projection.  The lungs are clear.  There is no pneumothorax or pleural effusion.  No acute osseous abnormalities.    IMPRESSION:  Clear lungs.      < from: Transthoracic Echocardiogram (07.26.22 @ 09:55) >  Mitral Valve: Mild mitral stenosis due to annular  calcification. Mean gradient 4.3 mmHg at  109/min.  Aortic Valve/Aorta: Calcified aortic valve with normal  opening.  Normal aortic root size.  Left Atrium: Normal left atrium.  Left Ventricle: Normal left ventricular internal dimensions  and wall thicknesses.  Hyperdynamic left ventricle.  Right Heart: Normal right atrium. Normal right ventricular  size and function.  Normal tricuspid valve. Mild tricuspid regurgitation.  Normal pulmonic valve.  Pericardium/Pleura: Normal pericardium with no pericardial  effusion.  Hemodynamic: Estimated right atrial pressure is normal.  Mild-moderate pulmonary hypertension. Estimated PASP 52  mmHg.  ------------------------------------------------------------------------  Conclusions:  Hyperdynamic left ventricle.  Mild mitral stenosis due to annular calcification.  Mild-moderate pulmonary hypertension.

## 2025-03-31 NOTE — H&P ADULT - NSHPLABSRESULTS_GEN_ALL_CORE
LABS:                        10.8   7.46  )-----------( 279      ( 31 Mar 2025 09:19 )             35.1     03-31    138  |  104  |  15  ----------------------------<  138[H]  4.0   |  19[L]  |  0.69    Ca    9.2      31 Mar 2025 09:19    TPro  7.3  /  Alb  3.4  /  TBili  0.4  /  DBili  x   /  AST  19  /  ALT  14  /  AlkPhos  126[H]  03-31          Urinalysis Basic - ( 31 Mar 2025 09:19 )    Color: x / Appearance: x / SG: x / pH: x  Gluc: 138 mg/dL / Ketone: x  / Bili: x / Urobili: x   Blood: x / Protein: x / Nitrite: x   Leuk Esterase: x / RBC: x / WBC x   Sq Epi: x / Non Sq Epi: x / Bacteria: x      Lactate, Blood: 1.4 mmol/L (03-31 @ 12:46)      03-31 @ 09:07  4.2  25

## 2025-03-31 NOTE — ED PROVIDER NOTE - CARE PLAN
Principal Discharge DX:	Bilateral lower leg cellulitis  Secondary Diagnosis:	Lymphedema of leg  Secondary Diagnosis:	Type 2 diabetes mellitus   1

## 2025-03-31 NOTE — H&P ADULT - NSHPPHYSICALEXAM_GEN_ALL_CORE
T(C): 36.3 (03-31-25 @ 08:30), Max: 36.3 (03-31-25 @ 08:30)  HR: 83 (03-31-25 @ 08:30) (83 - 83)  BP: 153/91 (03-31-25 @ 08:30) (153/91 - 153/91)  RR: 17 (03-31-25 @ 08:30) (17 - 17)  SpO2: 99% (03-31-25 @ 08:30) (99% - 99%)  GENERAL: NAD, lying in bed comfortably  HEAD:  Atraumatic, normocephalic  EYES: EOMI, PERRLA, conjunctiva and sclera clear  NECK: Supple, trachea midline, no JVD  HEART: Regular rate and rhythm, no murmurs, rubs, or gallops  LUNGS: Unlabored respirations.  Clear to auscultation bilaterally, no crackles, wheezing, or rhonchi  ABDOMEN: Soft, nontender, nondistended, +BS  EXTREMITIES: 2+ peripheral pulses bilaterally. No clubbing, cyanosis,  NERVOUS SYSTEM:  A&Ox3, moving all extremities, no focal deficits   SKIN: No rashes     b/l  dital  leg  swelling/  redness/  cracked, dry skin, planatar  aspect

## 2025-03-31 NOTE — ED ADULT NURSE NOTE - OBJECTIVE STATEMENT
Pt presents to the ED A&Ox4 co BLLE swelling x 5 weeks. Hx lyphedema, venous insufficiency, diastolic HF. Pt presents to the ED A&Ox4 co BLLE swelling x 5 weeks. Hx lymphedema, venous insufficiency, diastolic HF, afib on xarelto.  Pt  associates leg swelling with erythema and similar to cellulitis. Pt admits to taking oral antibiotics and completing the regimen 3 weeks ago. Pt endorses taking cephalexin. Denies fevers, sweats, nvd, chills. chest pain, shortness of breath. Pt remains ambulatory.

## 2025-03-31 NOTE — ED PROVIDER NOTE - PROGRESS NOTE DETAILS
Deni Cassidy PA-C: spoke with Dr. Hickman. will admit patient to medicine for continued IV abx and further eval and management. discussed with ED attending

## 2025-03-31 NOTE — ED PROVIDER NOTE - ATTENDING APP SHARED VISIT CONTRIBUTION OF CARE
I performed a history and physical exam of the patient and discussed their management with the resident/ACP/medical or PA student. I reviewed the resident/ACP/medical or PA student's note and agree with the documented findings and plan of care except where noted.

## 2025-03-31 NOTE — H&P ADULT - ASSESSMENT
79-year-old female     h/o    HTN, HLD, DM     A-fib on Xarelto, lymphedema c/c,  ,  renal  stone  left  ureter       presents to er,  for erythema swelling of  b/l   lower extremity concerning for cellulitis. as  advised  by  pmd      h/ o  c/c    lymphedema   however erythema ,  progressively worsned e over the last 2 weeks with  some,  clear discharge        distal  legs  with  redness        on iv   ancef/  did  well with  ance f on prior  visit         house  ID      c/c  Afib,           on Xarelto/  knwon to  card   HTN /  HLD   DM,  follow   fs    Hypothyroid     h/o  c/c  diastolic  chf    venous  insufficiency.   b/l  legs /  known to  outside    vascular  dr    obesity/  bmi  42    lipitor, toprol, synthroid, xarelo     at  bedside/  pt  is full code

## 2025-03-31 NOTE — ED PROVIDER NOTE - CLINICAL SUMMARY MEDICAL DECISION MAKING FREE TEXT BOX
Attending note.  Patient was seen in room #32 to the right.  Patient has history of lymphedema of bilateral lower extremities and prior history is of cellulitis reports of onset of erythema, increased heat approximately 6 weeks ago in both lower extremities.  Patient received 2 courses of oral antibiotics with the last finishing about 3 weeks ago.  She thinks she took cephalexin is 1 course.  She denies any fevers, chills or sweats.  She has history of type 2 diabetes but does not check blood sugar.  She reports increasing redness and pain with weeping which is foul-smelling.  She has no allergies to medication.  She has a history of hypertension, hyperlipidemia as well as atrial fibrillation and congestive heart failure.  Patient takes metformin, atorvastatin, metoprolol, levothyroxine and Xarelto.     ROS-as above, otherwise negative.  PE-patient is alert in no acute distress.  Lungs are clear and equal bilaterally.  Heart is regular rate and rhythm.  Abdomen is obese, soft and nontender.  Patient has lymphedema in both lower extremities.  There is circumferential erythema with increased heat in both lower extremities with weeping serous fluid.  There is no ascending lymphangitis.  A/P-patient with diabetes and lymphedema of both lower extremities with bilateral lower extremity cellulitis.  Patient failed outpatient course of antibiotics.  Admission and antibiotics-Ancef and doxycycline.  Labs, blood sugar.  No concern for DVT.

## 2025-03-31 NOTE — CONSULT NOTE ADULT - ASSESSMENT
Patient has history of lymphedema of bilateral lower extremities and prior history is of cellulitis reports of onset of erythema, increased heat approximately 6 weeks ago in both lower extremities.  Patient received 2 courses of oral antibiotics with the last finishing about 3 weeks ago.  She thinks she took cephalexin is 1 course.  She denies any fevers, chills or sweats.  She has history of type 2 diabetes but does not check blood sugar.  She reports increasing redness and pain with weeping which is foul-smelling.  She has no allergies to medication.  She has a history of hypertension, hyperlipidemia as well as atrial fibrillation and congestive heart failure.  Patient takes metformin, atorvastatin, metoprolol, levothyroxine and Xarelto. Patient has history of lymphedema of bilateral lower extremities and prior history is of cellulitis reports of onset of erythema, increased heat approximately 6 weeks ago in both lower extremities.  Patient received 2 courses of oral antibiotics with the last finishing about 3 weeks ago.  She thinks she took cephalexin is 1 course.  She denies any fevers, chills or sweats.  She has history of type 2 diabetes but does not check blood sugar.  She reports increasing redness and pain with weeping which is foul-smelling.  She has no allergies to medication.  She has a history of hypertension, hyperlipidemia as well as atrial fibrillation and congestive heart failure.  Patient takes metformin, atorvastatin, metoprolol, levothyroxine and Xarelto.  pt with sig cardiac hx with Mitral Stenosis, HTN and pulmonary htn with sob and LE edema.  le edema ?cellulitis, chf  add iv abx  lasix 20 mg daily  repeat TTE  dvt prophylaxis AC ?with a/.fib will discuss with Medicine

## 2025-04-01 LAB
A1C WITH ESTIMATED AVERAGE GLUCOSE RESULT: 7.9 % — HIGH (ref 4–5.6)
ANION GAP SERPL CALC-SCNC: 14 MMOL/L — SIGNIFICANT CHANGE UP (ref 5–17)
BUN SERPL-MCNC: 15 MG/DL — SIGNIFICANT CHANGE UP (ref 7–23)
CALCIUM SERPL-MCNC: 9.4 MG/DL — SIGNIFICANT CHANGE UP (ref 8.4–10.5)
CHLORIDE SERPL-SCNC: 104 MMOL/L — SIGNIFICANT CHANGE UP (ref 96–108)
CO2 SERPL-SCNC: 23 MMOL/L — SIGNIFICANT CHANGE UP (ref 22–31)
CREAT SERPL-MCNC: 0.65 MG/DL — SIGNIFICANT CHANGE UP (ref 0.5–1.3)
EGFR: 89 ML/MIN/1.73M2 — SIGNIFICANT CHANGE UP
EGFR: 89 ML/MIN/1.73M2 — SIGNIFICANT CHANGE UP
ESTIMATED AVERAGE GLUCOSE: 180 MG/DL — HIGH (ref 68–114)
GLUCOSE BLDC GLUCOMTR-MCNC: 122 MG/DL — HIGH (ref 70–99)
GLUCOSE BLDC GLUCOMTR-MCNC: 137 MG/DL — HIGH (ref 70–99)
GLUCOSE BLDC GLUCOMTR-MCNC: 139 MG/DL — HIGH (ref 70–99)
GLUCOSE BLDC GLUCOMTR-MCNC: 146 MG/DL — HIGH (ref 70–99)
GLUCOSE SERPL-MCNC: 125 MG/DL — HIGH (ref 70–99)
HCT VFR BLD CALC: 36.1 % — SIGNIFICANT CHANGE UP (ref 34.5–45)
HGB BLD-MCNC: 10.9 G/DL — LOW (ref 11.5–15.5)
MCHC RBC-ENTMCNC: 27.5 PG — SIGNIFICANT CHANGE UP (ref 27–34)
MCHC RBC-ENTMCNC: 30.2 G/DL — LOW (ref 32–36)
MCV RBC AUTO: 91.2 FL — SIGNIFICANT CHANGE UP (ref 80–100)
NRBC BLD AUTO-RTO: 0 /100 WBCS — SIGNIFICANT CHANGE UP (ref 0–0)
PLATELET # BLD AUTO: 301 K/UL — SIGNIFICANT CHANGE UP (ref 150–400)
POTASSIUM SERPL-MCNC: 4.3 MMOL/L — SIGNIFICANT CHANGE UP (ref 3.5–5.3)
POTASSIUM SERPL-SCNC: 4.3 MMOL/L — SIGNIFICANT CHANGE UP (ref 3.5–5.3)
RBC # BLD: 3.96 M/UL — SIGNIFICANT CHANGE UP (ref 3.8–5.2)
RBC # FLD: 14.7 % — HIGH (ref 10.3–14.5)
SODIUM SERPL-SCNC: 141 MMOL/L — SIGNIFICANT CHANGE UP (ref 135–145)
WBC # BLD: 7.85 K/UL — SIGNIFICANT CHANGE UP (ref 3.8–10.5)
WBC # FLD AUTO: 7.85 K/UL — SIGNIFICANT CHANGE UP (ref 3.8–10.5)

## 2025-04-01 PROCEDURE — 99222 1ST HOSP IP/OBS MODERATE 55: CPT

## 2025-04-01 PROCEDURE — G0545: CPT

## 2025-04-01 RX ORDER — RIVAROXABAN 10 MG/1
20 TABLET, FILM COATED ORAL
Refills: 0 | Status: DISCONTINUED | OUTPATIENT
Start: 2025-04-01 | End: 2025-04-03

## 2025-04-01 RX ADMIN — RIVAROXABAN 20 MILLIGRAM(S): 10 TABLET, FILM COATED ORAL at 18:09

## 2025-04-01 RX ADMIN — ATORVASTATIN CALCIUM 20 MILLIGRAM(S): 80 TABLET, FILM COATED ORAL at 22:27

## 2025-04-01 RX ADMIN — METOPROLOL SUCCINATE 200 MILLIGRAM(S): 50 TABLET, EXTENDED RELEASE ORAL at 05:13

## 2025-04-01 RX ADMIN — Medication 25 MICROGRAM(S): at 05:14

## 2025-04-01 RX ADMIN — Medication 100 MILLIGRAM(S): at 14:25

## 2025-04-01 RX ADMIN — FUROSEMIDE 40 MILLIGRAM(S): 10 INJECTION INTRAMUSCULAR; INTRAVENOUS at 14:29

## 2025-04-01 RX ADMIN — Medication 100 MILLIGRAM(S): at 22:28

## 2025-04-01 RX ADMIN — Medication 100 MILLIGRAM(S): at 05:13

## 2025-04-01 RX ADMIN — NYSTATIN 1 APPLICATION(S): 100000 CREAM TOPICAL at 22:44

## 2025-04-01 RX ADMIN — NYSTATIN 1 APPLICATION(S): 100000 CREAM TOPICAL at 05:15

## 2025-04-01 NOTE — PROGRESS NOTE ADULT - SUBJECTIVE AND OBJECTIVE BOX
Date of Service: 04-01-25 @ 06:08           CARDIOLOGY     PROGRESS  NOTE   ________________________________________________    CHIEF COMPLAINT:Patient is a 79y old  Female who presents with a chief complaint of cellulitis (31 Mar 2025 16:50)  no complain  	  REVIEW OF SYSTEMS:  CONSTITUTIONAL: No fever, weight loss, or fatigue  EYES: No eye pain, visual disturbances, or discharge  ENT:  No difficulty hearing, tinnitus, vertigo; No sinus or throat pain  NECK: No pain or stiffness  RESPIRATORY: No cough, wheezing, chills or hemoptysis; No Shortness of Breath  CARDIOVASCULAR: No chest pain, palpitations, passing out, dizziness, or leg swelling  GASTROINTESTINAL: No abdominal or epigastric pain. No nausea, vomiting, or hematemesis; No diarrhea or constipation. No melena or hematochezia.  GENITOURINARY: No dysuria, frequency, hematuria, or incontinence  NEUROLOGICAL: No headaches, memory loss, loss of strength, numbness, or tremors  SKIN: No itching, burning, rashes, or lesions   LYMPH Nodes: No enlarged glands  ENDOCRINE: No heat or cold intolerance; No hair loss  MUSCULOSKELETAL: No joint pain or swelling; No muscle, back, or extremity pain  PSYCHIATRIC: No depression, anxiety, mood swings, or difficulty sleeping  HEME/LYMPH: No easy bruising, or bleeding gums  ALLERGY AND IMMUNOLOGIC: No hives or eczema	    [ x] All others negative	  [ ] Unable to obtain    PHYSICAL EXAM:  T(C): 36.4 (04-01-25 @ 04:22), Max: 36.6 (03-31-25 @ 20:24)  HR: 67 (04-01-25 @ 04:22) (67 - 83)  BP: 129/64 (04-01-25 @ 04:22) (129/64 - 153/91)  RR: 18 (04-01-25 @ 04:22) (17 - 19)  SpO2: 97% (04-01-25 @ 04:22) (96% - 99%)  Wt(kg): --  I&O's Summary      Appearance: Normal	  HEENT:   Normal oral mucosa, PERRL, EOMI	  Lymphatic: No lymphadenopathy  Cardiovascular: Normal S1 S2, No JVD, + murmurs, + lymph edema  Respiratory: rhonchi  Psychiatry: A & O x 3, Mood & affect appropriate  Gastrointestinal:  Soft, Non-tender, + BS	  Skin: No rashes, No ecchymoses, No cyanosis	  Neurologic: Non-focal  Extremities: Normal range of motion, No clubbing, cyanosis + lymph edema, cellulitis  Vascular: Peripheral pulses palpable 2+ bilaterally    MEDICATIONS  (STANDING):  atorvastatin 20 milliGRAM(s) Oral at bedtime  ceFAZolin   IVPB 1000 milliGRAM(s) IV Intermittent every 8 hours  dextrose 5%. 1000 milliLiter(s) (50 mL/Hr) IV Continuous <Continuous>  dextrose 5%. 1000 milliLiter(s) (100 mL/Hr) IV Continuous <Continuous>  dextrose 50% Injectable 25 Gram(s) IV Push once  dextrose 50% Injectable 12.5 Gram(s) IV Push once  dextrose 50% Injectable 25 Gram(s) IV Push once  furosemide   Injectable 40 milliGRAM(s) IV Push daily  glucagon  Injectable 1 milliGRAM(s) IntraMuscular once  insulin lispro (ADMELOG) corrective regimen sliding scale   SubCutaneous three times a day before meals  levothyroxine 25 MICROGram(s) Oral daily  lisinopril 5 milliGRAM(s) Oral daily  metoprolol succinate  milliGRAM(s) Oral daily  nystatin Cream 1 Application(s) Topical two times a day  rivaroxaban 15 milliGRAM(s) Oral with dinner      TELEMETRY: 	    ECG:  	  RADIOLOGY:  OTHER: 	  	  LABS:	 	    CARDIAC MARKERS:                                10.8   7.46  )-----------( 279      ( 31 Mar 2025 09:19 )             35.1     03-31    138  |  104  |  15  ----------------------------<  138[H]  4.0   |  19[L]  |  0.69    Ca    9.2      31 Mar 2025 09:19    TPro  7.3  /  Alb  3.4  /  TBili  0.4  /  DBili  x   /  AST  19  /  ALT  14  /  AlkPhos  126[H]  03-31    proBNP:   Lipid Profile:   HgA1c:   TSH:         Assessment and plan  ---------------------------  Patient has history of lymphedema of bilateral lower extremities and prior history is of cellulitis reports of onset of erythema, increased heat approximately 6 weeks ago in both lower extremities.  Patient received 2 courses of oral antibiotics with the last finishing about 3 weeks ago.  She thinks she took cephalexin is 1 course.  She denies any fevers, chills or sweats.  She has history of type 2 diabetes but does not check blood sugar.  She reports increasing redness and pain with weeping which is foul-smelling.  She has no allergies to medication.  She has a history of hypertension, hyperlipidemia as well as atrial fibrillation and congestive heart failure.  Patient takes metformin, atorvastatin, metoprolol, levothyroxine and Xarelto.  pt with sig cardiac hx with Mitral Stenosis, HTN and pulmonary htn with sob and LE edema.  le edema ?cellulitis, chf  add iv abx  lasix 20 mg daily  repeat TTE  a.fib started ob AC, increase Xarelto to 20 mg daily  will review prior ECHO

## 2025-04-01 NOTE — PROGRESS NOTE ADULT - ASSESSMENT
79-year-old female     h/o    HTN, HLD, DM     A-fib on Xarelto, lymphedema c/c,  ,  renal  stone  left  ureter       presents to er,  for erythema swelling of  b/l   lower extremity concerning for cellulitis. as  advised  by  pmd      h/ o  c/c    lymphedema   however erythema ,  progressively worsned e over the last 2 weeks with  some,  clear discharge        distal  legs  with  redness        on iv   ancef/  did  well with  ancef  on prior  visit         house  ID      c/c  Afib,           on Xarelto   HTN /  HLD   DM,  follow   fs    Hypothyroid     h/o  c/c  diastolic  chf    venous  insufficiency.   b/l  legs /  known to  outside    vascular  dr    obesity/  bmi  41    lipitor, toprol, synthroid, xarelo     on iv ancef/  lasix  /  discussed  with team     pt  is full code

## 2025-04-01 NOTE — CONSULT NOTE ADULT - ASSESSMENT
79-year-old female past medical history HTN, HLD, DM II, A-fib on Xarelto, chronic lymphedema, recent hospitalization Septemeber 2024 for for erythema, malodorous drainage, and swelling of the lower extremity which was treated with keflex x 7 days. she now presents with increasing redness and pain with weeping which is foul-smelling -she has received 2 courses of oral antibiotics-possibly cephalexin, with the last finishing about 3 weeks ago. In Ed pt with circumferential erythema with increased heat in both lower extremities with weeping serous fluid no ascending lymphangitis given ancef and doxy, DVT studies have been neg. ID consulted        Afebrile, no leukocytosis,   ESR/ CRP not sent    No pending cultures  Started on cefazolin by primary team on 3/31.  Doxy x 1 dose given 3/31      # Bilateral Lower extremity Cellulitis  # Chronic lymphedema    INcompleted******************************************************   79-year-old female past medical history HTN, HLD, DM II, A-fib on Xarelto, chronic lymphedema, recent hospitalization September 2024 for for erythema, malodorous drainage, and swelling of the lower extremity which was treated with keflex x 7 days. She  now presents with increasing redness and pain with weeping which is foul-smelling -she has received 2 courses of oral antibiotics-cephalexin 500 bid x 7 days in January with minimal improvements and was prescribed cephalexin again with an increase in dosing to 500mg QID x 7 days finishing about 3 weeks ago. She as denied fever and chills. No pets, pools,/spa/ beaches. Does not work. In Ed pt with circumferential erythema with increased heat in both lower extremities with weeping serous fluid no ascending lymphangitis given ancef and doxy, DVT studies have been neg. No Fevers or WBC. ID consulted        Afebrile, no leukocytosis,   ESR/ CRP not sent    No pending cultures  Started on cefazolin by primary team on 3/31.  Doxy x 1 dose given 3/31  circumferential erythema with increased heat in both lower extremities with weeping serous fluid no ascending lymphangitis    # Bilateral Lower extremity Cellulitis  # Chronic lymphedema    Plan    INCOMPLETED******************************************     79-year-old female past medical history HTN, HLD, DM II, A-fib on Xarelto, chronic lymphedema, recent hospitalization September 2024 for for erythema, malodorous drainage, and swelling of the lower extremity which was treated with keflex x 7 days. She  now presents with increasing redness and pain with weeping which is foul-smelling -she has received 2 courses of oral antibiotics-cephalexin 500 bid x 7 days in January with minimal improvements and was prescribed cephalexin again with an increase in dosing to 500mg QID x 7 days finishing about 3 weeks ago. She as denied fever and chills. No pets, pools,/spa/ beaches. Does not work. In Ed pt with circumferential erythema with increased heat in both lower extremities with weeping serous fluid no ascending lymphangitis given ancef and doxy, DVT studies have been neg. No Fevers or WBC. ID consulted        Afebrile, no leukocytosis,   ESR/ CRP not sent    No pending cultures  Started on cefazolin by primary team on 3/31.  Doxy x 1 dose given 3/31  circumferential erythema with increased heat in both lower extremities with weeping serous fluid no ascending lymphangitis    # Bilateral Lower extremity Cellulitis  # Chronic lymphedema    PLAN  Overall patient with known chronic lymphaedema associated with multiple cellulitis episodes to b/l lower extremity s/p multiple rounds of abx with minimal improvement.There are no systemic symptoms.  Doubt this is cellulitis but will elect to treat as patient already on abx. Continue current abx on current dosing x 7 days total  Continue with on going lymphedema management-elevation of legs etc..  Continue to monitor for signs of improvement.  Continue to monitor temps  Plan d/w Dr. Arnold renteria   79-year-old female past medical history HTN, HLD, DM II, A-fib on Xarelto, chronic lymphedema, recent hospitalization September 2024 for for erythema, malodorous drainage, and swelling of the lower extremity which was treated with keflex x 7 days. She  now presents with increasing redness and pain with weeping which is foul-smelling -she has received 2 courses of oral antibiotics-cephalexin 500 bid x 7 days in January with minimal improvements and was prescribed cephalexin again with an increase in dosing to 500mg QID x 7 days finishing about 3 weeks ago. She as denied fever and chills. No pets, pools,/spa/ beaches. Does not work. In Ed pt with circumferential erythema with increased heat in both lower extremities with weeping serous fluid no ascending lymphangitis given ancef and doxy, DVT studies have been neg. No Fevers or WBC. ID consulted    Afebrile, no leukocytosis,   No pending cultures  Started on cefazolin by primary team on 3/31.  Doxy x 1 dose given 3/31  circumferential erythema with increased heat in both lower extremities with weeping serous fluid no ascending lymphangitis    # Bilateral Lower extremity Cellulitis  # Chronic lymphedema    PLAN  Overall patient with known chronic lymphaedema associated with multiple cellulitis episodes to b/l lower extremity s/p multiple rounds of abx with minimal improvement.There are no systemic symptoms.  Doubt this is cellulitis but will elect to treat as patient already on abx. Continue current abx on current dosing x 7 days total  Continue with on going lymphedema management-elevation of legs etc..  Continue to monitor for signs of improvement.  Continue to monitor temps  Plan d/w Dr. Arnold renteria

## 2025-04-01 NOTE — PROGRESS NOTE ADULT - SUBJECTIVE AND OBJECTIVE BOX
date of service: 04-01-25 @ 08:58  [facility]  REVIEW OF SYSTEMS:  CONSTITUTIONAL: No fever,  no  weight loss  ENT:  No  tinnitus,   no   vertigo  NECK: No pain or stiffness  RESPIRATORY: No cough, wheezing, chills or hemoptysis;    No Shortness of Breath  CARDIOVASCULAR: No chest pain, palpitations, dizziness  GASTROINTESTINAL: No abdominal or epigastric pain. No nausea, vomiting, or hematemesis; No diarrhea  No melena or hematochezia.  GENITOURINARY: No dysuria, frequency, hematuria, or incontinence  NEUROLOGICAL: No headaches  SKIN: No itching,  no   rash  LYMPH Nodes: No enlarged glands  ENDOCRINE: No heat or cold intolerance  MUSCULOSKELETAL: No joint pain or swelling  PSYCHIATRIC: No depression, anxiety  HEME/LYMPH: No easy bruising, or bleeding gums  ALLERGY AND IMMUNOLOGIC: No hives or eczema	    MEDICATIONS  (STANDING):  atorvastatin 20 milliGRAM(s) Oral at bedtime  ceFAZolin   IVPB 1000 milliGRAM(s) IV Intermittent every 8 hours  dextrose 5%. 1000 milliLiter(s) (50 mL/Hr) IV Continuous <Continuous>  dextrose 5%. 1000 milliLiter(s) (100 mL/Hr) IV Continuous <Continuous>  dextrose 50% Injectable 25 Gram(s) IV Push once  dextrose 50% Injectable 12.5 Gram(s) IV Push once  dextrose 50% Injectable 25 Gram(s) IV Push once  furosemide   Injectable 40 milliGRAM(s) IV Push daily  glucagon  Injectable 1 milliGRAM(s) IntraMuscular once  insulin lispro (ADMELOG) corrective regimen sliding scale   SubCutaneous three times a day before meals  levothyroxine 25 MICROGram(s) Oral daily  lisinopril 5 milliGRAM(s) Oral daily  metoprolol succinate  milliGRAM(s) Oral daily  nystatin Cream 1 Application(s) Topical two times a day  rivaroxaban 20 milliGRAM(s) Oral with dinner    MEDICATIONS  (PRN):  dextrose Oral Gel 15 Gram(s) Oral once PRN Blood Glucose LESS THAN 70 milliGRAM(s)/deciliter      Vital Signs Last 24 Hrs  T(C): 36.4 (01 Apr 2025 04:22), Max: 36.6 (31 Mar 2025 20:24)  T(F): 97.5 (01 Apr 2025 04:22), Max: 97.9 (31 Mar 2025 20:24)  HR: 67 (01 Apr 2025 04:22) (67 - 82)  BP: 129/64 (01 Apr 2025 04:22) (129/64 - 152/62)  BP(mean): --  RR: 18 (01 Apr 2025 04:22) (18 - 19)  SpO2: 97% (01 Apr 2025 04:22) (96% - 98%)    Parameters below as of 01 Apr 2025 04:22  Patient On (Oxygen Delivery Method): room air      CAPILLARY BLOOD GLUCOSE      POCT Blood Glucose.: 122 mg/dL (01 Apr 2025 08:31)  POCT Blood Glucose.: 135 mg/dL (31 Mar 2025 21:57)  POCT Blood Glucose.: 108 mg/dL (31 Mar 2025 16:59)    I&O's Summary        Appearance: Normal	  HEENT:   Normal oral mucosa, PERRL, EOMI	  Lymphatic: No lymphadenopathy  Cardiovascular: Normal S1 S2, No JVD  Respiratory: Lungs clear to auscultation	  Gastrointestinal:  Soft, Non-tender, + BS	  Skin: No rash, No ecchymoses	  Extremities:     LABS:                        10.9   7.85  )-----------( 301      ( 01 Apr 2025 07:02 )             36.1     04-01    141  |  104  |  15  ----------------------------<  125[H]  4.3   |  23  |  0.65    Ca    9.4      01 Apr 2025 07:02    TPro  7.3  /  Alb  3.4  /  TBili  0.4  /  DBili  x   /  AST  19  /  ALT  14  /  AlkPhos  126[H]  03-31          Urinalysis Basic - ( 01 Apr 2025 07:02 )    Color: x / Appearance: x / SG: x / pH: x  Gluc: 125 mg/dL / Ketone: x  / Bili: x / Urobili: x   Blood: x / Protein: x / Nitrite: x   Leuk Esterase: x / RBC: x / WBC x   Sq Epi: x / Non Sq Epi: x / Bacteria: x      Lactate, Blood: 1.4 mmol/L (03-31 @ 12:46)      03-31 @ 09:07  4.2  25              Consultant(s) Notes Reviewed:      Care Discussed with Consultants/Other Providers:

## 2025-04-01 NOTE — CONSULT NOTE ADULT - NS ATTEND AMEND GEN_ALL_CORE FT
79-year-old female past medical history HTN, HLD, DM II, A-fib on Xarelto, chronic lymphedema, recent hospitalization September 2024 for for erythema, malodorous drainage, and swelling of the lower extremity which was treated with keflex x 7 days. She  now presents with increasing redness and pain with weeping which is foul-smelling -she has received 2 courses of oral antibiotics-cephalexin 500 bid x 7 days in January with minimal improvements and was prescribed cephalexin again with an increase in dosing to 500mg QID x 7 days finishing about 3 weeks ago. She as denied fever and chills. No pets, pools,/spa/ beaches. Does not work. In Ed pt with circumferential erythema with increased heat in both lower extremities with weeping serous fluid no ascending lymphangitis given ancef and doxy, DVT studies have been neg. No Fevers or WBC. ID consulted    Bilateral Lower extremity erythema, swelling, weeping bullae, now worsening, likely in the setting of Chronic lymphedema and venous insufficiency, concern of underlying cellulitis, absence of systemic symptoms    Started on Cefazolin - would continue for now   Keep bilateral leg elevated.    Leonila Abrams MD  ID physician  [employer] Teams Preferred  After 5pm/weekends call [phone]

## 2025-04-01 NOTE — CONSULT NOTE ADULT - SUBJECTIVE AND OBJECTIVE BOX
Patient is a 79y old  Female who presents with a chief complaint of cellulitis (01 Apr 2025 08:57)    HPI:  79  yr    h/o   lymphedema of bilateral lower extremities  priro leg   cellulitis    HTN/  DM /  HLD .  c/c    Afib       had  erythema,   warmth   approximately 6 weeks ago in both lower extremities.   and  had  received 2 courses of oral antibiotics /  keflex, with the last finishing about 3 weeks ago.       denies any fevers, chills or sweats.     then had  increasing  redness and pain with weeping which is foul-smelling.    metformin, atorvastatin, metoprolol, levothyroxine and Xarelto.     (31 Mar 2025 13:46)     REVIEW OF SYSTEMS  [  ] ROS unobtainable because:    [ x ] All other systems negative except as noted below  Constitutional:  [ ] fever [ ] chills  [ ] weight loss  [ ]night sweat  [ ]poor appetite/PO intake [ ]fatigue   Skin:  [ ] rash [ ] phlebitis	  Eyes: [ ] icterus [ ] pain  [ ] discharge	  ENMT: [ ] sore throat  [ ] thrush [ ] ulcers [ ] exudates [ ]anosmia  Respiratory: [ ] dyspnea [ ] hemoptysis [ ] cough [ ] sputum	  Cardiovascular:  [ ] chest pain [ ] palpitations [ ] edema	  Gastrointestinal:  [ ] nausea [ ] vomiting [ ] diarrhea [ ] constipation [ ] pain	  Genitourinary:  [ ] dysuria [ ] frequency [ ] hematuria [ ] discharge [ ] flank pain  [ ] incontinence  Musculoskeletal:  [ ] myalgias [ ] arthralgias [ ] arthritis  [ ] back pain  Neurological:  [ ] headache [ ] weakness [ ] seizures  [ ] confusion/altered mental status  prior hospital charts reviewed [V]  primary team notes reviewed [V]  other consultant notes reviewed [V]    PAST MEDICAL & SURGICAL HISTORY:  Hypertension  stable      Diabetes type 2, controlled      Hyperlipidemia      Nephrolithiasis      AF (atrial fibrillation)      HTN (hypertension)      HLD (hyperlipidemia)      Diabetes      Atrial fibrillation  onset 5/2018      Hypothyroid      Nephrolithiasis      Status post total knee replacement, unspecified laterality      H/O total knee replacement  bilateral  , 1998, 2000      S/P cystoscopy  with left ureteral stent 4/2018          SOCIAL HISTORY:  - Denied smoking/vaping/alcohol/recreational drug use    FAMILY HISTORY:  FH: heart failure (Father)        Allergies  No Known Allergies        ANTIMICROBIALS:  ceFAZolin   IVPB 1000 every 8 hours      ANTIMICROBIALS (past 90 days):  MEDICATIONS  (STANDING):  ceFAZolin   IVPB   100 mL/Hr IV Intermittent (04-01-25 @ 05:13)   100 mL/Hr IV Intermittent (03-31-25 @ 21:13)    ceFAZolin   IVPB   100 mL/Hr IV Intermittent (03-31-25 @ 09:06)    doxycycline IVPB   100 mL/Hr IV Intermittent (03-31-25 @ 09:47)        OTHER MEDS:   MEDICATIONS  (STANDING):  atorvastatin 20 at bedtime  dextrose 50% Injectable 25 once  dextrose 50% Injectable 12.5 once  dextrose 50% Injectable 25 once  dextrose Oral Gel 15 once PRN  furosemide   Injectable 40 daily  glucagon  Injectable 1 once  insulin lispro (ADMELOG) corrective regimen sliding scale  three times a day before meals  levothyroxine 25 daily  lisinopril 5 daily  metoprolol succinate  daily  rivaroxaban 20 with dinner      VITALS:  Vital Signs Last 24 Hrs  T(F): 97.9 (04-01-25 @ 09:41), Max: 97.9 (03-31-25 @ 20:24)    Vital Signs Last 24 Hrs  HR: 88 (04-01-25 @ 09:41) (67 - 88)  BP: 119/73 (04-01-25 @ 09:41) (119/73 - 152/62)  RR: 18 (04-01-25 @ 09:41)  SpO2: 97% (04-01-25 @ 09:41) (96% - 98%)  Wt(kg): --    EXAM:    GA: NAD, AOx3  HEENT: oral cavity no lesion  CV: nl S1/S2, no RMG  Lungs: CTAB, No distress  Abd: BS+, soft, nontender, no rebounding pain  Ext:   Neuro: No focal deficits  Skin:   IV: no phlebitis  Labs:                        10.9   7.85  )-----------( 301      ( 01 Apr 2025 07:02 )             36.1     04-01    141  |  104  |  15  ----------------------------<  125[H]  4.3   |  23  |  0.65    Ca    9.4      01 Apr 2025 07:02    TPro  7.3  /  Alb  3.4  /  TBili  0.4  /  DBili  x   /  AST  19  /  ALT  14  /  AlkPhos  126[H]  03-31    WBC Trend:  WBC Count: 7.85 (04-01-25 @ 07:02)  WBC Count: 7.46 (03-31-25 @ 09:19)    Auto Neutrophil #: 6.75 K/uL (09-13-24 @ 12:13)    Creatine Trend:  Creatinine: 0.65 (04-01)  Creatinine: 0.69 (03-31)    Liver Biochemical Testing Trend:  Alanine Aminotransferase (ALT/SGPT): 14 (03-31)  Alanine Aminotransferase (ALT/SGPT): 16 (09-13)  Aspartate Aminotransferase (AST/SGOT): 19 (03-31-25 @ 09:19)  Aspartate Aminotransferase (AST/SGOT): 20 (09-13-24 @ 12:13)  Bilirubin Total: 0.4 (03-31)  Bilirubin Total: 0.4 (09-13)    Trend LDH      Urinalysis Basic - ( 01 Apr 2025 07:02 )    Color: x / Appearance: x / SG: x / pH: x  Gluc: 125 mg/dL / Ketone: x  / Bili: x / Urobili: x   Blood: x / Protein: x / Nitrite: x   Leuk Esterase: x / RBC: x / WBC x   Sq Epi: x / Non Sq Epi: x / Bacteria: x      MICROBIOLOGY:    MRSA PCR Result.: NotDetec (09-15-24 @ 07:08)    Lactate, Blood: 1.4 (03-31 @ 12:46)  Blood Gas Venous - Lactate: 2.5 (03-31 @ 09:07)    Sedimentation Rate, Erythrocyte: 63 mm/hr (09-13-24 @ 12:13)    CSF:      RADIOLOGY:     Patient is a 79y old  Female who presents with a chief complaint of cellulitis (01 Apr 2025 08:57)    HPI:  79  yr    h/o   lymphedema of bilateral lower extremities  priro leg   cellulitis    HTN/  DM /  HLD .  c/c    Afib       had  erythema,   warmth   approximately 6 weeks ago in both lower extremities.   and  had  received 2 courses of oral antibiotics /  keflex, with the last finishing about 3 weeks ago.       denies any fevers, chills or sweats.     then had  increasing  redness and pain with weeping which is foul-smelling.    metformin, atorvastatin, metoprolol, levothyroxine and Xarelto.     (31 Mar 2025 13:46)     REVIEW OF SYSTEMS  [  ] ROS unobtainable because:    [ x ] All other systems negative except as noted below  Constitutional:  [ ] fever [ ] chills  [ ] weight loss  [ ]night sweat  [ ]poor appetite/PO intake [ ]fatigue   Skin:  [ ] rash [ ] phlebitis	  Eyes: [ ] icterus [ ] pain  [ ] discharge	  ENMT: [ ] sore throat  [ ] thrush [ ] ulcers [ ] exudates [ ]anosmia  Respiratory: [ ] dyspnea [ ] hemoptysis [ ] cough [ ] sputum	  Cardiovascular:  [ ] chest pain [ ] palpitations [x ] edema	  Gastrointestinal:  [ ] nausea [ ] vomiting [ ] diarrhea [ ] constipation [ ] pain	  Genitourinary:  [ ] dysuria [ ] frequency [ ] hematuria [ ] discharge [ ] flank pain  [ ] incontinence  Musculoskeletal:  [ ] myalgias [ ] arthralgias [ ] arthritis  [ ] back pain  Neurological:  [ ] headache [ ] weakness [ ] seizures  [ ] confusion/altered mental status  prior hospital charts reviewed [V]  primary team notes reviewed [V]  other consultant notes reviewed [V]    PAST MEDICAL & SURGICAL HISTORY:  Hypertension  stable      Diabetes type 2, controlled      Hyperlipidemia      Nephrolithiasis      AF (atrial fibrillation)      HTN (hypertension)      HLD (hyperlipidemia)      Diabetes      Atrial fibrillation  onset 5/2018      Hypothyroid      Nephrolithiasis      Status post total knee replacement, unspecified laterality      H/O total knee replacement  bilateral  , 1998, 2000      S/P cystoscopy  with left ureteral stent 4/2018          SOCIAL HISTORY:  - Denied smoking/vaping/alcohol/recreational drug use    FAMILY HISTORY:  FH: heart failure (Father)        Allergies  No Known Allergies        ANTIMICROBIALS:  ceFAZolin   IVPB 1000 every 8 hours      ANTIMICROBIALS (past 90 days):  MEDICATIONS  (STANDING):  ceFAZolin   IVPB   100 mL/Hr IV Intermittent (04-01-25 @ 05:13)   100 mL/Hr IV Intermittent (03-31-25 @ 21:13)    ceFAZolin   IVPB   100 mL/Hr IV Intermittent (03-31-25 @ 09:06)    doxycycline IVPB   100 mL/Hr IV Intermittent (03-31-25 @ 09:47)        OTHER MEDS:   MEDICATIONS  (STANDING):  atorvastatin 20 at bedtime  dextrose 50% Injectable 25 once  dextrose 50% Injectable 12.5 once  dextrose 50% Injectable 25 once  dextrose Oral Gel 15 once PRN  furosemide   Injectable 40 daily  glucagon  Injectable 1 once  insulin lispro (ADMELOG) corrective regimen sliding scale  three times a day before meals  levothyroxine 25 daily  lisinopril 5 daily  metoprolol succinate  daily  rivaroxaban 20 with dinner      VITALS:  Vital Signs Last 24 Hrs  T(F): 97.9 (04-01-25 @ 09:41), Max: 97.9 (03-31-25 @ 20:24)    Vital Signs Last 24 Hrs  HR: 88 (04-01-25 @ 09:41) (67 - 88)  BP: 119/73 (04-01-25 @ 09:41) (119/73 - 152/62)  RR: 18 (04-01-25 @ 09:41)  SpO2: 97% (04-01-25 @ 09:41) (96% - 98%)  Wt(kg): --    EXAM:    GA: NAD, AOx3  HEENT: oral cavity no lesion  CV: nl S1/S2, no RMG  Lungs: CTAB, No distress  Abd: BS+, soft, nontender, no rebounding pain  Ext: b/l edema with circular erythema and weeping edema  Neuro: No focal deficits  Skin: Circular b/l erythema with weeping eema  IV: no phlebitis  Labs:                        10.9   7.85  )-----------( 301      ( 01 Apr 2025 07:02 )             36.1     04-01    141  |  104  |  15  ----------------------------<  125[H]  4.3   |  23  |  0.65    Ca    9.4      01 Apr 2025 07:02    TPro  7.3  /  Alb  3.4  /  TBili  0.4  /  DBili  x   /  AST  19  /  ALT  14  /  AlkPhos  126[H]  03-31    WBC Trend:  WBC Count: 7.85 (04-01-25 @ 07:02)  WBC Count: 7.46 (03-31-25 @ 09:19)    Auto Neutrophil #: 6.75 K/uL (09-13-24 @ 12:13)    Creatine Trend:  Creatinine: 0.65 (04-01)  Creatinine: 0.69 (03-31)    Liver Biochemical Testing Trend:  Alanine Aminotransferase (ALT/SGPT): 14 (03-31)  Alanine Aminotransferase (ALT/SGPT): 16 (09-13)  Aspartate Aminotransferase (AST/SGOT): 19 (03-31-25 @ 09:19)  Aspartate Aminotransferase (AST/SGOT): 20 (09-13-24 @ 12:13)  Bilirubin Total: 0.4 (03-31)  Bilirubin Total: 0.4 (09-13)    Trend LDH      Urinalysis Basic - ( 01 Apr 2025 07:02 )    Color: x / Appearance: x / SG: x / pH: x  Gluc: 125 mg/dL / Ketone: x  / Bili: x / Urobili: x   Blood: x / Protein: x / Nitrite: x   Leuk Esterase: x / RBC: x / WBC x   Sq Epi: x / Non Sq Epi: x / Bacteria: x      MICROBIOLOGY:    MRSA PCR Result.: NotDetec (09-15-24 @ 07:08)    Lactate, Blood: 1.4 (03-31 @ 12:46)  Blood Gas Venous - Lactate: 2.5 (03-31 @ 09:07)    Sedimentation Rate, Erythrocyte: 63 mm/hr (09-13-24 @ 12:13)    CSF:      RADIOLOGY:

## 2025-04-02 LAB
ANION GAP SERPL CALC-SCNC: 15 MMOL/L — SIGNIFICANT CHANGE UP (ref 5–17)
BUN SERPL-MCNC: 20 MG/DL — SIGNIFICANT CHANGE UP (ref 7–23)
CALCIUM SERPL-MCNC: 9.2 MG/DL — SIGNIFICANT CHANGE UP (ref 8.4–10.5)
CHLORIDE SERPL-SCNC: 103 MMOL/L — SIGNIFICANT CHANGE UP (ref 96–108)
CO2 SERPL-SCNC: 22 MMOL/L — SIGNIFICANT CHANGE UP (ref 22–31)
CREAT SERPL-MCNC: 0.81 MG/DL — SIGNIFICANT CHANGE UP (ref 0.5–1.3)
EGFR: 73 ML/MIN/1.73M2 — SIGNIFICANT CHANGE UP
EGFR: 73 ML/MIN/1.73M2 — SIGNIFICANT CHANGE UP
GLUCOSE BLDC GLUCOMTR-MCNC: 132 MG/DL — HIGH (ref 70–99)
GLUCOSE BLDC GLUCOMTR-MCNC: 154 MG/DL — HIGH (ref 70–99)
GLUCOSE BLDC GLUCOMTR-MCNC: 167 MG/DL — HIGH (ref 70–99)
GLUCOSE BLDC GLUCOMTR-MCNC: 223 MG/DL — HIGH (ref 70–99)
GLUCOSE SERPL-MCNC: 136 MG/DL — HIGH (ref 70–99)
HCT VFR BLD CALC: 34.4 % — LOW (ref 34.5–45)
HGB BLD-MCNC: 10.7 G/DL — LOW (ref 11.5–15.5)
MCHC RBC-ENTMCNC: 28.2 PG — SIGNIFICANT CHANGE UP (ref 27–34)
MCHC RBC-ENTMCNC: 31.1 G/DL — LOW (ref 32–36)
MCV RBC AUTO: 90.5 FL — SIGNIFICANT CHANGE UP (ref 80–100)
NRBC BLD AUTO-RTO: 0 /100 WBCS — SIGNIFICANT CHANGE UP (ref 0–0)
PLATELET # BLD AUTO: 284 K/UL — SIGNIFICANT CHANGE UP (ref 150–400)
POTASSIUM SERPL-MCNC: 3.8 MMOL/L — SIGNIFICANT CHANGE UP (ref 3.5–5.3)
POTASSIUM SERPL-SCNC: 3.8 MMOL/L — SIGNIFICANT CHANGE UP (ref 3.5–5.3)
RBC # BLD: 3.8 M/UL — SIGNIFICANT CHANGE UP (ref 3.8–5.2)
RBC # FLD: 14.8 % — HIGH (ref 10.3–14.5)
SODIUM SERPL-SCNC: 140 MMOL/L — SIGNIFICANT CHANGE UP (ref 135–145)
WBC # BLD: 7.91 K/UL — SIGNIFICANT CHANGE UP (ref 3.8–10.5)
WBC # FLD AUTO: 7.91 K/UL — SIGNIFICANT CHANGE UP (ref 3.8–10.5)

## 2025-04-02 PROCEDURE — G0545: CPT

## 2025-04-02 PROCEDURE — 99232 SBSQ HOSP IP/OBS MODERATE 35: CPT

## 2025-04-02 RX ADMIN — NYSTATIN 1 APPLICATION(S): 100000 CREAM TOPICAL at 18:13

## 2025-04-02 RX ADMIN — ATORVASTATIN CALCIUM 20 MILLIGRAM(S): 80 TABLET, FILM COATED ORAL at 21:24

## 2025-04-02 RX ADMIN — Medication 100 MILLIGRAM(S): at 21:24

## 2025-04-02 RX ADMIN — INSULIN LISPRO 1: 100 INJECTION, SOLUTION INTRAVENOUS; SUBCUTANEOUS at 17:32

## 2025-04-02 RX ADMIN — METOPROLOL SUCCINATE 200 MILLIGRAM(S): 50 TABLET, EXTENDED RELEASE ORAL at 05:05

## 2025-04-02 RX ADMIN — INSULIN LISPRO 2: 100 INJECTION, SOLUTION INTRAVENOUS; SUBCUTANEOUS at 12:46

## 2025-04-02 RX ADMIN — Medication 100 MILLIGRAM(S): at 05:01

## 2025-04-02 RX ADMIN — Medication 25 MICROGRAM(S): at 05:04

## 2025-04-02 RX ADMIN — RIVAROXABAN 20 MILLIGRAM(S): 10 TABLET, FILM COATED ORAL at 17:32

## 2025-04-02 RX ADMIN — NYSTATIN 1 APPLICATION(S): 100000 CREAM TOPICAL at 05:04

## 2025-04-02 RX ADMIN — Medication 100 MILLIGRAM(S): at 14:33

## 2025-04-02 NOTE — PROGRESS NOTE ADULT - ASSESSMENT
79-year-old female past medical history HTN, HLD, DM II, A-fib on Xarelto, chronic lymphedema, recent hospitalization September 2024 for for erythema, malodorous drainage, and swelling of the lower extremity which was treated with keflex x 7 days. She  now presents with increasing redness and pain with weeping which is foul-smelling -she has received 2 courses of oral antibiotics-cephalexin 500 bid x 7 days in January with minimal improvements and was prescribed cephalexin again with an increase in dosing to 500mg QID x 7 days finishing about 3 weeks ago. She as denied fever and chills. No pets, pools,/spa/ beaches. Does not work. In Ed pt with circumferential erythema with increased heat in both lower extremities with weeping serous fluid no ascending lymphangitis given ancef and doxy, DVT studies have been neg. No Fevers or WBC. ID consulted    Bilateral Lower extremity erythema, swelling, weeping bullae, now worsening, likely in the setting of Chronic lymphedema and venous insufficiency, concern of underlying cellulitis, absence of systemic symptoms    Recommendations:   -Clinically improving - Continue Cefazolin   -Keep bilateral leg elevated.  -Wound care   -Monitor     Leonila Abrams MD  ID physician  [employer] Preferred  After 5pm/weekends call [phone].   79-year-old female past medical history HTN, HLD, DM II, A-fib on Xarelto, chronic lymphedema, recent hospitalization September 2024 for for erythema, malodorous drainage, and swelling of the lower extremity which was treated with keflex x 7 days. She  now presents with increasing redness and pain with weeping which is foul-smelling -she has received 2 courses of oral antibiotics-cephalexin 500 bid x 7 days in January with minimal improvements and was prescribed cephalexin again with an increase in dosing to 500mg QID x 7 days finishing about 3 weeks ago. She as denied fever and chills. No pets, pools,/spa/ beaches. Does not work. In Ed pt with circumferential erythema with increased heat in both lower extremities with weeping serous fluid no ascending lymphangitis given ancef and doxy, DVT studies have been neg. No Fevers or WBC. ID consulted    #Bilateral Lower extremity erythema, swelling, weeping bullae, now worsening, likely in the setting of Chronic lymphedema and venous insufficiency, concern of underlying cellulitis, absence of systemic symptoms    Recommendations:   -Clinically improving - Continue Cefazolin   -Keep bilateral leg elevated   -Wound care   -Monitor for clinical improvement     Leonila Abrams MD  ID physician  [employer] Preferred  After 5pm/weekends call [phone].

## 2025-04-02 NOTE — PROGRESS NOTE ADULT - ASSESSMENT
79-year-old female     h/o    HTN, HLD, DM     A-fib on Xarelto, lymphedema c/c,  ,  renal  stone  left  ureter       presents to er,  for erythema swelling of  b/l   lower extremity concerning for cellulitis. as  advised  by  pmd      h/ o  c/c    lymphedema   however erythema ,  progressively worsned e over the last 2 weeks with  some,  clear discharge        distal  legs  with  redness        on iv   ancef/  did  well with  ancef  on prior  visit         house  ID      c/c  Afib,           on Xarelto   HTN /  HLD   DM,  follow   fs    Hypothyroid     h/o  c/c  diastolic  chf    venous  insufficiency.   b/l  legs /  known to  outside    vascular  dr    obesity/  bmi  41    lipitor, toprol, synthroid, xarelo     on iv ancef/  lasix/  seen by  ID     pt  is full code

## 2025-04-02 NOTE — SBIRT NOTE ADULT - NSSBIRTALCNOACTINTDET_GEN_A_CORE
Patient reports no substance use. No social work interventions needed at this time. SW remains available.

## 2025-04-02 NOTE — PROGRESS NOTE ADULT - SUBJECTIVE AND OBJECTIVE BOX
Follow Up:      Interval History/ROS:Patient is a 79y old  Female who presents with a chief complaint of cellulitis (02 Apr 2025 08:42)      REVIEW OF SYSTEMS  [  ] ROS unobtainable because:    [ x ] All other systems negative except as noted below    Constitutional:  [ ] fever [ ] chills  [ ] weight loss  [ ]night sweat  [ ]poor appetite/PO intake [ ]fatigue   Skin:  [ ] rash [ ] phlebitis	  Eyes: [ ] icterus [ ] pain  [ ] discharge	  ENMT: [ ] sore throat  [ ] thrush [ ] ulcers [ ] exudates [ ]anosmia  Respiratory: [ ] dyspnea [ ] hemoptysis [ ] cough [ ] sputum	  Cardiovascular:  [ ] chest pain [ ] palpitations [ ] edema	  Gastrointestinal:  [ ] nausea [ ] vomiting [ ] diarrhea [ ] constipation [ ] pain	  Genitourinary:  [ ] dysuria [ ] frequency [ ] hematuria [ ] discharge [ ] flank pain  [ ] incontinence  Musculoskeletal:  [ ] myalgias [ ] arthralgias [ ] arthritis  [ ] back pain  Neurological:  [ ] headache [ ] weakness [ ] seizures  [ ] confusion/altered mental status    Allergies  No Known Allergies        ANTIMICROBIALS:    ceFAZolin   IVPB 1000 every 8 hours        OTHER MEDS: MEDICATIONS  (STANDING):  atorvastatin 20 at bedtime  dextrose 50% Injectable 25 once  dextrose 50% Injectable 12.5 once  dextrose 50% Injectable 25 once  dextrose Oral Gel 15 once PRN  furosemide   Injectable 40 daily  glucagon  Injectable 1 once  insulin lispro (ADMELOG) corrective regimen sliding scale  three times a day before meals  levothyroxine 25 daily  lisinopril 5 daily  metoprolol succinate  daily  rivaroxaban 20 with dinner      Vital Signs Last 24 Hrs  T(F): 98.5 (04-02-25 @ 04:58), Max: 98.5 (04-02-25 @ 04:58)    Vital Signs Last 24 Hrs  HR: 62 (04-02-25 @ 04:58) (62 - 64)  BP: 130/84 (04-02-25 @ 04:58) (123/74 - 130/84)  RR: 18 (04-02-25 @ 04:58)  SpO2: 99% (04-02-25 @ 04:58) (96% - 99%)  Wt(kg): --    EXAM:    GA: NAD  HEENT: oral cavity no lesion  CV: nl S1/S2, no RMG  Lungs: CTAB, no distress  Abd: BS+, soft, nontender, no rebounding pain  Ext: no edema  Neuro: No focal deficits  Skin: Intact  IV: no phlebitis    Labs:                        10.7   7.91  )-----------( 284      ( 02 Apr 2025 07:25 )             34.4     04-02    140  |  103  |  20  ----------------------------<  136[H]  3.8   |  22  |  0.81    Ca    9.2      02 Apr 2025 07:25        WBC Trend:  WBC Count: 7.91 (04-02-25 @ 07:25)  WBC Count: 7.85 (04-01-25 @ 07:02)  WBC Count: 7.46 (03-31-25 @ 09:19)      Creatine Trend:  Creatinine: 0.81 (04-02)  Creatinine: 0.65 (04-01)  Creatinine: 0.69 (03-31)      Liver Biochemical Testing Trend:  Alanine Aminotransferase (ALT/SGPT): 14 (03-31)  Alanine Aminotransferase (ALT/SGPT): 16 (09-13)  Aspartate Aminotransferase (AST/SGOT): 19 (03-31-25 @ 09:19)  Aspartate Aminotransferase (AST/SGOT): 20 (09-13-24 @ 12:13)  Bilirubin Total: 0.4 (03-31)  Bilirubin Total: 0.4 (09-13)      Trend LDH      Urinalysis Basic - ( 02 Apr 2025 07:25 )    Color: x / Appearance: x / SG: x / pH: x  Gluc: 136 mg/dL / Ketone: x  / Bili: x / Urobili: x   Blood: x / Protein: x / Nitrite: x   Leuk Esterase: x / RBC: x / WBC x   Sq Epi: x / Non Sq Epi: x / Bacteria: x    MICROBIOLOGY:    MRSA PCR Result.: NotDetec (09-15-24 @ 07:08)    Lactate, Blood: 1.4 (03-31 @ 12:46)  Blood Gas Venous - Lactate: 2.5 (03-31 @ 09:07)    Sedimentation Rate, Erythrocyte: 63 mm/hr (09-13-24 @ 12:13)    RADIOLOGY:  imaging below personally reviewed    < from: [facility] Duplex Lower Ext Vein Scan, Bilat (09.14.24 @ 13:44) >  IMPRESSION:  No evidence of deep venous thrombosis in either lower extremity.            --- End of Report ---      < end of copied text >                       Follow Up:      Interval History/ROS:Patient is a 79y old  Female who presents with a chief complaint of cellulitis (02 Apr 2025 08:42)  Seen at bedside, bilateral LE redness and swelling improving.     Allergies  No Known Allergies    ANTIMICROBIALS:    ceFAZolin   IVPB 1000 every 8 hours    OTHER MEDS: MEDICATIONS  (STANDING):  atorvastatin 20 at bedtime  dextrose 50% Injectable 25 once  dextrose 50% Injectable 12.5 once  dextrose 50% Injectable 25 once  dextrose Oral Gel 15 once PRN  furosemide   Injectable 40 daily  glucagon  Injectable 1 once  insulin lispro (ADMELOG) corrective regimen sliding scale  three times a day before meals  levothyroxine 25 daily  lisinopril 5 daily  metoprolol succinate  daily  rivaroxaban 20 with dinner      Vital Signs Last 24 Hrs  T(F): 98.5 (04-02-25 @ 04:58), Max: 98.5 (04-02-25 @ 04:58)    Vital Signs Last 24 Hrs  HR: 62 (04-02-25 @ 04:58) (62 - 64)  BP: 130/84 (04-02-25 @ 04:58) (123/74 - 130/84)  RR: 18 (04-02-25 @ 04:58)  SpO2: 99% (04-02-25 @ 04:58) (96% - 99%)  Wt(kg): --    EXAM:    GA: NAD  CV: nl S1/S2  Lungs: CTAB  Abd: soft, nontender  Ext: bilateral LE lymphedema, swelling and erythema improving   Skin: Intact  IV: no phlebitis    Labs:                        10.7   7.91  )-----------( 284      ( 02 Apr 2025 07:25 )             34.4     04-02    140  |  103  |  20  ----------------------------<  136[H]  3.8   |  22  |  0.81    Ca    9.2      02 Apr 2025 07:25        WBC Trend:  WBC Count: 7.91 (04-02-25 @ 07:25)  WBC Count: 7.85 (04-01-25 @ 07:02)  WBC Count: 7.46 (03-31-25 @ 09:19)      Creatine Trend:  Creatinine: 0.81 (04-02)  Creatinine: 0.65 (04-01)  Creatinine: 0.69 (03-31)      Liver Biochemical Testing Trend:  Alanine Aminotransferase (ALT/SGPT): 14 (03-31)  Alanine Aminotransferase (ALT/SGPT): 16 (09-13)  Aspartate Aminotransferase (AST/SGOT): 19 (03-31-25 @ 09:19)  Aspartate Aminotransferase (AST/SGOT): 20 (09-13-24 @ 12:13)  Bilirubin Total: 0.4 (03-31)  Bilirubin Total: 0.4 (09-13)      Trend LDH      Urinalysis Basic - ( 02 Apr 2025 07:25 )    Color: x / Appearance: x / SG: x / pH: x  Gluc: 136 mg/dL / Ketone: x  / Bili: x / Urobili: x   Blood: x / Protein: x / Nitrite: x   Leuk Esterase: x / RBC: x / WBC x   Sq Epi: x / Non Sq Epi: x / Bacteria: x    MICROBIOLOGY:    MRSA PCR Result.: NotDetec (09-15-24 @ 07:08)    Lactate, Blood: 1.4 (03-31 @ 12:46)  Blood Gas Venous - Lactate: 2.5 (03-31 @ 09:07)    Sedimentation Rate, Erythrocyte: 63 mm/hr (09-13-24 @ 12:13)    RADIOLOGY:  imaging below personally reviewed    < from: [facility] Duplex Lower Ext Vein Scan, [place] (09.14.24 @ 13:44) >  IMPRESSION:  No evidence of deep venous thrombosis in either lower extremity.            --- End of Report ---      < end of copied text >

## 2025-04-02 NOTE — PROGRESS NOTE ADULT - SUBJECTIVE AND OBJECTIVE BOX
Date of Service: 04-02-25 @ 08:03           CARDIOLOGY     PROGRESS  NOTE   ________________________________________________    CHIEF COMPLAINT:Patient is a 79y old  Female who presents with a chief complaint of cellulitis (01 Apr 2025 09:53)  doing better  	  REVIEW OF SYSTEMS:  CONSTITUTIONAL: No fever, weight loss, or fatigue  EYES: No eye pain, visual disturbances, or discharge  ENT:  No difficulty hearing, tinnitus, vertigo; No sinus or throat pain  NECK: No pain or stiffness  RESPIRATORY: No cough, wheezing, chills or hemoptysis; No Shortness of Breath  CARDIOVASCULAR: No chest pain, palpitations, passing out, dizziness, or leg swelling  GASTROINTESTINAL: No abdominal or epigastric pain. No nausea, vomiting, or hematemesis; No diarrhea or constipation. No melena or hematochezia.  GENITOURINARY: No dysuria, frequency, hematuria, or incontinence  NEUROLOGICAL: No headaches, memory loss, loss of strength, numbness, or tremors  SKIN: No itching, burning, rashes, or lesions   LYMPH Nodes: No enlarged glands  ENDOCRINE: No heat or cold intolerance; No hair loss  MUSCULOSKELETAL: No joint pain or swelling; No muscle, back, or extremity pain  PSYCHIATRIC: No depression, anxiety, mood swings, or difficulty sleeping  HEME/LYMPH: No easy bruising, or bleeding gums  ALLERGY AND IMMUNOLOGIC: No hives or eczema	    [x ] All others negative	  [ ] Unable to obtain    PHYSICAL EXAM:  T(C): 36.9 (04-02-25 @ 04:58), Max: 36.9 (04-02-25 @ 04:58)  HR: 62 (04-02-25 @ 04:58) (62 - 88)  BP: 130/84 (04-02-25 @ 04:58) (119/73 - 130/84)  RR: 18 (04-02-25 @ 04:58) (18 - 18)  SpO2: 99% (04-02-25 @ 04:58) (96% - 99%)  Wt(kg): --  I&O's Summary    01 Apr 2025 07:01  -  02 Apr 2025 07:00  --------------------------------------------------------  IN: 120 mL / OUT: 300 mL / NET: -180 mL        Appearance: Normal	  HEENT:   Normal oral mucosa, PERRL, EOMI	  Lymphatic: No lymphadenopathy  Cardiovascular: Normal S1 S2, No JVD, + murmurs, No edema  Respiratory: rhonchi  Psychiatry: A & O x 3, Mood & affect appropriate  Gastrointestinal:  Soft, Non-tender, + BS	  Skin: No rashes, No ecchymoses, No cyanosis	  Neurologic: Non-focal  Extremities: Normal range of motion, + lymphedema +erythema bl  Vascular: Peripheral pulses palpable 2+ bilaterally    MEDICATIONS  (STANDING):  atorvastatin 20 milliGRAM(s) Oral at bedtime  ceFAZolin   IVPB 1000 milliGRAM(s) IV Intermittent every 8 hours  dextrose 5%. 1000 milliLiter(s) (50 mL/Hr) IV Continuous <Continuous>  dextrose 5%. 1000 milliLiter(s) (100 mL/Hr) IV Continuous <Continuous>  dextrose 50% Injectable 25 Gram(s) IV Push once  dextrose 50% Injectable 12.5 Gram(s) IV Push once  dextrose 50% Injectable 25 Gram(s) IV Push once  furosemide   Injectable 40 milliGRAM(s) IV Push daily  glucagon  Injectable 1 milliGRAM(s) IntraMuscular once  insulin lispro (ADMELOG) corrective regimen sliding scale   SubCutaneous three times a day before meals  levothyroxine 25 MICROGram(s) Oral daily  lisinopril 5 milliGRAM(s) Oral daily  metoprolol succinate  milliGRAM(s) Oral daily  nystatin Cream 1 Application(s) Topical two times a day  rivaroxaban 20 milliGRAM(s) Oral with dinner      TELEMETRY: 	    ECG:  	  RADIOLOGY:  OTHER: 	  	  LABS:	 	    CARDIAC MARKERS:                          10.7   7.91  )-----------( 284      ( 02 Apr 2025 07:25 )             34.4     04-01    141  |  104  |  15  ----------------------------<  125[H]  4.3   |  23  |  0.65    Ca    9.4      01 Apr 2025 07:02    TPro  7.3  /  Alb  3.4  /  TBili  0.4  /  DBili  x   /  AST  19  /  ALT  14  /  AlkPhos  126[H]  03-31    proBNP:   Lipid Profile:   HgA1c:   TSH:     < from: Transthoracic Echocardiogram (07.26.22 @ 09:55) >  Hyperdynamic left ventricle.  Mild mitral stenosis due to annular calcification.  Mild-moderate pulmonary hypertension.      Assessment and plan  ---------------------------  Patient has history of lymphedema of bilateral lower extremities and prior history is of cellulitis reports of onset of erythema, increased heat approximately 6 weeks ago in both lower extremities.  Patient received 2 courses of oral antibiotics with the last finishing about 3 weeks ago.  She thinks she took cephalexin is 1 course.  She denies any fevers, chills or sweats.  She has history of type 2 diabetes but does not check blood sugar.  She reports increasing redness and pain with weeping which is foul-smelling.  She has no allergies to medication.  She has a history of hypertension, hyperlipidemia as well as atrial fibrillation and congestive heart failure.  Patient takes metformin, atorvastatin, metoprolol, levothyroxine and Xarelto.  pt with sig cardiac hx with Mitral Stenosis, HTN and pulmonary htn with sob and LE edema.  le edema ?cellulitis, chf  add iv abx  lasix 20 mg daily  repeat TTE  a.fib started ob AC, increase Xarelto to 20 mg daily  will review prior ECHO, mild to mod Pulmonary htn, needs  sleep study as out pt  continue AC

## 2025-04-02 NOTE — PROGRESS NOTE ADULT - SUBJECTIVE AND OBJECTIVE BOX
date of service: 04-02-25 @ 08:42  [facility]  REVIEW OF SYSTEMS:  CONSTITUTIONAL: No fever,  no  weight loss  ENT:  No  tinnitus,   no   vertigo  NECK: No pain or stiffness  RESPIRATORY: No cough, wheezing, chills or hemoptysis;    No Shortness of Breath  CARDIOVASCULAR: No chest pain, palpitations, dizziness  GASTROINTESTINAL: No abdominal or epigastric pain. No nausea, vomiting, or hematemesis; No diarrhea  No melena or hematochezia.  GENITOURINARY: No dysuria, frequency, hematuria, or incontinence  NEUROLOGICAL: No headaches  SKIN: No itching,  no   rash  LYMPH Nodes: No enlarged glands  ENDOCRINE: No heat or cold intolerance  MUSCULOSKELETAL: No joint pain or swelling  PSYCHIATRIC: No depression, anxiety  HEME/LYMPH: No easy bruising, or bleeding gums  ALLERGY AND IMMUNOLOGIC: No hives or eczema	    MEDICATIONS  (STANDING):  atorvastatin 20 milliGRAM(s) Oral at bedtime  ceFAZolin   IVPB 1000 milliGRAM(s) IV Intermittent every 8 hours  dextrose 5%. 1000 milliLiter(s) (50 mL/Hr) IV Continuous <Continuous>  dextrose 5%. 1000 milliLiter(s) (100 mL/Hr) IV Continuous <Continuous>  dextrose 50% Injectable 25 Gram(s) IV Push once  dextrose 50% Injectable 12.5 Gram(s) IV Push once  dextrose 50% Injectable 25 Gram(s) IV Push once  furosemide   Injectable 40 milliGRAM(s) IV Push daily  glucagon  Injectable 1 milliGRAM(s) IntraMuscular once  insulin lispro (ADMELOG) corrective regimen sliding scale   SubCutaneous three times a day before meals  levothyroxine 25 MICROGram(s) Oral daily  lisinopril 5 milliGRAM(s) Oral daily  metoprolol succinate  milliGRAM(s) Oral daily  nystatin Cream 1 Application(s) Topical two times a day  rivaroxaban 20 milliGRAM(s) Oral with dinner    MEDICATIONS  (PRN):  dextrose Oral Gel 15 Gram(s) Oral once PRN Blood Glucose LESS THAN 70 milliGRAM(s)/deciliter      Vital Signs Last 24 Hrs  T(C): 36.9 (02 Apr 2025 04:58), Max: 36.9 (02 Apr 2025 04:58)  T(F): 98.5 (02 Apr 2025 04:58), Max: 98.5 (02 Apr 2025 04:58)  HR: 62 (02 Apr 2025 04:58) (62 - 88)  BP: 130/84 (02 Apr 2025 04:58) (119/73 - 130/84)  BP(mean): --  RR: 18 (02 Apr 2025 04:58) (18 - 18)  SpO2: 99% (02 Apr 2025 04:58) (96% - 99%)    Parameters below as of 02 Apr 2025 04:58  Patient On (Oxygen Delivery Method): room air      CAPILLARY BLOOD GLUCOSE      POCT Blood Glucose.: 132 mg/dL (02 Apr 2025 08:01)  POCT Blood Glucose.: 139 mg/dL (01 Apr 2025 21:42)  POCT Blood Glucose.: 137 mg/dL (01 Apr 2025 17:01)  POCT Blood Glucose.: 146 mg/dL (01 Apr 2025 12:00)    I&O's Summary    01 Apr 2025 07:01  -  02 Apr 2025 07:00  --------------------------------------------------------  IN: 120 mL / OUT: 300 mL / NET: -180 mL          Appearance: Normal	  HEENT:   Normal oral mucosa, PERRL, EOMI	  Lymphatic: No lymphadenopathy  Cardiovascular: Normal S1 S2, No JVD  Respiratory: Lungs clear to auscultation	  Gastrointestinal:  Soft, Non-tender, + BS	  Skin: No rash, No ecchymoses	  Extremities:     LABS:                        10.7   7.91  )-----------( 284      ( 02 Apr 2025 07:25 )             34.4     04-02    140  |  103  |  20  ----------------------------<  136[H]  3.8   |  22  |  0.81    Ca    9.2      02 Apr 2025 07:25    TPro  7.3  /  Alb  3.4  /  TBili  0.4  /  DBili  x   /  AST  19  /  ALT  14  /  AlkPhos  126[H]  03-31          Urinalysis Basic - ( 02 Apr 2025 07:25 )    Color: x / Appearance: x / SG: x / pH: x  Gluc: 136 mg/dL / Ketone: x  / Bili: x / Urobili: x   Blood: x / Protein: x / Nitrite: x   Leuk Esterase: x / RBC: x / WBC x   Sq Epi: x / Non Sq Epi: x / Bacteria: x      Lactate, Blood: 1.4 mmol/L (03-31 @ 12:46)      03-31 @ 09:07  4.2  25              Consultant(s) Notes Reviewed:      Care Discussed with Consultants/Other Providers:

## 2025-04-03 ENCOUNTER — TRANSCRIPTION ENCOUNTER (OUTPATIENT)
Age: 80
End: 2025-04-03

## 2025-04-03 VITALS
SYSTOLIC BLOOD PRESSURE: 153 MMHG | TEMPERATURE: 98 F | DIASTOLIC BLOOD PRESSURE: 85 MMHG | OXYGEN SATURATION: 97 % | HEART RATE: 84 BPM | RESPIRATION RATE: 16 BRPM

## 2025-04-03 LAB
GLUCOSE BLDC GLUCOMTR-MCNC: 141 MG/DL — HIGH (ref 70–99)
GLUCOSE BLDC GLUCOMTR-MCNC: 156 MG/DL — HIGH (ref 70–99)

## 2025-04-03 PROCEDURE — 82435 ASSAY OF BLOOD CHLORIDE: CPT

## 2025-04-03 PROCEDURE — 36415 COLL VENOUS BLD VENIPUNCTURE: CPT

## 2025-04-03 PROCEDURE — 82947 ASSAY GLUCOSE BLOOD QUANT: CPT

## 2025-04-03 PROCEDURE — 82803 BLOOD GASES ANY COMBINATION: CPT

## 2025-04-03 PROCEDURE — 84295 ASSAY OF SERUM SODIUM: CPT

## 2025-04-03 PROCEDURE — 82330 ASSAY OF CALCIUM: CPT

## 2025-04-03 PROCEDURE — 85018 HEMOGLOBIN: CPT

## 2025-04-03 PROCEDURE — 96375 TX/PRO/DX INJ NEW DRUG ADDON: CPT

## 2025-04-03 PROCEDURE — 99285 EMERGENCY DEPT VISIT HI MDM: CPT

## 2025-04-03 PROCEDURE — 84132 ASSAY OF SERUM POTASSIUM: CPT

## 2025-04-03 PROCEDURE — 80048 BASIC METABOLIC PNL TOTAL CA: CPT

## 2025-04-03 PROCEDURE — 83605 ASSAY OF LACTIC ACID: CPT

## 2025-04-03 PROCEDURE — 99232 SBSQ HOSP IP/OBS MODERATE 35: CPT

## 2025-04-03 PROCEDURE — 83036 HEMOGLOBIN GLYCOSYLATED A1C: CPT

## 2025-04-03 PROCEDURE — 82962 GLUCOSE BLOOD TEST: CPT

## 2025-04-03 PROCEDURE — G0545: CPT

## 2025-04-03 PROCEDURE — 85025 COMPLETE CBC W/AUTO DIFF WBC: CPT

## 2025-04-03 PROCEDURE — 80053 COMPREHEN METABOLIC PANEL: CPT

## 2025-04-03 PROCEDURE — 85027 COMPLETE CBC AUTOMATED: CPT

## 2025-04-03 PROCEDURE — 96374 THER/PROPH/DIAG INJ IV PUSH: CPT

## 2025-04-03 PROCEDURE — 85014 HEMATOCRIT: CPT

## 2025-04-03 RX ORDER — RIVAROXABAN 10 MG/1
1 TABLET, FILM COATED ORAL
Qty: 30 | Refills: 0
Start: 2025-04-03 | End: 2025-05-02

## 2025-04-03 RX ORDER — CEPHALEXIN 250 MG/1
1 CAPSULE ORAL
Qty: 14 | Refills: 0
Start: 2025-04-03 | End: 2025-04-06

## 2025-04-03 RX ADMIN — NYSTATIN 1 APPLICATION(S): 100000 CREAM TOPICAL at 08:12

## 2025-04-03 RX ADMIN — Medication 100 MILLIGRAM(S): at 14:44

## 2025-04-03 RX ADMIN — RIVAROXABAN 20 MILLIGRAM(S): 10 TABLET, FILM COATED ORAL at 17:07

## 2025-04-03 RX ADMIN — Medication 100 MILLIGRAM(S): at 06:20

## 2025-04-03 RX ADMIN — METOPROLOL SUCCINATE 200 MILLIGRAM(S): 50 TABLET, EXTENDED RELEASE ORAL at 06:20

## 2025-04-03 RX ADMIN — LISINOPRIL 5 MILLIGRAM(S): 5 TABLET ORAL at 06:20

## 2025-04-03 RX ADMIN — Medication 25 MICROGRAM(S): at 06:20

## 2025-04-03 RX ADMIN — INSULIN LISPRO 1: 100 INJECTION, SOLUTION INTRAVENOUS; SUBCUTANEOUS at 08:51

## 2025-04-03 NOTE — PROGRESS NOTE ADULT - ASSESSMENT
79-year-old female     h/o    HTN, HLD, DM     A-fib on Xarelto, lymphedema c/c,  ,  renal  stone  left  ureter       presents to er,  for erythema swelling of  b/l   lower extremity concerning for cellulitis. as  advised  by  pmd      h/ o  c/c    lymphedema   however erythema ,  progressively worsned e over the last 2 weeks with  some,  clear discharge        distal  legs  with  redness        on iv   ancef/  did  well with  ancef  on prior  visit      c/c  Afib,           on Xarelto   HTN /  HLD   DM,  follow   fs    Hypothyroid     h/o  c/c  diastolic  chf    venous  insufficiency.   b/l  legs /  known to  outside    vascular  dr    obesity/  bmi  41    lipitor, toprol, synthroid, xarelo     on iv ancef/  lasix/  seen by  ID/  swlling ha s decerased/  still  with  redness      pt  is full code                      79-year-old female     h/o    HTN, HLD, DM     A-fib on Xarelto, lymphedema c/c,  ,  renal  stone  left  ureter       presents to er,  for erythema swelling of  b/l   lower extremity concerning for cellulitis. as  advised  by  pmd      h/ o  c/c    lymphedema   however erythema ,  progressively worsned e over the last 2 weeks with  some,  clear discharge        distal  legs  with  redness        on iv   ancef/  did  well with  ancef  on prior  visit      c/c  Afib,           on Xarelto   HTN /  HLD   DM,  follow   fs    Hypothyroid     h/o  c/c  diastolic  chf    venous  insufficiency.   b/l  legs /  known to  outside    vascular  dr    obesity/  bmi  41    lipitor, toprol, synthroid, xarelo     on iv ancef/  lasix/  seen by  ID/  still  with  redness    pt refusing lasix/  echo/  also has  commode/ has  significant  edema   duration of ab  as  per ID     pt  is full code                      79-year-old female     h/o    HTN, HLD, DM     A-fib on Xarelto, lymphedema c/c,  ,  renal  stone  left  ureter       presents to er,  for erythema swelling of  b/l   lower extremity concerning for cellulitis. as  advised  by  pmd      h/ o  c/c    lymphedema   however erythema ,  progressively worsned e over the last 2 weeks with  some,  clear discharge        distal  legs  with  redness        on iv   ancef/  did  well with  ancef  on prior  visit      c/c  Afib,           on Xarelto   HTN /  HLD   DM,  follow   fs    Hypothyroid     h/o  c/c  diastolic  chf    venous  insufficiency.   b/l  legs /  known to  outside    vascular  dr    obesity/  bmi  41    lipitor, toprol, synthroid, xarelo     on iv ancef/  lasix/  seen by  ID/  still  with  redness    pt refusing lasix/  echo/  also has  commode/ has  significant  edema   duration of ab  as  per ID/  pt  wishe  sto  go  home, is  awrae  taht pt  can  have  recurrence  given  her  refusal  to  acceot  recs     pt  is full code                      79-year-old female     h/o    HTN, HLD, DM     A-fib on Xarelto, lymphedema c/c,  ,  renal  stone  left  ureter       presents to er,  for erythema swelling of  b/l   lower extremity concerning for cellulitis. as  advised  by  pmd      h/ o  c/c    lymphedema   however erythema ,  progressively worsned e over the last 2 weeks with  some,  clear discharge        distal  legs  with  redness        on iv   ancef/  did  well with  ancef  on prior  visit      c/c  Afib,           on Xarelto   HTN /  HLD   DM,  follow   fs    Hypothyroid     h/o  c/c  diastolic  chf    venous  insufficiency.   b/l  legs /  known to  outside    vascular  dr    obesity/  bmi  41    lipitor, toprol, synthroid, xarelo     on iv ancef/  lasix/  seen by  ID/  still  with  redness    pt refusing lasix/  echo/  also has  commode  at  bedside,  has  significant  edema   duration of ab  as  per ID/  pt  wishe  to   go  home, is  awrae  taht pt  can  have  recurrence  given  her  refusal  to  acceot  recs     discussed  with  team  zo      pt  is full code

## 2025-04-03 NOTE — DISCHARGE NOTE NURSING/CASE MANAGEMENT/SOCIAL WORK - FINANCIAL ASSISTANCE
[facility] provides services at a reduced cost to those who are determined to be eligible through [facility]’s financial assistance program. Information regarding [facility]’s financial assistance program can be found by going to [email or web address] or by calling [phone].

## 2025-04-03 NOTE — PROGRESS NOTE ADULT - SUBJECTIVE AND OBJECTIVE BOX
Date of Service: 04-03-25 @ 06:33           CARDIOLOGY     PROGRESS  NOTE   ________________________________________________    CHIEF COMPLAINT:Patient is a 79y old  Female who presents with a chief complaint of cellulitis (02 Apr 2025 13:33)  doing better  	  REVIEW OF SYSTEMS:  CONSTITUTIONAL: No fever, weight loss, or fatigue  EYES: No eye pain, visual disturbances, or discharge  ENT:  No difficulty hearing, tinnitus, vertigo; No sinus or throat pain  NECK: No pain or stiffness  RESPIRATORY: No cough, wheezing, chills or hemoptysis; + Shortness of Breath  CARDIOVASCULAR: No chest pain, palpitations, passing out, dizziness, or leg swelling  GASTROINTESTINAL: No abdominal or epigastric pain. No nausea, vomiting, or hematemesis; No diarrhea or constipation. No melena or hematochezia.  GENITOURINARY: No dysuria, frequency, hematuria, or incontinence  NEUROLOGICAL: No headaches, memory loss, loss of strength, numbness, or tremors  SKIN: No itching, burning, rashes, or lesions   LYMPH Nodes: No enlarged glands  ENDOCRINE: No heat or cold intolerance; No hair loss  MUSCULOSKELETAL: No joint pain or swelling; No muscle, back, + extremity pain  PSYCHIATRIC: No depression, anxiety, mood swings, or difficulty sleeping  HEME/LYMPH: No easy bruising, or bleeding gums  ALLERGY AND IMMUNOLOGIC: No hives or eczema	    [x ] All others negative	  [ ] Unable to obtain    PHYSICAL EXAM:  T(C): 36.6 (04-02-25 @ 20:05), Max: 36.6 (04-02-25 @ 20:05)  HR: 76 (04-02-25 @ 20:05) (76 - 76)  BP: 138/72 (04-02-25 @ 20:05) (138/72 - 138/72)  RR: 18 (04-02-25 @ 20:05) (18 - 18)  SpO2: 96% (04-02-25 @ 20:05) (96% - 96%)  Wt(kg): --  I&O's Summary    01 Apr 2025 07:01  -  02 Apr 2025 07:00  --------------------------------------------------------  IN: 120 mL / OUT: 300 mL / NET: -180 mL        Appearance: Normal	  HEENT:   Normal oral mucosa, PERRL, EOMI	  Lymphatic: No lymphadenopathy  Cardiovascular: Normal S1 S2, No JVD, + murmurs, + edema  Respiratory: Lungs clear to auscultation	  Psychiatry: A & O x 3, Mood & affect appropriate  Gastrointestinal:  Soft, Non-tender, + BS	  Skin: No rashes, No ecchymoses, No cyanosis	  Neurologic: Non-focal  Extremities: Normal range of motion, No clubbing, cyanosis + lymph edema, erythema  Vascular: Peripheral pulses palpable 2+ bilaterally    MEDICATIONS  (STANDING):  atorvastatin 20 milliGRAM(s) Oral at bedtime  ceFAZolin   IVPB 1000 milliGRAM(s) IV Intermittent every 8 hours  dextrose 5%. 1000 milliLiter(s) (50 mL/Hr) IV Continuous <Continuous>  dextrose 5%. 1000 milliLiter(s) (100 mL/Hr) IV Continuous <Continuous>  dextrose 50% Injectable 25 Gram(s) IV Push once  dextrose 50% Injectable 12.5 Gram(s) IV Push once  dextrose 50% Injectable 25 Gram(s) IV Push once  furosemide   Injectable 40 milliGRAM(s) IV Push daily  glucagon  Injectable 1 milliGRAM(s) IntraMuscular once  insulin lispro (ADMELOG) corrective regimen sliding scale   SubCutaneous three times a day before meals  levothyroxine 25 MICROGram(s) Oral daily  lisinopril 5 milliGRAM(s) Oral daily  metoprolol succinate  milliGRAM(s) Oral daily  nystatin Cream 1 Application(s) Topical two times a day  rivaroxaban 20 milliGRAM(s) Oral with dinner      TELEMETRY: 	    ECG:  	  RADIOLOGY:  OTHER: 	  	  LABS:	 	    CARDIAC MARKERS:                                10.7   7.91  )-----------( 284      ( 02 Apr 2025 07:25 )             34.4     04-02    140  |  103  |  20  ----------------------------<  136[H]  3.8   |  22  |  0.81    Ca    9.2      02 Apr 2025 07:25      proBNP:   Lipid Profile:   HgA1c:   TSH:       -Clinically improving - Continue Cefazolin   -Keep bilateral leg elevated   -Wound care   -Monitor for clinical improvement     < from: Transthoracic Echocardiogram (07.26.22 @ 09:55) >  Conclusions:  Hyperdynamic left ventricle.  Mild mitral stenosis due to annular calcification.  Mild-moderate pulmonary hypertension.  ------------------------------------------------------------------------        Assessment and plan  ---------------------------  Patient has history of lymphedema of bilateral lower extremities and prior history is of cellulitis reports of onset of erythema, increased heat approximately 6 weeks ago in both lower extremities.  Patient received 2 courses of oral antibiotics with the last finishing about 3 weeks ago.  She thinks she took cephalexin is 1 course.  She denies any fevers, chills or sweats.  She has history of type 2 diabetes but does not check blood sugar.  She reports increasing redness and pain with weeping which is foul-smelling.  She has no allergies to medication.  She has a history of hypertension, hyperlipidemia as well as atrial fibrillation and congestive heart failure.  Patient takes metformin, atorvastatin, metoprolol, levothyroxine and Xarelto.  pt with sig cardiac hx with Mitral Stenosis, HTN and pulmonary htn with sob and LE edema.  le edema ?cellulitis, chf  add iv abx  lasix 20 mg daily  repeat TTE  a.fib started ob AC, increase Xarelto to 20 mg daily  will review prior ECHO, mild to mod Pulmonary htn, needs  sleep study as out pt  continue AC  cellulitis is improving., elevate le ?ace wrap  repeat echo to check for pulmonary htn, continue AC

## 2025-04-03 NOTE — DISCHARGE NOTE NURSING/CASE MANAGEMENT/SOCIAL WORK - PATIENT PORTAL LINK FT
You can access the FollowMyHealth Patient Portal offered by [facility] by registering at the following website: [email or web address]. By joining [facility]’s FollowMyHealth portal, you will also be able to view your health information using other applications (apps) compatible with our system.

## 2025-04-03 NOTE — PROGRESS NOTE ADULT - SUBJECTIVE AND OBJECTIVE BOX
Follow Up:      Interval History/ROS:Patient is a 79y old  Female who presents with a chief complaint of cellulitis (03 Apr 2025 13:05)  Clinically improving, asking to go home.    Allergies  No Known Allergies        ANTIMICROBIALS:    ceFAZolin   IVPB 1000 every 8 hours        OTHER MEDS: MEDICATIONS  (STANDING):  atorvastatin 20 at bedtime  dextrose 50% Injectable 25 once  dextrose 50% Injectable 12.5 once  dextrose 50% Injectable 25 once  dextrose Oral Gel 15 once PRN  furosemide   Injectable 40 daily  glucagon  Injectable 1 once  insulin lispro (ADMELOG) corrective regimen sliding scale  three times a day before meals  levothyroxine 25 daily  lisinopril 5 daily  metoprolol succinate  daily  rivaroxaban 20 with dinner      Vital Signs Last 24 Hrs  T(F): 97.5 (04-03-25 @ 09:06), Max: 98.5 (04-02-25 @ 04:58)    Vital Signs Last 24 Hrs  HR: 68 (04-03-25 @ 09:06) (68 - 76)  BP: 151/78 (04-03-25 @ 09:08) (131/78 - 165/91)  RR: 18 (04-03-25 @ 09:06)  SpO2: 97% (04-03-25 @ 09:06) (96% - 97%)  Wt(kg): --    EXAM:      GA: NAD  CV: nl S1/S2  Lungs: CTAB  Abd: soft, nontender  Ext: bilateral LE lymphedema, swelling and erythema improving   Skin: Intact  IV: no phlebitis    Labs:                        10.7   7.91  )-----------( 284      ( 02 Apr 2025 07:25 )             34.4     04-02    140  |  103  |  20  ----------------------------<  136[H]  3.8   |  22  |  0.81    Ca    9.2      02 Apr 2025 07:25        WBC Trend:  WBC Count: 7.91 (04-02-25 @ 07:25)  WBC Count: 7.85 (04-01-25 @ 07:02)  WBC Count: 7.46 (03-31-25 @ 09:19)      Creatine Trend:  Creatinine: 0.81 (04-02)  Creatinine: 0.65 (04-01)  Creatinine: 0.69 (03-31)      Liver Biochemical Testing Trend:  Alanine Aminotransferase (ALT/SGPT): 14 (03-31)  Alanine Aminotransferase (ALT/SGPT): 16 (09-13)  Aspartate Aminotransferase (AST/SGOT): 19 (03-31-25 @ 09:19)  Aspartate Aminotransferase (AST/SGOT): 20 (09-13-24 @ 12:13)  Bilirubin Total: 0.4 (03-31)  Bilirubin Total: 0.4 (09-13)      Trend LDH      Urinalysis Basic - ( 02 Apr 2025 07:25 )    Color: x / Appearance: x / SG: x / pH: x  Gluc: 136 mg/dL / Ketone: x  / Bili: x / Urobili: x   Blood: x / Protein: x / Nitrite: x   Leuk Esterase: x / RBC: x / WBC x   Sq Epi: x / Non Sq Epi: x / Bacteria: x        MICROBIOLOGY:    MRSA PCR Result.: NotDetec (09-15-24 @ 07:08)    Sedimentation Rate, Erythrocyte: 63 mm/hr (09-13-24 @ 12:13)    RADIOLOGY:  imaging below personally reviewed

## 2025-04-03 NOTE — DISCHARGE NOTE PROVIDER - NSDCMRMEDTOKEN_GEN_ALL_CORE_FT
atorvastatin 20 mg oral tablet: 1 tab(s) orally once a day (at bedtime)  cephalexin 500 mg oral capsule: 1 cap(s) orally 4 times a day  furosemide 40 mg oral tablet: 1 tab(s) orally once a day  levothyroxine 25 mcg (0.025 mg) oral tablet: 1 tab(s) orally once a day  lisinopril 5 mg oral tablet: 1 tab(s) orally once a day  metFORMIN 500 mg oral tablet, extended release: 1 tab(s) orally 2 times a day  metoprolol succinate 200 mg oral tablet, extended release: 1 tab(s) orally once a day  Physical therapy: evaluate and treat  rivaroxaban 15 mg oral tablet: 1 tab(s) orally once a day   atorvastatin 20 mg oral tablet: 1 tab(s) orally once a day (at bedtime)  cephalexin 500 mg oral tablet: 1 tab(s) orally every 6 hours Take through April 6, 2025 and then discontinue  furosemide 40 mg oral tablet: 1 tab(s) orally once a day  levothyroxine 25 mcg (0.025 mg) oral tablet: 1 tab(s) orally once a day  lisinopril 5 mg oral tablet: 1 tab(s) orally once a day  metFORMIN 500 mg oral tablet, extended release: 1 tab(s) orally 2 times a day  metoprolol succinate 200 mg oral tablet, extended release: 1 tab(s) orally once a day  rivaroxaban 20 mg oral tablet: 1 tab(s) orally once a day (before a meal)

## 2025-04-03 NOTE — DISCHARGE NOTE PROVIDER - NSDCFUADDAPPT_GEN_ALL_CORE_FT
You must follow up with your primary medical doctor within 2-3 days of discharge - please call to make an appointment.    You must follow up with your vascular doctor within one week of discharge - please call to make an appointment.    You can follow up with a wound care doctor within one week of discharge - please call to make an appointment.          APPTS ARE READY TO BE MADE: [X] YES    Best Family or Patient Contact (if needed):    Additional Information about above appointments (if needed):    1: Primary medical doctor  2: Vascular doctor  3: Wound care doctor    Other comments or requests:

## 2025-04-03 NOTE — DISCHARGE NOTE PROVIDER - HOSPITAL COURSE
79-year-old female     h/o    HTN, HLD, DM     A-fib on Xarelto, lymphedema c/c,  ,  renal  stone  left  ureter       presents to er,  for erythema swelling of  b/l   lower extremity concerning for cellulitis. as  advised  by  pmd      h/ o  c/c    lymphedema   however erythema ,  progressively worsned e over the last 2 weeks with  some,  clear discharge        distal  legs  with  redness        on iv   ancef/  did  well with  ancef  on prior  visit      c/c  Afib,           on Xarelto   HTN /  HLD   DM,  follow   fs    Hypothyroid     h/o  c/c  diastolic  chf    venous  insufficiency.   b/l  legs /  known to  outside    vascular  dr    obesity/  bmi  42    lipitor, toprol, synthroid, xarelo     on iv ancef/  lasix/  seen by  ID/  still  with  redness    pt refusing lasix/  echo/  also has  commode/ has  significant  edema.  awrae  that  by  bot  acceoting  rx,  she  can  have  recurrence     duration of ab  as  per ID     pt  is full code              79-year-old female     h/o    HTN, HLD, DM     A-fib on Xarelto, lymphedema c/c,  ,  renal  stone  left  ureter       presents to er,  for erythema swelling of  b/l   lower extremity concerning for cellulitis. as  advised  by  pmd      h/ o  c/c    lymphedema   however erythema ,  progressively worsned e over the last 2 weeks with  some,  clear discharge        distal  legs  with  redness        on iv   ancef/  did  well with  ancef  on prior  visit      c/c  Afib,           on Xarelto   HTN /  HLD   DM,  follow   fs    Hypothyroid     h/o  c/c  diastolic  chf    venous  insufficiency.   b/l  legs /  known to  outside    vascular  dr    obesity/  bmi  42    lipitor, toprol, synthroid, xarelo     on iv ancef/  lasix/  seen by  ID/  still  with  redness    pt refusing lasix/  echo/  also has  commode/ has  significant  edema.  awrae  that  by  bot  acceoting  rx,  she  can  have  recurrence     duration of ab  as  per ID     pt  is full code      An 79-year-old female with a history of hypertension, hyperlipidemia, type 2 diabetes mellitus, atrial fibrillation (on rivaroxaban), chronic bilateral lower extremity lymphedema, diastolic congestive heart failure, venous insufficiency, and obesity (BMI 42) presented to the ED with worsening bilateral lower extremity erythema, swelling, and foul-smelling weeping discharge. This was a recurrence of similar symptoms approximately six weeks prior, treated with two courses of oral cephalexin with minimal improvement. The most recent course finished approximately three weeks prior to this presentation. On exam, she had circumferential erythema, increased warmth, and weeping serous fluid in both lower extremities, without ascending lymphangitis or fever. In the ED, she was started on intravenous cefazolin and doxycycline for presumed cellulitis superimposed on chronic lymphedema and venous insufficiency. She received four days of IV antibiotics (Ancef) with clinical improvement. At discharge, she was transitioned to oral cephalexin 500mg every six hours to complete a seven-day course. She was instructed to keep her legs elevated and continue wound care. Her home medications include atorvastatin, metoprolol, levothyroxine, metformin, and rivaroxaban. Her rivaroxaban was increased to 20mg oral daily during this admission, as per cardiology.  She was counseled on the importance of compliance with her treatment plan and follow-up with her primary care physician and vascular specialist. She was informed of the risk of recurrence given her underlying conditions.

## 2025-04-03 NOTE — CHART NOTE - NSCHARTNOTEFT_GEN_A_CORE
Request from Dr. Hickman to facilitate patient discharge.  Medication reconciliation reviewed, revised, and resolved with Dr. Hickman, who has medically cleared patient for discharge with follow up as advised.  Please refer to discharge note for detailed hospital course.

## 2025-04-03 NOTE — DISCHARGE NOTE PROVIDER - CARE PROVIDER_API CALL
Dong Machuca  Cardiovascular Disease  [address] [place] [id number]  Phone: [phone]  Fax: [phone]  Follow Up Time: 1-3 days    Joseph Santiago  Wound Care  [address] [place] [address], [address] [address]  Phone: [phone]  Fax: [phone]  Follow Up Time: 1-3 days

## 2025-04-03 NOTE — PROGRESS NOTE ADULT - ASSESSMENT
79-year-old female past medical history HTN, HLD, DM II, A-fib on Xarelto, chronic lymphedema, recent hospitalization September 2024 for for erythema, malodorous drainage, and swelling of the lower extremity which was treated with keflex x 7 days. She  now presents with increasing redness and pain with weeping which is foul-smelling -she has received 2 courses of oral antibiotics-cephalexin 500 bid x 7 days in January with minimal improvements and was prescribed cephalexin again with an increase in dosing to 500mg QID x 7 days finishing about 3 weeks ago. She as denied fever and chills. No pets, pools,/spa/ beaches. Does not work. In Ed pt with circumferential erythema with increased heat in both lower extremities with weeping serous fluid no ascending lymphangitis given ancef and doxy, No Fevers or WBC. ID consulted    #Bilateral Lower extremity erythema, swelling, weeping bullae, now worsening, likely in the setting of Chronic lymphedema and venous insufficiency, concern of underlying cellulitis, absence of systemic symptoms    Recommendations:   -Clinically improving - Continue Cefazolin while inpatient   -Day 4 of IV therapy, When stable for discharge, can switch to Keflex 500 mg po q6hrs to complete 7 days course  -Keep bilateral leg elevated   -Wound care   -Monitor for clinical improvement     Discussed with primary team     Leonila Abrams MD  ID physician  [employer] Teams Preferred  After 5pm/weekends call [phone].

## 2025-04-03 NOTE — PROGRESS NOTE ADULT - PROVIDER SPECIALTY LIST ADULT
Cardiology
Internal Medicine
Cardiology
Infectious Disease
Internal Medicine
Cardiology
Infectious Disease
Internal Medicine

## 2025-04-03 NOTE — PROGRESS NOTE ADULT - SUBJECTIVE AND OBJECTIVE BOX
date of service: 04-03-25 @ 08:51  [facility]  REVIEW OF SYSTEMS:  CONSTITUTIONAL: No fever,  no  weight loss  ENT:  No  tinnitus,   no   vertigo  NECK: No pain or stiffness  RESPIRATORY: No cough, wheezing, chills or hemoptysis;    No Shortness of Breath  CARDIOVASCULAR: No chest pain, palpitations, dizziness  GASTROINTESTINAL: No abdominal or epigastric pain. No nausea, vomiting, or hematemesis; No diarrhea  No melena or hematochezia.  GENITOURINARY: No dysuria, frequency, hematuria, or incontinence  NEUROLOGICAL: No headaches  SKIN: No itching,  no   rash  LYMPH Nodes: No enlarged glands  ENDOCRINE: No heat or cold intolerance  MUSCULOSKELETAL: No joint pain or swelling  PSYCHIATRIC: No depression, anxiety  HEME/LYMPH: No easy bruising, or bleeding gums  ALLERGY AND IMMUNOLOGIC: No hives or eczema	    MEDICATIONS  (STANDING):  atorvastatin 20 milliGRAM(s) Oral at bedtime  ceFAZolin   IVPB 1000 milliGRAM(s) IV Intermittent every 8 hours  dextrose 5%. 1000 milliLiter(s) (50 mL/Hr) IV Continuous <Continuous>  dextrose 5%. 1000 milliLiter(s) (100 mL/Hr) IV Continuous <Continuous>  dextrose 50% Injectable 25 Gram(s) IV Push once  dextrose 50% Injectable 12.5 Gram(s) IV Push once  dextrose 50% Injectable 25 Gram(s) IV Push once  furosemide   Injectable 40 milliGRAM(s) IV Push daily  glucagon  Injectable 1 milliGRAM(s) IntraMuscular once  insulin lispro (ADMELOG) corrective regimen sliding scale   SubCutaneous three times a day before meals  levothyroxine 25 MICROGram(s) Oral daily  lisinopril 5 milliGRAM(s) Oral daily  metoprolol succinate  milliGRAM(s) Oral daily  nystatin Cream 1 Application(s) Topical two times a day  rivaroxaban 20 milliGRAM(s) Oral with dinner    MEDICATIONS  (PRN):  dextrose Oral Gel 15 Gram(s) Oral once PRN Blood Glucose LESS THAN 70 milliGRAM(s)/deciliter      Vital Signs Last 24 Hrs  T(C): 36.4 (03 Apr 2025 05:45), Max: 36.6 (02 Apr 2025 20:05)  T(F): 97.5 (03 Apr 2025 05:45), Max: 97.8 (02 Apr 2025 20:05)  HR: 76 (03 Apr 2025 05:45) (76 - 76)  BP: 131/78 (03 Apr 2025 05:45) (131/78 - 138/72)  BP(mean): --  RR: 18 (03 Apr 2025 05:45) (18 - 18)  SpO2: 97% (03 Apr 2025 05:45) (96% - 97%)    Parameters below as of 03 Apr 2025 05:45  Patient On (Oxygen Delivery Method): room air      CAPILLARY BLOOD GLUCOSE      POCT Blood Glucose.: 156 mg/dL (03 Apr 2025 08:45)  POCT Blood Glucose.: 167 mg/dL (02 Apr 2025 21:49)  POCT Blood Glucose.: 154 mg/dL (02 Apr 2025 16:53)  POCT Blood Glucose.: 223 mg/dL (02 Apr 2025 12:14)    I&O's Summary        Appearance: Normal	  HEENT:   Normal oral mucosa, PERRL, EOMI	  Lymphatic: No lymphadenopathy  Cardiovascular: Normal S1 S2, No JVD  Respiratory: Lungs clear to auscultation	  Gastrointestinal:  Soft, Non-tender, + BS	  Skin: No rash, No ecchymoses	  Extremities:     LABS:                        10.7   7.91  )-----------( 284      ( 02 Apr 2025 07:25 )             34.4     04-02    140  |  103  |  20  ----------------------------<  136[H]  3.8   |  22  |  0.81    Ca    9.2      02 Apr 2025 07:25            Urinalysis Basic - ( 02 Apr 2025 07:25 )    Color: x / Appearance: x / SG: x / pH: x  Gluc: 136 mg/dL / Ketone: x  / Bili: x / Urobili: x   Blood: x / Protein: x / Nitrite: x   Leuk Esterase: x / RBC: x / WBC x   Sq Epi: x / Non Sq Epi: x / Bacteria: x                      Consultant(s) Notes Reviewed:      Care Discussed with Consultants/Other Providers:

## 2025-04-03 NOTE — DISCHARGE NOTE PROVIDER - PROVIDER TOKENS
PROVIDER:[TOKEN:[[id number]],FOLLOWUP:[1-3 days]],PROVIDER:[TOKEN:[[id number]],FOLLOWUP:[1-3 days]]

## 2025-04-03 NOTE — DISCHARGE NOTE PROVIDER - NSDCCPCAREPLAN_GEN_ALL_CORE_FT
PRINCIPAL DISCHARGE DIAGNOSIS  Diagnosis: Bilateral lower leg cellulitis  Assessment and Plan of Treatment: You will take oral antibiotics, cephalexin, through April 6, 2025, to complete a 7 day course (including the IV antibiotics your received during hospitalization.  You must follow up with your primary medical doctor, Dr. Machuca, within 2-3 days of discharge - please call to make an appointment.      SECONDARY DISCHARGE DIAGNOSES  Diagnosis: Lymphedema of leg  Assessment and Plan of Treatment: Continue with your home regimen of diuretics.  You must follow up with your vascular doctor within one week of discharge.  Continue previous wound care.  You can follow up with wound care specialist upon discharge.    Diagnosis: Type 2 diabetes mellitus  Assessment and Plan of Treatment: Check your blood glucose before meals and at bedtime.  It's important not to skip any meals.  Keep a log of your blood glucose results and always take it with you to your doctor appointments.  Keep a list of your current medications including injectables and over the counter medications and bring this medication list with you to all your doctor appointments.  If you have not seen your ophthalmologist this year call for appointment.  Check your feet daily for redness, sores, or openings. Do not self treat. If no improvement in two days call your primary care physician for an appointment.  Low blood sugar (hypoglycemia) is a blood sugar below 70mg/dl. Check your blood sugar if you feel signs/symptoms of hypoglycemia. If your blood sugar is below 70 take 15 grams of carbohydrates (ex 4 oz of apple juice, 3-4 glucose tablets, or 4-6 oz of regular soda) wait 15 minutes and repeat blood sugar to make sure it comes up above 70.  If your blood sugar is above 70 and you are due for a meal, have a meal.  If you are not due for a meal have a snack.  This snack helps keeps your blood sugar at a safe range.

## 2025-04-15 ENCOUNTER — NON-APPOINTMENT (OUTPATIENT)
Age: 80
End: 2025-04-15

## 2025-04-16 ENCOUNTER — APPOINTMENT (OUTPATIENT)
Dept: WOUND CARE | Facility: HOSPITAL | Age: 80
End: 2025-04-16
Payer: MEDICARE

## 2025-04-16 ENCOUNTER — OUTPATIENT (OUTPATIENT)
Dept: OUTPATIENT SERVICES | Facility: HOSPITAL | Age: 80
LOS: 1 days | End: 2025-04-16
Payer: MEDICARE

## 2025-04-16 VITALS
OXYGEN SATURATION: 18 % | DIASTOLIC BLOOD PRESSURE: 57 MMHG | WEIGHT: 240 LBS | HEART RATE: 71 BPM | HEIGHT: 63 IN | RESPIRATION RATE: 18 BRPM | SYSTOLIC BLOOD PRESSURE: 151 MMHG | BODY MASS INDEX: 42.52 KG/M2 | TEMPERATURE: 98 F

## 2025-04-16 DIAGNOSIS — N20.0 CALCULUS OF KIDNEY: Chronic | ICD-10-CM

## 2025-04-16 DIAGNOSIS — Z98.890 OTHER SPECIFIED POSTPROCEDURAL STATES: Chronic | ICD-10-CM

## 2025-04-16 DIAGNOSIS — I10 ESSENTIAL (PRIMARY) HYPERTENSION: ICD-10-CM

## 2025-04-16 DIAGNOSIS — E03.9 HYPOTHYROIDISM, UNSPECIFIED: ICD-10-CM

## 2025-04-16 DIAGNOSIS — I48.20 CHRONIC ATRIAL FIBRILLATION, UNSP: ICD-10-CM

## 2025-04-16 DIAGNOSIS — R23.8 OTHER SKIN CHANGES: ICD-10-CM

## 2025-04-16 DIAGNOSIS — I89.0 LYMPHEDEMA, NOT ELSEWHERE CLASSIFIED: ICD-10-CM

## 2025-04-16 DIAGNOSIS — E66.9 OBESITY, UNSPECIFIED: ICD-10-CM

## 2025-04-16 DIAGNOSIS — I83.93 ASYMPTOMATIC VARICOSE VEINS OF BILATERAL LOWER EXTREMITIES: ICD-10-CM

## 2025-04-16 DIAGNOSIS — Z96.659 PRESENCE OF UNSPECIFIED ARTIFICIAL KNEE JOINT: Chronic | ICD-10-CM

## 2025-04-16 DIAGNOSIS — E78.5 HYPERLIPIDEMIA, UNSPECIFIED: ICD-10-CM

## 2025-04-16 DIAGNOSIS — E11.628 TYPE 2 DIABETES MELLITUS WITH OTHER SKIN COMPLICATIONS: ICD-10-CM

## 2025-04-16 DIAGNOSIS — I48.91 UNSPECIFIED ATRIAL FIBRILLATION: ICD-10-CM

## 2025-04-16 PROCEDURE — 99205 OFFICE O/P NEW HI 60 MIN: CPT

## 2025-04-16 PROCEDURE — 29581 APPL MULTLAYER CMPRN SYS LEG: CPT

## 2025-04-16 PROCEDURE — 93922 UPR/L XTREMITY ART 2 LEVELS: CPT | Mod: 26

## 2025-04-16 PROCEDURE — 93922 UPR/L XTREMITY ART 2 LEVELS: CPT

## 2025-04-16 PROCEDURE — G0463: CPT

## 2025-04-16 RX ORDER — METOPROLOL SUCCINATE 200 MG/1
200 TABLET, EXTENDED RELEASE ORAL
Refills: 0 | Status: ACTIVE | COMMUNITY

## 2025-04-16 RX ORDER — AMMONIUM LACTATE 12 %
12 CREAM (GRAM) TOPICAL TWICE DAILY
Qty: 1 | Refills: 3 | Status: ACTIVE | COMMUNITY
Start: 2025-04-16 | End: 1900-01-01

## 2025-04-16 RX ORDER — BUMETANIDE 2 MG/1
2 TABLET ORAL
Refills: 0 | Status: ACTIVE | COMMUNITY

## 2025-04-16 RX ORDER — RIVAROXABAN 20 MG/1
20 TABLET, FILM COATED ORAL
Refills: 0 | Status: ACTIVE | COMMUNITY

## 2025-04-16 RX ORDER — METFORMIN ER 500 MG 500 MG/1
500 TABLET ORAL
Refills: 0 | Status: ACTIVE | COMMUNITY

## 2025-04-16 RX ORDER — ATORVASTATIN CALCIUM 20 MG/1
20 TABLET, FILM COATED ORAL
Refills: 0 | Status: ACTIVE | COMMUNITY

## 2025-04-16 RX ORDER — LEVOTHYROXINE SODIUM 137 UG/1
TABLET ORAL
Refills: 0 | Status: ACTIVE | COMMUNITY

## 2025-04-21 ENCOUNTER — NON-APPOINTMENT (OUTPATIENT)
Age: 80
End: 2025-04-21

## 2025-04-24 DIAGNOSIS — R23.8 OTHER SKIN CHANGES: ICD-10-CM

## 2025-04-24 DIAGNOSIS — I89.0 LYMPHEDEMA, NOT ELSEWHERE CLASSIFIED: ICD-10-CM

## 2025-04-24 DIAGNOSIS — I77.1 STRICTURE OF ARTERY: ICD-10-CM

## 2025-05-13 ENCOUNTER — APPOINTMENT (OUTPATIENT)
Dept: WOUND CARE | Facility: HOSPITAL | Age: 80
End: 2025-05-13

## 2025-05-27 ENCOUNTER — APPOINTMENT (OUTPATIENT)
Dept: WOUND CARE | Facility: HOSPITAL | Age: 80
End: 2025-05-27

## 2025-06-17 ENCOUNTER — APPOINTMENT (OUTPATIENT)
Dept: WOUND CARE | Facility: HOSPITAL | Age: 80
End: 2025-06-17
Payer: MEDICARE

## 2025-06-17 ENCOUNTER — OUTPATIENT (OUTPATIENT)
Dept: OUTPATIENT SERVICES | Facility: HOSPITAL | Age: 80
LOS: 1 days | End: 2025-06-17
Payer: MEDICARE

## 2025-06-17 VITALS
WEIGHT: 240 LBS | HEIGHT: 62 IN | BODY MASS INDEX: 44.16 KG/M2 | OXYGEN SATURATION: 97 % | SYSTOLIC BLOOD PRESSURE: 182 MMHG | HEART RATE: 94 BPM | DIASTOLIC BLOOD PRESSURE: 85 MMHG | RESPIRATION RATE: 18 BRPM | TEMPERATURE: 98.2 F

## 2025-06-17 DIAGNOSIS — Z98.890 OTHER SPECIFIED POSTPROCEDURAL STATES: Chronic | ICD-10-CM

## 2025-06-17 DIAGNOSIS — Z96.659 PRESENCE OF UNSPECIFIED ARTIFICIAL KNEE JOINT: Chronic | ICD-10-CM

## 2025-06-17 DIAGNOSIS — N20.0 CALCULUS OF KIDNEY: Chronic | ICD-10-CM

## 2025-06-17 PROCEDURE — 29581 APPL MULTLAYER CMPRN SYS LEG: CPT | Mod: 50

## 2025-06-17 PROCEDURE — 93970 EXTREMITY STUDY: CPT | Mod: 26

## 2025-06-17 PROCEDURE — 93970 EXTREMITY STUDY: CPT

## 2025-06-25 DIAGNOSIS — E66.9 OBESITY, UNSPECIFIED: ICD-10-CM

## 2025-06-25 DIAGNOSIS — R23.8 OTHER SKIN CHANGES: ICD-10-CM

## 2025-06-25 DIAGNOSIS — I87.2 VENOUS INSUFFICIENCY (CHRONIC) (PERIPHERAL): ICD-10-CM

## 2025-06-25 DIAGNOSIS — I83.022 VARICOSE VEINS OF LEFT LOWER EXTREMITY WITH ULCER OF CALF: ICD-10-CM

## 2025-06-25 DIAGNOSIS — L97.218 NON-PRESSURE CHRONIC ULCER OF RIGHT CALF WITH OTHER SPECIFIED SEVERITY: ICD-10-CM

## 2025-06-25 DIAGNOSIS — I89.0 LYMPHEDEMA, NOT ELSEWHERE CLASSIFIED: ICD-10-CM

## 2025-06-25 DIAGNOSIS — L97.229 NON-PRESSURE CHRONIC ULCER OF LEFT CALF WITH UNSPECIFIED SEVERITY: ICD-10-CM

## 2025-06-25 DIAGNOSIS — I83.012 VARICOSE VEINS OF RIGHT LOWER EXTREMITY WITH ULCER OF CALF: ICD-10-CM

## 2025-06-25 DIAGNOSIS — E11.628 TYPE 2 DIABETES MELLITUS WITH OTHER SKIN COMPLICATIONS: ICD-10-CM

## 2025-07-02 ENCOUNTER — NON-APPOINTMENT (OUTPATIENT)
Age: 80
End: 2025-07-02

## 2025-07-05 ENCOUNTER — NON-APPOINTMENT (OUTPATIENT)
Age: 80
End: 2025-07-05

## 2025-07-15 ENCOUNTER — APPOINTMENT (OUTPATIENT)
Dept: WOUND CARE | Facility: HOSPITAL | Age: 80
End: 2025-07-15

## 2025-07-17 RX ORDER — LIDOCAINE HCL/PF 10 MG/ML
1 VIAL (ML) INJECTION
Qty: 50 | Refills: 0 | Status: COMPLETED | COMMUNITY
Start: 2025-07-17 | End: 2025-07-22

## 2025-07-22 ENCOUNTER — APPOINTMENT (OUTPATIENT)
Dept: VASCULAR SURGERY | Facility: CLINIC | Age: 80
End: 2025-07-22
Payer: MEDICARE

## 2025-07-22 ENCOUNTER — APPOINTMENT (OUTPATIENT)
Dept: VASCULAR SURGERY | Facility: CLINIC | Age: 80
End: 2025-07-22

## 2025-07-22 PROCEDURE — 36478 ENDOVENOUS LASER 1ST VEIN: CPT | Mod: RT

## 2025-07-28 ENCOUNTER — APPOINTMENT (OUTPATIENT)
Dept: WOUND CARE | Facility: CLINIC | Age: 80
End: 2025-07-28
Payer: MEDICARE

## 2025-07-28 ENCOUNTER — OUTPATIENT (OUTPATIENT)
Dept: OUTPATIENT SERVICES | Facility: HOSPITAL | Age: 80
LOS: 1 days | End: 2025-07-28
Payer: MEDICARE

## 2025-07-28 VITALS
BODY MASS INDEX: 44.16 KG/M2 | RESPIRATION RATE: 18 BRPM | DIASTOLIC BLOOD PRESSURE: 84 MMHG | OXYGEN SATURATION: 98 % | HEART RATE: 69 BPM | WEIGHT: 240 LBS | SYSTOLIC BLOOD PRESSURE: 137 MMHG | HEIGHT: 62 IN | TEMPERATURE: 98.4 F

## 2025-07-28 DIAGNOSIS — Z96.659 PRESENCE OF UNSPECIFIED ARTIFICIAL KNEE JOINT: Chronic | ICD-10-CM

## 2025-07-28 DIAGNOSIS — N20.0 CALCULUS OF KIDNEY: Chronic | ICD-10-CM

## 2025-07-28 DIAGNOSIS — Z98.890 OTHER SPECIFIED POSTPROCEDURAL STATES: Chronic | ICD-10-CM

## 2025-07-28 PROCEDURE — 93971 EXTREMITY STUDY: CPT | Mod: RT

## 2025-07-28 PROCEDURE — 99214 OFFICE O/P EST MOD 30 MIN: CPT

## 2025-07-28 PROCEDURE — G0463: CPT

## 2025-07-28 PROCEDURE — 93971 EXTREMITY STUDY: CPT | Mod: 26,RT

## 2025-08-06 ENCOUNTER — APPOINTMENT (OUTPATIENT)
Dept: WOUND CARE | Facility: HOSPITAL | Age: 80
End: 2025-08-06
Payer: MEDICARE

## 2025-08-06 ENCOUNTER — OUTPATIENT (OUTPATIENT)
Dept: OUTPATIENT SERVICES | Facility: HOSPITAL | Age: 80
LOS: 1 days | End: 2025-08-06
Payer: MEDICARE

## 2025-08-06 DIAGNOSIS — E11.628 TYPE 2 DIABETES MELLITUS WITH OTHER SKIN COMPLICATIONS: ICD-10-CM

## 2025-08-06 DIAGNOSIS — I83.893 VARICOSE VEINS OF BILATERAL LOWER EXTREMITIES WITH OTHER COMPLICATIONS: ICD-10-CM

## 2025-08-06 DIAGNOSIS — Z96.659 PRESENCE OF UNSPECIFIED ARTIFICIAL KNEE JOINT: Chronic | ICD-10-CM

## 2025-08-06 DIAGNOSIS — Z98.890 OTHER SPECIFIED POSTPROCEDURAL STATES: Chronic | ICD-10-CM

## 2025-08-06 DIAGNOSIS — N20.0 CALCULUS OF KIDNEY: Chronic | ICD-10-CM

## 2025-08-06 PROCEDURE — G0463: CPT

## 2025-08-06 PROCEDURE — 99214 OFFICE O/P EST MOD 30 MIN: CPT

## 2025-08-13 ENCOUNTER — APPOINTMENT (OUTPATIENT)
Dept: WOUND CARE | Facility: HOSPITAL | Age: 80
End: 2025-08-13
Payer: MEDICARE

## 2025-08-13 VITALS
RESPIRATION RATE: 18 BRPM | TEMPERATURE: 98.7 F | HEART RATE: 76 BPM | DIASTOLIC BLOOD PRESSURE: 77 MMHG | SYSTOLIC BLOOD PRESSURE: 145 MMHG

## 2025-08-13 DIAGNOSIS — I83.012 VARICOSE VEINS OF RIGHT LOWER EXTREMITY WITH ULCER OF CALF: ICD-10-CM

## 2025-08-13 DIAGNOSIS — I89.0 LYMPHEDEMA, NOT ELSEWHERE CLASSIFIED: ICD-10-CM

## 2025-08-13 DIAGNOSIS — L97.229 VARICOSE VEINS OF LEFT LOWER EXTREMITY WITH ULCER OF CALF: ICD-10-CM

## 2025-08-13 DIAGNOSIS — I83.893 VARICOSE VEINS OF BILATERAL LOWER EXTREMITIES WITH OTHER COMPLICATIONS: ICD-10-CM

## 2025-08-13 DIAGNOSIS — L97.919 VARICOSE VEINS OF RIGHT LOWER EXTREMITY WITH ULCER OF UNSPECIFIED SITE: ICD-10-CM

## 2025-08-13 DIAGNOSIS — I83.022 VARICOSE VEINS OF LEFT LOWER EXTREMITY WITH ULCER OF CALF: ICD-10-CM

## 2025-08-13 DIAGNOSIS — I83.019 VARICOSE VEINS OF RIGHT LOWER EXTREMITY WITH ULCER OF UNSPECIFIED SITE: ICD-10-CM

## 2025-08-13 DIAGNOSIS — L97.219 VARICOSE VEINS OF RIGHT LOWER EXTREMITY WITH ULCER OF CALF: ICD-10-CM

## 2025-08-13 DIAGNOSIS — E11.628 TYPE 2 DIABETES MELLITUS WITH OTHER SKIN COMPLICATIONS: ICD-10-CM

## 2025-08-13 DIAGNOSIS — I83.029 VARICOSE VEINS OF RIGHT LOWER EXTREMITY WITH ULCER OF UNSPECIFIED SITE: ICD-10-CM

## 2025-08-13 DIAGNOSIS — L97.929 VARICOSE VEINS OF RIGHT LOWER EXTREMITY WITH ULCER OF UNSPECIFIED SITE: ICD-10-CM

## 2025-08-13 DIAGNOSIS — L97.219 NON-PRESSURE CHRONIC ULCER OF RIGHT CALF WITH UNSPECIFIED SEVERITY: ICD-10-CM

## 2025-08-13 PROCEDURE — 99214 OFFICE O/P EST MOD 30 MIN: CPT

## 2025-08-19 ENCOUNTER — APPOINTMENT (OUTPATIENT)
Dept: VASCULAR SURGERY | Facility: CLINIC | Age: 80
End: 2025-08-19
Payer: MEDICARE

## 2025-08-19 DIAGNOSIS — I83.893 VARICOSE VEINS OF BILATERAL LOWER EXTREMITIES WITH OTHER COMPLICATIONS: ICD-10-CM

## 2025-08-19 PROCEDURE — 36475 ENDOVENOUS RF 1ST VEIN: CPT | Mod: RT

## 2025-08-25 ENCOUNTER — OUTPATIENT (OUTPATIENT)
Dept: OUTPATIENT SERVICES | Facility: HOSPITAL | Age: 80
LOS: 1 days | End: 2025-08-25
Payer: MEDICARE

## 2025-08-25 ENCOUNTER — APPOINTMENT (OUTPATIENT)
Dept: WOUND CARE | Facility: CLINIC | Age: 80
End: 2025-08-25
Payer: MEDICARE

## 2025-08-25 DIAGNOSIS — L97.929 VARICOSE VEINS OF RIGHT LOWER EXTREMITY WITH ULCER OF UNSPECIFIED SITE: ICD-10-CM

## 2025-08-25 DIAGNOSIS — Z96.659 PRESENCE OF UNSPECIFIED ARTIFICIAL KNEE JOINT: Chronic | ICD-10-CM

## 2025-08-25 DIAGNOSIS — S81.801D UNSPECIFIED OPEN WOUND, RIGHT LOWER LEG, SUBSEQUENT ENCOUNTER: ICD-10-CM

## 2025-08-25 DIAGNOSIS — I89.0 LYMPHEDEMA, NOT ELSEWHERE CLASSIFIED: ICD-10-CM

## 2025-08-25 DIAGNOSIS — I83.019 VARICOSE VEINS OF RIGHT LOWER EXTREMITY WITH ULCER OF UNSPECIFIED SITE: ICD-10-CM

## 2025-08-25 DIAGNOSIS — N20.0 CALCULUS OF KIDNEY: Chronic | ICD-10-CM

## 2025-08-25 DIAGNOSIS — Z98.890 OTHER SPECIFIED POSTPROCEDURAL STATES: Chronic | ICD-10-CM

## 2025-08-25 DIAGNOSIS — I83.029 VARICOSE VEINS OF RIGHT LOWER EXTREMITY WITH ULCER OF UNSPECIFIED SITE: ICD-10-CM

## 2025-08-25 DIAGNOSIS — L97.919 VARICOSE VEINS OF RIGHT LOWER EXTREMITY WITH ULCER OF UNSPECIFIED SITE: ICD-10-CM

## 2025-08-25 DIAGNOSIS — S81.802D UNSPECIFIED OPEN WOUND, LEFT LOWER LEG, SUBSEQUENT ENCOUNTER: ICD-10-CM

## 2025-08-25 PROCEDURE — 93971 EXTREMITY STUDY: CPT | Mod: 26

## 2025-08-25 PROCEDURE — G0463: CPT

## 2025-08-25 PROCEDURE — 99204 OFFICE O/P NEW MOD 45 MIN: CPT

## 2025-08-25 PROCEDURE — 93971 EXTREMITY STUDY: CPT

## 2025-09-04 DIAGNOSIS — I83.019 VARICOSE VEINS OF RIGHT LOWER EXTREMITY WITH ULCER OF UNSPECIFIED SITE: ICD-10-CM

## 2025-09-04 DIAGNOSIS — S81.802D UNSPECIFIED OPEN WOUND, LEFT LOWER LEG, SUBSEQUENT ENCOUNTER: ICD-10-CM

## 2025-09-04 DIAGNOSIS — S81.801D UNSPECIFIED OPEN WOUND, RIGHT LOWER LEG, SUBSEQUENT ENCOUNTER: ICD-10-CM

## 2025-09-04 DIAGNOSIS — L97.919 NON-PRESSURE CHRONIC ULCER OF UNSPECIFIED PART OF RIGHT LOWER LEG WITH UNSPECIFIED SEVERITY: ICD-10-CM

## 2025-09-04 DIAGNOSIS — L97.929 NON-PRESSURE CHRONIC ULCER OF UNSPECIFIED PART OF LEFT LOWER LEG WITH UNSPECIFIED SEVERITY: ICD-10-CM

## 2025-09-04 DIAGNOSIS — I83.029 VARICOSE VEINS OF LEFT LOWER EXTREMITY WITH ULCER OF UNSPECIFIED SITE: ICD-10-CM

## 2025-09-04 DIAGNOSIS — I87.2 VENOUS INSUFFICIENCY (CHRONIC) (PERIPHERAL): ICD-10-CM

## 2025-09-04 DIAGNOSIS — I89.0 LYMPHEDEMA, NOT ELSEWHERE CLASSIFIED: ICD-10-CM
